# Patient Record
Sex: MALE | Race: BLACK OR AFRICAN AMERICAN | NOT HISPANIC OR LATINO | Employment: FULL TIME | URBAN - METROPOLITAN AREA
[De-identification: names, ages, dates, MRNs, and addresses within clinical notes are randomized per-mention and may not be internally consistent; named-entity substitution may affect disease eponyms.]

---

## 2018-12-05 ENCOUNTER — TRANSCRIBE ORDERS (OUTPATIENT)
Dept: LAB | Facility: CLINIC | Age: 38
End: 2018-12-05

## 2018-12-05 ENCOUNTER — APPOINTMENT (OUTPATIENT)
Dept: OCCUPATIONAL MEDICINE | Facility: CLINIC | Age: 38
End: 2018-12-05

## 2018-12-05 ENCOUNTER — TRANSCRIBE ORDERS (OUTPATIENT)
Dept: OCCUPATIONAL MEDICINE | Facility: CLINIC | Age: 38
End: 2018-12-05

## 2018-12-05 ENCOUNTER — APPOINTMENT (OUTPATIENT)
Dept: LAB | Facility: HOSPITAL | Age: 38
End: 2018-12-05

## 2018-12-05 DIAGNOSIS — Z00.8 SPECIAL EXAM: ICD-10-CM

## 2018-12-05 DIAGNOSIS — Z00.8 SPECIAL EXAM: Primary | ICD-10-CM

## 2018-12-05 LAB — RUBV IGG SERPL IA-ACNC: 5.3 IU/ML

## 2018-12-05 PROCEDURE — 86787 VARICELLA-ZOSTER ANTIBODY: CPT

## 2018-12-05 PROCEDURE — 86480 TB TEST CELL IMMUN MEASURE: CPT

## 2018-12-05 PROCEDURE — 86735 MUMPS ANTIBODY: CPT

## 2018-12-05 PROCEDURE — 36415 COLL VENOUS BLD VENIPUNCTURE: CPT

## 2018-12-05 PROCEDURE — 86762 RUBELLA ANTIBODY: CPT

## 2018-12-05 PROCEDURE — 86765 RUBEOLA ANTIBODY: CPT

## 2018-12-06 LAB
MEV IGG SER QL: NORMAL
MUV IGG SER QL: NORMAL
VZV IGG SER IA-ACNC: NORMAL

## 2018-12-07 LAB
GAMMA INTERFERON BACKGROUND BLD IA-ACNC: 0.02 IU/ML
M TB IFN-G BLD-IMP: NEGATIVE
M TB IFN-G CD4+ BCKGRND COR BLD-ACNC: -0.01 IU/ML
M TB IFN-G CD4+ BCKGRND COR BLD-ACNC: 0 IU/ML
MEV IGM SER-ACNC: <0.8 AU (ref 0–0.79)
MITOGEN IGNF BCKGRD COR BLD-ACNC: >10 IU/ML

## 2019-01-09 ENCOUNTER — OFFICE VISIT (OUTPATIENT)
Dept: URGENT CARE | Facility: MEDICAL CENTER | Age: 39
End: 2019-01-09
Payer: COMMERCIAL

## 2019-01-09 VITALS
BODY MASS INDEX: 40.32 KG/M2 | OXYGEN SATURATION: 100 % | WEIGHT: 272.2 LBS | RESPIRATION RATE: 16 BRPM | HEIGHT: 69 IN | HEART RATE: 71 BPM | TEMPERATURE: 98 F | SYSTOLIC BLOOD PRESSURE: 150 MMHG | DIASTOLIC BLOOD PRESSURE: 98 MMHG

## 2019-01-09 DIAGNOSIS — J01.00 ACUTE NON-RECURRENT MAXILLARY SINUSITIS: Primary | ICD-10-CM

## 2019-01-09 PROCEDURE — 99203 OFFICE O/P NEW LOW 30 MIN: CPT | Performed by: PHYSICIAN ASSISTANT

## 2019-01-09 RX ORDER — AMOXICILLIN AND CLAVULANATE POTASSIUM 875; 125 MG/1; MG/1
1 TABLET, FILM COATED ORAL EVERY 12 HOURS SCHEDULED
Qty: 14 TABLET | Refills: 0 | Status: SHIPPED | OUTPATIENT
Start: 2019-01-09 | End: 2019-01-16

## 2019-01-09 RX ORDER — CETIRIZINE HYDROCHLORIDE 10 MG/1
10 TABLET ORAL DAILY
Qty: 30 TABLET | Refills: 0 | Status: SHIPPED | OUTPATIENT
Start: 2019-01-09 | End: 2021-02-03 | Stop reason: ALTCHOICE

## 2019-01-09 RX ORDER — BENZONATATE 100 MG/1
100 CAPSULE ORAL 3 TIMES DAILY PRN
Qty: 15 CAPSULE | Refills: 0 | Status: SHIPPED | OUTPATIENT
Start: 2019-01-09 | End: 2019-03-19 | Stop reason: ALTCHOICE

## 2019-01-09 RX ORDER — ALBUTEROL SULFATE 90 UG/1
2 AEROSOL, METERED RESPIRATORY (INHALATION) EVERY 6 HOURS PRN
Qty: 1 INHALER | Refills: 0 | Status: SHIPPED | OUTPATIENT
Start: 2019-01-09 | End: 2020-04-03 | Stop reason: SDUPTHER

## 2019-01-09 RX ORDER — DIPHENOXYLATE HYDROCHLORIDE AND ATROPINE SULFATE 2.5; .025 MG/1; MG/1
1 TABLET ORAL DAILY
COMMUNITY

## 2019-01-09 RX ORDER — FLUTICASONE PROPIONATE 50 MCG
2 SPRAY, SUSPENSION (ML) NASAL DAILY
Qty: 16 G | Refills: 0 | Status: SHIPPED | OUTPATIENT
Start: 2019-01-09 | End: 2020-02-03 | Stop reason: ALTCHOICE

## 2019-01-09 NOTE — PROGRESS NOTES
[facility]        NAME: Ritesh Aragon is a 45 y o  female  : 1980    MRN: [id number]  DATE: 2019  TIME: 6:00 PM    Assessment and Plan   Acute non-recurrent maxillary sinusitis [J01 00]  1  Acute non-recurrent maxillary sinusitis  amoxicillin-clavulanate (AUGMENTIN) 875-125 mg per tablet    fluticasone (FLONASE) 50 mcg/act nasal spray    cetirizine (ZyrTEC) 10 mg tablet    albuterol (PROVENTIL HFA,VENTOLIN HFA) 90 mcg/act inhaler    benzonatate (TESSALON PERLES) 100 mg capsule         Patient Instructions     Take antibiotic as directed until completed  Motrin and/or Tylenol as needed for fevers and body aches and pains  Use additional medications as directed for symptomatic relief  Follow up with PCP in 3-5 days  Proceed to  ER if symptoms worsen  Chief Complaint     Chief Complaint   Patient presents with    Cold Like Symptoms     Patient here with sinus congestion, and a cough for 6 days  History of Present Illness       49-year-old female presents with week's worth of sinus congestion pain and cough  Reports he did have some fevers and chills  Denies any chest pain abdominal pain nausea vomiting or diarrhea  Has been taking Mucinex sinus for the past week without any relief  Sinusitis   This is a new problem  The current episode started 1 to 4 weeks ago  The problem has been waxing and waning since onset  Maximum temperature: Subjective  The fever has been present for 1 to 2 days  The pain is moderate  Associated symptoms include chills, congestion, coughing, headaches, sinus pressure and a sore throat  Treatments tried: mucinex sinus  The treatment provided mild relief  Review of Systems   Review of Systems   Constitutional: Positive for chills  HENT: Positive for congestion, sinus pressure and sore throat  Eyes: Negative  Respiratory: Positive for cough  Cardiovascular: Negative  Gastrointestinal: Negative  Musculoskeletal: Negative  Skin: Negative  Neurological: Positive for headaches  Current Medications       Current Outpatient Prescriptions:     multivitamin (THERAGRAN) TABS, Take 1 tablet by mouth daily, Disp: , Rfl:     albuterol (PROVENTIL HFA,VENTOLIN HFA) 90 mcg/act inhaler, Inhale 2 puffs every 6 (six) hours as needed for wheezing, Disp: 1 Inhaler, Rfl: 0    amoxicillin-clavulanate (AUGMENTIN) 875-125 mg per tablet, Take 1 tablet by mouth every 12 (twelve) hours for 7 days, Disp: 14 tablet, Rfl: 0    benzonatate (TESSALON PERLES) 100 mg capsule, Take 1 capsule (100 mg total) by mouth 3 (three) times a day as needed for cough, Disp: 15 capsule, Rfl: 0    cetirizine (ZyrTEC) 10 mg tablet, Take 1 tablet (10 mg total) by mouth daily, Disp: 30 tablet, Rfl: 0    fluticasone (FLONASE) 50 mcg/act nasal spray, 2 sprays into each nostril daily, Disp: 16 g, Rfl: 0    Current Allergies     Allergies as of 01/09/2019 - Reviewed 01/09/2019   Allergen Reaction Noted    Contrast dye [iodinated diagnostic agents] Anaphylaxis 01/09/2019            The following portions of the patient's history were reviewed and updated as appropriate: allergies, current medications, past family history, past medical history, past social history, past surgical history and problem list      Past Medical History:   Diagnosis Date    Asthma        Past Surgical History:   Procedure Laterality Date    GASTRIC BYPASS         No family history on file  Medications have been verified  Objective   /98 (BP Location: Left arm, Patient Position: Sitting, Cuff Size: Large)   Pulse 71   Temp 98 °F (36 7 °C) (Tympanic)   Resp 16   Ht 5' 9" (1 753 m)   Wt 123 kg (272 lb 3 2 oz)   SpO2 100%   BMI 40 20 kg/m²        Physical Exam     Physical Exam   Constitutional: She is oriented to person, place, and time  She appears well-developed and well-nourished  No distress  HENT:   Head: Normocephalic and atraumatic     Right Ear: External ear normal    Left Ear: External ear normal    Nose: Right sinus exhibits maxillary sinus tenderness  Left sinus exhibits maxillary sinus tenderness  Mouth/Throat: Oropharynx is clear and moist  No oropharyngeal exudate  Eyes: Conjunctivae are normal  Right eye exhibits no discharge  Left eye exhibits no discharge  Neck: Normal range of motion  Neck supple  Cardiovascular: Normal rate, regular rhythm, normal heart sounds and intact distal pulses  No murmur heard  Pulmonary/Chest: Effort normal and breath sounds normal  No respiratory distress  She has no wheezes  She has no rales  Abdominal: Soft  Bowel sounds are normal  There is no tenderness  Musculoskeletal: Normal range of motion  Lymphadenopathy:     She has no cervical adenopathy  Neurological: She is alert and oriented to person, place, and time  Skin: Skin is warm and dry  Psychiatric: She has a normal mood and affect  Nursing note and vitals reviewed

## 2019-01-09 NOTE — PATIENT INSTRUCTIONS
Take antibiotic as directed until completed  Motrin and/or Tylenol as needed for fevers and body aches and pains  Use additional medications as directed for symptomatic relief  Follow up with PCP in 3-5 days  Proceed to  ER if symptoms worsen  Sinusitis   AMBULATORY CARE:   Sinusitis  is inflammation or infection of your sinuses  It is most often caused by a virus  Acute sinusitis may last up to 12 weeks  Chronic sinusitis lasts longer than 12 weeks  Recurrent sinusitis means you have 4 or more times in 1 year  Common symptoms include the following:   · Fever    · Pain, pressure, redness, or swelling around the forehead, cheeks, or eyes    · Thick yellow or green discharge from your nose    · Tenderness when you touch your face over your sinuses    · Dry cough that happens mostly at night or when you lie down    · Headache and face pain that is worse when you lean forward    · Tooth pain, or pain when you chew  Seek care immediately if:   · Your eye and eyelid are red, swollen, and painful  · You cannot open your eye  · You have vision changes, such as double vision  · Your eyeball bulges out or you cannot move your eye  · You are more sleepy than normal, or you notice changes in your ability to think, move, or talk  · You have a stiff neck, a fever, or a bad headache  · You have swelling of your forehead or scalp  Contact your healthcare provider if:   · Your symptoms do not improve after 3 days  · Your symptoms do not go away after 10 days  · You have nausea and are vomiting  · Your nose is bleeding  · You have questions or concerns about your condition or care  Treatment for sinusitis:  Your symptoms may go away on their own  Your healthcare provider may recommend watchful waiting for up to 10 days before starting antibiotics  You may  need any of the following:  · Acetaminophen  decreases pain and fever  It is available without a doctor's order   Ask how much to take and how often to take it  Follow directions  Read the labels of all other medicines you are using to see if they also contain acetaminophen, or ask your doctor or pharmacist  Acetaminophen can cause liver damage if not taken correctly  Do not use more than 4 grams (4,000 milligrams) total of acetaminophen in one day  · NSAIDs , such as ibuprofen, help decrease swelling, pain, and fever  This medicine is available with or without a doctor's order  NSAIDs can cause stomach bleeding or kidney problems in certain people  If you take blood thinner medicine, always ask your healthcare provider if NSAIDs are safe for you  Always read the medicine label and follow directions  · Nasal steroid sprays  may help decrease inflammation in your nose and sinuses  · Decongestants  help reduce swelling and drain mucus in the nose and sinuses  They may help you breathe easier  · Antihistamines  help dry mucus in the nose and relieve sneezing  · Antibiotics  help treat or prevent a bacterial infection  · Take your medicine as directed  Contact your healthcare provider if you think your medicine is not helping or if you have side effects  Tell him or her if you are allergic to any medicine  Keep a list of the medicines, vitamins, and herbs you take  Include the amounts, and when and why you take them  Bring the list or the pill bottles to follow-up visits  Carry your medicine list with you in case of an emergency  Self-care:   · Rinse your sinuses  Use a sinus rinse device to rinse your nasal passages with a saline (salt water) solution or distilled water  Do not use tap water  This will help thin the mucus in your nose and rinse away pollen and dirt  It will also help reduce swelling so you can breathe normally  Ask your healthcare provider how often to do this  · Breathe in steam   Heat a bowl of water until you see steam  Lean over the bowl and make a tent over your head with a large towel   Breathe deeply for about 20 minutes  Be careful not to get too close to the steam or burn yourself  Do this 3 times a day  You can also breathe deeply when you take a hot shower  · Sleep with your head elevated  Place an extra pillow under your head before you go to sleep to help your sinuses drain  · Drink liquids as directed  Ask your healthcare provider how much liquid to drink each day and which liquids are best for you  Liquids will thin the mucus in your nose and help it drain  Avoid drinks that contain alcohol or caffeine  · Do not smoke, and avoid secondhand smoke  Nicotine and other chemicals in cigarettes and cigars can make your symptoms worse  Ask your healthcare provider for information if you currently smoke and need help to quit  E-cigarettes or smokeless tobacco still contain nicotine  Talk to your healthcare provider before you use these products  Prevent the spread of germs that cause sinusitis:  Wash your hands often with soap and water  Wash your hands after you use the bathroom, change a child's diaper, or sneeze  Wash your hands before you prepare or eat food  Follow up with your healthcare provider as directed: You may be referred to an ear, nose, and throat specialist  Write down your questions so you remember to ask them during your visits  © 2017 [address] [facility] Information is for End User's use only and may not be sold, redistributed or otherwise used for commercial purposes  All illustrations and images included in CareNotes® are the copyrighted property of [employer]  or Bertram Chandra  The above information is an  only  It is not intended as medical advice for individual conditions or treatments  Talk to your doctor, nurse or pharmacist before following any medical regimen to see if it is safe and effective for you

## 2019-03-06 ENCOUNTER — HOSPITAL ENCOUNTER (EMERGENCY)
Facility: HOSPITAL | Age: 39
Discharge: HOME | End: 2019-03-06
Attending: EMERGENCY MEDICINE | Admitting: EMERGENCY MEDICINE
Payer: COMMERCIAL

## 2019-03-06 ENCOUNTER — APPOINTMENT (EMERGENCY)
Dept: RADIOLOGY | Facility: HOSPITAL | Age: 39
End: 2019-03-06
Payer: COMMERCIAL

## 2019-03-06 VITALS
TEMPERATURE: 98.1 F | DIASTOLIC BLOOD PRESSURE: 70 MMHG | SYSTOLIC BLOOD PRESSURE: 139 MMHG | HEART RATE: 90 BPM | RESPIRATION RATE: 18 BRPM | OXYGEN SATURATION: 97 % | WEIGHT: 264.55 LBS | BODY MASS INDEX: 39.07 KG/M2

## 2019-03-06 DIAGNOSIS — K52.9 GASTROENTERITIS: Primary | ICD-10-CM

## 2019-03-06 LAB
ALBUMIN SERPL BCP-MCNC: 3.8 G/DL (ref 3.5–5)
ALP SERPL-CCNC: 67 U/L (ref 46–116)
ALT SERPL W P-5'-P-CCNC: 15 U/L (ref 12–78)
ANION GAP SERPL CALCULATED.3IONS-SCNC: 9 MMOL/L (ref 4–13)
AST SERPL W P-5'-P-CCNC: 21 U/L (ref 5–45)
BASOPHILS # BLD AUTO: 0.01 THOUSANDS/ΜL (ref 0–0.1)
BASOPHILS NFR BLD AUTO: 0 % (ref 0–1)
BILIRUB SERPL-MCNC: 0.7 MG/DL (ref 0.2–1)
BUN SERPL-MCNC: 11 MG/DL (ref 5–25)
CALCIUM SERPL-MCNC: 9 MG/DL (ref 8.3–10.1)
CHLORIDE SERPL-SCNC: 104 MMOL/L (ref 100–108)
CO2 SERPL-SCNC: 25 MMOL/L (ref 21–32)
CREAT SERPL-MCNC: 0.96 MG/DL (ref 0.6–1.3)
EOSINOPHIL # BLD AUTO: 0.09 THOUSAND/ΜL (ref 0–0.61)
EOSINOPHIL NFR BLD AUTO: 1 % (ref 0–6)
ERYTHROCYTE [DISTWIDTH] IN BLOOD BY AUTOMATED COUNT: 13.1 % (ref 11.6–15.1)
GLUCOSE SERPL-MCNC: 95 MG/DL (ref 65–140)
HCT VFR BLD AUTO: 50.9 % (ref 36.5–46.1)
HGB BLD-MCNC: 16 G/DL (ref 12–15.4)
IMM GRANULOCYTES # BLD AUTO: 0.03 THOUSAND/UL (ref 0–0.2)
IMM GRANULOCYTES NFR BLD AUTO: 0 % (ref 0–2)
LIPASE SERPL-CCNC: 160 U/L (ref 73–393)
LYMPHOCYTES # BLD AUTO: 0.29 THOUSANDS/ΜL (ref 0.6–4.47)
LYMPHOCYTES NFR BLD AUTO: 2 % (ref 14–44)
MCH RBC QN AUTO: 29.2 PG (ref 26.8–34.3)
MCHC RBC AUTO-ENTMCNC: 31.4 G/DL (ref 31.4–37.4)
MCV RBC AUTO: 93 FL (ref 82–98)
MONOCYTES # BLD AUTO: 0.28 THOUSAND/ΜL (ref 0.17–1.22)
MONOCYTES NFR BLD AUTO: 2 % (ref 4–12)
NEUTROPHILS # BLD AUTO: 11.65 THOUSANDS/ΜL (ref 1.85–7.62)
NEUTS SEG NFR BLD AUTO: 95 % (ref 43–75)
NRBC BLD AUTO-RTO: 0 /100 WBCS
PLATELET # BLD AUTO: 254 THOUSANDS/UL (ref 149–390)
PMV BLD AUTO: 9.5 FL (ref 8.9–12.7)
POTASSIUM SERPL-SCNC: 4 MMOL/L (ref 3.5–5.3)
PROT SERPL-MCNC: 8.1 G/DL (ref 6.4–8.2)
RBC # BLD AUTO: 5.48 MILLION/UL (ref 3.88–5.12)
SODIUM SERPL-SCNC: 138 MMOL/L (ref 136–145)
TROPONIN I SERPL-MCNC: <0.02 NG/ML
WBC # BLD AUTO: 12.35 THOUSAND/UL (ref 4.31–10.16)

## 2019-03-06 PROCEDURE — 36415 COLL VENOUS BLD VENIPUNCTURE: CPT

## 2019-03-06 PROCEDURE — 80053 COMPREHEN METABOLIC PANEL: CPT | Performed by: EMERGENCY MEDICINE

## 2019-03-06 PROCEDURE — 93005 ELECTROCARDIOGRAM TRACING: CPT

## 2019-03-06 PROCEDURE — 85025 COMPLETE CBC W/AUTO DIFF WBC: CPT | Performed by: EMERGENCY MEDICINE

## 2019-03-06 PROCEDURE — 84484 ASSAY OF TROPONIN QUANT: CPT | Performed by: EMERGENCY MEDICINE

## 2019-03-06 PROCEDURE — 99285 EMERGENCY DEPT VISIT HI MDM: CPT

## 2019-03-06 PROCEDURE — 83690 ASSAY OF LIPASE: CPT | Performed by: EMERGENCY MEDICINE

## 2019-03-06 PROCEDURE — 96361 HYDRATE IV INFUSION ADD-ON: CPT

## 2019-03-06 PROCEDURE — 71046 X-RAY EXAM CHEST 2 VIEWS: CPT

## 2019-03-06 PROCEDURE — 96360 HYDRATION IV INFUSION INIT: CPT

## 2019-03-06 RX ORDER — MAGNESIUM HYDROXIDE/ALUMINUM HYDROXICE/SIMETHICONE 120; 1200; 1200 MG/30ML; MG/30ML; MG/30ML
30 SUSPENSION ORAL ONCE
Status: COMPLETED | OUTPATIENT
Start: 2019-03-06 | End: 2019-03-06

## 2019-03-06 RX ORDER — DICYCLOMINE HCL 20 MG
20 TABLET ORAL ONCE
Status: COMPLETED | OUTPATIENT
Start: 2019-03-06 | End: 2019-03-06

## 2019-03-06 RX ORDER — ONDANSETRON 2 MG/ML
4 INJECTION INTRAMUSCULAR; INTRAVENOUS ONCE
Status: DISCONTINUED | OUTPATIENT
Start: 2019-03-06 | End: 2019-03-06

## 2019-03-06 RX ORDER — ONDANSETRON 4 MG/1
4 TABLET, ORALLY DISINTEGRATING ORAL EVERY 8 HOURS PRN
Qty: 15 TABLET | Refills: 0 | Status: SHIPPED | OUTPATIENT
Start: 2019-03-06 | End: 2019-10-17 | Stop reason: SDUPTHER

## 2019-03-06 RX ORDER — ONDANSETRON 4 MG/1
4 TABLET, ORALLY DISINTEGRATING ORAL ONCE
Status: COMPLETED | OUTPATIENT
Start: 2019-03-06 | End: 2019-03-06

## 2019-03-06 RX ADMIN — DICYCLOMINE HYDROCHLORIDE 20 MG: 20 TABLET ORAL at 16:28

## 2019-03-06 RX ADMIN — ALUMINUM HYDROXIDE, MAGNESIUM HYDROXIDE, AND SIMETHICONE 30 ML: 200; 200; 20 SUSPENSION ORAL at 18:06

## 2019-03-06 RX ADMIN — ONDANSETRON 4 MG: 4 TABLET, ORALLY DISINTEGRATING ORAL at 17:13

## 2019-03-06 RX ADMIN — LIDOCAINE HYDROCHLORIDE 15 ML: 20 SOLUTION ORAL; TOPICAL at 18:07

## 2019-03-06 RX ADMIN — SODIUM CHLORIDE 1000 ML: 0.9 INJECTION, SOLUTION INTRAVENOUS at 18:04

## 2019-03-06 NOTE — ED PROVIDER NOTES
History  Chief Complaint   Patient presents with    Chest Pain     Pt reports chest tightness for 1 hr  She also c/o abd pain     Patient is a 28-year-old transgender female with a history of gastric bypass who presents with abdominal and chest pain  Patient states she woke up this morning around 3:30 a m  with epigastric pain  She admits to associated nausea and diarrhea  She has had decreased PO intake today due to nausea  Her symptoms persisted throughout the morning and afternoon  This afternoon she developed pain radiating into her central chest   At that point, she decided to come to the emergency department for further evaluation  She denies fever, chills, shortness of breath, diaphoresis or other complaints  She continues to have nausea and diarrhea  She admits to sick contacts at home  Her grandmother had similar symptoms several days ago  Patient had sleeve gastrectomy and has had no complications as a result of the surgery  However she states that she drinks mainly diet sodas because she has difficulty tolerating water  History provided by:  Patient  Chest Pain   Pain location:  Substernal area and epigastric  Pain quality: aching and burning    Pain radiates to:  Does not radiate  Pain radiates to the back: no    Pain severity:  Mild  Duration:  1 day  Timing:  Constant  Progression:  Unchanged  Chronicity:  New  Ineffective treatments:  None tried  Associated symptoms: abdominal pain, heartburn and nausea    Associated symptoms: no back pain, no cough, no diaphoresis, no dizziness, no dysphagia, no fever, no headache, no palpitations, no shortness of breath and not vomiting        Prior to Admission Medications   Prescriptions Last Dose Informant Patient Reported? Taking?    albuterol (PROVENTIL HFA,VENTOLIN HFA) 90 mcg/act inhaler Past Week at Unknown time  No Yes   Sig: Inhale 2 puffs every 6 (six) hours as needed for wheezing   benzonatate (TESSALON PERLES) 100 mg capsule Past Month at Unknown time  No Yes   Sig: Take 1 capsule (100 mg total) by mouth 3 (three) times a day as needed for cough   cetirizine (ZyrTEC) 10 mg tablet Past Month at Unknown time  No Yes   Sig: Take 1 tablet (10 mg total) by mouth daily   fluticasone (FLONASE) 50 mcg/act nasal spray Past Month at Unknown time  No Yes   Si sprays into each nostril daily   multivitamin (THERAGRAN) TABS 3/6/2019 at Unknown time Self Yes Yes   Sig: Take 1 tablet by mouth daily      Facility-Administered Medications: None       Past Medical History:   Diagnosis Date    Asthma        Past Surgical History:   Procedure Laterality Date    GASTRIC BYPASS         History reviewed  No pertinent family history  I have reviewed and agree with the history as documented  Social History     Tobacco Use    Smoking status: Never Smoker    Smokeless tobacco: Never Used   Substance Use Topics    Alcohol use: Yes     Comment: socially    Drug use: Not on file        Review of Systems   Constitutional: Negative for chills, diaphoresis and fever  HENT: Negative for nosebleeds, sore throat and trouble swallowing  Eyes: Negative for photophobia, pain and visual disturbance  Respiratory: Negative for cough, chest tightness and shortness of breath  Cardiovascular: Positive for chest pain  Negative for palpitations and leg swelling  Gastrointestinal: Positive for abdominal pain, heartburn and nausea  Negative for constipation, diarrhea and vomiting  Endocrine: Negative for polydipsia and polyuria  Genitourinary: Negative for difficulty urinating, dysuria and hematuria  Musculoskeletal: Negative for back pain, neck pain and neck stiffness  Skin: Negative for pallor and rash  Neurological: Negative for dizziness, syncope, light-headedness and headaches  All other systems reviewed and are negative  Physical Exam  Physical Exam   Constitutional: She is oriented to person, place, and time   She appears well-developed and well-nourished  No distress  HENT:   Head: Normocephalic and atraumatic  Mouth/Throat: Oropharynx is clear and moist and mucous membranes are normal    Eyes: Pupils are equal, round, and reactive to light  EOM are normal    Neck: Normal range of motion  Neck supple  Cardiovascular: Normal rate, regular rhythm, normal heart sounds, intact distal pulses and normal pulses  Pulmonary/Chest: Effort normal and breath sounds normal  No respiratory distress  Abdominal: Soft  She exhibits no distension  There is tenderness in the epigastric area, periumbilical area and suprapubic area  There is no rigidity, no rebound and no guarding  Musculoskeletal: Normal range of motion  She exhibits no edema or tenderness  Lymphadenopathy:     She has no cervical adenopathy  Neurological: She is alert and oriented to person, place, and time  She has normal strength  No cranial nerve deficit or sensory deficit  Skin: Skin is warm and dry  Capillary refill takes less than 2 seconds  Psychiatric: She has a normal mood and affect  Nursing note and vitals reviewed        Vital Signs  ED Triage Vitals   Temperature Pulse Respirations Blood Pressure SpO2   03/06/19 1336 03/06/19 1336 03/06/19 1336 03/06/19 1340 03/06/19 1336   98 1 °F (36 7 °C) 78 18 167/80 96 %      Temp Source Heart Rate Source Patient Position - Orthostatic VS BP Location FiO2 (%)   03/06/19 1336 03/06/19 1336 03/06/19 1340 03/06/19 1340 --   Oral Monitor Sitting Left arm       Pain Score       03/06/19 1336       8           Vitals:    03/06/19 1340 03/06/19 1619 03/06/19 1838 03/06/19 1845   BP: 167/80 162/94 139/70 139/70   Pulse:  75 91 90   Patient Position - Orthostatic VS: Sitting Lying Lying        Visual Acuity      ED Medications  Medications   dicyclomine (BENTYL) tablet 20 mg (20 mg Oral Given 3/6/19 1628)   sodium chloride 0 9 % bolus 1,000 mL (0 mL Intravenous Stopped 3/6/19 1954)   ondansetron (ZOFRAN-ODT) dispersible tablet 4 mg (4 mg Oral Given 3/6/19 1713)   aluminum-magnesium hydroxide-simethicone (MYLANTA) 200-200-20 mg/5 mL oral suspension 30 mL (30 mL Oral Given 3/6/19 1806)   lidocaine viscous (XYLOCAINE) 2 % mucosal solution 15 mL (15 mL Swish & Spit Given 3/6/19 1807)       Diagnostic Studies  Results Reviewed     Procedure Component Value Units Date/Time    Lipase [[id number]]  (Normal) Collected:  03/06/19 1412    Lab Status:  Final result Specimen:  Blood from Arm, Right Updated:  03/06/19 1633     Lipase 160 u/L     CBC and differential [[id number]]  (Abnormal) Collected:  03/06/19 1412    Lab Status:  Final result Specimen:  Blood from Arm, Right Updated:  03/06/19 1444     WBC 12 35 Thousand/uL      RBC 5 48 Million/uL      Hemoglobin 16 0 g/dL      Hematocrit 50 9 %      MCV 93 fL      MCH 29 2 pg      MCHC 31 4 g/dL      RDW 13 1 %      MPV 9 5 fL      Platelets 480 Thousands/uL      nRBC 0 /100 WBCs      Neutrophils Relative 95 %      Immat GRANS % 0 %      Lymphocytes Relative 2 %      Monocytes Relative 2 %      Eosinophils Relative 1 %      Basophils Relative 0 %      Neutrophils Absolute 11 65 Thousands/µL      Immature Grans Absolute 0 03 Thousand/uL      Lymphocytes Absolute 0 29 Thousands/µL      Monocytes Absolute 0 28 Thousand/µL      Eosinophils Absolute 0 09 Thousand/µL      Basophils Absolute 0 01 Thousands/µL     Narrative: This is an appended report  These results have been appended to a previously verified report      Troponin I [[id number]]  (Normal) Collected:  03/06/19 1412    Lab Status:  Final result Specimen:  Blood from Arm, Right Updated:  03/06/19 1436     Troponin I <0 02 ng/mL     Comprehensive metabolic panel [[id number]] Collected:  03/06/19 1412    Lab Status:  Final result Specimen:  Blood from Arm, Right Updated:  03/06/19 1435     Sodium 138 mmol/L      Potassium 4 0 mmol/L      Chloride 104 mmol/L      CO2 25 mmol/L      ANION GAP 9 mmol/L      BUN 11 mg/dL      Creatinine 0 96 mg/dL Glucose 95 mg/dL      Calcium 9 0 mg/dL      AST 21 U/L      ALT 15 U/L      Alkaline Phosphatase 67 U/L      Total Protein 8 1 g/dL      Albumin 3 8 g/dL      Total Bilirubin 0 70 mg/dL      eGFR -- ml/min/1 73sq m     Narrative:       Notes:   1  eGFR calculation is only valid for adults 18 years and older  2  EGFR calculation cannot be performed for patients who are transgender, non-binary, or whose legal sex, sex at birth, and gender identity differ  XR chest 2 views   ED Interpretation by Sonja Lynch DO (03/06 2103)   No acute process  Final Result by Zulema Conley MD (03/06 1814)      No acute cardiopulmonary disease  Workstation performed: [id number]                    Procedures  ECG 12 Lead Documentation  Date/Time: 3/6/2019 3:55 PM  Performed by: Sonja Lynch DO  Authorized by: Sonja Lynch DO     ECG reviewed by me, the ED Provider: yes    Patient location:  ED  Previous ECG:     Previous ECG:  Unavailable    Comparison to cardiac monitor: Yes    Comments:      Normal sinus rhythm at a rate of 81 beats per minute  Normal intervals  Normal axis  Normal QRS  No ST T wave abnormalities  Occasional PACs  No old for comparison  Phone Contacts  ED Phone Contact    ED Course  ED Course as of Mar 07 1324   Wed Mar 06, 2019   1603 CBC appears heme concentrated  Will give IV fluids  1738 Patient with continued epigastric pain  Will give GI cocktail  1909 Patient tolerating PO  No vomiting in the emergency department  Complains of abdominal cramping but abdominal exam is not concerning for acute surgical pathology  MDM  Number of Diagnoses or Management Options  Gastroenteritis: new and requires workup  Diagnosis management comments: Patient presents with epigastric pain, nausea and diarrhea    She also states that epigastric pain radiates into her central chest   EKG shows no evidence of ischemia and troponin is negative  I suspect chest pain is of a GI etiology given her other symptoms  She admits to sick contact with similar GI symptoms  Patient was treated in the emergency department with IV fluids and antiemetics  She had significant improvement in nausea and is now tolerating PO  Abdominal exam is benign and I do not feel that further imaging is indicated  Do not suspect acute surgical process including but not limited to acute cholecystitis, acute appendicitis, SBO, mesenteric ischemia, AAA, vascular dissection, vascular occlusion, perforated viscus, testicular torsion  Do not suspect intrathoracic cause of abdominal pain  Symptoms improved and patient is tolerating po  Patient advised to return to ED if symptoms worsen or persist  Patient also advised to follow up with PCP  Amount and/or Complexity of Data Reviewed  Clinical lab tests: ordered and reviewed  Tests in the radiology section of CPT®: ordered and reviewed  Tests in the medicine section of CPT®: ordered and reviewed  Review and summarize past medical records: yes  Independent visualization of images, tracings, or specimens: yes    Risk of Complications, Morbidity, and/or Mortality  Presenting problems: high  Diagnostic procedures: moderate  Management options: moderate    Patient Progress  Patient progress: improved      Disposition  Final diagnoses:   Gastroenteritis     Time reflects when diagnosis was documented in both MDM as applicable and the Disposition within this note     Time User Action Codes Description Comment    3/6/2019  6:55 PM Carolina Cooper Add [K52 9] Gastroenteritis       ED Disposition     ED Disposition Condition Date/Time Comment    Discharge Stable Wed Mar 6, 2019  6:55 PM Beth Mata  discharge to home/self care              Follow-up Information     Follow up With Specialties Details Why Aletha Sutherland MD Internal Medicine Schedule an appointment as soon as possible for a visit [id number]  [phone]            Discharge Medication List as of 3/6/2019  6:56 PM      START taking these medications    Details   ondansetron (ZOFRAN-ODT) 4 mg disintegrating tablet Take 1 tablet (4 mg total) by mouth every 8 (eight) hours as needed for nausea or vomiting, Starting Wed 3/6/2019, Normal         CONTINUE these medications which have NOT CHANGED    Details   albuterol (PROVENTIL HFA,VENTOLIN HFA) 90 mcg/act inhaler Inhale 2 puffs every 6 (six) hours as needed for wheezing, Starting Wed 1/9/2019, Print      benzonatate (TESSALON PERLES) 100 mg capsule Take 1 capsule (100 mg total) by mouth 3 (three) times a day as needed for cough, Starting Wed 1/9/2019, Print      cetirizine (ZyrTEC) 10 mg tablet Take 1 tablet (10 mg total) by mouth daily, Starting Wed 1/9/2019, Print      fluticasone (FLONASE) 50 mcg/act nasal spray 2 sprays into each nostril daily, Starting Wed 1/9/2019, Print      multivitamin (THERAGRAN) TABS Take 1 tablet by mouth daily, Historical Med           No discharge procedures on file      ED Provider  Electronically Signed by           Laury Sanchez DO  03/07/19 9139

## 2019-03-07 LAB
ATRIAL RATE: 86 BPM
P AXIS: 63 DEGREES
PR INTERVAL: 172 MS
QRS AXIS: 5 DEGREES
QRSD INTERVAL: 88 MS
QT INTERVAL: 358 MS
QTC INTERVAL: 416 MS
T WAVE AXIS: 57 DEGREES
VENTRICULAR RATE: 81 BPM

## 2019-03-07 PROCEDURE — 93010 ELECTROCARDIOGRAM REPORT: CPT | Performed by: INTERNAL MEDICINE

## 2019-03-19 ENCOUNTER — OFFICE VISIT (OUTPATIENT)
Dept: INTERNAL MEDICINE CLINIC | Facility: CLINIC | Age: 39
End: 2019-03-19
Payer: COMMERCIAL

## 2019-03-19 VITALS
HEART RATE: 68 BPM | SYSTOLIC BLOOD PRESSURE: 142 MMHG | TEMPERATURE: 98.5 F | WEIGHT: 283.4 LBS | BODY MASS INDEX: 41.98 KG/M2 | HEIGHT: 69 IN | DIASTOLIC BLOOD PRESSURE: 90 MMHG | OXYGEN SATURATION: 97 %

## 2019-03-19 DIAGNOSIS — Z78.9 MALE-TO-FEMALE TRANSGENDER PERSON: ICD-10-CM

## 2019-03-19 DIAGNOSIS — I10 ACCELERATED HYPERTENSION: ICD-10-CM

## 2019-03-19 DIAGNOSIS — Z98.84 S/P GASTRIC BYPASS: Primary | ICD-10-CM

## 2019-03-19 DIAGNOSIS — M50.90 CERVICAL DISC DISEASE: ICD-10-CM

## 2019-03-19 DIAGNOSIS — F41.9 ANXIETY: ICD-10-CM

## 2019-03-19 PROCEDURE — 99204 OFFICE O/P NEW MOD 45 MIN: CPT | Performed by: INTERNAL MEDICINE

## 2019-03-19 PROCEDURE — 1036F TOBACCO NON-USER: CPT | Performed by: INTERNAL MEDICINE

## 2019-03-19 PROCEDURE — 3008F BODY MASS INDEX DOCD: CPT | Performed by: INTERNAL MEDICINE

## 2019-03-19 RX ORDER — AMLODIPINE BESYLATE 5 MG/1
5 TABLET ORAL DAILY
Qty: 30 TABLET | Refills: 2 | Status: SHIPPED | OUTPATIENT
Start: 2019-03-19 | End: 2019-09-27 | Stop reason: SDUPTHER

## 2019-03-19 RX ORDER — ESTRADIOL 2 MG/1
1 TABLET ORAL 2 TIMES DAILY
COMMUNITY
End: 2019-03-19 | Stop reason: SDUPTHER

## 2019-03-19 RX ORDER — LORAZEPAM 1 MG/1
1 TABLET ORAL AS NEEDED
COMMUNITY
End: 2019-03-19 | Stop reason: SDUPTHER

## 2019-03-19 RX ORDER — LORAZEPAM 1 MG/1
1 TABLET ORAL AS NEEDED
Qty: 30 TABLET | Refills: 1 | Status: SHIPPED | OUTPATIENT
Start: 2019-03-19 | End: 2020-02-03 | Stop reason: SDUPTHER

## 2019-03-19 RX ORDER — SPIRONOLACTONE 100 MG/1
100 TABLET, FILM COATED ORAL 2 TIMES DAILY
Qty: 60 TABLET | Refills: 3 | Status: SHIPPED | OUTPATIENT
Start: 2019-03-19 | End: 2020-03-10 | Stop reason: HOSPADM

## 2019-03-19 RX ORDER — SPIRONOLACTONE 100 MG/1
100 TABLET, FILM COATED ORAL 2 TIMES DAILY
COMMUNITY
End: 2019-03-19 | Stop reason: SDUPTHER

## 2019-03-19 RX ORDER — ESTRADIOL 2 MG/1
1 TABLET ORAL 2 TIMES DAILY
Qty: 60 TABLET | Refills: 2 | Status: SHIPPED | OUTPATIENT
Start: 2019-03-19 | End: 2020-02-03 | Stop reason: SDUPTHER

## 2019-03-19 NOTE — PROGRESS NOTES
Assessment/Plan:    S/P gastric bypass  Will need some routine follow-up regarding gastric bypass surgery  Patient would like to be evaluated by our Bariatric surgery Department  Accelerated hypertension  Blood pressure significantly elevated initially improved in the course of the office visit to 142/90  We will initiate amlodipine 5 mg daily obtain some basic blood work in see the patient back in 4 weeks    Cervical disc disease  Currently stable not requiring any additional management at this time    Male-to-female transgender person  Continue estradiol 2 mg daily along with spironolactone 200 mg daily    Anxiety  Lorazepam was renewed       Diagnoses and all orders for this visit:    S/P gastric bypass  -     LORazepam (ATIVAN) 1 mg tablet; Take 1 tablet (1 mg total) by mouth as needed for anxiety for up to 60 days    Accelerated hypertension  -     spironolactone (ALDACTONE) 100 mg tablet; Take 1 tablet (100 mg total) by mouth 2 (two) times a day  -     amLODIPine (NORVASC) 5 mg tablet; Take 1 tablet (5 mg total) by mouth daily for 90 days  -     Basic metabolic panel; Future  -     CBC and Platelet; Future    Cervical disc disease  -     LORazepam (ATIVAN) 1 mg tablet; Take 1 tablet (1 mg total) by mouth as needed for anxiety for up to 60 days    Male-to-female transgender person  -     spironolactone (ALDACTONE) 100 mg tablet; Take 1 tablet (100 mg total) by mouth 2 (two) times a day  -     estradiol (ESTRACE) 2 MG tablet; Take 0 5 tablets (1 mg total) by mouth 2 (two) times a day for 90 days    Anxiety    Other orders  -     Discontinue: LORazepam (ATIVAN) 1 mg tablet; Take 1 mg by mouth as needed for anxiety  -     Discontinue: estradiol (ESTRACE) 2 MG tablet; Take 1 mg by mouth 2 (two) times a day  -     Discontinue: spironolactone (ALDACTONE) 100 mg tablet; Take 100 mg by mouth 2 (two) times a day          Subjective:      Patient ID: Greg Trotter  is a 44 y o  adult      The patient is 51-year-old transgender female, preoperative is status post gastric bypass surgery 2 years ago for morbid obesity of greater than 500 lb  She has been treated with hormone therapy since early 2000s in anticipation of eventual trans gender surgery  Patient presents to establish medical care  Medications were reviewed  The patient is currently undergoing care in [place] and would like to transition her hormonal management to this area  She does have a history of hypertension in the past along with anxiety and pseudoseizures that occur when her anxiety is out of control  She has been stable for quite some time  She had been treated previously with some antihypertensive medications but all of her medications were discontinued following successful gastric bypass surgery  Patient recently relocated from [place]  No labs have been done recently        Family History   Problem Relation Age of Onset    Hypertension Mother     Diabetes Mother     Hypertension Sister      Social History     Socioeconomic History    Marital status: Single     Spouse name: Not on file    Number of children: Not on file    Years of education: Not on file    Highest education level: Not on file   Occupational History    Not on file   Social Needs    Financial resource strain: Not on file    Food insecurity:     Worry: Not on file     Inability: Not on file    Transportation needs:     Medical: Not on file     Non-medical: Not on file   Tobacco Use    Smoking status: Never Smoker    Smokeless tobacco: Never Used   Substance and Sexual Activity    Alcohol use: Yes     Comment: socially    Drug use: Never    Sexual activity: Not on file   Lifestyle    Physical activity:     Days per week: Not on file     Minutes per session: Not on file    Stress: Not on file   Relationships    Social connections:     Talks on phone: Not on file     Gets together: Not on file     Attends Anglican service: Not on file Active member of club or organization: Not on file     Attends meetings of clubs or organizations: Not on file     Relationship status: Not on file    Intimate partner violence:     Fear of current or ex partner: Not on file     Emotionally abused: Not on file     Physically abused: Not on file     Forced sexual activity: Not on file   Other Topics Concern    Not on file   Social History Narrative    Not on file     Past Medical History:   Diagnosis Date    Anxiety     Asthma     seasonal       Current Outpatient Medications:     albuterol (PROVENTIL HFA,VENTOLIN HFA) 90 mcg/act inhaler, Inhale 2 puffs every 6 (six) hours as needed for wheezing, Disp: 1 Inhaler, Rfl: 0    cetirizine (ZyrTEC) 10 mg tablet, Take 1 tablet (10 mg total) by mouth daily (Patient taking differently: Take 10 mg by mouth daily as needed ), Disp: 30 tablet, Rfl: 0    estradiol (ESTRACE) 2 MG tablet, Take 0 5 tablets (1 mg total) by mouth 2 (two) times a day for 90 days, Disp: 60 tablet, Rfl: 2    fluticasone (FLONASE) 50 mcg/act nasal spray, 2 sprays into each nostril daily (Patient taking differently: 2 sprays into each nostril daily as needed ), Disp: 16 g, Rfl: 0    LORazepam (ATIVAN) 1 mg tablet, Take 1 tablet (1 mg total) by mouth as needed for anxiety for up to 60 days, Disp: 30 tablet, Rfl: 1    multivitamin (THERAGRAN) TABS, Take 1 tablet by mouth daily, Disp: , Rfl:     ondansetron (ZOFRAN-ODT) 4 mg disintegrating tablet, Take 1 tablet (4 mg total) by mouth every 8 (eight) hours as needed for nausea or vomiting, Disp: 15 tablet, Rfl: 0    spironolactone (ALDACTONE) 100 mg tablet, Take 1 tablet (100 mg total) by mouth 2 (two) times a day, Disp: 60 tablet, Rfl: 3    amLODIPine (NORVASC) 5 mg tablet, Take 1 tablet (5 mg total) by mouth daily for 90 days, Disp: 30 tablet, Rfl: 2  Allergies   Allergen Reactions    Contrast Dye [Iodinated Diagnostic Agents] Anaphylaxis     Past Surgical History:   Procedure Laterality Date  GASTRIC BYPASS           Review of Systems   Constitutional: Negative  HENT: Negative  Eyes: Negative  Respiratory: Negative  Cardiovascular: Negative  Gastrointestinal: Negative  Genitourinary: Negative  Musculoskeletal: Negative  Skin: Negative  Neurological: Negative  Hematological: Negative  Psychiatric/Behavioral: Negative  Objective:      /90 (BP Location: Right arm, Patient Position: Sitting, Cuff Size: Standard)   Pulse 68   Temp 98 5 °F (36 9 °C) (Oral)   Ht 5' 8 9" (1 75 m)   Wt 129 kg (283 lb 6 4 oz)   SpO2 97%   BMI 41 97 kg/m²  BMI Counseling: Body mass index is 41 97 kg/m²  The BMI is above normal  Nutrition recommendations include reducing portion sizes, decreasing overall calorie intake, consuming healthier snacks, decreasing soda and/or juice intake, moderation in carbohydrate intake, increasing intake of lean protein, reducing intake of saturated fat and trans fat and reducing intake of cholesterol  Exercise recommendations include exercising 3-5 times per week  Patient referred to bariatric surgery due to patient being morbidly obese  Patient has undergone gastric bypass previously  Physical Exam   Constitutional: She is oriented to person, place, and time  She appears well-developed  No distress  HENT:   Head: Normocephalic and atraumatic  Right Ear: External ear normal    Left Ear: External ear normal    Eyes: Pupils are equal, round, and reactive to light  Conjunctivae are normal  No scleral icterus  Neck: No JVD present  No tracheal deviation present  Cardiovascular: Normal rate, regular rhythm and normal heart sounds  No murmur heard  Pulmonary/Chest: Effort normal and breath sounds normal  No respiratory distress  She has no wheezes  She has no rales  Abdominal: Soft  She exhibits no distension  There is no tenderness  Musculoskeletal: Normal range of motion  She exhibits no edema or deformity     Neurological: She is alert and oriented to person, place, and time  Grossly, no deficits  Skin: Skin is warm and dry  She is not diaphoretic  Psychiatric: She has a normal mood and affect  Her behavior is normal  Thought content normal    Vitals reviewed

## 2019-03-19 NOTE — ASSESSMENT & PLAN NOTE
Will need some routine follow-up regarding gastric bypass surgery  Patient would like to be evaluated by our Bariatric surgery Department

## 2019-03-19 NOTE — ASSESSMENT & PLAN NOTE
Blood pressure significantly elevated initially improved in the course of the office visit to 142/90    We will initiate amlodipine 5 mg daily obtain some basic blood work in see the patient back in 4 weeks

## 2019-04-24 ENCOUNTER — APPOINTMENT (OUTPATIENT)
Dept: LAB | Facility: CLINIC | Age: 39
End: 2019-04-24
Payer: COMMERCIAL

## 2019-04-24 DIAGNOSIS — I10 ACCELERATED HYPERTENSION: ICD-10-CM

## 2019-04-24 LAB
ANION GAP SERPL CALCULATED.3IONS-SCNC: 7 MMOL/L (ref 4–13)
BUN SERPL-MCNC: 10 MG/DL (ref 5–25)
CALCIUM SERPL-MCNC: 9.1 MG/DL (ref 8.3–10.1)
CHLORIDE SERPL-SCNC: 103 MMOL/L (ref 100–108)
CO2 SERPL-SCNC: 29 MMOL/L (ref 21–32)
CREAT SERPL-MCNC: 1 MG/DL (ref 0.6–1.3)
ERYTHROCYTE [DISTWIDTH] IN BLOOD BY AUTOMATED COUNT: 13.3 % (ref 11.6–15.1)
GLUCOSE SERPL-MCNC: 86 MG/DL (ref 65–140)
HCT VFR BLD AUTO: 47.9 % (ref 36.5–46.1)
HGB BLD-MCNC: 15.4 G/DL (ref 12–15.4)
MCH RBC QN AUTO: 29.4 PG (ref 26.8–34.3)
MCHC RBC AUTO-ENTMCNC: 32.2 G/DL (ref 31.4–37.4)
MCV RBC AUTO: 91 FL (ref 82–98)
PLATELET # BLD AUTO: 260 THOUSANDS/UL (ref 149–390)
PMV BLD AUTO: 9.8 FL (ref 8.9–12.7)
POTASSIUM SERPL-SCNC: 4.3 MMOL/L (ref 3.5–5.3)
RBC # BLD AUTO: 5.24 MILLION/UL (ref 3.88–5.12)
SODIUM SERPL-SCNC: 139 MMOL/L (ref 136–145)
WBC # BLD AUTO: 7.29 THOUSAND/UL (ref 4.31–10.16)

## 2019-04-24 PROCEDURE — 36415 COLL VENOUS BLD VENIPUNCTURE: CPT

## 2019-04-24 PROCEDURE — 80048 BASIC METABOLIC PNL TOTAL CA: CPT

## 2019-04-24 PROCEDURE — 85027 COMPLETE CBC AUTOMATED: CPT

## 2019-05-01 ENCOUNTER — OFFICE VISIT (OUTPATIENT)
Dept: INTERNAL MEDICINE CLINIC | Facility: CLINIC | Age: 39
End: 2019-05-01
Payer: COMMERCIAL

## 2019-05-01 VITALS
WEIGHT: 288.6 LBS | HEIGHT: 69 IN | HEART RATE: 60 BPM | TEMPERATURE: 98 F | SYSTOLIC BLOOD PRESSURE: 118 MMHG | DIASTOLIC BLOOD PRESSURE: 76 MMHG | BODY MASS INDEX: 42.75 KG/M2

## 2019-05-01 DIAGNOSIS — Z23 NEED FOR TDAP VACCINATION: Primary | ICD-10-CM

## 2019-05-01 DIAGNOSIS — M50.90 CERVICAL DISC DISEASE: ICD-10-CM

## 2019-05-01 DIAGNOSIS — I10 ESSENTIAL HYPERTENSION: ICD-10-CM

## 2019-05-01 DIAGNOSIS — Z98.84 S/P GASTRIC BYPASS: ICD-10-CM

## 2019-05-01 PROCEDURE — 3078F DIAST BP <80 MM HG: CPT | Performed by: INTERNAL MEDICINE

## 2019-05-01 PROCEDURE — 99213 OFFICE O/P EST LOW 20 MIN: CPT | Performed by: INTERNAL MEDICINE

## 2019-05-01 PROCEDURE — 3074F SYST BP LT 130 MM HG: CPT | Performed by: INTERNAL MEDICINE

## 2019-05-01 RX ORDER — CYCLOBENZAPRINE HCL 10 MG
10 TABLET ORAL DAILY PRN
Qty: 30 TABLET | Refills: 1 | Status: SHIPPED | OUTPATIENT
Start: 2019-05-01 | End: 2021-06-07 | Stop reason: ALTCHOICE

## 2019-05-13 ENCOUNTER — APPOINTMENT (OUTPATIENT)
Dept: LAB | Facility: CLINIC | Age: 39
End: 2019-05-13
Payer: COMMERCIAL

## 2019-05-13 DIAGNOSIS — Z98.84 S/P GASTRIC BYPASS: ICD-10-CM

## 2019-05-13 DIAGNOSIS — I10 ESSENTIAL HYPERTENSION: ICD-10-CM

## 2019-05-13 LAB
MAGNESIUM SERPL-MCNC: 1.9 MG/DL (ref 1.6–2.6)
VIT B12 SERPL-MCNC: 261 PG/ML (ref 100–900)

## 2019-05-13 PROCEDURE — 82607 VITAMIN B-12: CPT

## 2019-05-13 PROCEDURE — 36415 COLL VENOUS BLD VENIPUNCTURE: CPT

## 2019-05-13 PROCEDURE — 83735 ASSAY OF MAGNESIUM: CPT

## 2019-05-13 PROCEDURE — 84591 ASSAY OF NOS VITAMIN: CPT

## 2019-05-20 LAB — MISCELLANEOUS LAB TEST RESULT: NORMAL

## 2019-05-31 ENCOUNTER — OFFICE VISIT (OUTPATIENT)
Dept: GASTROENTEROLOGY | Facility: CLINIC | Age: 39
End: 2019-05-31
Payer: COMMERCIAL

## 2019-05-31 VITALS
SYSTOLIC BLOOD PRESSURE: 138 MMHG | BODY MASS INDEX: 41.97 KG/M2 | WEIGHT: 283.38 LBS | HEART RATE: 80 BPM | DIASTOLIC BLOOD PRESSURE: 86 MMHG

## 2019-05-31 DIAGNOSIS — K21.9 GASTROESOPHAGEAL REFLUX DISEASE WITHOUT ESOPHAGITIS: Primary | ICD-10-CM

## 2019-05-31 DIAGNOSIS — K42.9 UMBILICAL HERNIA WITHOUT OBSTRUCTION AND WITHOUT GANGRENE: ICD-10-CM

## 2019-05-31 PROCEDURE — 99244 OFF/OP CNSLTJ NEW/EST MOD 40: CPT | Performed by: INTERNAL MEDICINE

## 2019-05-31 RX ORDER — PANTOPRAZOLE SODIUM 40 MG/1
40 TABLET, DELAYED RELEASE ORAL DAILY
Qty: 30 TABLET | Refills: 2 | Status: SHIPPED | OUTPATIENT
Start: 2019-05-31 | End: 2019-09-26 | Stop reason: SDUPTHER

## 2019-06-06 ENCOUNTER — TELEPHONE (OUTPATIENT)
Dept: GASTROENTEROLOGY | Facility: CLINIC | Age: 39
End: 2019-06-06

## 2019-06-06 DIAGNOSIS — K42.9 UMBILICAL HERNIA WITHOUT OBSTRUCTION AND WITHOUT GANGRENE: Primary | ICD-10-CM

## 2019-06-07 ENCOUNTER — OFFICE VISIT (OUTPATIENT)
Dept: INTERNAL MEDICINE CLINIC | Facility: CLINIC | Age: 39
End: 2019-06-07
Payer: COMMERCIAL

## 2019-06-07 VITALS
HEART RATE: 76 BPM | TEMPERATURE: 98.4 F | WEIGHT: 288 LBS | BODY MASS INDEX: 42.65 KG/M2 | DIASTOLIC BLOOD PRESSURE: 82 MMHG | OXYGEN SATURATION: 98 % | HEIGHT: 69 IN | SYSTOLIC BLOOD PRESSURE: 146 MMHG

## 2019-06-07 DIAGNOSIS — M50.90 CERVICAL DISC DISEASE: Primary | ICD-10-CM

## 2019-06-07 PROCEDURE — 3008F BODY MASS INDEX DOCD: CPT | Performed by: INTERNAL MEDICINE

## 2019-06-07 PROCEDURE — 99213 OFFICE O/P EST LOW 20 MIN: CPT | Performed by: INTERNAL MEDICINE

## 2019-06-07 RX ORDER — GABAPENTIN 100 MG/1
100 CAPSULE ORAL 3 TIMES DAILY
Qty: 90 CAPSULE | Refills: 1 | Status: SHIPPED | OUTPATIENT
Start: 2019-06-07 | End: 2020-03-02 | Stop reason: SDUPTHER

## 2019-06-07 RX ORDER — METHOCARBAMOL 500 MG/1
500 TABLET, FILM COATED ORAL DAILY PRN
Qty: 90 TABLET | Refills: 1 | Status: SHIPPED | OUTPATIENT
Start: 2019-06-07 | End: 2020-03-25

## 2019-06-07 RX ORDER — METHOCARBAMOL 500 MG/1
500 TABLET, FILM COATED ORAL DAILY PRN
COMMUNITY
End: 2019-06-07 | Stop reason: SDUPTHER

## 2019-06-11 ENCOUNTER — ANESTHESIA EVENT (OUTPATIENT)
Dept: GASTROENTEROLOGY | Facility: HOSPITAL | Age: 39
End: 2019-06-11

## 2019-06-11 DIAGNOSIS — T50.995S ALLERGIC REACTION TO DYE, SEQUELA: Primary | ICD-10-CM

## 2019-06-11 RX ORDER — METHYLPREDNISOLONE 32 MG/1
TABLET ORAL
Qty: 2 TABLET | Refills: 0 | Status: SHIPPED | OUTPATIENT
Start: 2019-06-11 | End: 2020-02-17

## 2019-06-11 RX ORDER — SODIUM CHLORIDE, SODIUM LACTATE, POTASSIUM CHLORIDE, CALCIUM CHLORIDE 600; 310; 30; 20 MG/100ML; MG/100ML; MG/100ML; MG/100ML
125 INJECTION, SOLUTION INTRAVENOUS CONTINUOUS
Status: CANCELLED | OUTPATIENT
Start: 2019-06-11

## 2019-06-12 ENCOUNTER — TELEPHONE (OUTPATIENT)
Dept: GASTROENTEROLOGY | Facility: CLINIC | Age: 39
End: 2019-06-12

## 2019-06-12 ENCOUNTER — HOSPITAL ENCOUNTER (OUTPATIENT)
Dept: GASTROENTEROLOGY | Facility: HOSPITAL | Age: 39
Setting detail: OUTPATIENT SURGERY
Discharge: HOME | End: 2019-06-12
Attending: INTERNAL MEDICINE | Admitting: INTERNAL MEDICINE
Payer: COMMERCIAL

## 2019-06-12 ENCOUNTER — ANESTHESIA (OUTPATIENT)
Dept: GASTROENTEROLOGY | Facility: HOSPITAL | Age: 39
End: 2019-06-12

## 2019-06-12 VITALS
OXYGEN SATURATION: 98 % | TEMPERATURE: 98.3 F | SYSTOLIC BLOOD PRESSURE: 159 MMHG | WEIGHT: 282.85 LBS | DIASTOLIC BLOOD PRESSURE: 76 MMHG | HEIGHT: 69 IN | RESPIRATION RATE: 19 BRPM | BODY MASS INDEX: 41.89 KG/M2 | HEART RATE: 81 BPM

## 2019-06-12 DIAGNOSIS — K21.9 GASTROESOPHAGEAL REFLUX DISEASE WITHOUT ESOPHAGITIS: ICD-10-CM

## 2019-06-12 DIAGNOSIS — K42.9 UMBILICAL HERNIA WITHOUT OBSTRUCTION AND WITHOUT GANGRENE: ICD-10-CM

## 2019-06-12 PROCEDURE — 43239 EGD BIOPSY SINGLE/MULTIPLE: CPT | Performed by: INTERNAL MEDICINE

## 2019-06-12 PROCEDURE — 88305 TISSUE EXAM BY PATHOLOGIST: CPT | Performed by: PATHOLOGY

## 2019-06-12 RX ORDER — PROPOFOL 10 MG/ML
INJECTION, EMULSION INTRAVENOUS AS NEEDED
Status: DISCONTINUED | OUTPATIENT
Start: 2019-06-12 | End: 2019-06-12 | Stop reason: SURG

## 2019-06-12 RX ORDER — DIPHENHYDRAMINE HYDROCHLORIDE 50 MG/ML
INJECTION INTRAMUSCULAR; INTRAVENOUS AS NEEDED
Status: DISCONTINUED | OUTPATIENT
Start: 2019-06-12 | End: 2019-06-12 | Stop reason: SURG

## 2019-06-12 RX ORDER — METOCLOPRAMIDE HYDROCHLORIDE 5 MG/ML
INJECTION INTRAMUSCULAR; INTRAVENOUS AS NEEDED
Status: DISCONTINUED | OUTPATIENT
Start: 2019-06-12 | End: 2019-06-12 | Stop reason: SURG

## 2019-06-12 RX ORDER — KETAMINE HYDROCHLORIDE 50 MG/ML
INJECTION, SOLUTION, CONCENTRATE INTRAMUSCULAR; INTRAVENOUS AS NEEDED
Status: DISCONTINUED | OUTPATIENT
Start: 2019-06-12 | End: 2019-06-12 | Stop reason: SURG

## 2019-06-12 RX ORDER — ONDANSETRON 2 MG/ML
INJECTION INTRAMUSCULAR; INTRAVENOUS AS NEEDED
Status: DISCONTINUED | OUTPATIENT
Start: 2019-06-12 | End: 2019-06-12 | Stop reason: SURG

## 2019-06-12 RX ORDER — DEXAMETHASONE SODIUM PHOSPHATE 4 MG/ML
INJECTION, SOLUTION INTRA-ARTICULAR; INTRALESIONAL; INTRAMUSCULAR; INTRAVENOUS; SOFT TISSUE AS NEEDED
Status: DISCONTINUED | OUTPATIENT
Start: 2019-06-12 | End: 2019-06-12 | Stop reason: SURG

## 2019-06-12 RX ORDER — SODIUM CHLORIDE, SODIUM LACTATE, POTASSIUM CHLORIDE, CALCIUM CHLORIDE 600; 310; 30; 20 MG/100ML; MG/100ML; MG/100ML; MG/100ML
INJECTION, SOLUTION INTRAVENOUS CONTINUOUS PRN
Status: DISCONTINUED | OUTPATIENT
Start: 2019-06-12 | End: 2019-06-12 | Stop reason: SURG

## 2019-06-12 RX ADMIN — PROPOFOL 50 MG: 10 INJECTION, EMULSION INTRAVENOUS at 12:09

## 2019-06-12 RX ADMIN — SODIUM CHLORIDE, SODIUM LACTATE, POTASSIUM CHLORIDE, AND CALCIUM CHLORIDE: .6; .31; .03; .02 INJECTION, SOLUTION INTRAVENOUS at 11:42

## 2019-06-12 RX ADMIN — METOCLOPRAMIDE HYDROCHLORIDE 10 MG: 5 INJECTION INTRAMUSCULAR; INTRAVENOUS at 12:17

## 2019-06-12 RX ADMIN — KETAMINE HYDROCHLORIDE 15 MG: 50 INJECTION INTRAMUSCULAR; INTRAVENOUS at 12:08

## 2019-06-12 RX ADMIN — KETAMINE HYDROCHLORIDE 15 MG: 50 INJECTION INTRAMUSCULAR; INTRAVENOUS at 12:09

## 2019-06-12 RX ADMIN — PROPOFOL 70 MG: 10 INJECTION, EMULSION INTRAVENOUS at 12:08

## 2019-06-12 RX ADMIN — LIDOCAINE HYDROCHLORIDE 100 MG: 20 INJECTION, SOLUTION INTRAVENOUS at 12:07

## 2019-06-12 RX ADMIN — DIPHENHYDRAMINE HYDROCHLORIDE 12.5 MG: 50 INJECTION, SOLUTION INTRAMUSCULAR; INTRAVENOUS at 12:21

## 2019-06-12 RX ADMIN — PROPOFOL 10 MG: 10 INJECTION, EMULSION INTRAVENOUS at 12:22

## 2019-06-12 RX ADMIN — ONDANSETRON 4 MG: 2 INJECTION INTRAMUSCULAR; INTRAVENOUS at 12:15

## 2019-06-12 RX ADMIN — ONDANSETRON 4 MG: 2 INJECTION INTRAMUSCULAR; INTRAVENOUS at 12:20

## 2019-06-12 RX ADMIN — PROPOFOL 50 MG: 10 INJECTION, EMULSION INTRAVENOUS at 12:11

## 2019-06-12 RX ADMIN — PROPOFOL 130 MG: 10 INJECTION, EMULSION INTRAVENOUS at 12:07

## 2019-06-12 RX ADMIN — PROPOFOL 10 MG: 10 INJECTION, EMULSION INTRAVENOUS at 12:21

## 2019-06-12 RX ADMIN — DEXAMETHASONE SODIUM PHOSPHATE 4 MG: 4 INJECTION, SOLUTION INTRAMUSCULAR; INTRAVENOUS at 12:19

## 2019-06-12 RX ADMIN — PROPOFOL 10 MG: 10 INJECTION, EMULSION INTRAVENOUS at 12:23

## 2019-06-13 ENCOUNTER — TELEPHONE (OUTPATIENT)
Dept: GASTROENTEROLOGY | Facility: CLINIC | Age: 39
End: 2019-06-13

## 2019-06-25 ENCOUNTER — TELEPHONE (OUTPATIENT)
Dept: BARIATRICS | Facility: CLINIC | Age: 39
End: 2019-06-25

## 2019-08-20 ENCOUNTER — TELEPHONE (OUTPATIENT)
Dept: INTERNAL MEDICINE CLINIC | Facility: CLINIC | Age: 39
End: 2019-08-20

## 2019-08-20 DIAGNOSIS — M50.90 CERVICAL DISC DISEASE: Primary | ICD-10-CM

## 2019-08-20 DIAGNOSIS — M54.2 NECK PAIN: ICD-10-CM

## 2019-08-20 NOTE — TELEPHONE ENCOUNTER
Pt states Dr Camacho has been following her for her neck pain and she wants to know if she can get a script put in for a tens unit  If someone can look into this

## 2019-09-25 DIAGNOSIS — I10 ESSENTIAL HYPERTENSION: Primary | ICD-10-CM

## 2019-09-26 DIAGNOSIS — K42.9 UMBILICAL HERNIA WITHOUT OBSTRUCTION AND WITHOUT GANGRENE: ICD-10-CM

## 2019-09-26 DIAGNOSIS — K21.9 GASTROESOPHAGEAL REFLUX DISEASE WITHOUT ESOPHAGITIS: ICD-10-CM

## 2019-09-26 RX ORDER — PANTOPRAZOLE SODIUM 40 MG/1
40 TABLET, DELAYED RELEASE ORAL DAILY
Qty: 30 TABLET | Refills: 2 | Status: SHIPPED | OUTPATIENT
Start: 2019-09-26 | End: 2019-10-17 | Stop reason: SDUPTHER

## 2019-09-26 RX ORDER — AMLODIPINE BESYLATE 5 MG/1
TABLET ORAL
Qty: 30 TABLET | Refills: 0 | Status: SHIPPED | OUTPATIENT
Start: 2019-09-26 | End: 2020-03-25

## 2019-09-27 DIAGNOSIS — K21.9 GASTROESOPHAGEAL REFLUX DISEASE WITHOUT ESOPHAGITIS: ICD-10-CM

## 2019-09-27 DIAGNOSIS — K42.9 UMBILICAL HERNIA WITHOUT OBSTRUCTION AND WITHOUT GANGRENE: ICD-10-CM

## 2019-09-27 DIAGNOSIS — I10 ACCELERATED HYPERTENSION: ICD-10-CM

## 2019-09-27 RX ORDER — AMLODIPINE BESYLATE 5 MG/1
5 TABLET ORAL DAILY
Qty: 30 TABLET | Refills: 0 | Status: SHIPPED | OUTPATIENT
Start: 2019-09-27 | End: 2020-02-03 | Stop reason: SDUPTHER

## 2019-09-27 RX ORDER — AMLODIPINE BESYLATE 5 MG/1
5 TABLET ORAL DAILY
Qty: 30 TABLET | Refills: 0 | OUTPATIENT
Start: 2019-09-27 | End: 2019-12-26

## 2019-09-27 NOTE — TELEPHONE ENCOUNTER
Please call patient and have her call the GI doctor for the Pantoprazole refill  They manage this medication  Thank you!

## 2019-09-27 NOTE — TELEPHONE ENCOUNTER
Pt needs a refill on amLODIPine (NORVASC) 5 mg tablet and pantoprazole (PROTONIX) 40 mg tablet, pt needs both medications sent to [facility] on [address] [facility]  Pt is there for the wknd

## 2019-10-03 ENCOUNTER — TELEPHONE (OUTPATIENT)
Dept: INTERNAL MEDICINE CLINIC | Facility: CLINIC | Age: 39
End: 2019-10-03

## 2019-10-03 DIAGNOSIS — K64.9 HEMORRHOIDS, UNSPECIFIED HEMORRHOID TYPE: Primary | ICD-10-CM

## 2019-10-03 RX ORDER — HYDROCORTISONE ACETATE 25 MG/1
25 SUPPOSITORY RECTAL 2 TIMES DAILY
Qty: 60 SUPPOSITORY | Refills: 1 | Status: SHIPPED | OUTPATIENT
Start: 2019-10-03 | End: 2021-07-28

## 2019-10-03 NOTE — TELEPHONE ENCOUNTER
Patient called requesting a Rx for Hydrocortisone Acetate rectal suppository for inflammation of hemorrhoids  This was written by her previous doctor and she wanted to know if you would refill it  Thank you!

## 2019-10-17 DIAGNOSIS — K21.9 GASTROESOPHAGEAL REFLUX DISEASE WITHOUT ESOPHAGITIS: ICD-10-CM

## 2019-10-17 DIAGNOSIS — K52.9 GASTROENTERITIS: ICD-10-CM

## 2019-10-17 DIAGNOSIS — K42.9 UMBILICAL HERNIA WITHOUT OBSTRUCTION AND WITHOUT GANGRENE: ICD-10-CM

## 2019-10-17 RX ORDER — ONDANSETRON 4 MG/1
4 TABLET, ORALLY DISINTEGRATING ORAL EVERY 8 HOURS PRN
Qty: 60 TABLET | Refills: 1 | Status: SHIPPED | OUTPATIENT
Start: 2019-10-17

## 2019-10-17 RX ORDER — PANTOPRAZOLE SODIUM 40 MG/1
40 TABLET, DELAYED RELEASE ORAL DAILY
Qty: 30 TABLET | Refills: 10 | Status: SHIPPED | OUTPATIENT
Start: 2019-10-17 | End: 2020-01-20 | Stop reason: SDUPTHER

## 2019-11-18 DIAGNOSIS — I10 ESSENTIAL HYPERTENSION: Primary | ICD-10-CM

## 2019-11-18 RX ORDER — AMLODIPINE BESYLATE 5 MG/1
TABLET ORAL
Qty: 30 TABLET | Refills: 0 | Status: SHIPPED | OUTPATIENT
Start: 2019-11-18 | End: 2020-02-03 | Stop reason: SDUPTHER

## 2020-01-20 DIAGNOSIS — K21.9 GASTROESOPHAGEAL REFLUX DISEASE WITHOUT ESOPHAGITIS: ICD-10-CM

## 2020-01-20 DIAGNOSIS — K42.9 UMBILICAL HERNIA WITHOUT OBSTRUCTION AND WITHOUT GANGRENE: ICD-10-CM

## 2020-01-20 RX ORDER — PANTOPRAZOLE SODIUM 40 MG/1
40 TABLET, DELAYED RELEASE ORAL DAILY
Qty: 30 TABLET | Refills: 10 | Status: SHIPPED | OUTPATIENT
Start: 2020-01-20 | End: 2021-02-10

## 2020-02-03 ENCOUNTER — OFFICE VISIT (OUTPATIENT)
Dept: INTERNAL MEDICINE CLINIC | Facility: CLINIC | Age: 40
End: 2020-02-03
Payer: COMMERCIAL

## 2020-02-03 VITALS
SYSTOLIC BLOOD PRESSURE: 148 MMHG | HEART RATE: 82 BPM | HEIGHT: 69 IN | DIASTOLIC BLOOD PRESSURE: 90 MMHG | OXYGEN SATURATION: 98 % | TEMPERATURE: 98.1 F | BODY MASS INDEX: 42.69 KG/M2 | WEIGHT: 288.2 LBS

## 2020-02-03 DIAGNOSIS — Z98.84 S/P GASTRIC BYPASS: ICD-10-CM

## 2020-02-03 DIAGNOSIS — Z78.9 MALE-TO-FEMALE TRANSGENDER PERSON: ICD-10-CM

## 2020-02-03 DIAGNOSIS — R51.9 WORSENING HEADACHES: ICD-10-CM

## 2020-02-03 DIAGNOSIS — Z23 NEED FOR INFLUENZA VACCINATION: Primary | ICD-10-CM

## 2020-02-03 DIAGNOSIS — I10 ESSENTIAL HYPERTENSION: ICD-10-CM

## 2020-02-03 DIAGNOSIS — Z90.3 HISTORY OF SLEEVE GASTRECTOMY: ICD-10-CM

## 2020-02-03 DIAGNOSIS — M50.90 CERVICAL DISC DISEASE: ICD-10-CM

## 2020-02-03 PROCEDURE — 3077F SYST BP >= 140 MM HG: CPT | Performed by: INTERNAL MEDICINE

## 2020-02-03 PROCEDURE — 3008F BODY MASS INDEX DOCD: CPT | Performed by: INTERNAL MEDICINE

## 2020-02-03 PROCEDURE — 99214 OFFICE O/P EST MOD 30 MIN: CPT | Performed by: INTERNAL MEDICINE

## 2020-02-03 PROCEDURE — 1036F TOBACCO NON-USER: CPT | Performed by: INTERNAL MEDICINE

## 2020-02-03 PROCEDURE — 3080F DIAST BP >= 90 MM HG: CPT | Performed by: INTERNAL MEDICINE

## 2020-02-03 RX ORDER — TRAMADOL HYDROCHLORIDE 50 MG/1
50 TABLET ORAL EVERY 6 HOURS PRN
Qty: 60 TABLET | Refills: 1 | Status: SHIPPED | OUTPATIENT
Start: 2020-02-03 | End: 2020-04-03 | Stop reason: SDUPTHER

## 2020-02-03 RX ORDER — ESTRADIOL 2 MG/1
2 TABLET ORAL 2 TIMES DAILY
Qty: 180 TABLET | Refills: 1 | Status: SHIPPED | OUTPATIENT
Start: 2020-02-03 | End: 2021-02-03 | Stop reason: SDUPTHER

## 2020-02-03 RX ORDER — LORAZEPAM 1 MG/1
1 TABLET ORAL AS NEEDED
Qty: 30 TABLET | Refills: 1 | Status: SHIPPED | OUTPATIENT
Start: 2020-02-03 | End: 2020-04-03 | Stop reason: SDUPTHER

## 2020-02-03 RX ORDER — CHLORTHALIDONE 25 MG/1
25 TABLET ORAL DAILY
Qty: 30 TABLET | Refills: 5 | Status: SHIPPED | OUTPATIENT
Start: 2020-02-03 | End: 2020-08-11

## 2020-02-03 NOTE — ASSESSMENT & PLAN NOTE
Will prescribe Ultram   To be taken as needed for headaches or neck pain  Patient is also using Robaxin p r n

## 2020-02-03 NOTE — PROGRESS NOTES
Assessment/Plan:    Worsening headaches  Possibly due to combination of cervical disc disease and elevated blood pressure  Will initiate chlorthalidone 25 mg daily  We will also provide the patient with a prescription for Ultram which she has used in the past with great success controlling her headaches  Cervical disc disease  Will prescribe Ultram   To be taken as needed for headaches or neck pain  Patient is also using Robaxin p r n  Male-to-female transgender person  Patient has not been taking Aldactone as prescribed because of muscle cramps  Essential hypertension  Will add chlorthalidone 25 mg daily and follow-up in 2 months  Labs requested       Diagnoses and all orders for this visit:    Need for influenza vaccination    Male-to-female transgender person  -     estradiol (ESTRACE) 2 MG tablet; Take 1 tablet (2 mg total) by mouth 2 (two) times a day    S/P gastric bypass  -     LORazepam (ATIVAN) 1 mg tablet; Take 1 tablet (1 mg total) by mouth as needed for anxiety  -     Comprehensive metabolic panel; Future  -     Magnesium; Future  -     CBC and Platelet; Future    Cervical disc disease  -     LORazepam (ATIVAN) 1 mg tablet; Take 1 tablet (1 mg total) by mouth as needed for anxiety  -     traMADol (ULTRAM) 50 mg tablet; Take 1 tablet (50 mg total) by mouth every 6 (six) hours as needed for moderate pain    Essential hypertension  -     chlorthalidone 25 mg tablet; Take 1 tablet (25 mg total) by mouth daily  -     Comprehensive metabolic panel; Future  -     Magnesium; Future  -     CBC and Platelet; Future    Worsening headaches  -     chlorthalidone 25 mg tablet; Take 1 tablet (25 mg total) by mouth daily  -     traMADol (ULTRAM) 50 mg tablet;  Take 1 tablet (50 mg total) by mouth every 6 (six) hours as needed for moderate pain    History of sleeve gastrectomy    Other orders  -     Cancel: influenza vaccine, [id number], quadrivalent, 0 5 mL, preservative-free, for adult and pediatric patients 6 mos+ (AFLURIA, FLUARIX, FLULAVAL, FLUZONE)          Subjective:      Patient ID: Sujata Rai  is a 44 y o  adult  Patient presents for follow-up visit  She has been complaining of some recent onset of worsening headaches  Headache started the back of her head and radiate towards the frontal scalp but recently was associated with kind of a left-sided headache along with some numbness and tingling of the left arm  Patient took her usual medications for relieving headaches, gabapentin and Robaxin with eventual relief  She discontinued, or is in the process of discontinuing aldactone because of side effects of muscle cramps  Patient has also been complaining of some increased neck pains of late  Denies any weakness in arms or legs denies any weakness in hand   Appetite has been regular  Despite having had sleeve gastrectomy the patient has been unable to lose any substantial amount of weight  Family History   Problem Relation Age of Onset    Hypertension Mother     Diabetes Mother     Hypertension Sister      Social History     Socioeconomic History    Marital status: Single     Spouse name: Not on file    Number of children: Not on file    Years of education: Not on file    Highest education level: Not on file   Occupational History    Not on file   Social Needs    Financial resource strain: Not on file    Food insecurity:     Worry: Not on file     Inability: Not on file    Transportation needs:     Medical: Not on file     Non-medical: Not on file   Tobacco Use    Smoking status: Never Smoker    Smokeless tobacco: Never Used   Substance and Sexual Activity    Alcohol use:  Yes     Alcohol/week: 3 0 standard drinks     Types: 3 Standard drinks or equivalent per week     Comment: 3 per month    Drug use: Never    Sexual activity: Not on file   Lifestyle    Physical activity:     Days per week: Not on file     Minutes per session: Not on file    Stress: Not on file Relationships    Social connections:     Talks on phone: Not on file     Gets together: Not on file     Attends Holiness service: Not on file     Active member of club or organization: Not on file     Attends meetings of clubs or organizations: Not on file     Relationship status: Not on file    Intimate partner violence:     Fear of current or ex partner: Not on file     Emotionally abused: Not on file     Physically abused: Not on file     Forced sexual activity: Not on file   Other Topics Concern    Not on file   Social History Narrative    Not on file     Past Medical History:   Diagnosis Date    Anxiety     Asthma     seasonal    Hernia of abdominal wall     Hypertension     Seizures ([address] )     pseudoseizures since 2012       Current Outpatient Medications:     albuterol (PROVENTIL HFA,VENTOLIN HFA) 90 mcg/act inhaler, Inhale 2 puffs every 6 (six) hours as needed for wheezing, Disp: 1 Inhaler, Rfl: 0    amLODIPine (NORVASC) 5 mg tablet, TAKE ONE TABLET BY MOUTH DAILY, Disp: 30 tablet, Rfl: 0    cyclobenzaprine (FLEXERIL) 10 mg tablet, Take 1 tablet (10 mg total) by mouth daily as needed for muscle spasms for up to 30 days, Disp: 30 tablet, Rfl: 1    estradiol (ESTRACE) 2 MG tablet, Take 1 tablet (2 mg total) by mouth 2 (two) times a day, Disp: 180 tablet, Rfl: 1    gabapentin (NEURONTIN) 100 mg capsule, Take 1 capsule (100 mg total) by mouth 3 (three) times a day for 60 days, Disp: 90 capsule, Rfl: 1    hydrocortisone (ANUSOL-HC) 25 mg suppository, Insert 1 suppository (25 mg total) into the rectum 2 (two) times a day, Disp: 60 suppository, Rfl: 1    LORazepam (ATIVAN) 1 mg tablet, Take 1 tablet (1 mg total) by mouth as needed for anxiety, Disp: 30 tablet, Rfl: 1    methocarbamol (ROBAXIN) 500 mg tablet, Take 1 tablet (500 mg total) by mouth daily as needed for muscle spasms, Disp: 90 tablet, Rfl: 1    multivitamin (THERAGRAN) TABS, Take 1 tablet by mouth daily, Disp: , Rfl:     ondansetron (ZOFRAN-ODT) 4 mg disintegrating tablet, Take 1 tablet (4 mg total) by mouth every 8 (eight) hours as needed for nausea or vomiting, Disp: 60 tablet, Rfl: 1    pantoprazole (PROTONIX) 40 mg tablet, Take 1 tablet (40 mg total) by mouth daily, Disp: 30 tablet, Rfl: 10    spironolactone (ALDACTONE) 100 mg tablet, Take 1 tablet (100 mg total) by mouth 2 (two) times a day, Disp: 60 tablet, Rfl: 3    cetirizine (ZyrTEC) 10 mg tablet, Take 1 tablet (10 mg total) by mouth daily (Patient not taking: Reported on 2/3/2020), Disp: 30 tablet, Rfl: 0    chlorthalidone 25 mg tablet, Take 1 tablet (25 mg total) by mouth daily, Disp: 30 tablet, Rfl: 5    diphenhydrAMINE (BENADRYL) 50 MG tablet, Take one tablet 1 hour prior to contrast administration  (Patient not taking: Reported on 2/3/2020), Disp: 1 tablet, Rfl: 0    methylPREDNISolone (MEDROL) 32 MG tablet, Take one tablet by mouth 12 and 2 hours prior to contrast administraion  (Patient not taking: Reported on 2/3/2020), Disp: 2 tablet, Rfl: 0    traMADol (ULTRAM) 50 mg tablet, Take 1 tablet (50 mg total) by mouth every 6 (six) hours as needed for moderate pain, Disp: 60 tablet, Rfl: 1  Allergies   Allergen Reactions    Contrast Dye [Iodinated Diagnostic Agents] Anaphylaxis     Past Surgical History:   Procedure Laterality Date    APPENDECTOMY      CHOLECYSTECTOMY      GASTRIC BYPASS      HERNIA REPAIR      x2         Review of Systems   Constitutional: Negative  HENT: Negative  Eyes: Negative  Respiratory: Negative  Cardiovascular: Negative  Gastrointestinal: Negative  Endocrine: Negative  Genitourinary: Negative  Musculoskeletal: Positive for myalgias (Leg cramps) and neck pain  Skin: Negative  Allergic/Immunologic: Negative  Neurological: Negative  Hematological: Negative  Psychiatric/Behavioral: Negative            Objective:      /90 (BP Location: Left arm, Patient Position: Sitting, Cuff Size: Large)   Pulse 82 Temp 98 1 °F (36 7 °C) (Oral)   Ht 5' 8 58" (1 742 m)   Wt 131 kg (288 lb 3 2 oz)   SpO2 98%   BMI 43 08 kg/m²          Physical Exam   Constitutional: She is oriented to person, place, and time  She appears well-developed and well-nourished  No distress  HENT:   Head: Normocephalic and atraumatic  Right Ear: External ear normal    Left Ear: External ear normal    Mouth/Throat: Oropharynx is clear and moist    Eyes: Pupils are equal, round, and reactive to light  Conjunctivae and EOM are normal  Right eye exhibits no discharge  Left eye exhibits no discharge  Neck: Normal range of motion  No JVD present  No tracheal deviation present  Cardiovascular: Normal rate, regular rhythm and normal heart sounds  No murmur heard  Pulmonary/Chest: Effort normal and breath sounds normal  No respiratory distress  She has no rales  Abdominal: Soft  She exhibits no distension  Musculoskeletal: She exhibits no edema  Lymphadenopathy:     She has no cervical adenopathy  Neurological: She is alert and oriented to person, place, and time  No cranial nerve deficit  Coordination normal    No focal motor deficits appreciated  Skin: Skin is warm and dry  No rash noted  She is not diaphoretic  Vitals reviewed

## 2020-02-04 ENCOUNTER — APPOINTMENT (OUTPATIENT)
Dept: LAB | Facility: CLINIC | Age: 40
End: 2020-02-04
Payer: COMMERCIAL

## 2020-02-04 ENCOUNTER — TELEPHONE (OUTPATIENT)
Dept: INTERNAL MEDICINE CLINIC | Facility: CLINIC | Age: 40
End: 2020-02-04

## 2020-02-04 DIAGNOSIS — Z98.84 S/P GASTRIC BYPASS: ICD-10-CM

## 2020-02-04 DIAGNOSIS — I10 ESSENTIAL HYPERTENSION: ICD-10-CM

## 2020-02-04 LAB
ALBUMIN SERPL BCP-MCNC: 3.9 G/DL (ref 3.5–5)
ALP SERPL-CCNC: 68 U/L (ref 46–116)
ALT SERPL W P-5'-P-CCNC: 17 U/L (ref 12–78)
ANION GAP SERPL CALCULATED.3IONS-SCNC: 4 MMOL/L (ref 4–13)
AST SERPL W P-5'-P-CCNC: 16 U/L (ref 5–45)
BILIRUB SERPL-MCNC: 0.82 MG/DL (ref 0.2–1)
BUN SERPL-MCNC: 11 MG/DL (ref 5–25)
CALCIUM SERPL-MCNC: 9.3 MG/DL (ref 8.3–10.1)
CHLORIDE SERPL-SCNC: 104 MMOL/L (ref 100–108)
CO2 SERPL-SCNC: 30 MMOL/L (ref 21–32)
CREAT SERPL-MCNC: 0.95 MG/DL (ref 0.6–1.3)
ERYTHROCYTE [DISTWIDTH] IN BLOOD BY AUTOMATED COUNT: 12.9 % (ref 11.6–15.1)
GLUCOSE P FAST SERPL-MCNC: 87 MG/DL (ref 65–99)
HCT VFR BLD AUTO: 47.3 % (ref 36.5–46.1)
HGB BLD-MCNC: 15.1 G/DL (ref 12–15.4)
MAGNESIUM SERPL-MCNC: 2.3 MG/DL (ref 1.6–2.6)
MCH RBC QN AUTO: 29.4 PG (ref 26.8–34.3)
MCHC RBC AUTO-ENTMCNC: 31.9 G/DL (ref 31.4–37.4)
MCV RBC AUTO: 92 FL (ref 82–98)
PLATELET # BLD AUTO: 242 THOUSANDS/UL (ref 149–390)
PMV BLD AUTO: 10.1 FL (ref 8.9–12.7)
POTASSIUM SERPL-SCNC: 4 MMOL/L (ref 3.5–5.3)
PROT SERPL-MCNC: 8 G/DL (ref 6.4–8.2)
RBC # BLD AUTO: 5.13 MILLION/UL (ref 3.88–5.12)
SODIUM SERPL-SCNC: 138 MMOL/L (ref 136–145)
WBC # BLD AUTO: 7.06 THOUSAND/UL (ref 4.31–10.16)

## 2020-02-04 PROCEDURE — 80053 COMPREHEN METABOLIC PANEL: CPT

## 2020-02-04 PROCEDURE — 83735 ASSAY OF MAGNESIUM: CPT

## 2020-02-04 PROCEDURE — 36415 COLL VENOUS BLD VENIPUNCTURE: CPT

## 2020-02-04 PROCEDURE — 85027 COMPLETE CBC AUTOMATED: CPT

## 2020-02-04 NOTE — TELEPHONE ENCOUNTER
PA for Tramadol sent in Cover My Meds was denied for #60 tablets for a 30 day supply    #50 tablets for a 1 month supply is covered without a referral

## 2020-02-17 ENCOUNTER — HOSPITAL ENCOUNTER (OUTPATIENT)
Dept: RADIOLOGY | Age: 40
Discharge: HOME | End: 2020-02-17
Payer: COMMERCIAL

## 2020-02-17 ENCOUNTER — OFFICE VISIT (OUTPATIENT)
Dept: NEUROLOGY | Facility: CLINIC | Age: 40
End: 2020-02-17
Payer: COMMERCIAL

## 2020-02-17 ENCOUNTER — TELEPHONE (OUTPATIENT)
Dept: NEUROLOGY | Facility: CLINIC | Age: 40
End: 2020-02-17

## 2020-02-17 VITALS
DIASTOLIC BLOOD PRESSURE: 90 MMHG | BODY MASS INDEX: 42.65 KG/M2 | HEIGHT: 69 IN | HEART RATE: 66 BPM | WEIGHT: 288 LBS | SYSTOLIC BLOOD PRESSURE: 140 MMHG

## 2020-02-17 DIAGNOSIS — R29.898 RIGHT LEG WEAKNESS: ICD-10-CM

## 2020-02-17 DIAGNOSIS — M54.16 RADICULOPATHY, LUMBAR REGION: Primary | ICD-10-CM

## 2020-02-17 DIAGNOSIS — M54.16 RADICULOPATHY, LUMBAR REGION: ICD-10-CM

## 2020-02-17 PROCEDURE — 96372 THER/PROPH/DIAG INJ SC/IM: CPT | Performed by: PSYCHIATRY & NEUROLOGY

## 2020-02-17 PROCEDURE — 72131 CT LUMBAR SPINE W/O DYE: CPT

## 2020-02-17 PROCEDURE — 99204 OFFICE O/P NEW MOD 45 MIN: CPT | Performed by: PSYCHIATRY & NEUROLOGY

## 2020-02-17 RX ORDER — PREDNISONE 20 MG/1
TABLET ORAL
Qty: 27 TABLET | Refills: 0 | Status: SHIPPED | OUTPATIENT
Start: 2020-02-17 | End: 2020-03-10 | Stop reason: HOSPADM

## 2020-02-17 RX ORDER — KETOROLAC TROMETHAMINE 30 MG/ML
30 INJECTION, SOLUTION INTRAMUSCULAR; INTRAVENOUS ONCE
Status: COMPLETED | OUTPATIENT
Start: 2020-02-17 | End: 2020-02-17

## 2020-02-17 RX ADMIN — KETOROLAC TROMETHAMINE 30 MG: 30 INJECTION, SOLUTION INTRAMUSCULAR; INTRAVENOUS at 14:50

## 2020-02-17 NOTE — PROGRESS NOTES
Patient ID: Gianluca Hernández  is a 44 y o  adult  Assessment/Plan:    No problem-specific Assessment & Plan notes found for this encounter  Diagnoses and all orders for this visit:    Radiculopathy, lumbar region-- acute onset severe low back pain with radiation most notable to the right gluteus muscles  Concern for L5 disc herniation-acute  She has significant right lower extremity weakness with worse walking now than earlier on today  Recommend stat CT of the lumbar spine without contrast (pt with anaphylaxis to CT dye in past) to look for potential acute disc herniation versus other pathology  -     ketorolac (TORADOL) injection 30 mg  -     predniSONE 20 mg tablet; 4 tabs x 3 days, 3 tabs x 3 days, 2 tabs x 2 days, 1 tab x 2 days, then stop  Take in AM with food  -     CT spine lumbar wo contrast; Future    Right leg weakness  -     predniSONE 20 mg tablet; 4 tabs x 3 days, 3 tabs x 3 days, 2 tabs x 2 days, 1 tab x 2 days, then stop  Take in AM with food  -     CT spine lumbar wo contrast; Future      Discussed avoiding prolonged supine or seated positions and/or lay on this side rather than a no supine position at nighttime  Limit physical activity, no prolonged walks, no stairs until imaging/results  Gentle stretching and heating pad recommended  Once acute pain improved to some degree will refer to physical therapy  Discussed not driving herself home today due to the weakness  Further recommendations pending CT lumbar spine results    Other patient instructions  If worse numbness tingling weakness bladder control change or you cannot feel when you urinate/ call 911 go to nearest ER do not drive yourself there    Subjective:    HPI    Ms Ron Whiting is a pleasant 43 yo female with hx gx gastric bypass surgery seen in consultation for acute low back pain and RLE weakness starting Friday, with significant hx of cervical DDD and secondary headaches    States started sudden onset when standing up since Friday, with low mid back pain with burning upward toward her thoracic spine  States no pain or numbness or tingling radiating downwards per her  States RLE weakness progressively worse, cannot raise her proximal RLE antigravity while seated and she is unable to flex or extend the knee, is able to dorsiflex and plantarflex her foot  She tells me this is worse than this morning when she was able to drive into work- 45 minute drive  Denies LLE symptoms  No bowel or bladder control issues, no saddle anesthesia  No falls, no other recent trauma reported  Denies lifting heavy objects prior to this starting or other inciting event  Does report muscle spasm is low back and gluteus mm on the right- points to this region  She states standing and walking helps pain to some degree, sitting or laying down is much worse  States has to use her hands to turn herself in bed which is new today vs Friday  Heating pad and stretching somewhat helpful  Denies prior hx similar  Medications tried/failed: Tramadol, robaxin, gabapentin 100 mg TID which she typically takes for chronic cervical radiculopathy and cervicogenic headaches with benefit, has not helped low back pain  Also has long standing headaches starting from neck with photophobia phnophobia nausea improved with tramadol BID started by PCP recently  Robaxin 250-500 mg a day with benefit- no sedation from this  Flexeril 10 mg has not tried recently    The following portions of the patient's history were reviewed and updated as appropriate: allergies, current medications, past family history, past medical history, past social history, past surgical history and problem list and ROS         Objective:    Blood pressure 140/90, pulse 66, height 5' 8 58" (1 742 m), weight 131 kg (288 lb)  Physical Exam   Constitutional: She is oriented to person, place, and time  She appears well-developed and well-nourished  HENT:   Head: Normocephalic and atraumatic     Eyes: Pupils are equal, round, and reactive to light  Neck: Normal range of motion  Cardiovascular: Normal rate and regular rhythm  Pulmonary/Chest: Effort normal    Abdominal: Soft  Musculoskeletal: She exhibits tenderness (low back pain tenderness on mild palpation)  Neurological: She is alert and oriented to person, place, and time  Reflex Scores:       Patellar reflexes are Tr on the right side  Nursing note and vitals reviewed  Neurological Exam  Mental Status  Alert  Oriented to person, place, time and situation  Recent and remote memory are intact  no dysarthria present  Language is fluent with no aphasia  Attention and concentration are normal  Fund of knowledge is appropriate for level of education  Cranial Nerves  CN II: Visual fields full to confrontation  CN III, IV, VI: Extraocular movements intact bilaterally  Pupils equal round and reactive to light bilaterally  CN V: Facial sensation is normal   CN VII: Full and symmetric facial movement  CN VIII: Hearing is normal   CN IX, X: Palate elevates symmetrically  Normal gag reflex  CN XI: Shoulder shrug strength is normal   CN XII: Tongue midline without atrophy or fasciculations  Motor   Normal muscle tone  Strength is 5/5 in all four extremities except as noted  RLE 2/5 quadriceps and hamstrings, knee flexion and extension  5/5 dorsi and plantarflexion- there is a pain limited component to some degree  Sensory  Sensation is intact to light touch, pinprick, vibration and proprioception in all four extremities  Light touch is normal in upper and lower extremities  Temperature is normal in upper and lower extremities  Vibration is normal in upper and lower extremities  No right-sided hemispatial neglect  No left-sided hemispatial neglect  Reflexes  Deep tendon reflexes are 2+ and symmetric except as noted                                             Right                      Left  Patellar                               Tr Coordination  Right: Finger-to-nose normal   Left: Finger-to-nose normal     Gait  Casual gait: Reduced stride length  Antalgic gait  Paretic RLE         ROS:    Review of Systems   Constitutional: Negative  Negative for appetite change and fever  HENT: Negative  Negative for hearing loss, tinnitus, trouble swallowing and voice change  Eyes: Negative  Negative for photophobia and pain  Respiratory: Negative  Negative for shortness of breath  Cardiovascular: Negative  Negative for palpitations  Gastrointestinal: Positive for nausea  Negative for vomiting  Endocrine: Negative  Negative for cold intolerance and heat intolerance  Genitourinary: Negative  Negative for dysuria, frequency and urgency  Musculoskeletal: Positive for back pain ([place]eer back)  Negative for myalgias and neck pain  Skin: Negative  Negative for rash  Neurological: Negative  Negative for dizziness, tremors, seizures, syncope, facial asymmetry, speech difficulty, weakness, light-headedness, numbness and headaches  Hematological: Negative  Does not bruise/bleed easily  Psychiatric/Behavioral: Negative  Negative for confusion, hallucinations and sleep disturbance

## 2020-02-17 NOTE — PATIENT INSTRUCTIONS
Flexeril take 1 tab nightly as needed for low back pain/spasm  Prednisone take in the morning  Increase gabapentin to 1 tab AM, 1 tab afternoon, 3 tabs nightly if needed  Heating pad/thermacare to low back  Sleep on your side  Stay hydrated  If worse numbness tingling weakness bladder control change or you cannot feel when you urinate/ call 911 go to nearest ER do not drive yourself there

## 2020-02-17 NOTE — TELEPHONE ENCOUNTER
Discussed with Robbin Cartwright  Will try prednisone taper, flexeril nightly heating pad rest   If worse symptoms, will let me or on call neurology person know ASAP    Will refer to PM/neurosurgery pending clinical course    Just KRIS

## 2020-02-24 DIAGNOSIS — M54.16 RADICULOPATHY, LUMBAR REGION: Primary | ICD-10-CM

## 2020-02-24 DIAGNOSIS — M54.50 SEVERE LOW BACK PAIN: ICD-10-CM

## 2020-02-24 DIAGNOSIS — M51.26 LUMBAR HERNIATED DISC: ICD-10-CM

## 2020-02-24 DIAGNOSIS — R29.898 RIGHT LEG WEAKNESS: ICD-10-CM

## 2020-02-24 DIAGNOSIS — M54.16 RIGHT LUMBAR RADICULITIS: ICD-10-CM

## 2020-02-24 NOTE — PROGRESS NOTES
PT order placed and NSx referral placed given large L3 herniated disc even though its the opposite side of symptoms

## 2020-02-27 ENCOUNTER — CONSULT (OUTPATIENT)
Dept: NEUROSURGERY | Facility: CLINIC | Age: 40
End: 2020-02-27
Payer: COMMERCIAL

## 2020-02-27 VITALS
TEMPERATURE: 97.8 F | HEIGHT: 69 IN | BODY MASS INDEX: 42.51 KG/M2 | DIASTOLIC BLOOD PRESSURE: 90 MMHG | RESPIRATION RATE: 16 BRPM | HEART RATE: 68 BPM | WEIGHT: 287 LBS | SYSTOLIC BLOOD PRESSURE: 140 MMHG

## 2020-02-27 DIAGNOSIS — M54.16 RIGHT LUMBAR RADICULITIS: ICD-10-CM

## 2020-02-27 DIAGNOSIS — M54.12 RADICULOPATHY, CERVICAL: Primary | ICD-10-CM

## 2020-02-27 DIAGNOSIS — R29.898 RIGHT LEG WEAKNESS: ICD-10-CM

## 2020-02-27 DIAGNOSIS — M54.50 SEVERE LOW BACK PAIN: ICD-10-CM

## 2020-02-27 DIAGNOSIS — M51.26 LUMBAR HERNIATED DISC: ICD-10-CM

## 2020-02-27 PROCEDURE — 3008F BODY MASS INDEX DOCD: CPT | Performed by: INTERNAL MEDICINE

## 2020-02-27 PROCEDURE — 99244 OFF/OP CNSLTJ NEW/EST MOD 40: CPT | Performed by: PHYSICIAN ASSISTANT

## 2020-02-27 NOTE — PROGRESS NOTES
Office Note - Neurosurgery   Leslie Rios  44 y o  adult MRN: [id number]      Assessment: This is a 44years old [facility], here for evaluation of her neck and lower back pain  Patient reports, she has chronic neck pain over 3 years which gradually became worse  She reports intermittent numbness and weakness with weakness in  strength in the left hand, she also reports lower back pain with radiculopathy to her left proximal thigh, pain is pulling type and sometimes shooting  Her walking distance decreased, and her left leg sometimes right leg is getting weaker  She tried prednisone, Toradol but without any improvement  Patient also reports wobbling gait  Denies any epidural steroid injection or physical therapy  Neuro exam-slight weakness in the left  strength noted, and also positive straight leg raise test on the left leg was pain radiating down to her posterolateral thigh, proximal left leg weakness 4+/5, DTR 1+ in both lower extremities, 3+ in the left upper arm  Otherwise, 2+ on the right upper and lower extremity no clonus     Gait instability or heel-to-toe walking noted  Tenderness on lower cervical spine and upper lumbar region on palpation  CT of lumbar spine demonstrates multilevel degenerative disease, is extrusion of disc at L3-4 region  Hx, PE, images, and management plan discussed with the patient  Patient referred for MRI of lumbar and cervical spines without contrast   Ambulatory referral to pain management and also ambulatory referral to physical therapy was made  Patient's questions and concerns were answered  A fall precaution  Advised to avoid heavy lifting  Patient expressed her understanding is and agreed with the plan  Follow-up after images results    Plan:    1  Ambulatory referral to pain management  2  Ambulatory referral to physical therapy  3  MRI of cervical spine without contrast  4   MRI of lumbar spine without contrast  5  Follow up after images results  6  Call for question or concern  Subjective/Objective     Chief Complaint: " Have  neck and back pain"/3 years        HPI  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, here for evaluation of cervical and lower back radiculopathy  Patient reports pain is progressive and getting worse, she has reports the pain in the neck sometimes goes to her posterior head and down to her back, associated with left arm paresthesia and hand  weakness  Patient reports gait instability  Patient has also lower back pain with left leg tightness and putting  She also claims weakness on the right leg which currently subsided  She denies any bowel/bladder dysfunction or saddle anesthesia  Patient denies history of fever, chills, rigors, cough or chest pain  He tries Toradol and prednisone without any improvement  Denies any epidural steroid injection or physical therapy  Patient has history of hypertension, status post sleeve gastrectomy, anxiety, but denies history of diabetes mellitus, congestive heart failure, stroke, seizure, bleeding disorder or taking anticoagulant medications  She denies history of smoking cigarettes but drinks alcohol occasionally  CT of lumbar spine findings as described in the assessment section above  ROS  Reviewed personally and updated  Review of Systems   Constitutional: Negative  HENT: Negative  Eyes: Negative  Cardiovascular: Negative  Gastrointestinal: Negative  Endocrine: Negative  Genitourinary: Negative  Musculoskeletal: Positive for back pain (mid back pain radiating up into neck ), gait problem (due to weakness in leg) and neck pain  Skin: Negative  Allergic/Immunologic: Negative  Neurological: Positive for weakness (Right leg), numbness (mid back and buttock tinginling and in b/l hands) and headaches  Hematological: Does not bruise/bleed easily     Psychiatric/Behavioral: Positive for sleep disturbance (occasionally due to back pain)  Family History    Family History   Problem Relation Age of Onset   Tamar Bustillos Hypertension Mother     Diabetes Mother     Hypertension Sister        Social History    Social History     Socioeconomic History    Marital status: Single     Spouse name: Not on file    Number of children: Not on file    Years of education: Not on file    Highest education level: Not on file   Occupational History    Not on file   Social Needs    Financial resource strain: Not on file    Food insecurity:     Worry: Not on file     Inability: Not on file    Transportation needs:     Medical: Not on file     Non-medical: Not on file   Tobacco Use    Smoking status: Never Smoker    Smokeless tobacco: Never Used   Substance and Sexual Activity    Alcohol use:  Yes     Alcohol/week: 3 0 standard drinks     Types: 3 Standard drinks or equivalent per week     Comment: 3 per month    Drug use: Never    Sexual activity: Not on file   Lifestyle    Physical activity:     Days per week: Not on file     Minutes per session: Not on file    Stress: Not on file   Relationships    Social connections:     Talks on phone: Not on file     Gets together: Not on file     Attends Mosque service: Not on file     Active member of club or organization: Not on file     Attends meetings of clubs or organizations: Not on file     Relationship status: Not on file    Intimate partner violence:     Fear of current or ex partner: Not on file     Emotionally abused: Not on file     Physically abused: Not on file     Forced sexual activity: Not on file   Other Topics Concern    Not on file   Social History Narrative    Not on file       Past Medical History    Past Medical History:   Diagnosis Date    Anxiety     Asthma     seasonal    Hernia of abdominal wall     Hypertension     Seizures ([facility] [address] )     pseudoseizures since 2012       Surgical History    Past Surgical History:   Procedure Laterality Date  APPENDECTOMY      CHOLECYSTECTOMY      GASTRIC BYPASS      HERNIA REPAIR      x2       Medications      Current Outpatient Medications:     albuterol (PROVENTIL HFA,VENTOLIN HFA) 90 mcg/act inhaler, Inhale 2 puffs every 6 (six) hours as needed for wheezing, Disp: 1 Inhaler, Rfl: 0    amLODIPine (NORVASC) 5 mg tablet, TAKE ONE TABLET BY MOUTH DAILY, Disp: 30 tablet, Rfl: 0    cetirizine (ZyrTEC) 10 mg tablet, Take 1 tablet (10 mg total) by mouth daily, Disp: 30 tablet, Rfl: 0    chlorthalidone 25 mg tablet, Take 1 tablet (25 mg total) by mouth daily, Disp: 30 tablet, Rfl: 5    diphenhydrAMINE (BENADRYL) 50 MG tablet, Take one tablet 1 hour prior to contrast administration  , Disp: 1 tablet, Rfl: 0    estradiol (ESTRACE) 2 MG tablet, Take 1 tablet (2 mg total) by mouth 2 (two) times a day, Disp: 180 tablet, Rfl: 1    hydrocortisone (ANUSOL-HC) 25 mg suppository, Insert 1 suppository (25 mg total) into the rectum 2 (two) times a day, Disp: 60 suppository, Rfl: 1    LORazepam (ATIVAN) 1 mg tablet, Take 1 tablet (1 mg total) by mouth as needed for anxiety, Disp: 30 tablet, Rfl: 1    methocarbamol (ROBAXIN) 500 mg tablet, Take 1 tablet (500 mg total) by mouth daily as needed for muscle spasms, Disp: 90 tablet, Rfl: 1    multivitamin (THERAGRAN) TABS, Take 1 tablet by mouth daily, Disp: , Rfl:     ondansetron (ZOFRAN-ODT) 4 mg disintegrating tablet, Take 1 tablet (4 mg total) by mouth every 8 (eight) hours as needed for nausea or vomiting, Disp: 60 tablet, Rfl: 1    pantoprazole (PROTONIX) 40 mg tablet, Take 1 tablet (40 mg total) by mouth daily, Disp: 30 tablet, Rfl: 10    spironolactone (ALDACTONE) 100 mg tablet, Take 1 tablet (100 mg total) by mouth 2 (two) times a day, Disp: 60 tablet, Rfl: 3    traMADol (ULTRAM) 50 mg tablet, Take 1 tablet (50 mg total) by mouth every 6 (six) hours as needed for moderate pain, Disp: 60 tablet, Rfl: 1    cyclobenzaprine (FLEXERIL) 10 mg tablet, Take 1 tablet (10 mg total) by mouth daily as needed for muscle spasms for up to 30 days, Disp: 30 tablet, Rfl: 1    gabapentin (NEURONTIN) 100 mg capsule, Take 1 capsule (100 mg total) by mouth 3 (three) times a day for 60 days, Disp: 90 capsule, Rfl: 1    predniSONE 20 mg tablet, 4 tabs x 3 days, 3 tabs x 3 days, 2 tabs x 2 days, 1 tab x 2 days, then stop  Take in AM with food (Patient not taking: Reported on 2/27/2020), Disp: 27 tablet, Rfl: 0    Allergies    Allergies   Allergen Reactions    Contrast Dye [Iodinated Diagnostic Agents] Anaphylaxis       The following portions of the patient's history were reviewed and updated as appropriate: allergies, current medications, past family history, past medical history, past social history, past surgical history and problem list     Investigations    I personally reviewed the CT results with the patient:    CT of lumbar spine findings as described in the assessment section    Physical Exam    Vitals:    02/27/20 0823   BP: 140/90   Pulse: 68   Resp: 16   Temp: 97 8 °F (36 6 °C)       Physical Exam   Constitutional: She is oriented to person, place, and time  She appears well-developed and well-nourished  HENT:   Head: Normocephalic and atraumatic  Eyes: EOM are normal    Neck: Normal range of motion  Cardiovascular: Normal rate  Pulmonary/Chest: Effort normal    Musculoskeletal: Normal range of motion  She exhibits tenderness  Neurological: She is alert and oriented to person, place, and time  She has normal strength  No cranial nerve deficit or sensory deficit  She has a normal Finger-Nose-Finger Test  GCS eye subscore is 4  GCS verbal subscore is 5  GCS motor subscore is 6  Reflex Scores:       Tricep reflexes are 2+ on the right side and 3+ on the left side  Bicep reflexes are 2+ on the right side and 2+ on the left side  Brachioradialis reflexes are 2+ on the right side and 2+ on the left side         Patellar reflexes are 1+ on the right side and 1+ on the left side  Achilles reflexes are 1+ on the right side and 1+ on the left side  Psychiatric: Her speech is normal      Neurologic Exam     Mental Status   Oriented to person, place, and time  Speech: speech is normal   Level of consciousness: alert    Cranial Nerves     CN III, IV, VI   Extraocular motions are normal    Nystagmus: none     CN V   Right facial sensation deficit: none  Left facial sensation deficit: none    CN VII   Right facial weakness: none  Left facial weakness: none    CN XI   CN XI normal      CN XII   CN XII normal      Motor Exam   Muscle bulk: normal  Overall muscle tone: normal  Right arm tone: normal  Left arm tone: normal  Right arm pronator drift: absent  Left arm pronator drift: absent  Right leg tone: normal  Left leg tone: normal    Strength   Strength 5/5 throughout       Sensory Exam   Light touch normal      Gait, Coordination, and Reflexes     Coordination   Finger to nose coordination: normal    Reflexes   Right brachioradialis: 2+  Left brachioradialis: 2+  Right biceps: 2+  Left biceps: 2+  Right triceps: 2+  Left triceps: 3+  Right patellar: 1+  Left patellar: 1+  Right achilles: 1+  Left achilles: 1+  Right : 2+  Left : 1+  Right plantar: normal  Left plantar: normal  Right Bo[place]: absent  Left [place]: absent  Right ankle clonus: absent  Left ankle clonus: absent

## 2020-03-01 ENCOUNTER — HOSPITAL ENCOUNTER (EMERGENCY)
Facility: HOSPITAL | Age: 40
Discharge: HOME | End: 2020-03-01
Attending: EMERGENCY MEDICINE | Admitting: EMERGENCY MEDICINE
Payer: COMMERCIAL

## 2020-03-01 VITALS
SYSTOLIC BLOOD PRESSURE: 140 MMHG | TEMPERATURE: 97.8 F | HEIGHT: 68 IN | HEART RATE: 72 BPM | WEIGHT: 283.95 LBS | BODY MASS INDEX: 43.04 KG/M2 | DIASTOLIC BLOOD PRESSURE: 82 MMHG | OXYGEN SATURATION: 98 % | RESPIRATION RATE: 12 BRPM

## 2020-03-01 DIAGNOSIS — G89.29 CHRONIC NECK PAIN: Primary | ICD-10-CM

## 2020-03-01 DIAGNOSIS — M54.2 CHRONIC NECK PAIN: Primary | ICD-10-CM

## 2020-03-01 DIAGNOSIS — R11.2 NAUSEA AND VOMITING: ICD-10-CM

## 2020-03-01 DIAGNOSIS — G44.209 TENSION HEADACHE: ICD-10-CM

## 2020-03-01 LAB
ANION GAP SERPL CALCULATED.3IONS-SCNC: 9 MMOL/L (ref 4–13)
BACTERIA UR QL AUTO: ABNORMAL /HPF
BASOPHILS # BLD AUTO: 0.03 THOUSANDS/ΜL (ref 0–0.1)
BASOPHILS NFR BLD AUTO: 0 % (ref 0–1)
BILIRUB UR QL STRIP: NEGATIVE
BUN SERPL-MCNC: 13 MG/DL (ref 5–25)
CALCIUM SERPL-MCNC: 9.2 MG/DL (ref 8.3–10.1)
CHLORIDE SERPL-SCNC: 100 MMOL/L (ref 100–108)
CLARITY UR: CLEAR
CO2 SERPL-SCNC: 31 MMOL/L (ref 21–32)
COLOR UR: YELLOW
CREAT SERPL-MCNC: 0.97 MG/DL (ref 0.6–1.3)
EOSINOPHIL # BLD AUTO: 0.04 THOUSAND/ΜL (ref 0–0.61)
EOSINOPHIL NFR BLD AUTO: 0 % (ref 0–6)
ERYTHROCYTE [DISTWIDTH] IN BLOOD BY AUTOMATED COUNT: 13.1 % (ref 11.6–15.1)
GLUCOSE SERPL-MCNC: 121 MG/DL (ref 65–140)
GLUCOSE UR STRIP-MCNC: NEGATIVE MG/DL
HCT VFR BLD AUTO: 50.5 % (ref 36.5–46.1)
HGB BLD-MCNC: 15.9 G/DL (ref 12–15.4)
HGB UR QL STRIP.AUTO: NEGATIVE
IMM GRANULOCYTES # BLD AUTO: 0.07 THOUSAND/UL (ref 0–0.2)
IMM GRANULOCYTES NFR BLD AUTO: 1 % (ref 0–2)
KETONES UR STRIP-MCNC: NEGATIVE MG/DL
LEUKOCYTE ESTERASE UR QL STRIP: NEGATIVE
LYMPHOCYTES # BLD AUTO: 1.35 THOUSANDS/ΜL (ref 0.6–4.47)
LYMPHOCYTES NFR BLD AUTO: 11 % (ref 14–44)
MCH RBC QN AUTO: 29.1 PG (ref 26.8–34.3)
MCHC RBC AUTO-ENTMCNC: 31.5 G/DL (ref 31.4–37.4)
MCV RBC AUTO: 93 FL (ref 82–98)
MONOCYTES # BLD AUTO: 0.55 THOUSAND/ΜL (ref 0.17–1.22)
MONOCYTES NFR BLD AUTO: 4 % (ref 4–12)
NEUTROPHILS # BLD AUTO: 10.51 THOUSANDS/ΜL (ref 1.85–7.62)
NEUTS SEG NFR BLD AUTO: 84 % (ref 43–75)
NITRITE UR QL STRIP: NEGATIVE
NON-SQ EPI CELLS URNS QL MICRO: ABNORMAL /HPF
NRBC BLD AUTO-RTO: 0 /100 WBCS
PH UR STRIP.AUTO: 8 [PH]
PLATELET # BLD AUTO: 296 THOUSANDS/UL (ref 149–390)
PMV BLD AUTO: 9.5 FL (ref 8.9–12.7)
POTASSIUM SERPL-SCNC: 3.6 MMOL/L (ref 3.5–5.3)
PROT UR STRIP-MCNC: ABNORMAL MG/DL
RBC # BLD AUTO: 5.46 MILLION/UL (ref 3.88–5.12)
RBC #/AREA URNS AUTO: ABNORMAL /HPF
SODIUM SERPL-SCNC: 140 MMOL/L (ref 136–145)
SP GR UR STRIP.AUTO: 1.01 (ref 1–1.03)
UROBILINOGEN UR QL STRIP.AUTO: 0.2 E.U./DL
WBC # BLD AUTO: 12.55 THOUSAND/UL (ref 4.31–10.16)
WBC #/AREA URNS AUTO: ABNORMAL /HPF

## 2020-03-01 PROCEDURE — 99284 EMERGENCY DEPT VISIT MOD MDM: CPT | Performed by: EMERGENCY MEDICINE

## 2020-03-01 PROCEDURE — 93005 ELECTROCARDIOGRAM TRACING: CPT

## 2020-03-01 PROCEDURE — 99284 EMERGENCY DEPT VISIT MOD MDM: CPT

## 2020-03-01 PROCEDURE — 80048 BASIC METABOLIC PNL TOTAL CA: CPT | Performed by: EMERGENCY MEDICINE

## 2020-03-01 PROCEDURE — 85025 COMPLETE CBC W/AUTO DIFF WBC: CPT | Performed by: EMERGENCY MEDICINE

## 2020-03-01 PROCEDURE — 96374 THER/PROPH/DIAG INJ IV PUSH: CPT

## 2020-03-01 PROCEDURE — 96361 HYDRATE IV INFUSION ADD-ON: CPT

## 2020-03-01 PROCEDURE — 96375 TX/PRO/DX INJ NEW DRUG ADDON: CPT

## 2020-03-01 PROCEDURE — 36415 COLL VENOUS BLD VENIPUNCTURE: CPT | Performed by: EMERGENCY MEDICINE

## 2020-03-01 PROCEDURE — 81001 URINALYSIS AUTO W/SCOPE: CPT | Performed by: EMERGENCY MEDICINE

## 2020-03-01 RX ORDER — KETOROLAC TROMETHAMINE 30 MG/ML
15 INJECTION, SOLUTION INTRAMUSCULAR; INTRAVENOUS ONCE
Status: COMPLETED | OUTPATIENT
Start: 2020-03-01 | End: 2020-03-01

## 2020-03-01 RX ORDER — GABAPENTIN 300 MG/1
300 CAPSULE ORAL
COMMUNITY
End: 2020-03-02 | Stop reason: SDUPTHER

## 2020-03-01 RX ORDER — METOCLOPRAMIDE HYDROCHLORIDE 5 MG/ML
10 INJECTION INTRAMUSCULAR; INTRAVENOUS ONCE
Status: COMPLETED | OUTPATIENT
Start: 2020-03-01 | End: 2020-03-01

## 2020-03-01 RX ORDER — DIPHENHYDRAMINE HYDROCHLORIDE 50 MG/ML
50 INJECTION INTRAMUSCULAR; INTRAVENOUS ONCE
Status: COMPLETED | OUTPATIENT
Start: 2020-03-01 | End: 2020-03-01

## 2020-03-01 RX ADMIN — METOCLOPRAMIDE 10 MG: 5 INJECTION, SOLUTION INTRAMUSCULAR; INTRAVENOUS at 13:02

## 2020-03-01 RX ADMIN — DIPHENHYDRAMINE HYDROCHLORIDE 50 MG: 50 INJECTION, SOLUTION INTRAMUSCULAR; INTRAVENOUS at 12:57

## 2020-03-01 RX ADMIN — SODIUM CHLORIDE 1000 ML: 0.9 INJECTION, SOLUTION INTRAVENOUS at 12:55

## 2020-03-01 RX ADMIN — KETOROLAC TROMETHAMINE 15 MG: 30 INJECTION, SOLUTION INTRAMUSCULAR at 12:56

## 2020-03-02 DIAGNOSIS — M50.90 CERVICAL DISC DISEASE: ICD-10-CM

## 2020-03-02 DIAGNOSIS — G44.86 CERVICOGENIC HEADACHE: Primary | ICD-10-CM

## 2020-03-02 RX ORDER — GABAPENTIN 100 MG/1
CAPSULE ORAL
Qty: 180 CAPSULE | Refills: 1 | Status: SHIPPED | OUTPATIENT
Start: 2020-03-02 | End: 2020-03-10 | Stop reason: HOSPADM

## 2020-03-02 RX ORDER — GABAPENTIN 300 MG/1
300 CAPSULE ORAL
Qty: 90 CAPSULE | Refills: 3 | Status: SHIPPED | OUTPATIENT
Start: 2020-03-02 | End: 2020-03-10 | Stop reason: HOSPADM

## 2020-03-03 ENCOUNTER — HOSPITAL ENCOUNTER (EMERGENCY)
Facility: HOSPITAL | Age: 40
Discharge: HOME | End: 2020-03-03
Attending: EMERGENCY MEDICINE | Admitting: EMERGENCY MEDICINE
Payer: COMMERCIAL

## 2020-03-03 VITALS
WEIGHT: 284.39 LBS | DIASTOLIC BLOOD PRESSURE: 66 MMHG | HEART RATE: 79 BPM | TEMPERATURE: 98 F | RESPIRATION RATE: 17 BRPM | BODY MASS INDEX: 42.12 KG/M2 | SYSTOLIC BLOOD PRESSURE: 118 MMHG | OXYGEN SATURATION: 98 % | HEIGHT: 69 IN

## 2020-03-03 DIAGNOSIS — R00.2 PALPITATIONS: Primary | ICD-10-CM

## 2020-03-03 DIAGNOSIS — M54.2 CHRONIC NECK PAIN: ICD-10-CM

## 2020-03-03 DIAGNOSIS — G89.29 CHRONIC NECK PAIN: ICD-10-CM

## 2020-03-03 LAB
ALBUMIN SERPL BCP-MCNC: 3.7 G/DL (ref 3.5–5)
ALP SERPL-CCNC: 60 U/L (ref 46–116)
ALT SERPL W P-5'-P-CCNC: 13 U/L (ref 12–78)
ANION GAP SERPL CALCULATED.3IONS-SCNC: 9 MMOL/L (ref 4–13)
AST SERPL W P-5'-P-CCNC: 14 U/L (ref 5–45)
BASOPHILS # BLD AUTO: 0.04 THOUSANDS/ΜL (ref 0–0.1)
BASOPHILS NFR BLD AUTO: 0 % (ref 0–1)
BILIRUB SERPL-MCNC: 0.6 MG/DL (ref 0.2–1)
BUN SERPL-MCNC: 18 MG/DL (ref 5–25)
CALCIUM SERPL-MCNC: 8.8 MG/DL (ref 8.3–10.1)
CHLORIDE SERPL-SCNC: 101 MMOL/L (ref 100–108)
CO2 SERPL-SCNC: 27 MMOL/L (ref 21–32)
CREAT SERPL-MCNC: 1.17 MG/DL (ref 0.6–1.3)
EOSINOPHIL # BLD AUTO: 0.1 THOUSAND/ΜL (ref 0–0.61)
EOSINOPHIL NFR BLD AUTO: 1 % (ref 0–6)
ERYTHROCYTE [DISTWIDTH] IN BLOOD BY AUTOMATED COUNT: 13 % (ref 11.6–15.1)
GLUCOSE SERPL-MCNC: 139 MG/DL (ref 65–140)
HCT VFR BLD AUTO: 44.7 % (ref 36.5–46.1)
HGB BLD-MCNC: 14.7 G/DL (ref 12–15.4)
IMM GRANULOCYTES # BLD AUTO: 0.04 THOUSAND/UL (ref 0–0.2)
IMM GRANULOCYTES NFR BLD AUTO: 0 % (ref 0–2)
LYMPHOCYTES # BLD AUTO: 2.04 THOUSANDS/ΜL (ref 0.6–4.47)
LYMPHOCYTES NFR BLD AUTO: 18 % (ref 14–44)
MAGNESIUM SERPL-MCNC: 1.9 MG/DL (ref 1.6–2.6)
MCH RBC QN AUTO: 29.9 PG (ref 26.8–34.3)
MCHC RBC AUTO-ENTMCNC: 32.9 G/DL (ref 31.4–37.4)
MCV RBC AUTO: 91 FL (ref 82–98)
MONOCYTES # BLD AUTO: 0.67 THOUSAND/ΜL (ref 0.17–1.22)
MONOCYTES NFR BLD AUTO: 6 % (ref 4–12)
NEUTROPHILS # BLD AUTO: 8.76 THOUSANDS/ΜL (ref 1.85–7.62)
NEUTS SEG NFR BLD AUTO: 75 % (ref 43–75)
NRBC BLD AUTO-RTO: 0 /100 WBCS
PLATELET # BLD AUTO: 256 THOUSANDS/UL (ref 149–390)
PMV BLD AUTO: 9.2 FL (ref 8.9–12.7)
POTASSIUM SERPL-SCNC: 3.5 MMOL/L (ref 3.5–5.3)
PROT SERPL-MCNC: 7.8 G/DL (ref 6.4–8.2)
RBC # BLD AUTO: 4.92 MILLION/UL (ref 3.88–5.12)
SODIUM SERPL-SCNC: 137 MMOL/L (ref 136–145)
TROPONIN I SERPL-MCNC: <0.02 NG/ML
TSH SERPL DL<=0.05 MIU/L-ACNC: 2.98 UIU/ML (ref 0.36–3.74)
WBC # BLD AUTO: 11.65 THOUSAND/UL (ref 4.31–10.16)

## 2020-03-03 PROCEDURE — 93005 ELECTROCARDIOGRAM TRACING: CPT

## 2020-03-03 PROCEDURE — 80053 COMPREHEN METABOLIC PANEL: CPT | Performed by: PHYSICIAN ASSISTANT

## 2020-03-03 PROCEDURE — 99285 EMERGENCY DEPT VISIT HI MDM: CPT

## 2020-03-03 PROCEDURE — 99285 EMERGENCY DEPT VISIT HI MDM: CPT | Performed by: PHYSICIAN ASSISTANT

## 2020-03-03 PROCEDURE — 85025 COMPLETE CBC W/AUTO DIFF WBC: CPT | Performed by: PHYSICIAN ASSISTANT

## 2020-03-03 PROCEDURE — 84443 ASSAY THYROID STIM HORMONE: CPT | Performed by: PHYSICIAN ASSISTANT

## 2020-03-03 PROCEDURE — 36415 COLL VENOUS BLD VENIPUNCTURE: CPT | Performed by: PHYSICIAN ASSISTANT

## 2020-03-03 PROCEDURE — 84484 ASSAY OF TROPONIN QUANT: CPT | Performed by: PHYSICIAN ASSISTANT

## 2020-03-03 PROCEDURE — 83735 ASSAY OF MAGNESIUM: CPT | Performed by: PHYSICIAN ASSISTANT

## 2020-03-03 NOTE — ED PROVIDER NOTES
History  Chief Complaint   Patient presents with    Vomiting     weakness, nausea and vomiting today    Headache      66-year-old male to female, presents to the emergency room with headache and generalized weakness  Patient states she has a history of chronic neck pain and left-sided headache, which she follows with neuro surgery  She states that she was on prednisone but finished this a few days ago  She reports that she also is on tramadol for the pain but states that this is not been relieving her symptoms  She states that over the last few days she has had gradually worsening pain  She states that this is typical of prior chronic neck and headache  She also reports that she has had generalized weakness and nausea/vomiting x1  She denies any numbness/tingling/weakness of her extremities, vision changes, abdominal pain, diarrhea/constipation, or urinary symptoms  No other complaints  She also reports that she is scheduled for an MRI on 03/12  States that the pain and symptoms are no different than prior  History provided by:  Patient  Headache   Pain location:  L parietal  Quality: throbbing   Radiates to:  Does not radiate  Onset quality:  Gradual  Timing:  Constant  Progression:  Worsening  Chronicity:  Chronic  Similar to prior headaches: yes    Relieved by:  None tried  Worsened by:  Nothing  Ineffective treatments: tramadol   Associated symptoms: no abdominal pain, no congestion, no cough, no diarrhea, no ear pain, no eye pain, no fever, no nausea, no neck pain, no neck stiffness, no numbness, no sore throat, no vomiting and no weakness        Prior to Admission Medications   Prescriptions Last Dose Informant Patient Reported? Taking?    LORazepam (ATIVAN) 1 mg tablet More than a month at Unknown time  No No   Sig: Take 1 tablet (1 mg total) by mouth as needed for anxiety   albuterol (PROVENTIL HFA,VENTOLIN HFA) 90 mcg/act inhaler Unknown at Unknown time Self No No   Sig: Inhale 2 puffs every 6 (six) hours as needed for wheezing   amLODIPine (NORVASC) 5 mg tablet 3/1/2020 at Unknown time  No Yes   Sig: TAKE ONE TABLET BY MOUTH DAILY   cetirizine (ZyrTEC) 10 mg tablet Unknown at Unknown time Self No No   Sig: Take 1 tablet (10 mg total) by mouth daily   chlorthalidone 25 mg tablet 3/1/2020 at Unknown time  No Yes   Sig: Take 1 tablet (25 mg total) by mouth daily   cyclobenzaprine (FLEXERIL) 10 mg tablet  Self No No   Sig: Take 1 tablet (10 mg total) by mouth daily as needed for muscle spasms for up to 30 days   diphenhydrAMINE (BENADRYL) 50 MG tablet   No No   Sig: Take one tablet 1 hour prior to contrast administration  estradiol (ESTRACE) 2 MG tablet 3/1/2020 at Unknown time  No Yes   Sig: Take 1 tablet (2 mg total) by mouth 2 (two) times a day   hydrocortisone (ANUSOL-HC) 25 mg suppository Unknown at Unknown time  No No   Sig: Insert 1 suppository (25 mg total) into the rectum 2 (two) times a day   methocarbamol (ROBAXIN) 500 mg tablet 2020 at Unknown time  No Yes   Sig: Take 1 tablet (500 mg total) by mouth daily as needed for muscle spasms   multivitamin (THERAGRAN) TABS 3/1/2020 at Unknown time Self Yes Yes   Sig: Take 1 tablet by mouth daily   ondansetron (ZOFRAN-ODT) 4 mg disintegrating tablet 3/1/2020 at Unknown time  No Yes   Sig: Take 1 tablet (4 mg total) by mouth every 8 (eight) hours as needed for nausea or vomiting   pantoprazole (PROTONIX) 40 mg tablet 2020 at Unknown time  No Yes   Sig: Take 1 tablet (40 mg total) by mouth daily   predniSONE 20 mg tablet Unknown at Unknown time  No No   Si tabs x 3 days, 3 tabs x 3 days, 2 tabs x 2 days, 1 tab x 2 days, then stop   Take in AM with food   Patient not taking: Reported on 2020   spironolactone (ALDACTONE) 100 mg tablet Unknown at Unknown time Self No No   Sig: Take 1 tablet (100 mg total) by mouth 2 (two) times a day   traMADol (ULTRAM) 50 mg tablet 3/1/2020 at Unknown time  No Yes   Sig: Take 1 tablet (50 mg total) by mouth every 6 (six) hours as needed for moderate pain      Facility-Administered Medications: None       Past Medical History:   Diagnosis Date    Anxiety     Asthma     seasonal    Hernia of abdominal wall     Hypertension     Seizures (HCC)     pseudoseizures since 2012       Past Surgical History:   Procedure Laterality Date    APPENDECTOMY      CHOLECYSTECTOMY      GASTRIC BYPASS      HERNIA REPAIR      x2       Family History   Problem Relation Age of Onset    Hypertension Mother     Diabetes Mother     Hypertension Sister      I have reviewed and agree with the history as documented  E-Cigarette/Vaping    E-Cigarette Use Never User      E-Cigarette/Vaping Substances     Social History     Tobacco Use    Smoking status: Never Smoker    Smokeless tobacco: Never Used   Substance Use Topics    Alcohol use: Yes     Alcohol/week: 3 0 standard drinks     Types: 3 Standard drinks or equivalent per week     Comment: 3 per month    Drug use: Never       Review of Systems   Constitutional: Negative for chills and fever  HENT: Negative for congestion, ear pain and sore throat  Eyes: Negative for pain and visual disturbance  Respiratory: Negative for cough, shortness of breath and wheezing  Cardiovascular: Negative for chest pain and leg swelling  Gastrointestinal: Negative for abdominal pain, diarrhea, nausea and vomiting  Genitourinary: Negative for dysuria, frequency, hematuria and urgency  Musculoskeletal: Negative for neck pain and neck stiffness  Skin: Negative for rash and wound  Neurological: Positive for headaches  Negative for weakness and numbness  Psychiatric/Behavioral: Negative for agitation and confusion  All other systems reviewed and are negative  Physical Exam  Physical Exam   Constitutional: She is oriented to person, place, and time  She appears well-developed and well-nourished  HENT:   Head: Normocephalic and atraumatic     Mouth/Throat: Oropharynx is clear and moist    Eyes: Pupils are equal, round, and reactive to light  EOM are normal    Neck: Normal range of motion  Neck supple  Cardiovascular: Normal rate and regular rhythm  Pulmonary/Chest: Effort normal and breath sounds normal  No stridor  No respiratory distress  She has no wheezes  She has no rales  Abdominal: Soft  Bowel sounds are normal  She exhibits no distension  There is no tenderness  Musculoskeletal: Normal range of motion  Neurological: She is alert and oriented to person, place, and time  She has normal strength  No cranial nerve deficit or sensory deficit  No focal deficits   Skin: Skin is warm and dry  Nursing note and vitals reviewed        Vital Signs  ED Triage Vitals [03/01/20 1151]   Temperature Pulse Respirations Blood Pressure SpO2   97 8 °F (36 6 °C) 88 18 158/85 95 %      Temp Source Heart Rate Source Patient Position - Orthostatic VS BP Location FiO2 (%)   Oral Monitor Lying Right arm --      Pain Score       8           Vitals:    03/01/20 1151 03/01/20 1230 03/01/20 1415 03/01/20 1500   BP: 158/85 170/96 148/87 140/82   Pulse: 88 70 69 72   Patient Position - Orthostatic VS: Lying            Visual Acuity  Visual Acuity      Most Recent Value   L Pupil Size (mm)  3   R Pupil Size (mm)  3          ED Medications  Medications   sodium chloride 0 9 % bolus 1,000 mL (0 mL Intravenous Stopped 3/1/20 1405)   ketorolac (TORADOL) injection 15 mg (15 mg Intravenous Given 3/1/20 1256)   metoclopramide (REGLAN) injection 10 mg (10 mg Intravenous Given 3/1/20 1302)   diphenhydrAMINE (BENADRYL) injection 50 mg (50 mg Intravenous Given 3/1/20 1257)       Diagnostic Studies  Results Reviewed     Procedure Component Value Units Date/Time    Urine Microscopic [[id number]]  (Abnormal) Collected:  03/01/20 1406    Lab Status:  Final result Specimen:  Urine, Clean Catch Updated:  03/01/20 1434     RBC, UA 0-1 /hpf      WBC, UA None Seen /hpf      Epithelial Cells Occasional /hpf Bacteria, UA Occasional /hpf     UA w Reflex to Microscopic w Reflex to Culture [[id number]]  (Abnormal) Collected:  03/01/20 1406    Lab Status:  Final result Specimen:  Urine, Clean Catch Updated:  03/01/20 1418     Color, UA Yellow     Clarity, UA Clear     Specific Gravity, UA 1 015     pH, UA 8 0     Leukocytes, UA Negative     Nitrite, UA Negative     Protein, UA Trace mg/dl      Glucose, UA Negative mg/dl      Ketones, UA Negative mg/dl      Urobilinogen, UA 0 2 E U /dl      Bilirubin, UA Negative     Blood, UA Negative    Basic metabolic panel [[id number]] Collected:  03/01/20 1253    Lab Status:  Final result Specimen:  Blood from Arm, Right Updated:  03/01/20 1309     Sodium 140 mmol/L      Potassium 3 6 mmol/L      Chloride 100 mmol/L      CO2 31 mmol/L      ANION GAP 9 mmol/L      BUN 13 mg/dL      Creatinine 0 97 mg/dL      Glucose 121 mg/dL      Calcium 9 2 mg/dL      eGFR --    Narrative:       Notes:     1  eGFR calculation is only valid for adults 18 years and older  2  EGFR calculation cannot be performed for patients who are transgender, non-binary, or whose legal sex, sex at birth, and gender identity differ      CBC and differential [[id number]]  (Abnormal) Collected:  03/01/20 1253    Lab Status:  Final result Specimen:  Blood from Arm, Right Updated:  03/01/20 1302     WBC 12 55 Thousand/uL      RBC 5 46 Million/uL      Hemoglobin 15 9 g/dL      Hematocrit 50 5 %      MCV 93 fL      MCH 29 1 pg      MCHC 31 5 g/dL      RDW 13 1 %      MPV 9 5 fL      Platelets 902 Thousands/uL      nRBC 0 /100 WBCs      Neutrophils Relative 84 %      Immat GRANS % 1 %      Lymphocytes Relative 11 %      Monocytes Relative 4 %      Eosinophils Relative 0 %      Basophils Relative 0 %      Neutrophils Absolute 10 51 Thousands/µL      Immature Grans Absolute 0 07 Thousand/uL      Lymphocytes Absolute 1 35 Thousands/µL      Monocytes Absolute 0 55 Thousand/µL      Eosinophils Absolute 0 04 Thousand/µL      Basophils Absolute 0 03 Thousands/µL                  No orders to display              Procedures  Procedures         ED Course  ED Course as of Mar 02 2016   Sun Mar 01, 2020   1235 Hx of chronic neck pain- hx of left side headache from it  On prednisone- finished Thursday, tramadol  Generalized weakness, lastnight- throbbing pain  Gradually worsening  Nausea and vomiting x 1  Following with a neurosurgeon  Has an MRI scheduled on 3/12                                   [facility]  Number of Diagnoses or Management Options  Chronic neck pain: new and requires workup  Nausea and vomiting: new and requires workup  Tension headache: new and requires workup  Diagnosis management comments: Patient with acute on chronic head and neck pain as well as generalized weakness  Will do basic blood work and give fluids and migraine cocktail  Will reassess for dispo  Patient reevaluated and feels improved  Patient updated on results of tests  Discharge instructions given including medications, follow-up, and return precautions  Patient demonstrates verbal understanding and agrees with plan         Amount and/or Complexity of Data Reviewed  Clinical lab tests: ordered and reviewed  Tests in the radiology section of CPT®: ordered and reviewed  Tests in the medicine section of CPT®: ordered and reviewed  Discussion of test results with the performing providers: yes  Decide to obtain previous medical records or to obtain history from someone other than the patient: yes  Obtain history from someone other than the patient: yes  Review and summarize past medical records: yes  Discuss the patient with other providers: yes  Independent visualization of images, tracings, or specimens: yes    Patient Progress  Patient progress: improved        Disposition  Final diagnoses:   Chronic neck pain   Tension headache   Nausea and vomiting     Time reflects when diagnosis was documented in both MDM as applicable and the Disposition within this note     Time User Action Codes Description Comment    3/1/2020  2:55 PM Edgardo Second Add [M54 2,  G89 29] Chronic neck pain     3/1/2020  2:55 PM Edgardo Second Add [X01 954] Tension headache     3/1/2020  2:55 PM IndraGulshano Pack A Add [R11 2] Nausea and vomiting       ED Disposition     ED Disposition Condition Date/Time Comment    Discharge Stable Sun Mar 1, 2020  2:55 PM [facility] [address]  discharge to home/self care  Follow-up Information     Follow up With Specialties Details Why Contact Info Additional [address], [place] Internal Medicine Call in 1 day For follow-up within 2-3 days  [address],[facility]  [address] Emergency Department Emergency Medicine Go to  Immediately for any new or worsening symptoms   [address] [facility] [address] ED, [address] [place], [place], [address]          Discharge Medication List as of 3/1/2020  2:56 PM      CONTINUE these medications which have NOT CHANGED    Details   amLODIPine (NORVASC) 5 mg tablet TAKE ONE TABLET BY MOUTH DAILY, Normal      chlorthalidone 25 mg tablet Take 1 tablet (25 mg total) by mouth daily, Starting Mon 2/3/2020, Normal      estradiol (ESTRACE) 2 MG tablet Take 1 tablet (2 mg total) by mouth 2 (two) times a day, Starting Mon 2/3/2020, Until Sun 5/3/2020, Normal      methocarbamol (ROBAXIN) 500 mg tablet Take 1 tablet (500 mg total) by mouth daily as needed for muscle spasms, Starting Fri 6/7/2019, Normal      multivitamin (THERAGRAN) TABS Take 1 tablet by mouth daily, Historical Med      ondansetron (ZOFRAN-ODT) 4 mg disintegrating tablet Take 1 tablet (4 mg total) by mouth every 8 (eight) hours as needed for nausea or vomiting, Starting Thu 10/17/2019, Normal      pantoprazole (PROTONIX) 40 mg tablet Take 1 tablet (40 mg total) by mouth daily, Starting Mon 1/20/2020, Normal      traMADol (ULTRAM) 50 mg tablet Take 1 tablet (50 mg total) by mouth every 6 (six) hours as needed for moderate pain, Starting Mon 2/3/2020, Until Fri 4/3/2020, Normal      albuterol (PROVENTIL HFA,VENTOLIN HFA) 90 mcg/act inhaler Inhale 2 puffs every 6 (six) hours as needed for wheezing, Starting Wed 1/9/2019, Print      cetirizine (ZyrTEC) 10 mg tablet Take 1 tablet (10 mg total) by mouth daily, Starting Wed 1/9/2019, Print      cyclobenzaprine (FLEXERIL) 10 mg tablet Take 1 tablet (10 mg total) by mouth daily as needed for muscle spasms for up to 30 days, Starting Wed 5/1/2019, Until Mon 2/17/2020, Normal      diphenhydrAMINE (BENADRYL) 50 MG tablet Take one tablet 1 hour prior to contrast administration  , Normal      hydrocortisone (ANUSOL-HC) 25 mg suppository Insert 1 suppository (25 mg total) into the rectum 2 (two) times a day, Starting Thu 10/3/2019, Normal      LORazepam (ATIVAN) 1 mg tablet Take 1 tablet (1 mg total) by mouth as needed for anxiety, Starting Mon 2/3/2020, Until Fri 4/3/2020, Normal      predniSONE 20 mg tablet 4 tabs x 3 days, 3 tabs x 3 days, 2 tabs x 2 days, 1 tab x 2 days, then stop  Take in AM with food, Normal      spironolactone (ALDACTONE) 100 mg tablet Take 1 tablet (100 mg total) by mouth 2 (two) times a day, Starting Tue 3/19/2019, Normal      gabapentin (NEURONTIN) 300 mg capsule Take 300 mg by mouth daily at bedtime, Historical Med           No discharge procedures on file      PDMP Review       Value Time User    PDMP Reviewed  Yes 2/3/2020  9:41 AM Nabeel Alexis MD          ED Provider  Electronically Signed by           Leonor Qureshi DO  03/02/20 2016

## 2020-03-04 LAB
ATRIAL RATE: 85 BPM
ATRIAL RATE: 91 BPM
P AXIS: 54 DEGREES
P AXIS: 58 DEGREES
PR INTERVAL: 160 MS
PR INTERVAL: 166 MS
QRS AXIS: 2 DEGREES
QRS AXIS: 5 DEGREES
QRSD INTERVAL: 90 MS
QRSD INTERVAL: 96 MS
QT INTERVAL: 360 MS
QT INTERVAL: 384 MS
QTC INTERVAL: 442 MS
QTC INTERVAL: 456 MS
T WAVE AXIS: 47 DEGREES
T WAVE AXIS: 62 DEGREES
VENTRICULAR RATE: 85 BPM
VENTRICULAR RATE: 91 BPM

## 2020-03-04 PROCEDURE — 93010 ELECTROCARDIOGRAM REPORT: CPT | Performed by: INTERNAL MEDICINE

## 2020-03-04 NOTE — DISCHARGE INSTRUCTIONS
Avoid caffeine and stay hydrated  Follow-up with your family doctor in 3 days for recheck  Return to ER with chest pain or worsening symptoms

## 2020-03-04 NOTE — ED PROVIDER NOTES
History  Chief Complaint   Patient presents with    Palpitations     Patient reports feeling palpitations, smartwatch showed heart rate in the 130's  Patient reports cramping in left shoulder/arm and numbness in left face  66-year-old transgender female with history of anxiety, pseudoseizures, chronic back pain, and hypertension presenting via EMS for evaluation of palpitations that began about 2 hours ago  Patient reports having chronic neck pain and was seen here 2 days ago for her neck pain  Patient was given a migraine cocktail at that time felt significantly better  Patient states she was on the phone tonight and holding the cellphone in her left hand  Patient states her left hand began cramping with associated paresthesias  Her neck pain also began worsening at that time  She noticed that her heart was racing and checked her fitbit and her heart rate was 128 which prompted her to call EMS  Patient took Flexeril and lorazepam prior to arrival because she thought her symptoms might be related to anxiety  Patient is feeling better on arrival and palpitations have resolved  She denies chest pain, shortness of breath, calf pain, dizziness, syncope, diaphoresis, abdominal pain, nausea, vomiting, fevers  Patient is scheduled to have an MRI cervical and lumbar spine done next week  History provided by:  Patient and medical records   used: No    Palpitations   Palpitations quality:  Regular  Onset quality:  Sudden  Timing:  Constant  Progression:  Improving  Chronicity:  New  Context: anxiety    Context: not dehydration and not exercise    Relieved by: Lorazepam, Flexeril    Worsened by:  Nothing  Ineffective treatments:  None tried  Associated symptoms: numbness (Paresthesias in left hand)    Associated symptoms: no back pain, no chest pain, no chest pressure, no cough, no diaphoresis, no dizziness, no leg pain, no lower extremity edema, no malaise/fatigue, no nausea, no near-syncope, no shortness of breath, no syncope, no vomiting and no weakness        Prior to Admission Medications   Prescriptions Last Dose Informant Patient Reported? Taking? LORazepam (ATIVAN) 1 mg tablet 3/3/2020 at Unknown time  No Yes   Sig: Take 1 tablet (1 mg total) by mouth as needed for anxiety   albuterol (PROVENTIL HFA,VENTOLIN HFA) 90 mcg/act inhaler More than a month at Unknown time Self No No   Sig: Inhale 2 puffs every 6 (six) hours as needed for wheezing   amLODIPine (NORVASC) 5 mg tablet 3/3/2020 at Unknown time  No Yes   Sig: TAKE ONE TABLET BY MOUTH DAILY   cetirizine (ZyrTEC) 10 mg tablet Not Taking at Unknown time Self No No   Sig: Take 1 tablet (10 mg total) by mouth daily   Patient not taking: Reported on 3/3/2020   chlorthalidone 25 mg tablet 3/3/2020 at Unknown time  No Yes   Sig: Take 1 tablet (25 mg total) by mouth daily   cyclobenzaprine (FLEXERIL) 10 mg tablet  Self No No   Sig: Take 1 tablet (10 mg total) by mouth daily as needed for muscle spasms for up to 30 days   diphenhydrAMINE (BENADRYL) 50 MG tablet More than a month at Unknown time  No No   Sig: Take one tablet 1 hour prior to contrast administration     estradiol (ESTRACE) 2 MG tablet 3/3/2020 at Unknown time  No Yes   Sig: Take 1 tablet (2 mg total) by mouth 2 (two) times a day   gabapentin (NEURONTIN) 100 mg capsule Past Week at Unknown time  No Yes   Sig: Take 1 tab AM and afternoon daily   gabapentin (NEURONTIN) 300 mg capsule Past Week at Unknown time  No Yes   Sig: Take 1 capsule (300 mg total) by mouth daily at bedtime   hydrocortisone (ANUSOL-HC) 25 mg suppository More than a month at Unknown time  No No   Sig: Insert 1 suppository (25 mg total) into the rectum 2 (two) times a day   methocarbamol (ROBAXIN) 500 mg tablet 3/3/2020 at Unknown time  No Yes   Sig: Take 1 tablet (500 mg total) by mouth daily as needed for muscle spasms   multivitamin (THERAGRAN) TABS 3/3/2020 at Unknown time Self Yes Yes   Sig: Take 1 tablet by mouth daily   ondansetron (ZOFRAN-ODT) 4 mg disintegrating tablet Past Month at Unknown time  No Yes   Sig: Take 1 tablet (4 mg total) by mouth every 8 (eight) hours as needed for nausea or vomiting   pantoprazole (PROTONIX) 40 mg tablet 3/3/2020 at Unknown time  No Yes   Sig: Take 1 tablet (40 mg total) by mouth daily   predniSONE 20 mg tablet Not Taking at Unknown time  No No   Si tabs x 3 days, 3 tabs x 3 days, 2 tabs x 2 days, 1 tab x 2 days, then stop  Take in AM with food   Patient not taking: Reported on 2020   spironolactone (ALDACTONE) 100 mg tablet Past Month at Unknown time Self No Yes   Sig: Take 1 tablet (100 mg total) by mouth 2 (two) times a day   traMADol (ULTRAM) 50 mg tablet Past Week at Unknown time  No Yes   Sig: Take 1 tablet (50 mg total) by mouth every 6 (six) hours as needed for moderate pain      Facility-Administered Medications: None       Past Medical History:   Diagnosis Date    Anxiety     Asthma     seasonal    Hernia of abdominal wall     Hypertension     Seizures (HCC)     pseudoseizures since        Past Surgical History:   Procedure Laterality Date    APPENDECTOMY      CHOLECYSTECTOMY      GASTRIC BYPASS      HERNIA REPAIR      x2       Family History   Problem Relation Age of Onset    Hypertension Mother     Diabetes Mother     Hypertension Sister      I have reviewed and agree with the history as documented  E-Cigarette/Vaping    E-Cigarette Use Never User      E-Cigarette/Vaping Substances     Social History     Tobacco Use    Smoking status: Never Smoker    Smokeless tobacco: Never Used   Substance Use Topics    Alcohol use: Yes     Alcohol/week: 3 0 standard drinks     Types: 3 Standard drinks or equivalent per week     Comment: 3 per month    Drug use: Never       Review of Systems   Constitutional: Negative for chills, diaphoresis, fever and malaise/fatigue  HENT: Negative for congestion and drooling      Eyes: Negative for discharge and redness  Respiratory: Negative for cough, shortness of breath, wheezing and stridor  Cardiovascular: Positive for palpitations  Negative for chest pain, leg swelling, syncope and near-syncope  Gastrointestinal: Negative for abdominal pain, nausea and vomiting  Genitourinary: Negative for difficulty urinating and dysuria  Musculoskeletal: Positive for neck pain  Negative for back pain and neck stiffness  Skin: Negative for color change and rash  Allergic/Immunologic: Negative for immunocompromised state  Neurological: Positive for numbness (Paresthesias in left hand)  Negative for dizziness, syncope, facial asymmetry, weakness and light-headedness  Psychiatric/Behavioral: Negative for confusion  All other systems reviewed and are negative  Physical Exam  Physical Exam   Constitutional: She appears well-developed and well-nourished  No distress  Non-toxic appearing   HENT:   Head: Normocephalic and atraumatic  Right Ear: External ear normal    Left Ear: External ear normal    Mouth/Throat: Oropharynx is clear and moist    Eyes: Conjunctivae are normal  Right eye exhibits no discharge  Left eye exhibits no discharge  No scleral icterus  Neck: Normal range of motion  Neck supple  Palpable and visible spasm of cervical portion of left trapezius muscle  No midline cervical tenderness present  No meningismus    Cardiovascular: Normal rate, regular rhythm and normal heart sounds  No murmur heard  Pulses:       Radial pulses are 2+ on the right side, and 2+ on the left side  HR 97 on exam   Pulmonary/Chest: Effort normal and breath sounds normal  No stridor  No respiratory distress  She has no wheezes  She has no rales  Abdominal: Soft  Bowel sounds are normal  She exhibits no distension  There is no tenderness  There is no guarding  Musculoskeletal: Normal range of motion  She exhibits no deformity  Lymphadenopathy:     She has no cervical adenopathy  Neurological: She is alert  She is not disoriented  GCS eye subscore is 4  GCS verbal subscore is 5  GCS motor subscore is 6    4/5  strength on left  Sensation in decreased throughout left hand  Does not seem to be in a dermatomal panel   Skin: Skin is warm and dry  She is not diaphoretic  Psychiatric: She has a normal mood and affect  Her behavior is normal    Nursing note and vitals reviewed  Vital Signs  ED Triage Vitals [03/03/20 2156]   Temperature Pulse Respirations Blood Pressure SpO2   98 °F (36 7 °C) (!) 106 18 (!) 180/98 99 %      Temp Source Heart Rate Source Patient Position - Orthostatic VS BP Location FiO2 (%)   Oral Monitor Sitting Right arm --      Pain Score       No Pain           Vitals:    03/03/20 2156 03/03/20 2313 03/03/20 2330   BP: (!) 180/98 115/63 118/66   Pulse: (!) 106 81 79   Patient Position - Orthostatic VS: Sitting Lying Lying         Visual Acuity      ED Medications  Medications - No data to display    Diagnostic Studies  Results Reviewed     Procedure Component Value Units Date/Time    TSH [[id number]]  (Normal) Collected:  03/03/20 2223    Lab Status:  Final result Specimen:  Blood from Arm, Right Updated:  03/03/20 2307     TSH 3RD GENERATON 2 978 uIU/mL     Narrative:       Patients undergoing fluorescein dye angiography may retain small amounts of fluorescein in the body for 48-72 hours post procedure  Samples containing fluorescein can produce falsely depressed TSH values  If the patient had this procedure,a specimen should be resubmitted post fluorescein clearance        Magnesium [[id number]]  (Normal) Collected:  03/03/20 2223    Lab Status:  Final result Specimen:  Blood from Arm, Right Updated:  03/03/20 2307     Magnesium 1 9 mg/dL     Comprehensive metabolic panel [[id number]] Collected:  03/03/20 2223    Lab Status:  Final result Specimen:  Blood from Arm, Right Updated:  03/03/20 2256     Sodium 137 mmol/L      Potassium 3 5 mmol/L      Chloride 101 mmol/L CO2 27 mmol/L      ANION GAP 9 mmol/L      BUN 18 mg/dL      Creatinine 1 17 mg/dL      Glucose 139 mg/dL      Calcium 8 8 mg/dL      AST 14 U/L      ALT 13 U/L      Alkaline Phosphatase 60 U/L      Total Protein 7 8 g/dL      Albumin 3 7 g/dL      Total Bilirubin 0 60 mg/dL      eGFR --    Narrative:       Notes:     1  eGFR calculation is only valid for adults 18 years and older  2  EGFR calculation cannot be performed for patients who are transgender, non-binary, or whose legal sex, sex at birth, and gender identity differ      Troponin I [[id number]]  (Normal) Collected:  03/03/20 2223    Lab Status:  Final result Specimen:  Blood from Arm, Right Updated:  03/03/20 2254     Troponin I <0 02 ng/mL     CBC and differential [[id number]]  (Abnormal) Collected:  03/03/20 2223    Lab Status:  Final result Specimen:  Blood from Arm, Right Updated:  03/03/20 2241     WBC 11 65 Thousand/uL      RBC 4 92 Million/uL      Hemoglobin 14 7 g/dL      Hematocrit 44 7 %      MCV 91 fL      MCH 29 9 pg      MCHC 32 9 g/dL      RDW 13 0 %      MPV 9 2 fL      Platelets 643 Thousands/uL      nRBC 0 /100 WBCs      Neutrophils Relative 75 %      Immat GRANS % 0 %      Lymphocytes Relative 18 %      Monocytes Relative 6 %      Eosinophils Relative 1 %      Basophils Relative 0 %      Neutrophils Absolute 8 76 Thousands/µL      Immature Grans Absolute 0 04 Thousand/uL      Lymphocytes Absolute 2 04 Thousands/µL      Monocytes Absolute 0 67 Thousand/µL      Eosinophils Absolute 0 10 Thousand/µL      Basophils Absolute 0 04 Thousands/µL                  No orders to display              Procedures  ECG 12 Lead Documentation Only  Date/Time: 3/3/2020 11:16 PM  Performed by: Ricardo Arreaga PA-C  Authorized by: Ricardo Arreaga PA-C     Indications / Diagnosis:  Palpitations  ECG reviewed by me, the ED Provider: yes    Patient location:  ED  Previous ECG:     Previous ECG:  Compared to current    Comparison ECG info:  3/1/20 Similarity:  No change    Comparison to cardiac monitor: Yes    Interpretation:     Interpretation: normal    Rate:     ECG rate:  91    ECG rate assessment: normal    Rhythm:     Rhythm: sinus rhythm    Ectopy:     Ectopy: none    QRS:     QRS axis:  Normal    QRS intervals:  Normal  Conduction:     Conduction: normal    ST segments:     ST segments:  Normal  T waves:     T waves: normal               ED Course                 MDM  Number of Diagnoses or Management Options  Chronic neck pain: new and does not require workup  Palpitations: new and requires workup  Diagnosis management comments: 42yo female with a history of anxiety presenting for evaluation of palpitations  Palpitations started while she was having cramping and pain in neck with associated pain in her left hand  She checked her FitBit and her heart rate was 128 at that time  She took Ativan and Flexeril prior to arrival and now feels better  HR is 97 on initial evaluation  No chest pain and shortness of breath  There is palpable spasm of left cervical portion of trapezius muscle  Differential diagnosis includes but is not limited to: anxiety, palpitations related to pain, ACS, thyroid dysfunction, electrolyte abnormality, no shortness of breath or chest pain to suggest PE    Initial ED plan: Check labs including CBC, CMP, TSH, Mag, troponin, and EKG  No indication for cervical imaging at this time as she is scheduled to have an MRI done next week  Final assessment: Labs overall unremarkable  Electrolytes, TSH, and renal function normal  Troponin negative and EKG reveals NSR without ST changes  Patient re-evaluated and feels significantly better  Blood pressure improved to 118/66 and heart rate decreased to 79  Suspect palpitations were related to pain  No indications for admission at this time  Advised close PCP follow-up for recheck in 3-4 days  ED return precautions discussed including chest pain   Patient expressed understanding and is agreeable to plan  Patient discharged in stable condition  Amount and/or Complexity of Data Reviewed  Clinical lab tests: ordered and reviewed  Tests in the medicine section of CPT®: ordered and reviewed  Review and summarize past medical records: yes  Independent visualization of images, tracings, or specimens: yes    Risk of Complications, Morbidity, and/or Mortality  Presenting problems: moderate  Diagnostic procedures: moderate  Management options: moderate    Patient Progress  Patient progress: stable        Disposition  Final diagnoses:   Palpitations   Chronic neck pain     Time reflects when diagnosis was documented in both MDM as applicable and the Disposition within this note     Time User Action Codes Description Comment    3/3/2020 11:24  [employer] [place] [R00 2] Palpitations     3/3/2020 11:24  [employer] [place] [M54 2,  G89 29] Chronic neck pain       ED Disposition     ED Disposition Condition Date/Time Comment    Discharge Stable Tue Mar 3, 2020 11:24 PM Diaz Ware  discharge to home/self care              Follow-up Information     Follow up With Specialties Details Why Contact Info Additional Information    Roz Edmonds MD Internal Medicine In 3 days  [address] [facility]  [phone]       [facility] Emergency Medicine  If symptoms worsen [address] [facility] [id number] [phone] [address] [facility], [address], [address]          Discharge Medication List as of 3/3/2020 11:25 PM      CONTINUE these medications which have NOT CHANGED    Details   albuterol (PROVENTIL HFA,VENTOLIN HFA) 90 mcg/act inhaler Inhale 2 puffs every 6 (six) hours as needed for wheezing, Starting Wed 1/9/2019, Print      amLODIPine (NORVASC) 5 mg tablet TAKE ONE TABLET BY MOUTH DAILY, Normal      cetirizine (ZyrTEC) 10 mg tablet Take 1 tablet (10 mg total) by mouth daily, Starting Wed 1/9/2019, Print      chlorthalidone 25 mg tablet Take 1 tablet (25 mg total) by mouth daily, Starting Mon 2/3/2020, Normal      cyclobenzaprine (FLEXERIL) 10 mg tablet Take 1 tablet (10 mg total) by mouth daily as needed for muscle spasms for up to 30 days, Starting Wed 5/1/2019, Until Mon 2/17/2020, Normal      diphenhydrAMINE (BENADRYL) 50 MG tablet Take one tablet 1 hour prior to contrast administration  , Normal      estradiol (ESTRACE) 2 MG tablet Take 1 tablet (2 mg total) by mouth 2 (two) times a day, Starting Mon 2/3/2020, Until Sun 5/3/2020, Normal      !! gabapentin (NEURONTIN) 100 mg capsule Take 1 tab AM and afternoon daily, Normal      !! gabapentin (NEURONTIN) 300 mg capsule Take 1 capsule (300 mg total) by mouth daily at bedtime, Starting Mon 3/2/2020, Normal      hydrocortisone (ANUSOL-HC) 25 mg suppository Insert 1 suppository (25 mg total) into the rectum 2 (two) times a day, Starting Thu 10/3/2019, Normal      LORazepam (ATIVAN) 1 mg tablet Take 1 tablet (1 mg total) by mouth as needed for anxiety, Starting Mon 2/3/2020, Until Fri 4/3/2020, Normal      methocarbamol (ROBAXIN) 500 mg tablet Take 1 tablet (500 mg total) by mouth daily as needed for muscle spasms, Starting Fri 6/7/2019, Normal      multivitamin (THERAGRAN) TABS Take 1 tablet by mouth daily, Historical Med      ondansetron (ZOFRAN-ODT) 4 mg disintegrating tablet Take 1 tablet (4 mg total) by mouth every 8 (eight) hours as needed for nausea or vomiting, Starting Thu 10/17/2019, Normal      pantoprazole (PROTONIX) 40 mg tablet Take 1 tablet (40 mg total) by mouth daily, Starting Mon 1/20/2020, Normal      predniSONE 20 mg tablet 4 tabs x 3 days, 3 tabs x 3 days, 2 tabs x 2 days, 1 tab x 2 days, then stop   Take in AM with food, Normal      spironolactone (ALDACTONE) 100 mg tablet Take 1 tablet (100 mg total) by mouth 2 (two) times a day, Starting Tue 3/19/2019, Normal      traMADol (ULTRAM) 50 mg tablet Take 1 tablet (50 mg total) by mouth every 6 (six) hours as needed for moderate pain, Starting Mon 2/3/2020, Until Fri 4/3/2020, Normal       !! - Potential duplicate medications found  Please discuss with provider  No discharge procedures on file      PDMP Review       Value Time User    PDMP Reviewed  Yes 2/3/2020  9:41 AM Phuc Shay MD          ED Provider  Electronically Signed by           Nidia Garcia PA-C  03/04/20 101

## 2020-03-06 ENCOUNTER — APPOINTMENT (EMERGENCY)
Dept: CT IMAGING | Facility: HOSPITAL | Age: 40
DRG: 552 | End: 2020-03-06
Payer: COMMERCIAL

## 2020-03-06 ENCOUNTER — DOCUMENTATION (OUTPATIENT)
Dept: NEUROLOGY | Facility: CLINIC | Age: 40
End: 2020-03-06

## 2020-03-06 ENCOUNTER — HOSPITAL ENCOUNTER (INPATIENT)
Facility: HOSPITAL | Age: 40
LOS: 4 days | Discharge: HOME | DRG: 552 | End: 2020-03-10
Attending: EMERGENCY MEDICINE | Admitting: INTERNAL MEDICINE
Payer: COMMERCIAL

## 2020-03-06 DIAGNOSIS — R29.2 HYPERREFLEXIA: ICD-10-CM

## 2020-03-06 DIAGNOSIS — Z90.3 HISTORY OF SLEEVE GASTRECTOMY: ICD-10-CM

## 2020-03-06 DIAGNOSIS — M62.81 LEFT-SIDED MUSCLE WEAKNESS: Primary | ICD-10-CM

## 2020-03-06 DIAGNOSIS — M54.16 RADICULOPATHY, LUMBAR REGION: ICD-10-CM

## 2020-03-06 DIAGNOSIS — I10 ESSENTIAL HYPERTENSION: ICD-10-CM

## 2020-03-06 DIAGNOSIS — R20.2 FACIAL PARESTHESIA: ICD-10-CM

## 2020-03-06 DIAGNOSIS — M48.02 SPINAL STENOSIS IN CERVICAL REGION: ICD-10-CM

## 2020-03-06 DIAGNOSIS — R20.0 LEFT SIDED NUMBNESS: ICD-10-CM

## 2020-03-06 PROBLEM — R53.1 LEFT-SIDED WEAKNESS: Status: ACTIVE | Noted: 2020-03-06

## 2020-03-06 LAB
ALBUMIN SERPL BCP-MCNC: 4 G/DL (ref 3.5–5)
ALP SERPL-CCNC: 66 U/L (ref 46–116)
ALT SERPL W P-5'-P-CCNC: 13 U/L (ref 12–78)
ANION GAP SERPL CALCULATED.3IONS-SCNC: 12 MMOL/L (ref 4–13)
APTT PPP: 28 SECONDS (ref 23–37)
AST SERPL W P-5'-P-CCNC: 13 U/L (ref 5–45)
BASOPHILS # BLD AUTO: 0.05 THOUSANDS/ΜL (ref 0–0.1)
BASOPHILS NFR BLD AUTO: 1 % (ref 0–1)
BILIRUB SERPL-MCNC: 0.61 MG/DL (ref 0.2–1)
BUN SERPL-MCNC: 14 MG/DL (ref 5–25)
CALCIUM SERPL-MCNC: 9.1 MG/DL (ref 8.3–10.1)
CHLORIDE SERPL-SCNC: 102 MMOL/L (ref 100–108)
CO2 SERPL-SCNC: 25 MMOL/L (ref 21–32)
CREAT SERPL-MCNC: 1.03 MG/DL (ref 0.6–1.3)
EOSINOPHIL # BLD AUTO: 0.06 THOUSAND/ΜL (ref 0–0.61)
EOSINOPHIL NFR BLD AUTO: 1 % (ref 0–6)
ERYTHROCYTE [DISTWIDTH] IN BLOOD BY AUTOMATED COUNT: 12.7 % (ref 11.6–15.1)
GLUCOSE SERPL-MCNC: 93 MG/DL (ref 65–140)
HCT VFR BLD AUTO: 45.5 % (ref 36.5–46.1)
HGB BLD-MCNC: 15.3 G/DL (ref 12–15.4)
IMM GRANULOCYTES # BLD AUTO: 0.03 THOUSAND/UL (ref 0–0.2)
IMM GRANULOCYTES NFR BLD AUTO: 0 % (ref 0–2)
INR PPP: 1.04 (ref 0.84–1.19)
LYMPHOCYTES # BLD AUTO: 1.98 THOUSANDS/ΜL (ref 0.6–4.47)
LYMPHOCYTES NFR BLD AUTO: 18 % (ref 14–44)
MAGNESIUM SERPL-MCNC: 2.1 MG/DL (ref 1.6–2.6)
MCH RBC QN AUTO: 30.1 PG (ref 26.8–34.3)
MCHC RBC AUTO-ENTMCNC: 33.6 G/DL (ref 31.4–37.4)
MCV RBC AUTO: 90 FL (ref 82–98)
MONOCYTES # BLD AUTO: 0.64 THOUSAND/ΜL (ref 0.17–1.22)
MONOCYTES NFR BLD AUTO: 6 % (ref 4–12)
NEUTROPHILS # BLD AUTO: 8.25 THOUSANDS/ΜL (ref 1.85–7.62)
NEUTS SEG NFR BLD AUTO: 74 % (ref 43–75)
NRBC BLD AUTO-RTO: 0 /100 WBCS
PLATELET # BLD AUTO: 259 THOUSANDS/UL (ref 149–390)
PMV BLD AUTO: 9.4 FL (ref 8.9–12.7)
POTASSIUM SERPL-SCNC: 3.7 MMOL/L (ref 3.5–5.3)
PROT SERPL-MCNC: 8.2 G/DL (ref 6.4–8.2)
PROTHROMBIN TIME: 13 SECONDS (ref 11.6–14.5)
RBC # BLD AUTO: 5.08 MILLION/UL (ref 3.88–5.12)
SODIUM SERPL-SCNC: 139 MMOL/L (ref 136–145)
WBC # BLD AUTO: 11.01 THOUSAND/UL (ref 4.31–10.16)

## 2020-03-06 PROCEDURE — 93005 ELECTROCARDIOGRAM TRACING: CPT

## 2020-03-06 PROCEDURE — 99223 1ST HOSP IP/OBS HIGH 75: CPT | Performed by: PHYSICIAN ASSISTANT

## 2020-03-06 PROCEDURE — 99285 EMERGENCY DEPT VISIT HI MDM: CPT | Performed by: EMERGENCY MEDICINE

## 2020-03-06 PROCEDURE — 85610 PROTHROMBIN TIME: CPT | Performed by: EMERGENCY MEDICINE

## 2020-03-06 PROCEDURE — 83036 HEMOGLOBIN GLYCOSYLATED A1C: CPT | Performed by: PHYSICIAN ASSISTANT

## 2020-03-06 PROCEDURE — 70450 CT HEAD/BRAIN W/O DYE: CPT

## 2020-03-06 PROCEDURE — 80053 COMPREHEN METABOLIC PANEL: CPT | Performed by: EMERGENCY MEDICINE

## 2020-03-06 PROCEDURE — 85730 THROMBOPLASTIN TIME PARTIAL: CPT | Performed by: EMERGENCY MEDICINE

## 2020-03-06 PROCEDURE — 85025 COMPLETE CBC W/AUTO DIFF WBC: CPT | Performed by: EMERGENCY MEDICINE

## 2020-03-06 PROCEDURE — 83735 ASSAY OF MAGNESIUM: CPT | Performed by: EMERGENCY MEDICINE

## 2020-03-06 PROCEDURE — 36415 COLL VENOUS BLD VENIPUNCTURE: CPT | Performed by: EMERGENCY MEDICINE

## 2020-03-06 PROCEDURE — 99285 EMERGENCY DEPT VISIT HI MDM: CPT

## 2020-03-06 RX ORDER — ESTRADIOL 1 MG/1
2 TABLET ORAL DAILY
Status: DISCONTINUED | OUTPATIENT
Start: 2020-03-07 | End: 2020-03-10 | Stop reason: HOSPADM

## 2020-03-06 RX ORDER — DOCUSATE SODIUM 100 MG/1
100 CAPSULE, LIQUID FILLED ORAL 2 TIMES DAILY PRN
Status: DISCONTINUED | OUTPATIENT
Start: 2020-03-06 | End: 2020-03-10 | Stop reason: HOSPADM

## 2020-03-06 RX ORDER — SPIRONOLACTONE 100 MG/1
100 TABLET, FILM COATED ORAL 2 TIMES DAILY
Status: DISCONTINUED | OUTPATIENT
Start: 2020-03-06 | End: 2020-03-06

## 2020-03-06 RX ORDER — ASPIRIN 325 MG
325 TABLET ORAL ONCE
Status: COMPLETED | OUTPATIENT
Start: 2020-03-06 | End: 2020-03-06

## 2020-03-06 RX ORDER — ONDANSETRON 4 MG/1
4 TABLET, ORALLY DISINTEGRATING ORAL EVERY 8 HOURS PRN
Status: DISCONTINUED | OUTPATIENT
Start: 2020-03-06 | End: 2020-03-10 | Stop reason: HOSPADM

## 2020-03-06 RX ORDER — SENNOSIDES 8.6 MG
1 TABLET ORAL DAILY
Status: DISCONTINUED | OUTPATIENT
Start: 2020-03-07 | End: 2020-03-10 | Stop reason: HOSPADM

## 2020-03-06 RX ORDER — CHLORTHALIDONE 25 MG/1
25 TABLET ORAL DAILY
Status: DISCONTINUED | OUTPATIENT
Start: 2020-03-07 | End: 2020-03-10 | Stop reason: HOSPADM

## 2020-03-06 RX ORDER — TRAMADOL HYDROCHLORIDE 50 MG/1
50 TABLET ORAL ONCE
Status: COMPLETED | OUTPATIENT
Start: 2020-03-06 | End: 2020-03-06

## 2020-03-06 RX ORDER — ASPIRIN 81 MG/1
81 TABLET, CHEWABLE ORAL DAILY
Status: DISCONTINUED | OUTPATIENT
Start: 2020-03-07 | End: 2020-03-08

## 2020-03-06 RX ORDER — ONDANSETRON 2 MG/ML
4 INJECTION INTRAMUSCULAR; INTRAVENOUS EVERY 6 HOURS PRN
Status: DISCONTINUED | OUTPATIENT
Start: 2020-03-06 | End: 2020-03-10 | Stop reason: HOSPADM

## 2020-03-06 RX ORDER — LORAZEPAM 1 MG/1
1 TABLET ORAL EVERY 8 HOURS PRN
Status: DISCONTINUED | OUTPATIENT
Start: 2020-03-06 | End: 2020-03-10 | Stop reason: HOSPADM

## 2020-03-06 RX ORDER — DOCUSATE SODIUM 100 MG/1
100 CAPSULE, LIQUID FILLED ORAL 2 TIMES DAILY
Status: DISCONTINUED | OUTPATIENT
Start: 2020-03-06 | End: 2020-03-06

## 2020-03-06 RX ORDER — ATORVASTATIN CALCIUM 40 MG/1
40 TABLET, FILM COATED ORAL EVERY EVENING
Status: DISCONTINUED | OUTPATIENT
Start: 2020-03-06 | End: 2020-03-08

## 2020-03-06 RX ORDER — ALBUTEROL SULFATE 90 UG/1
2 AEROSOL, METERED RESPIRATORY (INHALATION) EVERY 6 HOURS PRN
Status: DISCONTINUED | OUTPATIENT
Start: 2020-03-06 | End: 2020-03-10 | Stop reason: HOSPADM

## 2020-03-06 RX ORDER — AMLODIPINE BESYLATE 5 MG/1
5 TABLET ORAL DAILY
Status: DISCONTINUED | OUTPATIENT
Start: 2020-03-07 | End: 2020-03-10 | Stop reason: HOSPADM

## 2020-03-06 RX ORDER — CALCIUM CARBONATE 200(500)MG
1000 TABLET,CHEWABLE ORAL DAILY PRN
Status: DISCONTINUED | OUTPATIENT
Start: 2020-03-06 | End: 2020-03-10 | Stop reason: HOSPADM

## 2020-03-06 RX ORDER — GABAPENTIN 100 MG/1
100 CAPSULE ORAL 2 TIMES DAILY
Status: DISCONTINUED | OUTPATIENT
Start: 2020-03-07 | End: 2020-03-07

## 2020-03-06 RX ORDER — BISACODYL 10 MG
10 SUPPOSITORY, RECTAL RECTAL DAILY PRN
Status: DISCONTINUED | OUTPATIENT
Start: 2020-03-06 | End: 2020-03-10 | Stop reason: HOSPADM

## 2020-03-06 RX ORDER — HYDROCORTISONE ACETATE 25 MG/1
25 SUPPOSITORY RECTAL 2 TIMES DAILY
Status: DISCONTINUED | OUTPATIENT
Start: 2020-03-06 | End: 2020-03-07

## 2020-03-06 RX ORDER — ESTRADIOL 1 MG/1
2 TABLET ORAL 2 TIMES DAILY
Status: DISCONTINUED | OUTPATIENT
Start: 2020-03-06 | End: 2020-03-06

## 2020-03-06 RX ORDER — TRAMADOL HYDROCHLORIDE 50 MG/1
50 TABLET ORAL EVERY 6 HOURS PRN
Status: DISCONTINUED | OUTPATIENT
Start: 2020-03-06 | End: 2020-03-07

## 2020-03-06 RX ORDER — HEPARIN SODIUM 5000 [USP'U]/ML
5000 INJECTION, SOLUTION INTRAVENOUS; SUBCUTANEOUS EVERY 8 HOURS SCHEDULED
Status: DISCONTINUED | OUTPATIENT
Start: 2020-03-06 | End: 2020-03-10 | Stop reason: HOSPADM

## 2020-03-06 RX ORDER — CYCLOBENZAPRINE HCL 10 MG
10 TABLET ORAL ONCE
Status: COMPLETED | OUTPATIENT
Start: 2020-03-06 | End: 2020-03-06

## 2020-03-06 RX ORDER — LORAZEPAM 1 MG/1
1 TABLET ORAL AS NEEDED
Status: DISCONTINUED | OUTPATIENT
Start: 2020-03-06 | End: 2020-03-06

## 2020-03-06 RX ORDER — METHOCARBAMOL 500 MG/1
500 TABLET, FILM COATED ORAL DAILY PRN
Status: DISCONTINUED | OUTPATIENT
Start: 2020-03-06 | End: 2020-03-10 | Stop reason: HOSPADM

## 2020-03-06 RX ORDER — PANTOPRAZOLE SODIUM 40 MG/1
40 TABLET, DELAYED RELEASE ORAL
Status: DISCONTINUED | OUTPATIENT
Start: 2020-03-07 | End: 2020-03-10 | Stop reason: HOSPADM

## 2020-03-06 RX ORDER — GABAPENTIN 300 MG/1
300 CAPSULE ORAL
Status: DISCONTINUED | OUTPATIENT
Start: 2020-03-06 | End: 2020-03-07

## 2020-03-06 RX ADMIN — HEPARIN SODIUM 5000 UNITS: 5000 INJECTION INTRAVENOUS; SUBCUTANEOUS at 22:09

## 2020-03-06 RX ADMIN — CYCLOBENZAPRINE 10 MG: 10 TABLET, FILM COATED ORAL at 19:49

## 2020-03-06 RX ADMIN — TRAMADOL HYDROCHLORIDE 50 MG: 50 TABLET, FILM COATED ORAL at 19:49

## 2020-03-06 RX ADMIN — GABAPENTIN 300 MG: 300 CAPSULE ORAL at 22:09

## 2020-03-06 RX ADMIN — ASPIRIN 325 MG ORAL TABLET 325 MG: 325 PILL ORAL at 19:49

## 2020-03-06 NOTE — LETTER
[address] [facility]  FLOOR MED SURG UNIT  [address] [address]  Dept: [phone]    March 10, 2020     Patient: Freddy Dietz  YOB: 1980   Date of Visit: 3/6/2020       To Whom it May Concern:    Uli Saha is under my professional care  She was seen in the hospital from 3/6/2020   to 03/10/20  She may return to work on 03/11/2020 with the following limitations With frequent breaks, no heavy lifting  If you have any questions or concerns, please don't hesitate to call           Sincerely,          Duane Agee MD

## 2020-03-06 NOTE — PROGRESS NOTES
I was called by Dr Renita Solomon regarding Westly Render as she has been having worsened weakness starting yesterday to examine her and reassess how she is doing  I did examine her with findings as below  NEUROLOGIC EXAMINATION:     Alert and oriented to person, date, location  Fund of knowledge is full with good understanding of medical situation  Recent and remote memory were intact    Cranial nerves: Pupils are equal round reactive to light and accommodation  Visual Fields are full to confrontation bilaterally  Extraocular movements are intact without nystagmus  Facial sensation is was decreased to light touch  No facial droop, face activates symmetrically  There is no dysarthria  Hearing was intact to finger rub  Tongue and uvula are midline and palate elevates symmetrically  Shoulder shrug  5/5  Motor Exam:  Downward drift of left arm, bu no pronator drift  Bulk and tone are normal Right arm: Shoulder abduction 5/5, elbow flexion 5/5, elbow extension 5/5,  wrist extension 5/5, hand  5/5  Left arm: Shoulder abduction 4/5, elbow flexion 4+/5, elbow extension 4/5, wrist extension 4+/5, hand  5/5  Right leg: Hip flexion 5/5, knee extension 5/5, knee flexion 5/5, dorsiflexion 5/5  Left leg: Hip flexion 2/5, knee extension 4+/5, knee flexion 4+/5, dorsiflexion 4/5    Deep tendon reflexes: Biceps 2+, brachioradialis 2+, patellar 2+, Achilles 2+ on right  Left biceps, brachioradialis and patella are asymmetric and brisker on therleft  Toes are mute bilaterally     Sensation: decreased sensation to light touch on left arm, trunk, and leg    Dr Soham Vogt was update with the findings listed above

## 2020-03-06 NOTE — PROGRESS NOTES
Patient called me this evening stating worsening left arm and leg weakness new since Wednesday with no trauma or other inciting etiology    Also reports intermittent jerking movements overnight of LUE  LLE with no clear confusion associated with this    Patient currently in [facility] office and I had my colleague Mu Toledo evaluate patient with significant LUE and LLE weakness sensory reduction C5-6 distribution- this was not noted on recent exam Feb 2020 by me nor significant weakness noted by neurosurgery evaluation recently  and also L V1-V3 sensory reduction noted  Also hyperreflexic on that side  No florid myelopathic signs noted  Her facial sensory loss would not be explained by spinal pathology    She has hx of mild central canal spinal stenosis with DDD more notable C4-5-6 as of 2018 MRI C spine and MR L spine with L3-4  Large extruded disc to left    Recommend ER evaluation with CTA H/N given facial symptoms also to make sure no dissection  Recommend admit for MRI C/L spine given new worsening symptoms over the last 48 hours to make sure no acute spine pathology, cord compromise noted  Pt prefers [employer] [facility] [facility] as she lives close to [facility]   Will notify ED there

## 2020-03-06 NOTE — ED PROVIDER NOTES
History  Chief Complaint   Patient presents with    Numbness     Pt left sided arm and leg numbness and weakness "from face all the way down to my feet", reports this episode started Wednesday  Pt currently under care of nuerologist        History provided by:  Patient   used: No     31-year-old transgender male to female sent from neurology office for evaluation left arm and leg weakness beginning 2 days ago as well as paresthesias in the arm and leg and also the left side of the face  Notes that she recalls having some jolting sensations while sleeping Tuesday night and Wednesday morning symptoms are present when she woke  She has had some difficulty walking  States that she has needed to use her right arm in order to lift up her left leg at times  She has had difficulty lifting the left arm and doing tasks  No history of though similar symptoms  She had had some paresthesias on the right side 2 weeks ago which resolved  She notes a history of chronic migraines as well as neck pain, especially left-sided  She does not feel like her movement is limited by pain although she states she walks better when she takes tramadol  On exam 4/5 strength in the left upper and lower extremity both proximally and distally with subjective light touch sensory loss as well  No difficulty with coordination  No difficulty with speech or swallowing  Patient has IV contrast allergy (anaphylaxis)  Will CT head, check EKG, labs and re-evaluate  Will need admission for further workup from inpatient Neurology consultation, MRI  Prior to Admission Medications   Prescriptions Last Dose Informant Patient Reported? Taking?    LORazepam (ATIVAN) 1 mg tablet   No No   Sig: Take 1 tablet (1 mg total) by mouth as needed for anxiety   albuterol (PROVENTIL HFA,VENTOLIN HFA) 90 mcg/act inhaler  Self No No   Sig: Inhale 2 puffs every 6 (six) hours as needed for wheezing   amLODIPine (NORVASC) 5 mg tablet   No No Sig: TAKE ONE TABLET BY MOUTH DAILY   cetirizine (ZyrTEC) 10 mg tablet  Self No No   Sig: Take 1 tablet (10 mg total) by mouth daily   Patient not taking: Reported on 3/3/2020   chlorthalidone 25 mg tablet   No No   Sig: Take 1 tablet (25 mg total) by mouth daily   cyclobenzaprine (FLEXERIL) 10 mg tablet  Self No No   Sig: Take 1 tablet (10 mg total) by mouth daily as needed for muscle spasms for up to 30 days   diphenhydrAMINE (BENADRYL) 50 MG tablet   No No   Sig: Take one tablet 1 hour prior to contrast administration  estradiol (ESTRACE) 2 MG tablet   No No   Sig: Take 1 tablet (2 mg total) by mouth 2 (two) times a day   gabapentin (NEURONTIN) 100 mg capsule   No No   Sig: Take 1 tab AM and afternoon daily   gabapentin (NEURONTIN) 300 mg capsule   No No   Sig: Take 1 capsule (300 mg total) by mouth daily at bedtime   hydrocortisone (ANUSOL-HC) 25 mg suppository   No No   Sig: Insert 1 suppository (25 mg total) into the rectum 2 (two) times a day   methocarbamol (ROBAXIN) 500 mg tablet   No No   Sig: Take 1 tablet (500 mg total) by mouth daily as needed for muscle spasms   multivitamin (THERAGRAN) TABS  Self Yes No   Sig: Take 1 tablet by mouth daily   ondansetron (ZOFRAN-ODT) 4 mg disintegrating tablet   No No   Sig: Take 1 tablet (4 mg total) by mouth every 8 (eight) hours as needed for nausea or vomiting   pantoprazole (PROTONIX) 40 mg tablet   No No   Sig: Take 1 tablet (40 mg total) by mouth daily   predniSONE 20 mg tablet   No No   Si tabs x 3 days, 3 tabs x 3 days, 2 tabs x 2 days, 1 tab x 2 days, then stop   Take in AM with food   Patient not taking: Reported on 2020   spironolactone (ALDACTONE) 100 mg tablet  Self No No   Sig: Take 1 tablet (100 mg total) by mouth 2 (two) times a day   traMADol (ULTRAM) 50 mg tablet   No No   Sig: Take 1 tablet (50 mg total) by mouth every 6 (six) hours as needed for moderate pain      Facility-Administered Medications: None       Past Medical History: Diagnosis Date    Anxiety     Asthma     seasonal    Hernia of abdominal wall     Hypertension     Seizures (HCC)     pseudoseizures since 2012       Past Surgical History:   Procedure Laterality Date    APPENDECTOMY      CHOLECYSTECTOMY      GASTRIC BYPASS      HERNIA REPAIR      x2       Family History   Problem Relation Age of Onset    Hypertension Mother     Diabetes Mother     Hypertension Sister      I have reviewed and agree with the history as documented  E-Cigarette/Vaping    E-Cigarette Use Never User      E-Cigarette/Vaping Substances     Social History     Tobacco Use    Smoking status: Never Smoker    Smokeless tobacco: Never Used   Substance Use Topics    Alcohol use: Yes     Alcohol/week: 3 0 standard drinks     Types: 3 Standard drinks or equivalent per week     Comment: 3 per month    Drug use: Never       Review of Systems   Constitutional: Negative for activity change and appetite change  Eyes: Negative for photophobia and visual disturbance  Respiratory: Negative for chest tightness and shortness of breath  Gastrointestinal: Negative for abdominal pain, nausea and vomiting  Musculoskeletal: Positive for gait problem and neck pain  Negative for back pain and neck stiffness  Skin: Negative for color change and rash  Neurological: Positive for weakness, numbness and headaches  Negative for dizziness  All other systems reviewed and are negative  Physical Exam  Physical Exam   Constitutional: She is oriented to person, place, and time  She appears well-developed and well-nourished  No distress  HENT:   Head: Normocephalic  Mouth/Throat: Oropharynx is clear and moist    Eyes: Pupils are equal, round, and reactive to light  EOM are normal    Neck:   Some tenderness of the left trapezius  Cardiovascular: Normal rate and regular rhythm  Pulmonary/Chest: Effort normal and breath sounds normal    Abdominal: Soft  She exhibits no distension   There is no tenderness  Musculoskeletal: Normal range of motion  She exhibits no tenderness or deformity  Neurological: She is alert and oriented to person, place, and time  No cranial nerve deficit  Coordination normal    4/5 strength left upper and lower extremities  Diminished sensation of the left upper and lower extremities as well as left face  Skin: Skin is warm and dry  Psychiatric: She has a normal mood and affect  Her behavior is normal    Nursing note and vitals reviewed        Vital Signs  ED Triage Vitals   Temperature Pulse Respirations Blood Pressure SpO2   03/06/20 1747 03/06/20 1747 03/06/20 1747 03/06/20 1747 03/06/20 1747   98 4 °F (36 9 °C) 79 16 (!) 158/110 97 %      Temp Source Heart Rate Source Patient Position - Orthostatic VS BP Location FiO2 (%)   03/06/20 1747 03/06/20 1946 03/06/20 1946 03/06/20 1747 --   Oral Monitor Sitting Right arm       Pain Score       03/06/20 1747       6           Vitals:    03/06/20 1747 03/06/20 1946   BP: (!) 158/110 155/98   Pulse: 79 86   Patient Position - Orthostatic VS:  Sitting         Visual Acuity  Visual Acuity      Most Recent Value   L Pupil Size (mm)  2   R Pupil Size (mm)  2          ED Medications  Medications   aspirin tablet 325 mg (325 mg Oral Given 3/6/20 1949)   cyclobenzaprine (FLEXERIL) tablet 10 mg (10 mg Oral Given 3/6/20 1949)   traMADol (ULTRAM) tablet 50 mg (50 mg Oral Given 3/6/20 1949)       Diagnostic Studies  Results Reviewed     Procedure Component Value Units Date/Time    Protime-INR [[id number]]  (Normal) Collected:  03/06/20 1813    Lab Status:  Final result Specimen:  Blood from Arm, Left Updated:  03/06/20 1901     Protime 13 0 seconds      INR 1 04    APTT [[id number]]  (Normal) Collected:  03/06/20 1813    Lab Status:  Final result Specimen:  Blood from Arm, Left Updated:  03/06/20 1901     PTT 28 seconds     Comprehensive metabolic panel [[id number]] Collected:  03/06/20 1813    Lab Status:  Final result Specimen:  Blood from Arm, Left Updated:  03/06/20 1854     Sodium 139 mmol/L      Potassium 3 7 mmol/L      Chloride 102 mmol/L      CO2 25 mmol/L      ANION GAP 12 mmol/L      BUN 14 mg/dL      Creatinine 1 03 mg/dL      Glucose 93 mg/dL      Calcium 9 1 mg/dL      AST 13 U/L      ALT 13 U/L      Alkaline Phosphatase 66 U/L      Total Protein 8 2 g/dL      Albumin 4 0 g/dL      Total Bilirubin 0 61 mg/dL      eGFR --    Narrative:       Notes:     1  eGFR calculation is only valid for adults 18 years and older  2  EGFR calculation cannot be performed for patients who are transgender, non-binary, or whose legal sex, sex at birth, and gender identity differ  Magnesium [[id number]]  (Normal) Collected:  03/06/20 1813    Lab Status:  Final result Specimen:  Blood from Arm, Left Updated:  03/06/20 1854     Magnesium 2 1 mg/dL     CBC and differential [[id number]]  (Abnormal) Collected:  03/06/20 1813    Lab Status:  Final result Specimen:  Blood from Arm, Left Updated:  03/06/20 1829     WBC 11 01 Thousand/uL      RBC 5 08 Million/uL      Hemoglobin 15 3 g/dL      Hematocrit 45 5 %      MCV 90 fL      MCH 30 1 pg      MCHC 33 6 g/dL      RDW 12 7 %      MPV 9 4 fL      Platelets 845 Thousands/uL      nRBC 0 /100 WBCs      Neutrophils Relative 74 %      Immat GRANS % 0 %      Lymphocytes Relative 18 %      Monocytes Relative 6 %      Eosinophils Relative 1 %      Basophils Relative 1 %      Neutrophils Absolute 8 25 Thousands/µL      Immature Grans Absolute 0 03 Thousand/uL      Lymphocytes Absolute 1 98 Thousands/µL      Monocytes Absolute 0 64 Thousand/µL      Eosinophils Absolute 0 06 Thousand/µL      Basophils Absolute 0 05 Thousands/µL                  CT head without contrast   Final Result by Kassi Santoyo MD (03/06 1904)      No acute intracranial abnormality                    Workstation performed: [id number]                    Procedures  ECG 12 Lead Documentation Only  Date/Time: 3/6/2020 6:53 PM  Performed by: Bridger Lomas Yves Barboza MD  Authorized by: Analisa Jama MD     Indications / Diagnosis:  Possible CVA  ECG reviewed by me, the ED Provider: yes    Patient location:  ED  Previous ECG:     Previous ECG:  Compared to current    Comparison ECG info:  3/3/20    Similarity:  No change  Rate:     ECG rate:  86  Rhythm:     Rhythm: sinus rhythm    Ectopy:     Ectopy: none    QRS:     QRS axis:  Normal  Conduction:     Conduction: normal    ST segments:     ST segments:  Normal  T waves:     T waves: normal               ED Course             Stroke Assessment     Row Name 03/06/20 1951             NIH Stroke Scale    Interval  Baseline      Level of Consciousness (1a )  0      LOC Questions (1b )  0      LOC Commands (1c )  0      Best Gaze (2 )  0      Visual (3 )  0      Facial Palsy (4 )  0      Motor Arm, Left (5a )  1      Motor Arm, Right (5b )  0      Motor Leg, Left (6a )  1      Motor Leg, Right (6b )  0      Limb Ataxia (7 )  0      Sensory (8 )  1      Best Language (9 )  0      Dysarthria (10 )  0      Extinction and Inattention (11 ) (Formerly Neglect)  0      Total  3          First Filed Value   TPA Decision  Patient not a TPA candidate  Patient is not a candidate options  Unclear time of onset outside appropriate time window  MDM  Number of Diagnoses or Management Options  Left sided numbness:   Left-sided muscle weakness:   Diagnosis management comments: 70-year-old transgender male to female presented with left-sided upper and lower extremity weakness for 2 days as well as sensory changes on the left side  CT head unremarkable  Given aspirin  Admitted for further workup  Patient stable at this time         Amount and/or Complexity of Data Reviewed  Clinical lab tests: ordered and reviewed  Tests in the radiology section of CPT®: ordered and reviewed  Discuss the patient with other providers: yes  Independent visualization of images, tracings, or specimens: yes    Patient Progress  Patient progress: stable        Disposition  Final diagnoses:   Left-sided muscle weakness   Left sided numbness     Time reflects when diagnosis was documented in both MDM as applicable and the Disposition within this note     Time User Action Codes Description Comment    3/6/2020  7:50 PM Michelle Found Add [M62 81] Left-sided muscle weakness     3/6/2020  7:51 PM Jackie CALERO Add [R20 0] Left sided numbness       ED Disposition     ED Disposition Condition Date/Time Comment    Admit Stable Fri Mar 6, 2020  7:50 PM Case was discussed with Lisa Barbour and the patient's admission status was agreed to be Admission Status: inpatient status to the service of Dr Gricelda Chaudhari   Follow-up Information    None         Patient's Medications   Discharge Prescriptions    No medications on file     No discharge procedures on file      PDMP Review       Value Time User    PDMP Reviewed  Yes 2/3/2020  9:41 AM Shira Herrmann MD          ED Provider  Electronically Signed by           Renetta Ghosh MD  03/06/20 9545

## 2020-03-07 ENCOUNTER — APPOINTMENT (INPATIENT)
Dept: MRI IMAGING | Facility: HOSPITAL | Age: 40
DRG: 552 | End: 2020-03-07
Payer: COMMERCIAL

## 2020-03-07 LAB
ANION GAP SERPL CALCULATED.3IONS-SCNC: 7 MMOL/L (ref 4–13)
BUN SERPL-MCNC: 15 MG/DL (ref 5–25)
CALCIUM SERPL-MCNC: 8.8 MG/DL (ref 8.3–10.1)
CHLORIDE SERPL-SCNC: 103 MMOL/L (ref 100–108)
CHOLEST SERPL-MCNC: 144 MG/DL (ref 50–200)
CO2 SERPL-SCNC: 28 MMOL/L (ref 21–32)
CREAT SERPL-MCNC: 1.06 MG/DL (ref 0.6–1.3)
ERYTHROCYTE [DISTWIDTH] IN BLOOD BY AUTOMATED COUNT: 12.7 % (ref 11.6–15.1)
EST. AVERAGE GLUCOSE BLD GHB EST-MCNC: 111 MG/DL
GLUCOSE SERPL-MCNC: 81 MG/DL (ref 65–140)
HBA1C MFR BLD: 5.5 %
HCT VFR BLD AUTO: 43.7 % (ref 36.5–46.1)
HDLC SERPL-MCNC: 50 MG/DL
HGB BLD-MCNC: 14.4 G/DL (ref 12–15.4)
LDLC SERPL CALC-MCNC: 86 MG/DL (ref 0–100)
MCH RBC QN AUTO: 29.7 PG (ref 26.8–34.3)
MCHC RBC AUTO-ENTMCNC: 33 G/DL (ref 31.4–37.4)
MCV RBC AUTO: 90 FL (ref 82–98)
PLATELET # BLD AUTO: 226 THOUSANDS/UL (ref 149–390)
PMV BLD AUTO: 9.6 FL (ref 8.9–12.7)
POTASSIUM SERPL-SCNC: 3.5 MMOL/L (ref 3.5–5.3)
RBC # BLD AUTO: 4.85 MILLION/UL (ref 3.88–5.12)
SODIUM SERPL-SCNC: 138 MMOL/L (ref 136–145)
TRIGL SERPL-MCNC: 40 MG/DL
WBC # BLD AUTO: 8.5 THOUSAND/UL (ref 4.31–10.16)

## 2020-03-07 PROCEDURE — 99232 SBSQ HOSP IP/OBS MODERATE 35: CPT | Performed by: INTERNAL MEDICINE

## 2020-03-07 PROCEDURE — 97166 OT EVAL MOD COMPLEX 45 MIN: CPT

## 2020-03-07 PROCEDURE — 85027 COMPLETE CBC AUTOMATED: CPT | Performed by: PHYSICIAN ASSISTANT

## 2020-03-07 PROCEDURE — 80061 LIPID PANEL: CPT | Performed by: PHYSICIAN ASSISTANT

## 2020-03-07 PROCEDURE — 97110 THERAPEUTIC EXERCISES: CPT

## 2020-03-07 PROCEDURE — 70551 MRI BRAIN STEM W/O DYE: CPT

## 2020-03-07 PROCEDURE — 97163 PT EVAL HIGH COMPLEX 45 MIN: CPT

## 2020-03-07 PROCEDURE — 99255 IP/OBS CONSLTJ NEW/EST HI 80: CPT | Performed by: PSYCHIATRY & NEUROLOGY

## 2020-03-07 PROCEDURE — 80048 BASIC METABOLIC PNL TOTAL CA: CPT | Performed by: PHYSICIAN ASSISTANT

## 2020-03-07 PROCEDURE — 97116 GAIT TRAINING THERAPY: CPT

## 2020-03-07 RX ORDER — DIPHENHYDRAMINE HYDROCHLORIDE 50 MG/ML
25 INJECTION INTRAMUSCULAR; INTRAVENOUS EVERY 6 HOURS PRN
Status: DISCONTINUED | OUTPATIENT
Start: 2020-03-07 | End: 2020-03-10 | Stop reason: HOSPADM

## 2020-03-07 RX ORDER — TRAMADOL HYDROCHLORIDE 50 MG/1
50 TABLET ORAL EVERY 4 HOURS PRN
Status: DISCONTINUED | OUTPATIENT
Start: 2020-03-07 | End: 2020-03-10 | Stop reason: HOSPADM

## 2020-03-07 RX ORDER — HYDROCORTISONE ACETATE 25 MG/1
25 SUPPOSITORY RECTAL 2 TIMES DAILY PRN
Status: DISCONTINUED | OUTPATIENT
Start: 2020-03-07 | End: 2020-03-10 | Stop reason: HOSPADM

## 2020-03-07 RX ORDER — LORAZEPAM 2 MG/ML
1 INJECTION INTRAMUSCULAR ONCE
Status: COMPLETED | OUTPATIENT
Start: 2020-03-07 | End: 2020-03-07

## 2020-03-07 RX ORDER — GABAPENTIN 300 MG/1
300 CAPSULE ORAL 3 TIMES DAILY
Status: DISCONTINUED | OUTPATIENT
Start: 2020-03-07 | End: 2020-03-10 | Stop reason: HOSPADM

## 2020-03-07 RX ADMIN — SENNOSIDES 8.6 MG: 8.6 TABLET, FILM COATED ORAL at 10:32

## 2020-03-07 RX ADMIN — PANTOPRAZOLE SODIUM 40 MG: 40 TABLET, DELAYED RELEASE ORAL at 06:22

## 2020-03-07 RX ADMIN — HEPARIN SODIUM 5000 UNITS: 5000 INJECTION INTRAVENOUS; SUBCUTANEOUS at 06:22

## 2020-03-07 RX ADMIN — ASPIRIN 81 MG 81 MG: 81 TABLET ORAL at 10:33

## 2020-03-07 RX ADMIN — CHLORTHALIDONE 25 MG: 25 TABLET ORAL at 10:32

## 2020-03-07 RX ADMIN — AMLODIPINE BESYLATE 5 MG: 5 TABLET ORAL at 10:33

## 2020-03-07 RX ADMIN — METHOCARBAMOL TABLETS 500 MG: 500 TABLET, COATED ORAL at 10:32

## 2020-03-07 RX ADMIN — TRAMADOL HYDROCHLORIDE 50 MG: 50 TABLET, FILM COATED ORAL at 06:39

## 2020-03-07 RX ADMIN — Medication 1 TABLET: at 10:33

## 2020-03-07 RX ADMIN — LORAZEPAM 1 MG: 2 INJECTION INTRAMUSCULAR; INTRAVENOUS at 14:46

## 2020-03-07 RX ADMIN — GABAPENTIN 300 MG: 300 CAPSULE ORAL at 15:45

## 2020-03-07 RX ADMIN — HEPARIN SODIUM 5000 UNITS: 5000 INJECTION INTRAVENOUS; SUBCUTANEOUS at 14:46

## 2020-03-07 RX ADMIN — GABAPENTIN 100 MG: 100 CAPSULE ORAL at 10:33

## 2020-03-07 RX ADMIN — TRAMADOL HYDROCHLORIDE 50 MG: 50 TABLET, FILM COATED ORAL at 17:49

## 2020-03-07 RX ADMIN — ESTRADIOL 2 MG: 1 TABLET ORAL at 10:33

## 2020-03-07 RX ADMIN — GABAPENTIN 300 MG: 300 CAPSULE ORAL at 21:27

## 2020-03-07 RX ADMIN — TRAMADOL HYDROCHLORIDE 50 MG: 50 TABLET, FILM COATED ORAL at 01:03

## 2020-03-07 RX ADMIN — TRAMADOL HYDROCHLORIDE 50 MG: 50 TABLET, FILM COATED ORAL at 21:27

## 2020-03-07 RX ADMIN — TRAMADOL HYDROCHLORIDE 50 MG: 50 TABLET, FILM COATED ORAL at 12:44

## 2020-03-07 NOTE — PROGRESS NOTES
Progress Note - Estrellita Castaneda  1980, 44 y o  adult MRN: [id number]    Unit/Bed#: S -01 Encounter: [id number]    Primary Care Provider: Myron Kingsley MD   Date and time admitted to hospital: 3/6/2020  5:41 PM      * Left-sided weakness  Assessment & Plan  · Patient with 3 day history of left upper extremity and left lower extremity weakness and sensory deficits  · Patient has decreased strength in upper extremity and lower extremity-- approximately 4/5 on the left side in regards to strength; decreased sensation to crude touch left-sided face, left upper extremity, left neck, left torso, left lower extremity  · Fortunately, 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 Myelopathy   · MRI brain, C spine and L spine   · Neuro checks  · Echo   · Telemetry  · FLP, hgb A1c  · Neuro consult-pending- appreciate input    Cervical disc disease  Assessment & Plan  · Known cervical disc disease, as patient has had long-standing issues with RLE in the past and has been following with Dr Jaqui Valdez in the past  · Continue with muscle relaxers    Essential hypertension  Assessment & Plan  · BP slightly elevated at this time  · Continue home medications    GERD (gastroesophageal reflux disease)  Assessment & Plan  · Continue PPI    History of sleeve gastrectomy  Assessment & Plan  · Performed in [place]  · Continue with diet modifications, vitamin supplementations    Male-to-female transgender person  Assessment & Plan  · Continue estrace    VTE Pharmacologic Prophylaxis:   Pharmacologic: Heparin  Mechanical VTE Prophylaxis in Place: Yes    Patient Centered Rounds: I have performed bedside rounds with nursing staff today       Discussions with Specialists or Other Care Team Provider:     Education and Discussions with Family / Patient: --  66   ALT U/L  --  13   AST U/L  --  13   GLUCOSE RANDOM mg/dL 81 93     Results from last 7 days   Lab Units 03/06/20  1813   INR  1 04         Results from last 7 days   Lab Units 03/06/20  1813   HEMOGLOBIN A1C % 5 5               * I Have Reviewed All Lab Data Listed Above  * Additional Pertinent Lab Tests Reviewed:  All Labs Within Last 24 Hours Reviewed    Imaging:    Imaging Reports Reviewed Today Include:   Imaging Personally Reviewed by Myself Includes:      Recent Cultures (last 7 days):           Last 24 Hours Medication List:     Current Facility-Administered Medications:  albuterol 2 puff Inhalation Q6H PRN Malia Romano PA-C   amLODIPine 5 mg Oral Daily Malia Romano PA-C   aspirin 81 mg Oral Daily Malia Romano PA-C   atorvastatin 40 mg Oral QPM Malia Romano PA-C   bisacodyl 10 mg Rectal Daily PRN NATHAN Mckeon   calcium carbonate 1,000 mg Oral Daily PRN Malia Romano PA-C   chlorthalidone 25 mg Oral Daily Malia Romano PA-C   diphenhydrAMINE 25 mg Intravenous Q6H PRN Vivek Mcghee MD   docusate sodium 100 mg Oral BID PRN NATHAN Mckeon   estradiol 2 mg Oral Daily NATHAN Mckeon   gabapentin 100 mg Oral BID Malia Romano PA-C   gabapentin 300 mg Oral HS aMlia Romano PA-C   heparin (porcine) 5,000 Units Subcutaneous Q8H [address] Malia Romano PA-C   hydrocortisone 25 mg Rectal BID PRN Vivek Mcghee MD   LORazepam 1 mg Oral Q8H PRN Malia Romano PA-C   methocarbamol 500 mg Oral Daily PRN Malia Romano PA-C   multivitamin-minerals 1 tablet Oral Daily Malia Romano PA-C   ondansetron 4 mg Intravenous Q6H PRN Malia Romano PA-C   ondansetron 4 mg Oral Q8H PRN Malia Romano PA-C   pantoprazole 40 mg Oral Early Morning Malia Romano PA-C   senna 1 tablet Oral Daily Malia Romano PA-C   traMADol 50 mg Oral Q6H PRN Malia Romano PA-C        Today, Patient Was Seen By: Myranda Rios MD    ** Please Note: Dictation voice to text software may have been used in the creation of this document   **

## 2020-03-07 NOTE — PLAN OF CARE
Problem: PHYSICAL THERAPY ADULT  Goal: Performs mobility at highest level of function for planned discharge setting  See evaluation for individualized goals  Description  Treatment/Interventions: Functional transfer training, LE strengthening/ROM, Elevations, Therapeutic exercise, Endurance training, Patient/family training, Equipment eval/education, Bed mobility, Gait training  Equipment Recommended: Other (Comment)(SBQC, trial RW)       See flowsheet documentation for full assessment, interventions and recommendations  Note:   Prognosis: Good  Problem List: Decreased strength, Decreased range of motion, Impaired balance, Decreased mobility, Impaired sensation, Obesity, Pain  Assessment: Sumeet Stark Reap is a 43 y/o Female who presents to [facility] on 3/6/2020 from home w/ c/o L arm and leg weakness, with a diagnosis of L-sided weakness  Received orders for PT eval and treat, with activity order(s) of up and OOB as tolerated and fall precautions  This patient presents w/ comorbidities of seizures, hx of sleeve gastrectomy, HTN, cervical disc disease, male-to-female transgender, GERD, HAs and has personal factors of living in 2 story house, mobilizing w/ assistive device, stair(s) to enter home, anxiety and obesity  Patient presents with the following impairments at time of evaluation including: pain, weakness, decreased ROM, impaired balance, gait deviations, altered sensation and fall risk  These impairments are evident in findings from the physical examination weakness and decreased ROM, mobility assessment (need for min A to mod I assist w/ all phases of mobility when usually mobilizing independently, tolerance to only 50, 80 feet of ambulation and need for cueing for mobility technique), and objective measure(s) Barthel Index: 80/100  During session, pt needed input for mobility technique  Due to physical deficits, patient is at an increased risk for falls   This patient's clinical presentation is unstable/unpredictable, which is evident in need for assist w/ all phases of mobility when usually mobilizing independently, tolerance to only 50, 80 feet of ambulation and need for input for mobility technique  At this time patient would benefit from skilled inpatient PT in order to address abovementioned deficits in order to improve overall function and mobility  Discharge recommendation is for outpatient PT and home w/ family support in order to reduce fall risk and maximize level of functional independence  Recommendation: Home with family support, Outpatient PT          See flowsheet documentation for full assessment

## 2020-03-07 NOTE — PLAN OF CARE
Problem: OCCUPATIONAL THERAPY ADULT  Goal: Performs self-care activities at highest level of function for planned discharge setting  See evaluation for individualized goals  Description  Treatment Interventions: ADL retraining, Functional transfer training, UE strengthening/ROM, Endurance training, Patient/family training, Neuromuscular reeducation, Activityengagement, Energy conservation, Continued evaluation          See flowsheet documentation for full assessment, interventions and recommendations  Note:   Limitation: Decreased ADL status, Decreased UE strength, Decreased endurance, Decreased self-care trans, Decreased high-level ADLs  Prognosis: Fair  Assessment: Patient evaluated by Occupational Therapy  Patient admitted with Left-sided weakness  The patients occupational profile, medical and therapy history includes a expanded review of medical and/or therapy records and additional review of physical, cognitive, or psychosocial history related to current functional performance  Comorbidities affecting functional mobility and ADLS include: hypertension  Prior to admission, patient was independent with functional mobility with SPC, independent with ADLS, independent with IADLS and living with family in a 2 level home with 2 steps to enter  Patient performed Sup bed mobility and transfers  Patient performed  Functional mobility with a quad cane 30 feet  Patient performed LB and UB dressing Min A , toileting at Sup and groom and UB bath at Sup  The evaluation identifies the following performance deficits: weakness, impaired balance, decreased endurance, decreased coordination, increased fall risk, new onset of impairment of functional mobility, decreased ADLS, decreased IADLS and decreased activity tolerance, that result in activity limitations and/or participation restrictions   This evaluation requires clinical decision making of moderate complexity, because the patient may present with comorbidities that affect occupational performance and required minimal or moderate modification of tasks or assistance with the consideration of several treatment options  The Barthel Index was used as a functional outcome tool presenting with a score of 70  Patient will benefit from skilled Occupational Therapy services to address above deficits and facilitate a safe return to prior level of function       OT Discharge Recommendation: Other (Comment)(Home with family support and Outpatient OT)

## 2020-03-07 NOTE — ASSESSMENT & PLAN NOTE
· Known cervical disc disease, as patient has had long-standing issues with RLE in the past and has been following with Dr Lukas Estrada in the past  · Continue with muscle relaxers

## 2020-03-07 NOTE — PHYSICAL THERAPY NOTE
PHYSICAL THERAPY EVALUATION NOTE    Patient Name: Lennox Yee  Today's Date: 3/7/2020     AGE:   44 y o   Mrn:   [id number]  ADMIT DX:  Essential hypertension [I10]  Left-sided muscle weakness [M62 81]  Left sided numbness [R20 0]  History of sleeve gastrectomy [Z90 3]    Past Medical History:   Diagnosis Date    Anxiety     Asthma     seasonal    Hernia of abdominal wall     Hypertension     Seizures ([facility] [address] )     pseudoseizures since      Length Of Stay: 1  PHYSICAL THERAPY EVALUATION :   Time in: 0938  Time out: 1005  Total eval time: 27 min       20 1028   Note Type   Note type Eval/Treat   Pain Assessment   Pain Assessment 0-10   Pain Score No Pain   Home Living   Type of Home House   Home Layout Two level;1/2 bath on main level;Bed/bath upstairs;Stairs to enter without rails  (3 SAMMIE)   Bathroom Shower/Tub Tub/shower unit   Home Equipment Cane  (borrowed from [address])   Prior Function   Level of [place] Independent with ADLs and functional mobility   Lives With Other (Comment)  (Godmother)   ADL Assistance Independent   IADLs Independent   Falls in the last 6 months 0   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)   Comments Pt lives in [address] [place] [address] w/ Godmother, she is I w/ ADLs, IADLS, commutes ~ 1 hour/day, (+) drives, uses her Godmother's SPC when needed  Restrictions/Precautions   Weight Bearing Precautions Per Order No   Other Precautions Fall Risk   General   Additional Pertinent History Pt states she's had a history of neck pain, but the L sided weakness/numbness has been since this last wednesday  She's used a cane for issues w/ her R LEs for the last year on and off      Family/Caregiver Present No   Cognition   Overall Cognitive Status Impaired   Orientation Level Oriented X4  (Pt identified by full name and )   Memory Within functional limits   Following Commands Follows all commands and directions without difficulty   Comments Pt supine in bed upon arrival  She is very pleasant and agreeable to PT intervention   RLE Assessment   RLE Assessment WFL   Strength RLE   RLE Overall Strength 4+/5   LLE Assessment   LLE Assessment WFL   Strength LLE   LLE Overall Strength 4-/5   Coordination   Coordination and Movement Description Cervical AROM: Flexion = WNL, Extension = Limited (~10 degrees); R SB > L SB, R Rotation > L Rotation (pt unable to rotate to R beyond midline seoncdary to pain, discomfort)  TTP along L paraspinals, upper trap   Sensation X   Light Touch   RLE Light Touch Grossly intact   LLE Light Touch Impaired   LLE Light Touch Comments Less than R; diminished temperature sensation as well   Bed Mobility   Supine to Sit 6  Modified independent   Additional items HOB elevated   Sit to Supine Unable to assess   Transfers   Sit to Stand 5  Supervision   Additional items Assist x 1; Increased time required   Stand to Sit 5  Supervision   Additional items Assist x 1; Increased time required   Additional Comments Introduced [facility] to patient via demonstration  Pt required S w/ SBQC compared to [facility]   Ambulation/Elevation   Gait pattern Improper Weight shift;Decreased foot clearance; Short stride; Excessively slow; Step to  (Decreased L LE clearance > R LE)   Gait Assistance   (CGA, S)   Additional items Assist x 1;Verbal cues   Assistive Device SPC;Small base quad cane   Distance 50 ft, 80 ft  (50 w/ SPC, 80 w/ SBQC)   Stair Management Assistance 4  Minimal assist   Additional items Assist x 1;Verbal cues  (for sequencing)   Stair Management Technique One rail L;Step to pattern;Assistive device  [facility])   Number of Stairs 8  (4 ascending, 4 descending)   Balance   Static Sitting Good   Static Standing Fair   Ambulatory Fair -  (w/ SBQC)   Activity Tolerance   Activity Tolerance Patient limited by pain   Nurse Made Aware Spoke to [facility]   Assessment   Prognosis Good   Problem List Decreased strength;Decreased range of motion; Impaired balance;Decreased mobility; Impaired sensation;Obesity;Pain   Assessment Rhys Saul is a 45 y/o Female who [facility] 3[facility]2020 from home w/ c/o L arm and leg weakness, with a diagnosis of L-sided weakness  Received orders for PT eval and treat, with activity order(s) of up and OOB as tolerated and fall precautions  This patient presents w/ comorbidities of seizures, hx of sleeve gastrectomy, HTN, cervical disc disease, male-to-female transgender, GERD, HAs and has personal factors of living in 2 story house, mobilizing w/ assistive device, stair(s) to enter home, anxiety and obesity  Patient presents with the following impairments at time of evaluation including: pain, weakness, decreased ROM, impaired balance, gait deviations, altered sensation and fall risk  These impairments are evident in findings from the physical examination weakness and decreased ROM, mobility assessment (need for min A to mod I assist w/ all phases of mobility when usually mobilizing independently, tolerance to only 50, 80 feet of ambulation and need for cueing for mobility technique), and objective measure(s) Barthel Index: 80/100  During session, pt needed input for mobility technique  Due to physical deficits, patient is at an increased risk for falls  This patient's clinical presentation is unstable/unpredictable, which is evident in need for assist w/ all phases of mobility when usually mobilizing independently, tolerance to only 50, 80 feet of ambulation and need for input for mobility technique  At this time patient would benefit from skilled inpatient PT in order to address abovementioned deficits in order to improve overall function and mobility  Discharge recommendation is for outpatient PT and home w/ family support in order to reduce fall risk and maximize level of functional independence      Goals   Patient Goals decrease pain   STG Expiration Date 03/17/20 Short Term Goal #1 Patient will: Increase bilateral LE strength 1/2 grade to facilitate independent mobility, Perform all transfers independently to improve independence, Ambulate at least 200 ft  with least restrictive assistive device modified independent w/o LOB, Navigate 1 full flight of stairs modified independent with unilateral handrail to facilitate return to previous living environment, Increase all balance 1/2 grade to decrease risk for falls, Complete exercise program independently and Improve Barthel Index score to 100 or greater to facilitate independence   PT Treatment Day 0  (treatment note below)   Plan   Treatment/Interventions Functional transfer training;LE strengthening/ROM; Elevations; Therapeutic exercise; Endurance training;Patient/family training;Equipment eval/education; Bed mobility;Gait training   PT Frequency Other (Comment)  (3-5x/wk)   Recommendation   Recommendation Home with family support; Outpatient PT   Equipment Recommended Other (Comment)  (SBQC, trial RW)   Barthel Index   Feeding 10   Bathing 5   Grooming Score 5   Dressing Score 10   Bladder Score 10   Bowels Score 10   Toilet Use Score 10   Transfers (Bed/Chair) Score 15   Mobility (Level Surface) Score 0   Stairs Score 5   Barthel Index Score 80                                                                  PHYSICAL THERAPY TREATMENT NOTE    Patient Name: Lesvia Spear  Today's Date: 3/7/2020     Time in: 1005  Time out: 1028  Total treat time: 23 min    S: Pt OOB in chair, agreeable to PT intervention    O: Introduced pt to [employer]  Pt c/o feeling the need to guard her LUE due to "pulling" of cervical muscles when it lays at her side at rest  Pt ambulated 40 ft w/ RW w/ S  PT spent time educating pt on proper stair training w/ SBQC and LE sequencing  Pt then again ambulated w/ SBQC x 20 ft w/ S back to patient room  Pt performed STS transfer to recliner w/ S   PT demonstrated cervical stretches throughout ROM w/ pt      A: Patient tolerated treatment well  Therapist introduced RW to patient in an effort to address LUE support and improve L LE "dragging"  Pt able to ambulate w/ RW for 40 ft w/ S, still note slight dragging of L LE  Provided pt again w/ SBQC, pt ambulated w/ S w/ the same gait deviations, pt states she feels she moves better w/ SBQC  Spent time demonstrating proper stair technique w/ SBQC and limb sequencing ("up with the good, down with the bad")  Ended session w/ demonstrating and have patient perform cervical stretches throughout cervical ROM on self, pt demonstrates good technique  Also demonstrated addition of self-manual therapy on L cervical musculature (trigger-point, muscle work) to assist w/ AROM  Provided pt with heat pack on folded up wash cloth, pt is able to distinguish hot/cold on upper trap area      P: Continue per plan above    Skilled inpatient PT is still recommended in order to progress patient toward [address], PT

## 2020-03-07 NOTE — ASSESSMENT & PLAN NOTE
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  Myelopathy   · MRI brain, C spine and L spine   · Neuro checks  · Echo   · Telemetry  · FLP, hgb A1c  · Neuro consult-pending- appreciate input

## 2020-03-07 NOTE — SPEECH THERAPY NOTE
Speech Language Pathology    Swallow Screen    Order received for Bedside Swallow Evaluation  Patient has been swallowing foods, fluids, and medications without any problems  Patient is currently receiving a regular diet and thin/ all liquids and denied difficulty swallowing during meals  There are no skilled ST needs at this time  Spoke with RN re: ST findings and plan      Aracely Rice MA, [address] [facility]  Speech Language Pathologist

## 2020-03-07 NOTE — CONSULTS
Consultation - Neurology   Vianey Desai  44 y o  adult MRN: [id number]  Unit/Bed#: S -01 Encounter: [id number]    Assessment/Plan   Assessment:  Vianey Desai  is a 44 y o  male to female transgender adult with HTN, chronic cervical and lower back pain, lumbar radiculopathy with right lower extremity weakness, history of gastric bypass surgery, history of pseudoseizures who presents to [facility], [place] ED on     1  Left-sided weakness and sensory deficit  Concern for cervical radiculopathy versus myelopathy in the setting of cervical disc disease, will obtain MRI C-spine  Will also obtain MRI brain to rule out acute infarct and MRI L-spine  Plan:  -MRI brain, C-spine, and L-spine pending  -Echo pending  -Increased gabapentin 300 mg TID  -Continue tramadol and robaxin   -Continue ASA 81 mg daily  -Continue atorvastatin 40 mg daily  -Telemetry  -Frequent neuro checks  -PT/OT  -Medical management per primary team  -Continue supportive care per primary team, notify with changes    Imaging/Labs:  -CT head unremarkable for acute intracranial abnormalities    -Lipid panel:  Cholesterol 144, triglycerides 40, HDL 50, LDL 86   -Hemoglobin A1c:  5 5    History of Present Illness     Reason for Consult / Principal Problem:  Left-sided weakness, left-sided numbness    HPI: Vianey Desai  is a 44 y o  male to female transgender  adult with chronic cervical and lower back pain, lumbar radiculopathy with right lower extremity weakness, history of gastric bypass surgery, history of pseudoseizures who presents to [facility], [place] ED on     Patient states on Wednesday 03/04/2020 she developed left sided weakness  She went to work on Wednesday, working at a [employer]  On Thursday patient woke up to jerking in the RUE and RLE extremity  Friday morning she reported she was "jittery", and numb and weak on the right side   She states she then noticed she was dragging her left leg, and noted it was very weak   Patient called her neurologist Dr Nupur Crocker while at work, who then requested epileptologist Dr Ivette Parker, who is in the same office as the patient, to evaluate her  Per epileptologist exam on 03/06/2020, patient had a downward drift in left upper extremity, 4-4+/5 strength in left upper extremity, 2/5 weakness in left hip flexion, 4/5 weakness in left lower extremity, decreased sensation in left upper and lower extremity, and asymmetric brisk reflexes in the left in comparison to the right  Patient states she started a new med chlorthalidone 1 month ago, without any issues  Patient denies trauma to the neck prior to current symptomology  Patient states today she feels sensation of "hot and cold sensation", which she states resolved at the end of examination  Patient continues to have the left-sided weakness and numbness  Denies CP, SOB, headache, dizziness, vision changes, N/V, abdominal pain  Inpatient consult to Neurology  Consult performed by: Priscila Colindres PA-C  Consult ordered by: Harshal Schwartz PA-C        Review of Systems  12 point ROS performed, as stated above, all others negative    Historical Information   Past Medical History:   Diagnosis Date    Anxiety     Asthma     seasonal    Hernia of abdominal wall     Hypertension     Seizures ([facility] [address] )     pseudoseizures since 2012     Past Surgical History:   Procedure Laterality Date    APPENDECTOMY      CHOLECYSTECTOMY      GASTRIC BYPASS      HERNIA REPAIR      x2     Social History   Social History     Substance and Sexual Activity   Alcohol Use Yes    Alcohol/week: 3 0 standard drinks    Types: 3 Standard drinks or equivalent per week    Comment: 3 per month     Social History     Substance and Sexual Activity   Drug Use Never     E-Cigarette/Vaping    E-Cigarette Use Never User      E-Cigarette/Vaping Substances     Social History     Tobacco Use   Smoking Status Never Smoker   Smokeless Tobacco Never Used     Family History:   Family History   Problem Relation Age of Onset    Hypertension Mother     Diabetes Mother     Hypertension Sister        Review of previous medical records was completed  Meds/Allergies   all current active meds have been reviewed, current meds:   Current Facility-Administered Medications   Medication Dose Route Frequency    albuterol (PROVENTIL HFA,VENTOLIN HFA) inhaler 2 puff  2 puff Inhalation Q6H PRN    amLODIPine (NORVASC) tablet 5 mg  5 mg Oral Daily    aspirin chewable tablet 81 mg  81 mg Oral Daily    atorvastatin (LIPITOR) tablet 40 mg  40 mg Oral QPM    bisacodyl (DULCOLAX) rectal suppository 10 mg  10 mg Rectal Daily PRN    calcium carbonate (TUMS) chewable tablet 1,000 mg  1,000 mg Oral Daily PRN    chlorthalidone tablet 25 mg  25 mg Oral Daily    diphenhydrAMINE (BENADRYL) injection 25 mg  25 mg Intravenous Q6H PRN    docusate sodium (COLACE) capsule 100 mg  100 mg Oral BID PRN    estradiol (ESTRACE) tablet 2 mg  2 mg Oral Daily    gabapentin (NEURONTIN) capsule 100 mg  100 mg Oral BID    gabapentin (NEURONTIN) capsule 300 mg  300 mg Oral HS    heparin (porcine) subcutaneous injection 5,000 Units  5,000 Units Subcutaneous Q8H [address]    hydrocortisone (ANUSOL-HC) rectal suppository 25 mg  25 mg Rectal BID PRN    LORazepam (ATIVAN) injection 1 mg  1 mg Intravenous Once    LORazepam (ATIVAN) tablet 1 mg  1 mg Oral Q8H PRN    methocarbamol (ROBAXIN) tablet 500 mg  500 mg Oral Daily PRN    multivitamin-minerals (CENTRUM) tablet 1 tablet  1 tablet Oral Daily    ondansetron (ZOFRAN) injection 4 mg  4 mg Intravenous Q6H PRN    ondansetron (ZOFRAN-ODT) dispersible tablet 4 mg  4 mg Oral Q8H PRN    pantoprazole (PROTONIX) EC tablet 40 mg  40 mg Oral Early Morning    senna (SENOKOT) tablet 8 6 mg  1 tablet Oral Daily    traMADol (ULTRAM) tablet 50 mg  50 mg Oral Q6H PRN   , PTA meds:   Prior to Admission Medications   Prescriptions Last Dose Informant Patient Reported? Taking? LORazepam (ATIVAN) 1 mg tablet   No No   Sig: Take 1 tablet (1 mg total) by mouth as needed for anxiety   albuterol (PROVENTIL HFA,VENTOLIN HFA) 90 mcg/act inhaler  Self No No   Sig: Inhale 2 puffs every 6 (six) hours as needed for wheezing   amLODIPine (NORVASC) 5 mg tablet   No No   Sig: TAKE ONE TABLET BY MOUTH DAILY   cetirizine (ZyrTEC) 10 mg tablet  Self No No   Sig: Take 1 tablet (10 mg total) by mouth daily   Patient not taking: Reported on 3/3/2020   chlorthalidone 25 mg tablet   No No   Sig: Take 1 tablet (25 mg total) by mouth daily   cyclobenzaprine (FLEXERIL) 10 mg tablet  Self No No   Sig: Take 1 tablet (10 mg total) by mouth daily as needed for muscle spasms for up to 30 days   diphenhydrAMINE (BENADRYL) 50 MG tablet   No No   Sig: Take one tablet 1 hour prior to contrast administration  estradiol (ESTRACE) 2 MG tablet   No No   Sig: Take 1 tablet (2 mg total) by mouth 2 (two) times a day   gabapentin (NEURONTIN) 100 mg capsule   No No   Sig: Take 1 tab AM and afternoon daily   gabapentin (NEURONTIN) 300 mg capsule   No No   Sig: Take 1 capsule (300 mg total) by mouth daily at bedtime   hydrocortisone (ANUSOL-HC) 25 mg suppository   No No   Sig: Insert 1 suppository (25 mg total) into the rectum 2 (two) times a day   methocarbamol (ROBAXIN) 500 mg tablet   No No   Sig: Take 1 tablet (500 mg total) by mouth daily as needed for muscle spasms   multivitamin (THERAGRAN) TABS  Self Yes No   Sig: Take 1 tablet by mouth daily   ondansetron (ZOFRAN-ODT) 4 mg disintegrating tablet   No No   Sig: Take 1 tablet (4 mg total) by mouth every 8 (eight) hours as needed for nausea or vomiting   pantoprazole (PROTONIX) 40 mg tablet   No No   Sig: Take 1 tablet (40 mg total) by mouth daily   predniSONE 20 mg tablet   No No   Si tabs x 3 days, 3 tabs x 3 days, 2 tabs x 2 days, 1 tab x 2 days, then stop   Take in AM with food   Patient not taking: Reported on 2020   spironolactone (ALDACTONE) 100 mg tablet Not Taking at Unknown time Self No No   Sig: Take 1 tablet (100 mg total) by mouth 2 (two) times a day   Patient not taking: Reported on 3/6/2020   traMADol (ULTRAM) 50 mg tablet   No No   Sig: Take 1 tablet (50 mg total) by mouth every 6 (six) hours as needed for moderate pain      Facility-Administered Medications: None    and     Allergies   Allergen Reactions    Contrast Dye [Iodinated Diagnostic Agents] Anaphylaxis       Objective   Vitals:Blood pressure 156/93, pulse 79, temperature 98 3 °F (36 8 °C), temperature source Oral, resp  rate 18, height 5' 9" (1 753 m), weight 124 kg (273 lb), SpO2 98 %  ,Body mass index is 40 32 kg/m²  No intake or output data in the 24 hours ending 03/07/20 1321    Invasive Devices: Invasive Devices     Peripheral Intravenous Line            Peripheral IV 03/06/20 Right Arm less than 1 day                Physical Exam   Constitutional: She is oriented to person, place, and time  She appears well-developed and well-nourished  No distress  HENT:   Head: Normocephalic and atraumatic  Eyes: Pupils are equal, round, and reactive to light  Conjunctivae and EOM are normal  Right eye exhibits no discharge  Left eye exhibits no discharge  No scleral icterus  Neck: Normal range of motion  Neck supple  No tenderness to palpation    Cardiovascular: Normal rate, regular rhythm and normal heart sounds  No murmur heard  Pulmonary/Chest: Effort normal and breath sounds normal  No respiratory distress  She has no wheezes  Musculoskeletal: Normal range of motion  Neurological: She is alert and oriented to person, place, and time  She has a normal Finger-Nose-Finger Test    Skin: Skin is warm and dry  No rash noted  She is not diaphoretic  No erythema  No pallor  Psychiatric: She has a normal mood and affect  Her behavior is normal  Judgment and thought content normal    Nursing note and vitals reviewed  Neurologic Exam     Mental Status   Oriented to person, place, and time  Patient is alert, lying in bed  Oriented x3  No dysarthria or aphasia noted  Able to follow multistep commands and answers all questions appropriately  Cranial Nerves     CN II   Visual fields full to confrontation  CN III, IV, VI   Pupils are equal, round, and reactive to light  Extraocular motions are normal    Nystagmus: none   Upgaze: normal  Downgaze: normal  Conjugate gaze: present    CN VII   Facial expression full, symmetric  CN VIII   Hearing: intact    CN IX, X   Palate: symmetric    CN XII   Tongue deviation: none  Decreased sensation to light touch and pinprick in left V1-V3     Motor Exam   Muscle bulk: normal  Overall muscle tone: normal  RUE strength 5/5  Left extensor drift noted   Left  weakness noted 4/5  Left triceps 5-/5  Left biceps 5/5  BLE extremity strength 5/5 except as noted:  Left dorsiflexion 5-/5     Sensory Exam   Decreased sensation to light touch and pinprick in left upper and lower extremities   No neglect noted      Gait, Coordination, and Reflexes     Coordination   Finger to nose coordination: normal    Tremor   Resting tremor: absent    Reflexes   Right plantar: normal  Left plantar: normal  No dysmetria on bilateral finger-to-nose testing  Bilateral Biceps and brachiradialis trace  Bilateral Triceps reflexes 1+  Bilateral Patellar 2+     Lab Results: I have personally reviewed pertinent reports    Recent Results (from the past 24 hour(s))   CBC and differential    Collection Time: 03/06/20  6:13 PM   Result Value Ref Range    WBC 11 01 (H) 4 31 - 10 16 Thousand/uL    RBC 5 08 3 88 - 5 12 Million/uL    Hemoglobin 15 3 12 0 - 15 4 g/dL    Hematocrit 45 5 36 5 - 46 1 %    MCV 90 82 - 98 fL    MCH 30 1 26 8 - 34 3 pg    MCHC 33 6 31 4 - 37 4 g/dL    RDW 12 7 11 6 - 15 1 %    MPV 9 4 8 9 - 12 7 fL    Platelets 877 019 - 143 Thousands/uL    nRBC 0 /100 WBCs    Neutrophils Relative 74 43 - 75 %    Immat GRANS % 0 0 - 2 %    Lymphocytes Relative 18 14 - 44 % Monocytes Relative 6 4 - 12 %    Eosinophils Relative 1 0 - 6 %    Basophils Relative 1 0 - 1 %    Neutrophils Absolute 8 25 (H) 1 85 - 7 62 Thousands/µL    Immature Grans Absolute 0 03 0 00 - 0 20 Thousand/uL    Lymphocytes Absolute 1 98 0 60 - 4 47 Thousands/µL    Monocytes Absolute 0 64 0 17 - 1 22 Thousand/µL    Eosinophils Absolute 0 06 0 00 - 0 61 Thousand/µL    Basophils Absolute 0 05 0 00 - 0 10 Thousands/µL   Comprehensive metabolic panel    Collection Time: 03/06/20  6:13 PM   Result Value Ref Range    Sodium 139 136 - 145 mmol/L    Potassium 3 7 3 5 - 5 3 mmol/L    Chloride 102 100 - 108 mmol/L    CO2 25 21 - 32 mmol/L    ANION GAP 12 4 - 13 mmol/L    BUN 14 5 - 25 mg/dL    Creatinine 1 03 0 60 - 1 30 mg/dL    Glucose 93 65 - 140 mg/dL    Calcium 9 1 8 3 - 10 1 mg/dL    AST 13 5 - 45 U/L    ALT 13 12 - 78 U/L    Alkaline Phosphatase 66 46 - 116 U/L    Total Protein 8 2 6 4 - 8 2 g/dL    Albumin 4 0 3 5 - 5 0 g/dL    Total Bilirubin 0 61 0 20 - 1 00 mg/dL    eGFR     Protime-INR    Collection Time: 03/06/20  6:13 PM   Result Value Ref Range    Protime 13 0 11 6 - 14 5 seconds    INR 1 04 0 84 - 1 19   APTT    Collection Time: 03/06/20  6:13 PM   Result Value Ref Range    PTT 28 23 - 37 seconds   Magnesium    Collection Time: 03/06/20  6:13 PM   Result Value Ref Range    Magnesium 2 1 1 6 - 2 6 mg/dL   Hemoglobin A1c w/EAG Estimation    Collection Time: 03/06/20  6:13 PM   Result Value Ref Range    Hemoglobin A1C 5 5 Normal 3 8-5 6%; PreDiabetic 5 7-6 4%;  Diabetic >=6 5%; Glycemic control for adults with diabetes <7 0% %     mg/dl   Lipid Panel with Direct LDL reflex    Collection Time: 03/07/20  5:16 AM   Result Value Ref Range    Cholesterol 144 50 - 200 mg/dL    Triglycerides 40 <=150 mg/dL    HDL, Direct 50 >=40 mg/dL    LDL Calculated 86 0 - 100 mg/dL   Basic metabolic panel    Collection Time: 03/07/20  5:16 AM   Result Value Ref Range    Sodium 138 136 - 145 mmol/L    Potassium 3 5 3 5 - 5 3 mmol/L    Chloride 103 100 - 108 mmol/L    CO2 28 21 - 32 mmol/L    ANION GAP 7 4 - 13 mmol/L    BUN 15 5 - 25 mg/dL    Creatinine 1 06 0 60 - 1 30 mg/dL    Glucose 81 65 - 140 mg/dL    Calcium 8 8 8 3 - 10 1 mg/dL    eGFR     CBC (With Platelets)    Collection Time: 03/07/20  5:16 AM   Result Value Ref Range    WBC 8 50 4 31 - 10 16 Thousand/uL    RBC 4 85 3 88 - 5 12 Million/uL    Hemoglobin 14 4 12 0 - 15 4 g/dL    Hematocrit 43 7 36 5 - 46 1 %    MCV 90 82 - 98 fL    MCH 29 7 26 8 - 34 3 pg    MCHC 33 0 31 4 - 37 4 g/dL    RDW 12 7 11 6 - 15 1 %    Platelets 948 757 - 989 Thousands/uL    MPV 9 6 8 9 - 12 7 fL   ]  Imaging Studies: I have personally reviewed pertinent reports and I have personally reviewed pertinent films in PACS  EKG, Pathology, and Other Studies: I have personally reviewed pertinent reports      VTE Prophylaxis: Heparin

## 2020-03-07 NOTE — ASSESSMENT & PLAN NOTE
· Known cervical disc disease, as patient has had long-standing issues with RLE in the past and has been following with Dr Ellamae Leventhal in the past  · Continue with muscle relaxers

## 2020-03-07 NOTE — H&P
H&P- Newton Tony  1980, 44 y o  adult MRN: [id number]    Unit/Bed#: S -01 Encounter: [id number]    Primary Care Provider: Ruddy Segura MD   Date and time admitted to hospital: 3/6/2020  5:41 PM    * Left-sided weakness  Assessment & Plan  · Patient with 3 day history of left upper extremity and left lower extremity weakness and sensory deficits  · Patient has decreased strength in upper extremity and lower extremity-- approximately 4/5 on the left side in regards to strength; decreased sensation to crude touch left-sided face, left upper extremity, left neck, left torso, left lower extremity  · Fortunately, 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 Myelopathy   · MRI brain, C spine and L spine   · Neuro checks  · Echo   · Telemetry  · FLP, hgb A1c  · Neuro consult-- appreciate input    GERD (gastroesophageal reflux disease)  Assessment & Plan  · Continue PPI    Male-to-female transgender person  Assessment & Plan  · Continue estrace    Cervical disc disease  Assessment & Plan  · Known cervical disc disease, as patient has had long-standing issues with RLE in the past and has been following with Dr Chance Francis in the past  · Continue with muscle relaxers    Essential hypertension  Assessment & Plan  · BP slightly elevated at this time  · Continue home medications    History of sleeve gastrectomy  Assessment & Plan  · Performed in [place]  · Continue with diet modifications, vitamin supplementations    VTE Prophylaxis: Heparin  / sequential compression device   Code Status:  Level 1  POLST: POLST form is not discussed and not completed at this time    Discussion with family:  Patient has alerted family    Anticipated Length of Stay:  Patient will be admitted on an Inpatient basis with an anticipated length of stay of  greater than 2 midnights  Justification for Hospital Stay:  Neurology workup    Total Time for Visit, including Counseling / Coordination of Care: 30 minutes  Greater than 50% of this total time spent on direct patient counseling and coordination of care  Chief Complaint:   Left-sided weakness    History of Present Illness:    Tera Ayon  is a 44 y o  adult with past medical history significant for cervical disc disease, hypertension, history of gastric bypass disease, GERD presents to this ER for evaluation of left-sided weakness and sensation deficits for the last 3 days  Patient reports that she has experience right lower extremity weakness in the past, and she follow-up with Neurology locally who diagnosed her with disc disease  She has been following with neurology and also Neurosurgery and her symptoms are much improved  However, over the last 3 days she has had increasing weakness and sensation deficits in her left arm, left face, left neck, and left lower extremity  She states though her muscles feel heavy and she has to use her opposite extremity to lift her arm or leg  Patient also reports numbness, which she describes as feeling less sensation on the left side of body compared to the right side of her body  She also reports jerking movements that occurred this AM  Patient reports no prior history of similar symptoms in her left upper extremity in the past   She reports that over the last 3 days, she has noticed that when she takes her tramadol she is able to ambulate more easily  She denies having any back pain or neck pain  Patient reports that she called her primary neurologist today to explain her symptoms  Fortunately for the patient, 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 and 1 of the Neurology attendings was able to examine her in the office, where they noted L sided weakness and also hyperreflexia without babinski    They recommended the patient come to the ER and ultimately admitted for further workup  Review of Systems:    Review of Systems   Constitutional: Negative  HENT: Negative  Eyes: Negative  Respiratory: Negative  Cardiovascular: Negative  Gastrointestinal: Negative  Genitourinary: Negative  Musculoskeletal: Positive for gait problem  Negative for arthralgias, back pain and neck pain  Skin: Negative  Neurological: Positive for weakness, numbness and headaches  Hematological: Negative  Psychiatric/Behavioral: The patient is nervous/anxious  Past Medical and Surgical History:     Past Medical History:   Diagnosis Date    Anxiety     Asthma     seasonal    Hernia of abdominal wall     Hypertension     Seizures ([address] )     pseudoseizures since 2012       Past Surgical History:   Procedure Laterality Date    APPENDECTOMY      CHOLECYSTECTOMY      GASTRIC BYPASS      HERNIA REPAIR      x2       Meds/Allergies:    Prior to Admission medications    Medication Sig Start Date End Date Taking? Authorizing Provider   albuterol (PROVENTIL HFA,VENTOLIN HFA) 90 mcg/act inhaler Inhale 2 puffs every 6 (six) hours as needed for wheezing 1/9/19   Saul River PA-C   amLODIPine (NORVASC) 5 mg tablet TAKE ONE TABLET BY MOUTH DAILY 9/26/19   Myron Browne MD   cetirizine (ZyrTEC) 10 mg tablet Take 1 tablet (10 mg total) by mouth daily  Patient not taking: Reported on 3/3/2020 1/9/19   Aydee River PA-C   chlorthalidone 25 mg tablet Take 1 tablet (25 mg total) by mouth daily 2/3/20   Myron Browne MD   cyclobenzaprine (FLEXERIL) 10 mg tablet Take 1 tablet (10 mg total) by mouth daily as needed for muscle spasms for up to 30 days 5/1/19 2/17/20  Myron Browne MD   diphenhydrAMINE (BENADRYL) 50 MG tablet Take one tablet 1 hour prior to contrast administration   6/11/19   Nova Garrison MD   estradiol (ESTRACE) 2 MG tablet Take 1 tablet (2 mg total) by mouth 2 (two) times a day 2/3/20 5/3/20  Myron Browne MD   gabapentin (NEURONTIN) 100 mg capsule Take 1 tab AM and afternoon daily 3/2/20   Shawna Taylor DO   gabapentin (NEURONTIN) 300 mg capsule Take 1 capsule (300 mg total) by mouth daily at bedtime 3/2/20   Shawna Taylor DO   hydrocortisone (ANUSOL-HC) 25 mg suppository Insert 1 suppository (25 mg total) into the rectum 2 (two) times a day 10/3/19   Lola Wheeler MD   LORazepam (ATIVAN) 1 mg tablet Take 1 tablet (1 mg total) by mouth as needed for anxiety 2/3/20 4/3/20  Lola Wheeler MD   methocarbamol (ROBAXIN) 500 mg tablet Take 1 tablet (500 mg total) by mouth daily as needed for muscle spasms 6/7/19   Lola Wheeler MD   multivitamin [facility]) TABS Take 1 tablet by mouth daily    Historical Provider, MD   ondansetron (ZOFRAN-ODT) 4 mg disintegrating tablet Take 1 tablet (4 mg total) by mouth every 8 (eight) hours as needed for nausea or vomiting 10/17/19   Reina Oakes III, MD   pantoprazole (PROTONIX) 40 mg tablet Take 1 tablet (40 mg total) by mouth daily 1/20/20   Brenda Naik PA-C   predniSONE 20 mg tablet 4 tabs x 3 days, 3 tabs x 3 days, 2 tabs x 2 days, 1 tab x 2 days, then stop  Take in AM with food  Patient not taking: Reported on 2/27/2020 2/17/20   Shawna Taylor DO   spironolactone (ALDACTONE) 100 mg tablet Take 1 tablet (100 mg total) by mouth 2 (two) times a day 3/19/19   Lola Wheeler MD   traMADol Abiodun Lis) 50 mg tablet Take 1 tablet (50 mg total) by mouth every 6 (six) hours as needed for moderate pain 2/3/20 4/3/20  Lola Wheeler MD     I have reviewed home medications with patient personally  Allergies:    Allergies   Allergen Reactions    Contrast Dye [Iodinated Diagnostic Agents] Anaphylaxis       Social History:     Marital Status: Single   Occupation:  Medical assistant with neurology  Patient Pre-hospital Living Situation:  Independent  Patient Pre-hospital Level of Mobility:  Typically independent, uses a cane intermittently  Patient Pre-hospital Diet Restrictions: None  Substance Use History:   Social History     Substance and Sexual Activity   Alcohol Use Yes    Alcohol/week: 3 0 standard drinks    Types: 3 Standard drinks or equivalent per week    Comment: 3 per month     Social History     Tobacco Use   Smoking Status Never Smoker   Smokeless Tobacco Never Used     Social History     Substance and Sexual Activity   Drug Use Never       Family History:    Family History   Problem Relation Age of Onset    Hypertension Mother     Diabetes Mother     Hypertension Sister        Physical Exam:     Vitals:   Blood Pressure: 168/87 (03/06/20 2045)  Pulse: 72 (03/06/20 2045)  Temperature: 98 °F (36 7 °C) (03/06/20 2045)  Temp Source: Oral (03/06/20 2045)  Respirations: 17 (03/06/20 2045)  Height: 5' 9" (175 3 cm) (03/06/20 2045)  Weight - Scale: 124 kg (273 lb) (03/06/20 2045)  SpO2: 96 % (03/06/20 2045)    Physical Exam   Constitutional: She is oriented to person, place, and time  No distress  HENT:   Head: Normocephalic and atraumatic  Eyes: Pupils are equal, round, and reactive to light  EOM are normal    Neck: No JVD present  Cardiovascular: Normal rate and regular rhythm  Exam reveals no gallop and no friction rub  No murmur heard  Pulmonary/Chest: Effort normal and breath sounds normal  No stridor  No respiratory distress  She has no wheezes  Abdominal: Soft  Bowel sounds are normal  She exhibits no distension  There is no tenderness  There is no guarding  Neurological: She is alert and oriented to person, place, and time  A sensory deficit (Patient reports decreased sensation to crude touch along CN V1-3 distribution; anterior neck, anterior and posterior arm & leg) is present  She exhibits abnormal muscle tone (overall about 4/5 strength in LUE; 4/5 strength LLE with exception of hip flexion that appears weaker)  GCS eye subscore is 4  GCS verbal subscore is 5  GCS motor subscore is 6  Skin: Skin is warm and dry  She is not diaphoretic  No erythema  Psychiatric: She has a normal mood and affect  Additional Data:     Lab Results: I have personally reviewed pertinent reports  Results from last 7 days   Lab Units 03/06/20  1813   WBC Thousand/uL 11 01*   HEMOGLOBIN g/dL 15 3   HEMATOCRIT % 45 5   PLATELETS Thousands/uL 259   NEUTROS PCT % 74   LYMPHS PCT % 18   MONOS PCT % 6   EOS PCT % 1     Results from last 7 days   Lab Units 03/06/20  1813   SODIUM mmol/L 139   POTASSIUM mmol/L 3 7   CHLORIDE mmol/L 102   CO2 mmol/L 25   BUN mg/dL 14   CREATININE mg/dL 1 03   ANION GAP mmol/L 12   CALCIUM mg/dL 9 1   ALBUMIN g/dL 4 0   TOTAL BILIRUBIN mg/dL 0 61   ALK PHOS U/L 66   ALT U/L 13   AST U/L 13   GLUCOSE RANDOM mg/dL 93     Results from last 7 days   Lab Units 03/06/20  1813   INR  1 04                   Imaging: I have personally reviewed pertinent reports  CT head without contrast   Final Result by Melba Bajwa MD (03/06 1904)      No acute intracranial abnormality  Workstation performed: [id number]         MRI Inpatient Order    (Results Pending)       EKG, Pathology, and Other Studies Reviewed on Admission:   · EKG:  Normal sinus rhythm, rate 86    Allscripts / Epic Records Reviewed: Yes     ** Please Note: This note has been constructed using a voice recognition system   **

## 2020-03-07 NOTE — OCCUPATIONAL THERAPY NOTE
Occupational Therapy Evaluation     Patient Name: Eddy Sierra  Today's Date: 3/7/2020  Problem List  Principal Problem:    Left-sided weakness  Active Problems:    History of sleeve gastrectomy    Essential hypertension    Cervical disc disease    Male-to-female transgender person    GERD (gastroesophageal reflux disease)    Past Medical History  Past Medical History:   Diagnosis Date    Anxiety     Asthma     seasonal    Hernia of abdominal wall     Hypertension     Seizures ([address] )     pseudoseizures since 2012     Past Surgical History  Past Surgical History:   Procedure Laterality Date    APPENDECTOMY      CHOLECYSTECTOMY      GASTRIC BYPASS      HERNIA REPAIR      x2             03/07/20 1145   Note Type   Note type Eval/Treat   Restrictions/Precautions   Weight Bearing Precautions Per Order No   Other Precautions Fall Risk;Bed Alarm; Chair Alarm   Pain Assessment   Pain Assessment 0-10   Pain Score 4   Pain Location Neck   Home Living   Type of Home House  (2 SAMMIE)   Home Layout Two level;Bed/bath upstairs;1/2 bath on main level   Bathroom Shower/Tub Tub/shower unit   Bathroom Toilet Standard   Home Equipment [facility])   Prior Function   Level of [place] Independent with ADLs and functional mobility  (Patient has needed assist with putting on socks)   Lives With Other (Comment)  (Godmother)   ADL Assistance Independent   IADLs Independent   Falls in the last 6 months 0   ADL   Eating Assistance 7  Independent   Grooming Assistance 5  Supervision/Setup   Grooming Deficit Wash/dry hands; Wash/dry face; Increased time to complete   UB Bathing Assistance 5  Supervision/Setup   UB Bathing Deficit Right arm;Left arm; Increased time to complete   LB Bathing Assistance Unable to assess   UB Dressing Assistance 4  Minimal Assistance   UB Dressing Deficit Thread RUE; Thread LUE; Increased time to complete;Pull around back   LB Dressing Assistance 4  Minimal Assistance   LB Dressing Deficit Thread RLE into pants; Thread LLE into pants; Increased time to complete;Supervision/safety   Toileting Assistance  5  Supervision/Setup   Toileting Deficit Increased time to complete   Bed Mobility   Supine to Sit 5  Supervision   Additional items HOB elevated; Increased time required;Verbal cues   Sit to Supine 4  Minimal assistance   Additional items LE management; Increased time required   Transfers   Sit to Stand 5  Supervision   Additional items Assist x 1;HOB elevated; Increased time required;Verbal cues; Bedrails   Stand to Sit 5  Supervision   Additional items Assist x 1; Increased time required   Functional Mobility   Functional Mobility 5  Supervision   Additional Comments 30 feet with quad cane   Balance   Static Sitting Good   Dynamic Sitting Good   Static Standing Fair   Dynamic Standing Fair -   Activity Tolerance   Activity Tolerance Patient tolerated treatment well;Patient limited by pain   Nurse Made Aware SIERRA cowan   RUGUIDO Assessment   RUE Assessment WFL   RUE Strength   RUE Overall Strength   (/5)   LUE Assessment   LUE Assessment X   LUE Overall AROM   L Shoulder Flexion   (limited on AROm increase on PROM)   L Shoulder ABduction   (limited on AROm increase on PROM)   L Shoulder ADduction   (limited on AROm increase on PROM)   L Elbow Flexion   (limited on AROm increase on PROM)   L Elbow Extension   (limited on AROm increase on PROM)   LUE Strength   LUE Overall Strength Deficits  (3-/5)   Hand Function   Fine Motor Coordination Impaired   Vision - Complex Assessment   Tracking Able to track stimulus in all quads without difficulty   Acuity Able to read clock/calendar on wall without difficulty   Cognition   Orientation Level Oriented X4   Following Commands Follows all commands and directions without difficulty   Comments   (Pt id by wristband name and )   Assessment   Limitation Decreased ADL status; Decreased UE strength;Decreased endurance;Decreased self-care trans;Decreased high-level ADLs   Prognosis Fair Assessment Patient evaluated by Occupational Therapy  Patient admitted with Left-sided weakness  The patients occupational profile, medical and therapy history includes a expanded review of medical and/or therapy records and additional review of physical, cognitive, or psychosocial history related to current functional performance  Comorbidities affecting functional mobility and ADLS include: hypertension  Prior to admission, patient was independent with functional mobility with SPC, independent with ADLS, independent with IADLS and living with family in a 2 level home with 2 steps to enter  Patient performed Sup bed mobility and transfers  Patient performed  Functional mobility with a quad cane 30 feet  Patient performed LB and UB dressing Min A , toileting at Sup and groom and UB bath at Sup  The evaluation identifies the following performance deficits: weakness, impaired balance, decreased endurance, decreased coordination, increased fall risk, new onset of impairment of functional mobility, decreased ADLS, decreased IADLS and decreased activity tolerance, that result in activity limitations and/or participation restrictions  This evaluation requires clinical decision making of moderate complexity, because the patient may present with comorbidities that affect occupational performance and required minimal or moderate modification of tasks or assistance with the consideration of several treatment options  The Barthel Index was used as a functional outcome tool presenting with a score of 70  Patient will benefit from skilled Occupational Therapy services to address above deficits and facilitate a safe return to prior level of function     Goals   Patient Goals less neck pain   STG Time Frame   (1-7)   Short Term Goal #1   Patient will increase standing tolerance to 8 minutes during ADL task to decrease assistance level and decrease fall risk; Patient will increase bed mobility to Mod  independent in preparation for ADLS and transfers; Patient will increase functional mobility to and from bathroom with quad cane Mod independently to increase performance with ADLS and to use a toilet; Patient will tolerate 10 minutes of UE ROM/strengthening to increase general activity tolerance and performance in ADLS/IADLS; Patient will improve functional activity tolerance to 10 minutes of sustained functional tasks to increase participation in basic self-care and decrease assistance level;  Patient will increase dynamic standing balance to Fair + to improve postural stability and decrease fall risk during standing ADLS and transfers  LTG Time Frame   (8-14)   Long Term Goal #1 Goals established to promote patient goal of less neck pain:  Patient will increase standing tolerance to 10 minutes during ADL task to decrease assistance level and decrease fall risk; Patient will increase bed mobility to independent in preparation for ADLS and transfers; Patient will increase functional mobility to and from bathroom with quad cane independently to increase performance with ADLS and to use a toilet; Patient will tolerate 12 minutes of UE ROM/strengthening to increase general activity tolerance and performance in ADLS/IADLS; Patient will improve functional activity tolerance to 12 minutes of sustained functional tasks to increase participation in basic self-care and decrease assistance level;  Patient will increase dynamic standing balance to good to improve postural stability and decrease fall risk during standing ADLS and transfers     Functional Transfer Goals   Pt Will Perform All Functional Transfers Independently   Pt Will Transfer To Bedside Commode Independently   Pt Will Transfer To Toilet Independently   Pt Will Transfer To Shower Independently   ADL Goals   Pt Will Perform Eating Independently   Pt Will Perform Grooming Independently   Pt Will Perform Bathing With stand by assist   Pt Will Perform UE Dressing With mod indep   Pt Will Perform LE Dressing With mod indep   Pt Will Perform Toileting With mod indep   Plan   Treatment Interventions ADL retraining;Functional transfer training;UE strengthening/ROM; Endurance training;Patient/family training;Neuromuscular reeducation; Activityengagement; Energy conservation;Continued evaluation   Goal Expiration Date 03/21/20   OT Frequency 2-3x/wk   Additional Treatment Session   Treatment Assessment Patient performed AROM to tolerance with LT UE and RT UE at 2X 10  PROM was performed to tolerance to Lt UE  Patient was educated on AROM exercises  Patient performed function ambulation with SPC 30 feet in room and hallway  Patient was returned to bed with all needs met     Recommendation   OT Discharge Recommendation Other (Comment)  (Home with family support and Outpatient OT)   Barthel Index   Feeding 10   Bathing 5   Grooming Score 5   Dressing Score 5   Bladder Score 10   Bowels Score 10   Toilet Use Score 5   Transfers (Bed/Chair) Score 15   Mobility (Level Surface) Score 0   Stairs Score 5   Barthel Index Score 70   Nobie [place], OT

## 2020-03-07 NOTE — QUICK NOTE
Telemetry order expiring  Reviewed MRI: no acute infarct representing CVA  Telemetry reviewed: no events  Will discontinue telemetry   Primary RN aware

## 2020-03-07 NOTE — PLAN OF CARE
Problem: Potential for Falls  Goal: Patient will remain free of falls  Description  INTERVENTIONS:  - Assess patient frequently for physical needs  -  Identify cognitive and physical deficits and behaviors that affect risk of falls  -  [place] fall precautions as indicated by assessment   - Educate patient/family on patient safety including physical limitations  - Instruct patient to call for assistance with activity based on assessment  - Modify environment to reduce risk of injury  - Consider OT/PT consult to assist with strengthening/mobility  Outcome: Progressing     Problem: Neurological Deficit  Goal: Neurological status is stable or improving  Description  Interventions:  - Monitor and assess patient's level of consciousness, motor function, sensory function, and level of assistance needed for ADLs  - Monitor and report changes from baseline  Collaborate with interdisciplinary team to initiate plan and implement interventions as ordered  - Provide and maintain a safe environment  - Consider seizure precautions  - Consider fall precautions  - Consider aspiration precautions  - Consider bleeding precautions  Outcome: Progressing     Problem: Activity Intolerance/Impaired Mobility  Goal: Mobility/activity is maintained at optimum level for patient  Description  Interventions:  - Assess and monitor patient  barriers to mobility and need for assistive/adaptive devices  - Assess patient's emotional response to limitations  - Collaborate with interdisciplinary team and initiate plans and interventions as ordered  - Encourage independent activity per ability   - Maintain proper body alignment  - Perform active/passive rom as tolerated/ordered    - Plan activities to conserve energy   - Turn patient as appropriate  Outcome: Progressing     Problem: PAIN - ADULT  Goal: Verbalizes/displays adequate comfort level or baseline comfort level  Description  Interventions:  - Encourage patient to monitor pain and request assistance  - Assess pain using appropriate pain scale  - Administer analgesics based on type and severity of pain and evaluate response  - Implement non-pharmacological measures as appropriate and evaluate response  - Consider cultural and social influences on pain and pain management  - Notify physician/advanced practitioner if interventions unsuccessful or patient reports new pain  Outcome: Progressing     Problem: SAFETY ADULT  Goal: Maintain or return to baseline ADL function  Description  INTERVENTIONS:  -  Assess patient's ability to carry out ADLs; assess patient's baseline for ADL function and identify physical deficits which impact ability to perform ADLs (bathing, care of mouth/teeth, toileting, grooming, dressing, etc )  - Assess/evaluate cause of self-care deficits   - Assess range of motion  - Assess patient's mobility; develop plan if impaired  - Assess patient's need for assistive devices and provide as appropriate  - Encourage maximum independence but intervene and supervise when necessary  - Involve family in performance of ADLs  - Assess for home care needs following discharge   - Consider OT consult to assist with ADL evaluation and planning for discharge  - Provide patient education as appropriate  Outcome: Progressing  Goal: Maintain or return mobility status to optimal level  Description  INTERVENTIONS:  - Assess patient's baseline mobility status (ambulation, transfers, stairs, etc )    - Identify cognitive and physical deficits and behaviors that affect mobility  - Identify mobility aids required to assist with transfers and/or ambulation (gait belt, sit-to-stand, lift, walker, cane, etc )  - [place] fall precautions as indicated by assessment  - Record patient progress and toleration of activity level on Mobility SBAR; progress patient to next Phase/Stage  - Instruct patient to call for assistance with activity based on assessment  - Consider rehabilitation consult to assist with strengthening/weightbearing, etc   Outcome: Progressing     Problem: DISCHARGE PLANNING  Goal: Discharge to home or other facility with appropriate resources  Description  INTERVENTIONS:  - Identify barriers to discharge w/patient and caregiver  - Arrange for needed discharge resources and transportation as appropriate  - Identify discharge learning needs (meds, wound care, etc )  - Arrange for interpretive services to assist at discharge as needed  - Refer to Case Management Department for coordinating discharge planning if the patient needs post-hospital services based on physician/advanced practitioner order or complex needs related to functional status, cognitive ability, or social support system  Outcome: Progressing     Problem: NEUROSENSORY - ADULT  Goal: Achieves stable or improved neurological status  Description  INTERVENTIONS  - Monitor and report changes in neurological status  - Monitor vital signs such as temperature, blood pressure, glucose, and any other labs ordered   - Initiate measures to prevent increased intracranial pressure  - Monitor for seizure activity and implement precautions if appropriate      Outcome: Progressing  Goal: Remains free of injury related to seizures activity  Description  INTERVENTIONS  - Maintain airway, patient safety  and administer oxygen as ordered  - Monitor patient for seizure activity, document and report duration and description of seizure to physician/advanced practitioner  - If seizure occurs,  ensure patient safety during seizure  - Reorient patient post seizure  - Seizure pads on all 4 side rails  - Instruct patient/family to notify RN of any seizure activity including if an aura is experienced  - Instruct patient/family to call for assistance with activity based on nursing assessment  - Administer anti-seizure medications if ordered    Outcome: Progressing  Goal: Achieves maximal functionality and self care  Description  INTERVENTIONS  - Monitor swallowing and airway patency with patient fatigue and changes in neurological status  - Encourage and assist patient to increase activity and self care     - Encourage visually impaired, hearing impaired and aphasic patients to use assistive/communication devices  Outcome: Progressing     Problem: MUSCULOSKELETAL - ADULT  Goal: Maintain or return mobility to safest level of function  Description  INTERVENTIONS:  - Assess patient's ability to carry out ADLs; assess patient's baseline for ADL function and identify physical deficits which impact ability to perform ADLs (bathing, care of mouth/teeth, toileting, grooming, dressing, etc )  - Assess/evaluate cause of self-care deficits   - Assess range of motion  - Assess patient's mobility  - Assess patient's need for assistive devices and provide as appropriate  - Encourage maximum independence but intervene and supervise when necessary  - Involve family in performance of ADLs  - Assess for home care needs following discharge   - Consider OT consult to assist with ADL evaluation and planning for discharge  - Provide patient education as appropriate  Outcome: Progressing  Goal: Maintain proper alignment of affected body part  Description  INTERVENTIONS:  - Support, maintain and protect limb and body alignment  - Provide patient/ family with appropriate education  Outcome: Progressing

## 2020-03-07 NOTE — PLAN OF CARE
Problem: PHYSICAL THERAPY ADULT  Goal: Performs mobility at highest level of function for planned discharge setting  See evaluation for individualized goals  Description  Treatment/Interventions: Functional transfer training, LE strengthening/ROM, Elevations, Therapeutic exercise, Endurance training, Patient/family training, Equipment eval/education, Bed mobility, Gait training  Equipment Recommended: Other (Comment)(SBQC, trial RW)       See flowsheet documentation for full assessment, interventions and recommendations  3/7/2020 1116 by Juan Johnson, PT  Outcome: Progressing  Note:   Prognosis: Good  Problem List: Decreased strength, Decreased range of motion, Impaired balance, Decreased mobility, Impaired sensation, Obesity, Pain  Assessment: Patient tolerated treatment well  Therapist introduced RW to patient in an effort to address LUE support and improve L LE "dragging"  Pt able to ambulate w/ RW for 40 ft w/ S, still note slight dragging of L LE  Provided pt again w/ SBQC, pt ambulated w/ S w/ the same gait deviations, pt states she feels she moves better w/ SBQC  Spent time demonstrating proper stair technique w/ SBQC and limb sequencing ("up with the good, down with the bad")  Ended session w/ demonstrating and have patient perform cervical stretches throughout cervical ROM on self, pt demonstrates good technique  Also demonstrated addition of self-manual therapy on L cervical musculature (trigger-point, muscle work) to assist w/ AROM  Provided pt with heat pack on folded up wash cloth, pt is able to distinguish hot/cold on upper trap area  Recommendation: Home with family support, Outpatient PT          See flowsheet documentation for full assessment

## 2020-03-07 NOTE — PLAN OF CARE
Problem: Potential for Falls  Goal: Patient will remain free of falls  Description  INTERVENTIONS:  - Assess patient frequently for physical needs  -  Identify cognitive and physical deficits and behaviors that affect risk of falls  -  [place] fall precautions as indicated by assessment   - Educate patient/family on patient safety including physical limitations  - Instruct patient to call for assistance with activity based on assessment  - Modify environment to reduce risk of injury  - Consider OT/PT consult to assist with strengthening/mobility  Outcome: Progressing     Problem: Neurological Deficit  Goal: Neurological status is stable or improving  Description  Interventions:  - Monitor and assess patient's level of consciousness, motor function, sensory function, and level of assistance needed for ADLs  - Monitor and report changes from baseline  Collaborate with interdisciplinary team to initiate plan and implement interventions as ordered  - Provide and maintain a safe environment  - Consider seizure precautions  - Consider fall precautions  - Consider aspiration precautions  - Consider bleeding precautions  Outcome: Progressing     Problem: Activity Intolerance/Impaired Mobility  Goal: Mobility/activity is maintained at optimum level for patient  Description  Interventions:  - Assess and monitor patient  barriers to mobility and need for assistive/adaptive devices  - Assess patient's emotional response to limitations  - Collaborate with interdisciplinary team and initiate plans and interventions as ordered  - Encourage independent activity per ability   - Maintain proper body alignment  - Perform active/passive rom as tolerated/ordered    - Plan activities to conserve energy   - Turn patient as appropriate  Outcome: Progressing     Problem: PAIN - ADULT  Goal: Verbalizes/displays adequate comfort level or baseline comfort level  Description  Interventions:  - Encourage patient to monitor pain and request assistance  - Assess pain using appropriate pain scale  - Administer analgesics based on type and severity of pain and evaluate response  - Implement non-pharmacological measures as appropriate and evaluate response  - Consider cultural and social influences on pain and pain management  - Notify physician/advanced practitioner if interventions unsuccessful or patient reports new pain  Outcome: Progressing     Problem: SAFETY ADULT  Goal: Maintain or return to baseline ADL function  Description  INTERVENTIONS:  -  Assess patient's ability to carry out ADLs; assess patient's baseline for ADL function and identify physical deficits which impact ability to perform ADLs (bathing, care of mouth/teeth, toileting, grooming, dressing, etc )  - Assess/evaluate cause of self-care deficits   - Assess range of motion  - Assess patient's mobility; develop plan if impaired  - Assess patient's need for assistive devices and provide as appropriate  - Encourage maximum independence but intervene and supervise when necessary  - Involve family in performance of ADLs  - Assess for home care needs following discharge   - Consider OT consult to assist with ADL evaluation and planning for discharge  - Provide patient education as appropriate  Outcome: Progressing  Goal: Maintain or return mobility status to optimal level  Description  INTERVENTIONS:  - Assess patient's baseline mobility status (ambulation, transfers, stairs, etc )    - Identify cognitive and physical deficits and behaviors that affect mobility  - Identify mobility aids required to assist with transfers and/or ambulation (gait belt, sit-to-stand, lift, walker, cane, etc )  - [place] fall precautions as indicated by assessment  - Record patient progress and toleration of activity level on Mobility SBAR; progress patient to next Phase/Stage  - Instruct patient to call for assistance with activity based on assessment  - Consider rehabilitation consult to assist with strengthening/weightbearing, etc   Outcome: Progressing     Problem: DISCHARGE PLANNING  Goal: Discharge to home or other facility with appropriate resources  Description  INTERVENTIONS:  - Identify barriers to discharge w/patient and caregiver  - Arrange for needed discharge resources and transportation as appropriate  - Identify discharge learning needs (meds, wound care, etc )  - Arrange for interpretive services to assist at discharge as needed  - Refer to Case Management Department for coordinating discharge planning if the patient needs post-hospital services based on physician/advanced practitioner order or complex needs related to functional status, cognitive ability, or social support system  Outcome: Progressing     Problem: NEUROSENSORY - ADULT  Goal: Achieves stable or improved neurological status  Description  INTERVENTIONS  - Monitor and report changes in neurological status  - Monitor vital signs such as temperature, blood pressure, glucose, and any other labs ordered   - Initiate measures to prevent increased intracranial pressure  - Monitor for seizure activity and implement precautions if appropriate      Outcome: Progressing  Goal: Remains free of injury related to seizures activity  Description  INTERVENTIONS  - Maintain airway, patient safety  and administer oxygen as ordered  - Monitor patient for seizure activity, document and report duration and description of seizure to physician/advanced practitioner  - If seizure occurs,  ensure patient safety during seizure  - Reorient patient post seizure  - Seizure pads on all 4 side rails  - Instruct patient/family to notify RN of any seizure activity including if an aura is experienced  - Instruct patient/family to call for assistance with activity based on nursing assessment  - Administer anti-seizure medications if ordered    Outcome: Progressing  Goal: Achieves maximal functionality and self care  Description  INTERVENTIONS  - Monitor swallowing and airway patency with patient fatigue and changes in neurological status  - Encourage and assist patient to increase activity and self care     - Encourage visually impaired, hearing impaired and aphasic patients to use assistive/communication devices  Outcome: Progressing     Problem: MUSCULOSKELETAL - ADULT  Goal: Maintain or return mobility to safest level of function  Description  INTERVENTIONS:  - Assess patient's ability to carry out ADLs; assess patient's baseline for ADL function and identify physical deficits which impact ability to perform ADLs (bathing, care of mouth/teeth, toileting, grooming, dressing, etc )  - Assess/evaluate cause of self-care deficits   - Assess range of motion  - Assess patient's mobility  - Assess patient's need for assistive devices and provide as appropriate  - Encourage maximum independence but intervene and supervise when necessary  - Involve family in performance of ADLs  - Assess for home care needs following discharge   - Consider OT consult to assist with ADL evaluation and planning for discharge  - Provide patient education as appropriate  Outcome: Progressing  Goal: Maintain proper alignment of affected body part  Description  INTERVENTIONS:  - Support, maintain and protect limb and body alignment  - Provide patient/ family with appropriate education  Outcome: Progressing

## 2020-03-07 NOTE — ASSESSMENT & PLAN NOTE
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  Myelopathy   · MRI brain, C spine and L spine   · Neuro checks  · Echo   · Telemetry  · FLP, hgb A1c  · Neuro consult-- appreciate input

## 2020-03-08 ENCOUNTER — APPOINTMENT (INPATIENT)
Dept: NON INVASIVE DIAGNOSTICS | Facility: HOSPITAL | Age: 40
DRG: 552 | End: 2020-03-08
Payer: COMMERCIAL

## 2020-03-08 PROCEDURE — 99232 SBSQ HOSP IP/OBS MODERATE 35: CPT | Performed by: INTERNAL MEDICINE

## 2020-03-08 PROCEDURE — 93306 TTE W/DOPPLER COMPLETE: CPT | Performed by: INTERNAL MEDICINE

## 2020-03-08 PROCEDURE — 93306 TTE W/DOPPLER COMPLETE: CPT

## 2020-03-08 PROCEDURE — 99232 SBSQ HOSP IP/OBS MODERATE 35: CPT | Performed by: PSYCHIATRY & NEUROLOGY

## 2020-03-08 RX ORDER — LORAZEPAM 2 MG/ML
1 INJECTION INTRAMUSCULAR ONCE
Status: COMPLETED | OUTPATIENT
Start: 2020-03-08 | End: 2020-03-09

## 2020-03-08 RX ADMIN — GABAPENTIN 300 MG: 300 CAPSULE ORAL at 20:55

## 2020-03-08 RX ADMIN — GABAPENTIN 300 MG: 300 CAPSULE ORAL at 16:01

## 2020-03-08 RX ADMIN — PANTOPRAZOLE SODIUM 40 MG: 40 TABLET, DELAYED RELEASE ORAL at 06:23

## 2020-03-08 RX ADMIN — SENNOSIDES 8.6 MG: 8.6 TABLET, FILM COATED ORAL at 09:43

## 2020-03-08 RX ADMIN — ASPIRIN 81 MG 81 MG: 81 TABLET ORAL at 09:43

## 2020-03-08 RX ADMIN — METHOCARBAMOL TABLETS 500 MG: 500 TABLET, COATED ORAL at 00:34

## 2020-03-08 RX ADMIN — LORAZEPAM 1 MG: 1 TABLET ORAL at 18:10

## 2020-03-08 RX ADMIN — AMLODIPINE BESYLATE 5 MG: 5 TABLET ORAL at 09:43

## 2020-03-08 RX ADMIN — ESTRADIOL 2 MG: 1 TABLET ORAL at 09:43

## 2020-03-08 RX ADMIN — TRAMADOL HYDROCHLORIDE 50 MG: 50 TABLET, FILM COATED ORAL at 06:58

## 2020-03-08 RX ADMIN — TRAMADOL HYDROCHLORIDE 50 MG: 50 TABLET, FILM COATED ORAL at 19:56

## 2020-03-08 RX ADMIN — TRAMADOL HYDROCHLORIDE 50 MG: 50 TABLET, FILM COATED ORAL at 16:01

## 2020-03-08 RX ADMIN — CHLORTHALIDONE 25 MG: 25 TABLET ORAL at 09:43

## 2020-03-08 RX ADMIN — GABAPENTIN 300 MG: 300 CAPSULE ORAL at 09:43

## 2020-03-08 NOTE — MALNUTRITION/BMI
This medical record reflects one or more clinical indicators suggestive of malnutrition and/or morbid obesity  BMI Findings:  BMI Classifications: Morbid Obesity 40-44 9     Body mass index is 40 32 kg/m²  See Nutrition note dated 3/8/20 for additional details  Completed nutrition assessment is viewable in the nutrition documentation

## 2020-03-08 NOTE — ASSESSMENT & PLAN NOTE
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  Myelopathy   · MRI brain, C spine and L spine   · Neuro checks  · Echo   · Telemetry  · FLP, hgb A1c  · MRI shows no acute findings  · MRI C-spine and lumbar spine pending

## 2020-03-08 NOTE — PROGRESS NOTES
Progress Note - Megan Reza  1980, 44 y o  adult MRN: [id number]    Unit/Bed#: S -01 Encounter: [id number]    Primary Care Provider: Gerardo Galindo MD   Date and time admitted to hospital: 3/6/2020  5:41 PM      * Left-sided weakness and sensory deficits  Assessment & Plan  · Patient with 3 day history of left upper extremity and left lower extremity weakness and sensory deficits  · Patient has decreased strength in upper extremity and lower extremity-- approximately 4/5 on the left side in regards to strength; decreased sensation to crude touch left-sided face, left upper extremity, left neck, left torso, left lower extremity  · Fortunately, 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 Myelopathy   · MRI brain, C spine and L spine   · Neuro checks  · Echo   · Telemetry  · FLP, hgb A1c  · MRI shows no acute findings  · MRI C-spine and lumbar spine pending    Cervical disc disease  Assessment & Plan  · Known cervical disc disease, as patient has had long-standing issues with RLE in the past and has been following with Dr Doe Daily in the past  · Continue with muscle relaxers    Essential hypertension  Assessment & Plan  · BP slightly elevated at this time  · Continue home medications    GERD (gastroesophageal reflux disease)  Assessment & Plan  · Continue PPI    History of sleeve gastrectomy  Assessment & Plan  · Performed in [place]  · Continue with diet modifications, vitamin supplementations    Male-to-female transgender person  Assessment & Plan  · Continue estrace    VTE Pharmacologic Prophylaxis:   Pharmacologic: Heparin  Mechanical VTE Prophylaxis in Place: Yes    Patient Centered Rounds: I have performed bedside rounds with nursing staff today      Discussions with Specialists or Other Care Team Provider:     Education and Discussions with Family / Patient: * patient    Time Spent for Care: 30 minutes  More than 50% of total time spent on counseling and coordination of care as described above  Current Length of Stay: 2 day(s)    Current Patient Status: Inpatient   Certification Statement: The patient will continue to require additional inpatient hospital stay due to Left-sided numbness    Discharge Plan:  Pending MRI    Code Status: Level 1 - Full Code      Subjective:   Feels okay today  Feels that her weakness has improved  Still feels tingly but much improved compared to yesterday    Objective:     Vitals:   Temp (24hrs), Av 2 °F (36 8 °C), Min:98 °F (36 7 °C), Max:98 3 °F (36 8 °C)    Temp:  [98 °F (36 7 °C)-98 3 °F (36 8 °C)] 98 3 °F (36 8 °C)  HR:  [63-83] 76  Resp:  [17-18] 17  BP: (128-156)/(75-98) 152/75  SpO2:  [96 %-98 %] 98 %  Body mass index is 40 32 kg/m²  Input and Output Summary (last 24 hours): Intake/Output Summary (Last 24 hours) at 3/8/2020 1208  Last data filed at 3/7/2020 1924  Gross per 24 hour   Intake 480 ml   Output    Net 480 ml       Physical Exam:     Physical Exam    Gen -Patient comfortable   Neck- Supple  No thyromegaly or lymphadenopathy  Lungs-Clear bilaterally without any wheeze or rales   Heart S1-S2, regular rate and rhythm, no murmurs  Abdomen-soft nontender, no organomegaly   Bowel sounds present  Extremities-no cyanosi,  clubbing or edema  Skin- no rash  Neuro-awake alert and oriented     Additional Data:     Labs:    Results from last 7 days   Lab Units 20  0516 20  1813   WBC Thousand/uL 8 50 11 01*   HEMOGLOBIN g/dL 14 4 15 3   HEMATOCRIT % 43 7 45 5   PLATELETS Thousands/uL 226 259   NEUTROS PCT %  --  74   LYMPHS PCT %  --  18   MONOS PCT %  --  6   EOS PCT %  --  1     Results from last 7 days   Lab Units 20  0516 20  1813   SODIUM mmol/L 138 139   POTASSIUM mmol/L 3 5 3 7   CHLORIDE mmol/L 103 102   CO2 mmol/L 28 25   BUN mg/dL 15 14 CREATININE mg/dL 1 06 1 03   ANION GAP mmol/L 7 12   CALCIUM mg/dL 8 8 9 1   ALBUMIN g/dL  --  4 0   TOTAL BILIRUBIN mg/dL  --  0 61   ALK PHOS U/L  --  66   ALT U/L  --  13   AST U/L  --  13   GLUCOSE RANDOM mg/dL 81 93     Results from last 7 days   Lab Units 03/06/20  1813   INR  1 04         Results from last 7 days   Lab Units 03/06/20  1813   HEMOGLOBIN A1C % 5 5               * I Have Reviewed All Lab Data Listed Above  * Additional Pertinent Lab Tests Reviewed:  All Labs Within Last 24 Hours Reviewed    Imaging:    Imaging Reports Reviewed Today Include:   Imaging Personally Reviewed by Myself Includes:      Recent Cultures (last 7 days):           Last 24 Hours Medication List:     Current Facility-Administered Medications:  albuterol 2 puff Inhalation Q6H PRN Malia Romano PA-C   amLODIPine 5 mg Oral Daily Malia Romano PA-C   aspirin 81 mg Oral Daily Malia Romano PA-C   atorvastatin 40 mg Oral QPM Malia Romano PA-C   bisacodyl 10 mg Rectal Daily PRN NATHAN Najera   calcium carbonate 1,000 mg Oral Daily PRN Malia Romano PA-C   chlorthalidone 25 mg Oral Daily Malia Romano PA-C   diphenhydrAMINE 25 mg Intravenous Q6H PRN Vivek Mcghee MD   docusate sodium 100 mg Oral BID PRN NATHAN Najera   estradiol 2 mg Oral Daily NATHAN Najera   gabapentin 300 mg Oral TID Jorden Iniguez PA-C   heparin (porcine) 5,000 Units Subcutaneous Q8H [facility] Malia Romano PA-C   hydrocortisone 25 mg Rectal BID PRN Vivek Mcghee MD   LORazepam 1 mg Intravenous Once Vivek Mcghee MD   LORazepam 1 mg Oral Q8H PRN Malia Romano PA-C   methocarbamol 500 mg Oral Daily PRN Malia Romano PA-C   multivitamin-minerals 1 tablet Oral Daily Malia Romano PA-C   ondansetron 4 mg Intravenous Q6H PRN Malia Romano PA-C   ondansetron 4 mg Oral Q8H PRN Malia Romano PA-C   pantoprazole 40 mg Oral Early Morning Evgeny Mcpherson MADISON Romano PA-C   senna 1 tablet Oral Daily Malia Romano PA-C   traMADol 50 mg Oral Q4H PRN Crystal Pelayo MD        Today, Patient Was Seen By: Kuldeep Cruz MD    ** Please Note: Dictation voice to text software may have been used in the creation of this document   **

## 2020-03-08 NOTE — PROGRESS NOTES
Progress Note - Neurology   Diaz Man  44 y o  adult MRN: [id number]  Unit/Bed#: S -01 Encounter: [id number]      Subjective:   Patient reports improvement of neck pain as well as paresthesias affecting the left side since gabapentin dosage was changed to 300 t i d  MRI of the brain reveals no evidence of any acute CVA, shows punctate foci in the left frontal subcortical region felt to be nonspecific  MRI of the cervical spine is pending, patient has been ambulating with the help of a cane  ROS: 12 system cued query was unchanged from org  consult note  Vitals:   Vitals:    03/08/20 1500   BP: 162/99   Pulse: 84   Resp: 18   Temp: 98 6 °F (37 °C)   SpO2: 96%   ,Body mass index is 40 32 kg/m²      MEDS:    Current Facility-Administered Medications:     albuterol (PROVENTIL HFA,VENTOLIN HFA) inhaler 2 puff, 2 puff, Inhalation, Q6H PRN, Malia Romano PA-C    amLODIPine (NORVASC) tablet 5 mg, 5 mg, Oral, Daily, Malia Romano PA-C, 5 mg at 03/08/20 0943    aspirin chewable tablet 81 mg, 81 mg, Oral, Daily, Malia Romano PA-C, 81 mg at 03/08/20 0943    atorvastatin (LIPITOR) tablet 40 mg, 40 mg, Oral, QPM, Malia Romano PA-C    bisacodyl (DULCOLAX) rectal suppository 10 mg, 10 mg, Rectal, Daily PRN, NATHAN Munoz    calcium carbonate (TUMS) chewable tablet 1,000 mg, 1,000 mg, Oral, Daily PRN, Malia Romano PA-C    chlorthalidone tablet 25 mg, 25 mg, Oral, Daily, Malia Romano PA-C, 25 mg at 03/08/20 0943    diphenhydrAMINE (BENADRYL) injection 25 mg, 25 mg, Intravenous, Q6H PRN, Vivek Mcghee MD    docusate sodium (COLACE) capsule 100 mg, 100 mg, Oral, BID PRN, NATHAN Munoz    estradiol (ESTRACE) tablet 2 mg, 2 mg, Oral, Daily, NATHAN Trimble, 2 mg at 03/08/20 2114    gabapentin (NEURONTIN) capsule 300 mg, 300 mg, Oral, TID, Lucy Stevenson PA-C, 300 mg at 03/08/20 1601    heparin (porcine) subcutaneous injection 5,000 Units, 5,000 Units, Subcutaneous, Q8H [address], 5,000 Units at 03/07/20 1446 **AND** Platelet count, , , Once, Malia Romano PA-C    hydrocortisone (ANUSOL-HC) rectal suppository 25 mg, 25 mg, Rectal, BID PRN, Vivek Mcghee MD    LORazepam (ATIVAN) injection 1 mg, 1 mg, Intravenous, Once, Vivek Mcghee MD    LORazepam (ATIVAN) tablet 1 mg, 1 mg, Oral, Q8H PRN, Malia Romano PA-C    methocarbamol (ROBAXIN) tablet 500 mg, 500 mg, Oral, Daily PRN, Malia Romano PA-C, 500 mg at 03/08/20 0034    multivitamin-minerals (CENTRUM) tablet 1 tablet, 1 tablet, Oral, Daily, Malia Romano PA-C, 1 tablet at 03/07/20 1033    ondansetron (ZOFRAN) injection 4 mg, 4 mg, Intravenous, Q6H PRN, Malia Romano PA-C    ondansetron (ZOFRAN-ODT) dispersible tablet 4 mg, 4 mg, Oral, Q8H PRN, Malia Romano PA-C    pantoprazole (PROTONIX) EC tablet 40 mg, 40 mg, Oral, Early Morning, Malia Romano PA-C, 40 mg at 03/08/20 4165    senna (SENOKOT) tablet 8 6 mg, 1 tablet, Oral, Daily, Malia Romano PA-C, 8 6 mg at 03/08/20 0943    traMADol (ULTRAM) tablet 50 mg, 50 mg, Oral, Q4H PRN, Darwin Osorio MD, 50 mg at 03/08/20 1601    Physical Exam:  General appearance: alert, appears stated age and cooperative  Head: Normocephalic, without obvious abnormality, atraumatic    Neurologic:  On examination patient is alert awake oriented, speech is fluent, cranial nerves are preserved bilaterally, sensation in the V1 V2 V3 distribution is symmetric bilaterally, with evidence of left regan sensory dysfunction mild left upper extremity extensor drift and distal weakness in the left hand  Lab Results: I have personally reviewed pertinent reports  Imaging Studies: I have personally reviewed pertinent reports  Assessment:  1  Cervical spondylosis, suspect myelopathy with cervical strain and left cervical radiculopathy      Plan:  Continue present medical management, patient remains on tramadol, Robaxin and gabapentin, MRI of the cervical spine is pending if MRI fails to reveal evidence of cervical myelopathy patient may be discharged on current medications to follow up with her neurologist as outpatient  Will DC aspirin and atorvastatin at this time  3/8/2020,5:36 PM    Dictation voice to text software has been used in the creation of this document  Please consider this in light of any contextual or grammatical errors

## 2020-03-08 NOTE — PLAN OF CARE
Problem: Potential for Falls  Goal: Patient will remain free of falls  Description  INTERVENTIONS:  - Assess patient frequently for physical needs  -  Identify cognitive and physical deficits and behaviors that affect risk of falls  -  [place] fall precautions as indicated by assessment   - Educate patient/family on patient safety including physical limitations  - Instruct patient to call for assistance with activity based on assessment  - Modify environment to reduce risk of injury  - Consider OT/PT consult to assist with strengthening/mobility  Outcome: Progressing     Problem: Neurological Deficit  Goal: Neurological status is stable or improving  Description  Interventions:  - Monitor and assess patient's level of consciousness, motor function, sensory function, and level of assistance needed for ADLs  - Monitor and report changes from baseline  Collaborate with interdisciplinary team to initiate plan and implement interventions as ordered  - Provide and maintain a safe environment  - Consider seizure precautions  - Consider fall precautions  - Consider aspiration precautions  - Consider bleeding precautions  Outcome: Progressing     Problem: Activity Intolerance/Impaired Mobility  Goal: Mobility/activity is maintained at optimum level for patient  Description  Interventions:  - Assess and monitor patient  barriers to mobility and need for assistive/adaptive devices  - Assess patient's emotional response to limitations  - Collaborate with interdisciplinary team and initiate plans and interventions as ordered  - Encourage independent activity per ability   - Maintain proper body alignment  - Perform active/passive rom as tolerated/ordered    - Plan activities to conserve energy   - Turn patient as appropriate  Outcome: Progressing     Problem: PAIN - ADULT  Goal: Verbalizes/displays adequate comfort level or baseline comfort level  Description  Interventions:  - Encourage patient to monitor pain and request assistance  - Assess pain using appropriate pain scale  - Administer analgesics based on type and severity of pain and evaluate response  - Implement non-pharmacological measures as appropriate and evaluate response  - Consider cultural and social influences on pain and pain management  - Notify physician/advanced practitioner if interventions unsuccessful or patient reports new pain  Outcome: Progressing     Problem: SAFETY ADULT  Goal: Maintain or return to baseline ADL function  Description  INTERVENTIONS:  -  Assess patient's ability to carry out ADLs; assess patient's baseline for ADL function and identify physical deficits which impact ability to perform ADLs (bathing, care of mouth/teeth, toileting, grooming, dressing, etc )  - Assess/evaluate cause of self-care deficits   - Assess range of motion  - Assess patient's mobility; develop plan if impaired  - Assess patient's need for assistive devices and provide as appropriate  - Encourage maximum independence but intervene and supervise when necessary  - Involve family in performance of ADLs  - Assess for home care needs following discharge   - Consider OT consult to assist with ADL evaluation and planning for discharge  - Provide patient education as appropriate  Outcome: Progressing  Goal: Maintain or return mobility status to optimal level  Description  INTERVENTIONS:  - Assess patient's baseline mobility status (ambulation, transfers, stairs, etc )    - Identify cognitive and physical deficits and behaviors that affect mobility  - Identify mobility aids required to assist with transfers and/or ambulation (gait belt, sit-to-stand, lift, walker, cane, etc )  - [place] fall precautions as indicated by assessment  - Record patient progress and toleration of activity level on Mobility SBAR; progress patient to next Phase/Stage  - Instruct patient to call for assistance with activity based on assessment  - Consider rehabilitation consult to assist with strengthening/weightbearing, etc   Outcome: Progressing     Problem: DISCHARGE PLANNING  Goal: Discharge to home or other facility with appropriate resources  Description  INTERVENTIONS:  - Identify barriers to discharge w/patient and caregiver  - Arrange for needed discharge resources and transportation as appropriate  - Identify discharge learning needs (meds, wound care, etc )  - Arrange for interpretive services to assist at discharge as needed  - Refer to Case Management Department for coordinating discharge planning if the patient needs post-hospital services based on physician/advanced practitioner order or complex needs related to functional status, cognitive ability, or social support system  Outcome: Progressing     Problem: NEUROSENSORY - ADULT  Goal: Achieves stable or improved neurological status  Description  INTERVENTIONS  - Monitor and report changes in neurological status  - Monitor vital signs such as temperature, blood pressure, glucose, and any other labs ordered   - Initiate measures to prevent increased intracranial pressure  - Monitor for seizure activity and implement precautions if appropriate      Outcome: Progressing  Goal: Remains free of injury related to seizures activity  Description  INTERVENTIONS  - Maintain airway, patient safety  and administer oxygen as ordered  - Monitor patient for seizure activity, document and report duration and description of seizure to physician/advanced practitioner  - If seizure occurs,  ensure patient safety during seizure  - Reorient patient post seizure  - Seizure pads on all 4 side rails  - Instruct patient/family to notify RN of any seizure activity including if an aura is experienced  - Instruct patient/family to call for assistance with activity based on nursing assessment  - Administer anti-seizure medications if ordered    Outcome: Progressing  Goal: Achieves maximal functionality and self care  Description  INTERVENTIONS  - Monitor swallowing and airway patency with patient fatigue and changes in neurological status  - Encourage and assist patient to increase activity and self care     - Encourage visually impaired, hearing impaired and aphasic patients to use assistive/communication devices  Outcome: Progressing     Problem: MUSCULOSKELETAL - ADULT  Goal: Maintain or return mobility to safest level of function  Description  INTERVENTIONS:  - Assess patient's ability to carry out ADLs; assess patient's baseline for ADL function and identify physical deficits which impact ability to perform ADLs (bathing, care of mouth/teeth, toileting, grooming, dressing, etc )  - Assess/evaluate cause of self-care deficits   - Assess range of motion  - Assess patient's mobility  - Assess patient's need for assistive devices and provide as appropriate  - Encourage maximum independence but intervene and supervise when necessary  - Involve family in performance of ADLs  - Assess for home care needs following discharge   - Consider OT consult to assist with ADL evaluation and planning for discharge  - Provide patient education as appropriate  Outcome: Progressing  Goal: Maintain proper alignment of affected body part  Description  INTERVENTIONS:  - Support, maintain and protect limb and body alignment  - Provide patient/ family with appropriate education  Outcome: Progressing

## 2020-03-08 NOTE — ASSESSMENT & PLAN NOTE
· Known cervical disc disease, as patient has had long-standing issues with RLE in the past and has been following with Dr Day Samuels in the past  · Continue with muscle relaxers

## 2020-03-08 NOTE — UTILIZATION REVIEW
Initial Clinical Review    Admission: Date/Time/Statement: Admission Orders (From admission, onward)     Ordered        03/06/20 1951  Inpatient Admission (expected length of stay for this patient Order details is greater than two midnights)  Once                   Orders Placed This Encounter   Procedures    Inpatient Admission (expected length of stay for this patient Order details is greater than two midnights)     Standing Status:   Standing     Number of Occurrences:   1     Order Specific Question:   Admitting Physician     Answer:   Rukhsana Whaley [[id number]]     Order Specific Question:   Level of Care     Answer:   Med Surg [[id number]]     Order Specific Question:   Estimated length of stay     Answer:   More than 2 Midnights     Order Specific Question:   Certification     Answer:   I certify that inpatient services are medically necessary for this patient for a duration of greater than two midnights  See H&P and MD Progress Notes for additional information about the patient's course of treatment  ED Arrival Information     Expected Arrival Acuity Means of Arrival Escorted By Service Admission Type    3/6/2020  3/6/2020 17:31 Emergent Wheelchair Self Hospitalist Emergency    Arrival Complaint    Left-sided muscle weakness        Chief Complaint   Patient presents with    Numbness     Pt left sided arm and leg numbness and weakness "from face all the way down to my feet", reports this episode started Wednesday  Pt currently under care of nuerologist      Assessment/Plan: 44year old female, presented to the ED from home via car  Admitted as Inpatient due to left sided muscle weakness  Patient with 3 day history of left upper extremity and left lower extremity weakness and sensory deficits    Patient has decreased strength in upper extremity and lower extremity-- approximately 4/5 on the left side in regards to strength; decreased sensation to crude touch left-sided face, left upper extremity, left neck, left torso, left lower extremity  Fortunately, 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 Neurology attendings  Recommend r/o sub acute CVA and evaluate for ? Myelopathy  MRI brain, C spine and L spine  Neuro checks  Echo  Telemetry  FLP, hgb A1c  Neuro consult      ED Triage Vitals   Temperature Pulse Respirations Blood Pressure SpO2   03/06/20 1747 03/06/20 1747 03/06/20 1747 03/06/20 1747 03/06/20 1747   98 4 °F (36 9 °C) 79 16 (!) 158/110 97 %      Temp Source Heart Rate Source Patient Position - Orthostatic VS BP Location FiO2 (%)   03/06/20 1747 03/06/20 1946 03/06/20 1946 03/06/20 1747 --   Oral Monitor Sitting Right arm       Pain Score       03/06/20 1747       6        Wt Readings from Last 1 Encounters:   03/06/20 124 kg (273 lb)     Additional Vital Signs:   Date/Time  Temp  Pulse  Resp  BP  MAP (mmHg)  SpO2  O2 Device  Patient Position - Orthostatic VS   03/08/20 1500  98 6 °F (37 °C)  84  18  162/99    96 %  None (Room air)  Sitting   03/08/20 0700  98 3 °F (36 8 °C)  76  17  152/75    98 %  None (Room air)  Lying   03/08/20 0300  98 1 °F (36 7 °C)  63  17  140/98    97 %  None (Room air)  Lying   03/07/20 2300    71  18  129/86  103  98 %  None (Room air)  Lying   03/07/20 1900  98 °F (36 7 °C)  67  17  133/94  109  96 %  None (Room air)  Lying   03/07/20 1500    83  17  128/88    96 %  None (Room air)  Lying   03/07/20 1259  98 3 °F (36 8 °C)  79    156/93        Lying   03/07/20 0700  98 °F (36 7 °C)  93  18  159/93    98 %  None (Room air)  Lying   03/07/20 0545  97 8 °F (36 6 °C)  60  18  150/73    96 %  None (Room air)  Lying   03/07/20 0345  97 7 °F (36 5 °C)  66  18  140/60    97 %  None (Room air)  Lying   03/07/20 0145  98 °F (36 7 °C)  69  18  145/72    98 %  None (Room air)  Lying   03/06/20 2345  97 7 °F (36 5 °C)  69  18  140/75    93 %  None (Room air)  Lying   03/06/20 2245    76    137/96           03/06/20 2145    80   143/99  117      Lying   20  98 °F (36 7 °C)  72  17  168/87  116  96 %  None (Room air)  Lying   20 1946    86  18  155/98    96 %    Sitting     Date and Time Eye Opening Best Verbal Response Best Motor Response [place] Coma Scale Score   20 0745 4 5 6 15   20 2345 4 5 6 15   20 1545 4 5 6 15   20 1145 4 5 6 15   20 0745 4 5 6 15   20 2045 4 5 6 15   20 1838 4 5 6 15     2020 @ 1557  MRI brain:  No acute intracranial abnormality  Punctate foci of left frontal subcortical white matter signal abnormality; correlate for history of migraines   Alternatively, this may represent the sequela of chronic microangiopathic disease  2020 @ 1902  CT head:  No acute intracranial abnormality      2020 @ 1801  EC, NSR    Pertinent Labs/Diagnostic Test Results:   Results from last 7 days   Lab Units 20  2223   WBC Thousand/uL 8 50 11 01* 11 65*   HEMOGLOBIN g/dL 14 4 15 3 14 7   HEMATOCRIT % 43 7 45 5 44 7   PLATELETS Thousands/uL 226 259 256   NEUTROS ABS Thousands/µL  --  8 25* 8 76*     Results from last 7 days   Lab Units 20  2223   SODIUM mmol/L 138 139 137   POTASSIUM mmol/L 3 5 3 7 3 5   CHLORIDE mmol/L 103 102 101   CO2 mmol/L 28 25 27   ANION GAP mmol/L 7 12 9   BUN mg/dL 15 14 18   CREATININE mg/dL 1 06 1 03 1 17   CALCIUM mg/dL 8 8 9 1 8 8   MAGNESIUM mg/dL  --  2 1 1 9     Results from last 7 days   Lab Units 20  2223   AST U/L 13 14   ALT U/L 13 13   ALK PHOS U/L 66 60   TOTAL PROTEIN g/dL 8 2 7 8   ALBUMIN g/dL 4 0 3 7   TOTAL BILIRUBIN mg/dL 0 61 0 60     Results from last 7 days   Lab Units 20  2223   GLUCOSE RANDOM mg/dL 81 93 139     Results from last 7 days   Lab Units 20  1813   HEMOGLOBIN A1C % 5 5   EAG mg/dl 111     Results from last 7 days   Lab Units 20  2223   TROPONIN I ng/mL <0 02     Results from last 7 days   Lab Units 03/06/20  1813   PROTIME seconds 13 0   INR  1 04   PTT seconds 28     Results from last 7 days   Lab Units 03/03/20  2223   TSH 3RD GENERATON uIU/mL 2 978     ED Treatment:   Medication Administration from 03/06/2020 1713 to 03/06/2020 2029       Date/Time Order Dose Route Action     03/06/2020 1949 aspirin tablet 325 mg 325 mg Oral Given     03/06/2020 1949 cyclobenzaprine (FLEXERIL) tablet 10 mg 10 mg Oral Given     03/06/2020 1949 traMADol (ULTRAM) tablet 50 mg 50 mg Oral Given        Past Medical History:   Diagnosis Date    Anxiety     Asthma     seasonal    Hernia of abdominal wall     Hypertension     Seizures (HCC)     pseudoseizures since 2012     Present on Admission:   Essential hypertension   Cervical disc disease   Male-to-female transgender person   GERD (gastroesophageal reflux disease)    Admitting Diagnosis: Essential hypertension [I10]  Left-sided muscle weakness [M62 81]  Left sided numbness [R20 0]  History of sleeve gastrectomy [Z90 3]  Age/Sex: 44 y o  adult  Admission Orders:  Scheduled Medications:  Medications:  amLODIPine 5 mg Oral Daily   aspirin 81 mg Oral Daily   atorvastatin 40 mg Oral QPM   chlorthalidone 25 mg Oral Daily   estradiol 2 mg Oral Daily   gabapentin 300 mg Oral TID   heparin (porcine) 5,000 Units Subcutaneous Q8H [address]   LORazepam 1 mg Intravenous Once   multivitamin-minerals 1 tablet Oral Daily   pantoprazole 40 mg Oral Early Morning   senna 1 tablet Oral Daily   Continuous IV Infusions:   PRN Meds:  albuterol 2 puff Inhalation Q6H PRN   bisacodyl 10 mg Rectal Daily PRN   calcium carbonate 1,000 mg Oral Daily PRN   diphenhydrAMINE 25 mg Intravenous Q6H PRN   docusate sodium 100 mg Oral BID PRN   hydrocortisone 25 mg Rectal BID PRN   LORazepam 1 mg Oral Q8H PRN   methocarbamol 500 mg Oral Daily PRN   ondansetron 4 mg Intravenous Q6H PRN   ondansetron 4 mg Oral Q8H PRN   traMADol 50 mg Oral Q4H PRN     Neuro Checks F5X x4, q2hx4 then q4h x 72h  Nsg dysphagia assessment > regular diet  MRI C/L spine:  Pending  Man SCDs  Consult PT/OT  IP CONSULT TO NEUROLOGY  IP CONSULT TO CASE MANAGEMENT  IP CONSULT TO NUTRITION SERVICES    Network Utilization Review Department  [email or web address]  ATTENTION: Please call with any questions or concerns to [phone] and carefully listen to the prompts so that you are directed to the right person  All voicemails are confidential   Michele Archibald all requests for admission clinical reviews, approved or denied determinations and any other requests to dedicated fax number below belonging to the campus where the patient is receiving treatment   List of dedicated fax numbers for the Facilities:  [address] DENIALS (Administrative/Medical Necessity) [phone]   [address] (Maternity/NICU/Pediatrics) [phone]   Baljit Needs [phone]   Micaela Caal [phone]   Chad Nelson [phone]   Angelica Settle [phone]   [address] [facility] [address] [place] [address] [phone]   [facility]  [phone]   [address] [facility] [address] [facility]  [address] [facility] [address] [facility] [phone]

## 2020-03-08 NOTE — PLAN OF CARE
Problem: Potential for Falls  Goal: Patient will remain free of falls  Description  INTERVENTIONS:  - Assess patient frequently for physical needs  -  Identify cognitive and physical deficits and behaviors that affect risk of falls  -  [place] fall precautions as indicated by assessment   - Educate patient/family on patient safety including physical limitations  - Instruct patient to call for assistance with activity based on assessment  - Modify environment to reduce risk of injury  - Consider OT/PT consult to assist with strengthening/mobility  Outcome: Progressing     Problem: Neurological Deficit  Goal: Neurological status is stable or improving  Description  Interventions:  - Monitor and assess patient's level of consciousness, motor function, sensory function, and level of assistance needed for ADLs  - Monitor and report changes from baseline  Collaborate with interdisciplinary team to initiate plan and implement interventions as ordered  - Provide and maintain a safe environment  - Consider seizure precautions  - Consider fall precautions  - Consider aspiration precautions  - Consider bleeding precautions  Outcome: Progressing     Problem: Activity Intolerance/Impaired Mobility  Goal: Mobility/activity is maintained at optimum level for patient  Description  Interventions:  - Assess and monitor patient  barriers to mobility and need for assistive/adaptive devices  - Assess patient's emotional response to limitations  - Collaborate with interdisciplinary team and initiate plans and interventions as ordered  - Encourage independent activity per ability   - Maintain proper body alignment  - Perform active/passive rom as tolerated/ordered    - Plan activities to conserve energy   - Turn patient as appropriate  Outcome: Progressing     Problem: PAIN - ADULT  Goal: Verbalizes/displays adequate comfort level or baseline comfort level  Description  Interventions:  - Encourage patient to monitor pain and request assistance  - Assess pain using appropriate pain scale  - Administer analgesics based on type and severity of pain and evaluate response  - Implement non-pharmacological measures as appropriate and evaluate response  - Consider cultural and social influences on pain and pain management  - Notify physician/advanced practitioner if interventions unsuccessful or patient reports new pain  Outcome: Progressing     Problem: SAFETY ADULT  Goal: Maintain or return to baseline ADL function  Description  INTERVENTIONS:  -  Assess patient's ability to carry out ADLs; assess patient's baseline for ADL function and identify physical deficits which impact ability to perform ADLs (bathing, care of mouth/teeth, toileting, grooming, dressing, etc )  - Assess/evaluate cause of self-care deficits   - Assess range of motion  - Assess patient's mobility; develop plan if impaired  - Assess patient's need for assistive devices and provide as appropriate  - Encourage maximum independence but intervene and supervise when necessary  - Involve family in performance of ADLs  - Assess for home care needs following discharge   - Consider OT consult to assist with ADL evaluation and planning for discharge  - Provide patient education as appropriate  Outcome: Progressing  Goal: Maintain or return mobility status to optimal level  Description  INTERVENTIONS:  - Assess patient's baseline mobility status (ambulation, transfers, stairs, etc )    - Identify cognitive and physical deficits and behaviors that affect mobility  - Identify mobility aids required to assist with transfers and/or ambulation (gait belt, sit-to-stand, lift, walker, cane, etc )  - [place] fall precautions as indicated by assessment  - Record patient progress and toleration of activity level on Mobility SBAR; progress patient to next Phase/Stage  - Instruct patient to call for assistance with activity based on assessment  - Consider rehabilitation consult to assist with strengthening/weightbearing, etc   Outcome: Progressing     Problem: DISCHARGE PLANNING  Goal: Discharge to home or other facility with appropriate resources  Description  INTERVENTIONS:  - Identify barriers to discharge w/patient and caregiver  - Arrange for needed discharge resources and transportation as appropriate  - Identify discharge learning needs (meds, wound care, etc )  - Arrange for interpretive services to assist at discharge as needed  - Refer to Case Management Department for coordinating discharge planning if the patient needs post-hospital services based on physician/advanced practitioner order or complex needs related to functional status, cognitive ability, or social support system  Outcome: Progressing     Problem: NEUROSENSORY - ADULT  Goal: Achieves stable or improved neurological status  Description  INTERVENTIONS  - Monitor and report changes in neurological status  - Monitor vital signs such as temperature, blood pressure, glucose, and any other labs ordered   - Initiate measures to prevent increased intracranial pressure  - Monitor for seizure activity and implement precautions if appropriate      Outcome: Progressing  Goal: Remains free of injury related to seizures activity  Description  INTERVENTIONS  - Maintain airway, patient safety  and administer oxygen as ordered  - Monitor patient for seizure activity, document and report duration and description of seizure to physician/advanced practitioner  - If seizure occurs,  ensure patient safety during seizure  - Reorient patient post seizure  - Seizure pads on all 4 side rails  - Instruct patient/family to notify RN of any seizure activity including if an aura is experienced  - Instruct patient/family to call for assistance with activity based on nursing assessment  - Administer anti-seizure medications if ordered    Outcome: Progressing  Goal: Achieves maximal functionality and self care  Description  INTERVENTIONS  - Monitor swallowing and airway patency with patient fatigue and changes in neurological status  - Encourage and assist patient to increase activity and self care     - Encourage visually impaired, hearing impaired and aphasic patients to use assistive/communication devices  Outcome: Progressing     Problem: MUSCULOSKELETAL - ADULT  Goal: Maintain or return mobility to safest level of function  Description  INTERVENTIONS:  - Assess patient's ability to carry out ADLs; assess patient's baseline for ADL function and identify physical deficits which impact ability to perform ADLs (bathing, care of mouth/teeth, toileting, grooming, dressing, etc )  - Assess/evaluate cause of self-care deficits   - Assess range of motion  - Assess patient's mobility  - Assess patient's need for assistive devices and provide as appropriate  - Encourage maximum independence but intervene and supervise when necessary  - Involve family in performance of ADLs  - Assess for home care needs following discharge   - Consider OT consult to assist with ADL evaluation and planning for discharge  - Provide patient education as appropriate  Outcome: Progressing  Goal: Maintain proper alignment of affected body part  Description  INTERVENTIONS:  - Support, maintain and protect limb and body alignment  - Provide patient/ family with appropriate education  Outcome: Progressing

## 2020-03-09 ENCOUNTER — APPOINTMENT (INPATIENT)
Dept: MRI IMAGING | Facility: HOSPITAL | Age: 40
DRG: 552 | End: 2020-03-09
Payer: COMMERCIAL

## 2020-03-09 LAB
ATRIAL RATE: 86 BPM
P AXIS: 58 DEGREES
PR INTERVAL: 162 MS
QRS AXIS: 20 DEGREES
QRSD INTERVAL: 90 MS
QT INTERVAL: 358 MS
QTC INTERVAL: 428 MS
T WAVE AXIS: 71 DEGREES
VENTRICULAR RATE: 86 BPM

## 2020-03-09 PROCEDURE — 99233 SBSQ HOSP IP/OBS HIGH 50: CPT | Performed by: PSYCHIATRY & NEUROLOGY

## 2020-03-09 PROCEDURE — 72148 MRI LUMBAR SPINE W/O DYE: CPT

## 2020-03-09 PROCEDURE — 72141 MRI NECK SPINE W/O DYE: CPT

## 2020-03-09 PROCEDURE — 97116 GAIT TRAINING THERAPY: CPT

## 2020-03-09 PROCEDURE — 93010 ELECTROCARDIOGRAM REPORT: CPT | Performed by: INTERNAL MEDICINE

## 2020-03-09 PROCEDURE — 99232 SBSQ HOSP IP/OBS MODERATE 35: CPT | Performed by: INTERNAL MEDICINE

## 2020-03-09 RX ORDER — METAXALONE 800 MG/1
800 TABLET ORAL 3 TIMES DAILY
Status: DISCONTINUED | OUTPATIENT
Start: 2020-03-09 | End: 2020-03-09

## 2020-03-09 RX ORDER — MAGNESIUM SULFATE 1 G/100ML
1 INJECTION INTRAVENOUS 2 TIMES DAILY
Status: DISCONTINUED | OUTPATIENT
Start: 2020-03-09 | End: 2020-03-10 | Stop reason: HOSPADM

## 2020-03-09 RX ORDER — BACLOFEN 10 MG/1
10 TABLET ORAL 3 TIMES DAILY
Status: DISCONTINUED | OUTPATIENT
Start: 2020-03-09 | End: 2020-03-10 | Stop reason: HOSPADM

## 2020-03-09 RX ORDER — KETOROLAC TROMETHAMINE 30 MG/ML
30 INJECTION, SOLUTION INTRAMUSCULAR; INTRAVENOUS EVERY 6 HOURS SCHEDULED
Status: DISCONTINUED | OUTPATIENT
Start: 2020-03-09 | End: 2020-03-10 | Stop reason: HOSPADM

## 2020-03-09 RX ORDER — METOCLOPRAMIDE HYDROCHLORIDE 5 MG/ML
10 INJECTION INTRAMUSCULAR; INTRAVENOUS EVERY 6 HOURS SCHEDULED
Status: DISCONTINUED | OUTPATIENT
Start: 2020-03-09 | End: 2020-03-10 | Stop reason: HOSPADM

## 2020-03-09 RX ORDER — METHOCARBAMOL 500 MG/1
500 TABLET, FILM COATED ORAL ONCE
Status: DISCONTINUED | OUTPATIENT
Start: 2020-03-09 | End: 2020-03-09

## 2020-03-09 RX ADMIN — GABAPENTIN 300 MG: 300 CAPSULE ORAL at 08:14

## 2020-03-09 RX ADMIN — BACLOFEN 10 MG: 10 TABLET ORAL at 20:01

## 2020-03-09 RX ADMIN — LORAZEPAM 1 MG: 1 TABLET ORAL at 17:37

## 2020-03-09 RX ADMIN — METAXALONE 800 MG: 800 TABLET ORAL at 16:09

## 2020-03-09 RX ADMIN — SENNOSIDES 8.6 MG: 8.6 TABLET, FILM COATED ORAL at 08:14

## 2020-03-09 RX ADMIN — TRAMADOL HYDROCHLORIDE 50 MG: 50 TABLET, FILM COATED ORAL at 14:30

## 2020-03-09 RX ADMIN — GABAPENTIN 300 MG: 300 CAPSULE ORAL at 16:09

## 2020-03-09 RX ADMIN — METOCLOPRAMIDE HYDROCHLORIDE 10 MG: 5 INJECTION INTRAMUSCULAR; INTRAVENOUS at 20:01

## 2020-03-09 RX ADMIN — TRAMADOL HYDROCHLORIDE 50 MG: 50 TABLET, FILM COATED ORAL at 00:05

## 2020-03-09 RX ADMIN — ONDANSETRON 4 MG: 2 INJECTION INTRAMUSCULAR; INTRAVENOUS at 17:37

## 2020-03-09 RX ADMIN — KETOROLAC TROMETHAMINE 30 MG: 30 INJECTION, SOLUTION INTRAMUSCULAR at 20:00

## 2020-03-09 RX ADMIN — TRAMADOL HYDROCHLORIDE 50 MG: 50 TABLET, FILM COATED ORAL at 05:26

## 2020-03-09 RX ADMIN — ESTRADIOL 2 MG: 1 TABLET ORAL at 08:16

## 2020-03-09 RX ADMIN — MAGNESIUM SULFATE HEPTAHYDRATE 1 G: 1 INJECTION, SOLUTION INTRAVENOUS at 20:04

## 2020-03-09 RX ADMIN — LORAZEPAM 1 MG: 1 TABLET ORAL at 09:01

## 2020-03-09 RX ADMIN — CHLORTHALIDONE 25 MG: 25 TABLET ORAL at 08:16

## 2020-03-09 RX ADMIN — VALPROATE SODIUM 1000 MG: 100 INJECTION, SOLUTION INTRAVENOUS at 21:42

## 2020-03-09 RX ADMIN — Medication 1 TABLET: at 08:14

## 2020-03-09 RX ADMIN — PANTOPRAZOLE SODIUM 40 MG: 40 TABLET, DELAYED RELEASE ORAL at 05:26

## 2020-03-09 RX ADMIN — LORAZEPAM 1 MG: 2 INJECTION INTRAMUSCULAR; INTRAVENOUS at 10:00

## 2020-03-09 RX ADMIN — METHOCARBAMOL TABLETS 500 MG: 500 TABLET, COATED ORAL at 00:06

## 2020-03-09 RX ADMIN — AMLODIPINE BESYLATE 5 MG: 5 TABLET ORAL at 08:14

## 2020-03-09 NOTE — SOCIAL WORK
Cm received consult for ischemic stroke  PT/OT has evaluated pt and she does not have any deficits  They are recommending a quad cane however does not have any cm needs at this time  Cm will continue to be available to assist with planning as needed

## 2020-03-09 NOTE — PHYSICAL THERAPY NOTE
PHYSICAL THERAPY TREATMENT NOTE      Patient Name: Mary Boateng  Today's Date: 3/9/2020        03/09/20 1555   Pain Assessment   Pain Assessment 0-10   Pain Score 3   Pain Type Acute pain   Pain Location Head   Restrictions/Precautions   Weight Bearing Precautions Per Order No   Other Precautions Fall Risk   General   Chart Reviewed Yes   Response to Previous Treatment Patient with no complaints from previous session  Family/Caregiver Present No   Cognition   Overall Cognitive Status Impaired   Attention Within functional limits   Orientation Level Oriented X4   Memory Within functional limits   Following Commands Follows all commands and directions without difficulty   Comments Pt identified by full name and    Subjective   Subjective Pt supine in bed upon arrival  She is pleasant and agreeable to PT intervention   Bed Mobility   Supine to Sit 6  Modified independent   Additional items HOB elevated   Sit to Supine 6  Modified independent   Additional items HOB elevated   Additional Comments Pt sat EOB x 10 min w/ mod I  Cervical ROM = pt is able to rotate R = L (pt unable to rotate beyond midline when seen on 3/7/2020)   Transfers   Sit to Stand 5  Supervision   Additional items Assist x 1; Increased time required   Stand to Sit 5  Supervision   Additional items Assist x 1; Increased time required   Ambulation/Elevation   Gait pattern Improper Weight shift;Decreased foot clearance; Excessively slow; Short stride  (decreased  LLE clearance > RLE)   Gait Assistance 5  Supervision   Additional items Assist x 1   Assistive Device Small base quad cane   Distance 120 ft, 400 ft  (seated rest break between)   Balance   Static Sitting Good   Static Standing Fair   Ambulatory Fair -   Activity Tolerance   Activity Tolerance Patient tolerated treatment well   Nurse Made Aware SI[facility] Martinez Chapstefano   Assessment   Prognosis Good   Problem List Decreased strength;Decreased range of motion; Impaired balance;Decreased mobility; Impaired sensation;Obesity;Pain   Assessment Patient tolerated treatment well  She demonstrates increased endurance w/ ambulation, able to ambulate 120 ft and then 400 ft w/ SBQC and S  She still is unable to full put weight on LLE, pt with small LOB that she is able to self-correct when attempting to perform step-through gait pattern  She demonstates understanding of cervical stretches and self trigger point massage as well as use of heat packs to improve stretches  Today she demonstrates R rotation = L rotation, which is greatly improved from eval when patient was unable to rotate head to R without compensating at the trunk  Patient would continue to benefit from skilled inpatient PT in order to continue gait and stair training w/ AD, therapeutic exercise and HEP to maximize function and improve independence to return to previous level of independence  Goals   Patient Goals pt sets an ambulation goal she would like to make today   STG Expiration Date 03/17/20   Short Term Goal #1 Patient will: Increase bilateral LE strength 1/2 grade to facilitate independent mobility, Perform all transfers independently to improve independence, Ambulate at least 200 ft  with least restrictive assistive device modified independent w/o LOB, Navigate 1 full flight of stairs modified independent with unilateral handrail to facilitate return to previous living environment, Increase all balance 1/2 grade to decrease risk for falls, Complete exercise program independently and Improve Barthel Index score to 100 or greater to facilitate independence   PT Treatment Day 2   Plan   Treatment/Interventions Functional transfer training;LE strengthening/ROM; Elevations; Therapeutic exercise; Endurance training;Patient/family training;Equipment eval/education; Bed mobility;Gait training; Compensatory technique education   Progress Progressing toward goals   PT Frequency Other (Comment)  (3-5x/wk)   Recommendation   Recommendation Home with family support; Outpatient PT   Equipment Recommended Other (Comment)  (3-5x/wk)     Skilled inpatient PT is still recommended in order to progress patient toward goals      Lilly Matthews, PT, DPT

## 2020-03-09 NOTE — PLAN OF CARE
Problem: Potential for Falls  Goal: Patient will remain free of falls  Description  INTERVENTIONS:  - Assess patient frequently for physical needs  -  Identify cognitive and physical deficits and behaviors that affect risk of falls  -  [place] fall precautions as indicated by assessment   - Educate patient/family on patient safety including physical limitations  - Instruct patient to call for assistance with activity based on assessment  - Modify environment to reduce risk of injury  - Consider OT/PT consult to assist with strengthening/mobility  3/9/2020 0144 by Jennifer Hernandez RN  Outcome: Progressing  3/9/2020 0144 by Jennifer Hernandez RN  Outcome: Progressing     Problem: Neurological Deficit  Goal: Neurological status is stable or improving  Description  Interventions:  - Monitor and assess patient's level of consciousness, motor function, sensory function, and level of assistance needed for ADLs  - Monitor and report changes from baseline  Collaborate with interdisciplinary team to initiate plan and implement interventions as ordered  - Provide and maintain a safe environment  - Consider seizure precautions  - Consider fall precautions  - Consider aspiration precautions  - Consider bleeding precautions  3/9/2020 0144 by Jennifer Hernandez RN  Outcome: Progressing  3/9/2020 0144 by Jennifer Hernandez RN  Outcome: Progressing     Problem: Activity Intolerance/Impaired Mobility  Goal: Mobility/activity is maintained at optimum level for patient  Description  Interventions:  - Assess and monitor patient  barriers to mobility and need for assistive/adaptive devices  - Assess patient's emotional response to limitations  - Collaborate with interdisciplinary team and initiate plans and interventions as ordered  - Encourage independent activity per ability   - Maintain proper body alignment  - Perform active/passive rom as tolerated/ordered    - Plan activities to conserve energy   - Turn patient as appropriate  3/9/2020 0144 by Gianna Lopez RN  Outcome: Progressing  3/9/2020 0144 by Gianna Lopez RN  Outcome: Progressing     Problem: PAIN - ADULT  Goal: Verbalizes/displays adequate comfort level or baseline comfort level  Description  Interventions:  - Encourage patient to monitor pain and request assistance  - Assess pain using appropriate pain scale  - Administer analgesics based on type and severity of pain and evaluate response  - Implement non-pharmacological measures as appropriate and evaluate response  - Consider cultural and social influences on pain and pain management  - Notify physician/advanced practitioner if interventions unsuccessful or patient reports new pain  3/9/2020 0144 by Gianna Lopez RN  Outcome: Progressing  3/9/2020 0144 by Gianna Lopez RN  Outcome: Progressing     Problem: SAFETY ADULT  Goal: Maintain or return to baseline ADL function  Description  INTERVENTIONS:  -  Assess patient's ability to carry out ADLs; assess patient's baseline for ADL function and identify physical deficits which impact ability to perform ADLs (bathing, care of mouth/teeth, toileting, grooming, dressing, etc )  - Assess/evaluate cause of self-care deficits   - Assess range of motion  - Assess patient's mobility; develop plan if impaired  - Assess patient's need for assistive devices and provide as appropriate  - Encourage maximum independence but intervene and supervise when necessary  - Involve family in performance of ADLs  - Assess for home care needs following discharge   - Consider OT consult to assist with ADL evaluation and planning for discharge  - Provide patient education as appropriate  3/9/2020 0144 by Gianna Lopez RN  Outcome: Progressing  3/9/2020 0144 by Gianna Lopez RN  Outcome: Progressing  Goal: Maintain or return mobility status to optimal level  Description  INTERVENTIONS:  - Assess patient's baseline mobility status (ambulation, transfers, stairs, etc )    - Identify cognitive and physical deficits and behaviors that affect mobility  - Identify mobility aids required to assist with transfers and/or ambulation (gait belt, sit-to-stand, lift, walker, cane, etc )  - [place] fall precautions as indicated by assessment  - Record patient progress and toleration of activity level on Mobility SBAR; progress patient to next Phase/Stage  - Instruct patient to call for assistance with activity based on assessment  - Consider rehabilitation consult to assist with strengthening/weightbearing, etc   3/9/2020 0144 by Leonid Holman RN  Outcome: Progressing  3/9/2020 0144 by Leonid Holman RN  Outcome: Progressing     Problem: DISCHARGE PLANNING  Goal: Discharge to home or other facility with appropriate resources  Description  INTERVENTIONS:  - Identify barriers to discharge w/patient and caregiver  - Arrange for needed discharge resources and transportation as appropriate  - Identify discharge learning needs (meds, wound care, etc )  - Arrange for interpretive services to assist at discharge as needed  - Refer to Case Management Department for coordinating discharge planning if the patient needs post-hospital services based on physician/advanced practitioner order or complex needs related to functional status, cognitive ability, or social support system  3/9/2020 0144 by Leonid Holman RN  Outcome: Progressing  3/9/2020 0144 by Leonid Holman RN  Outcome: Progressing     Problem: NEUROSENSORY - ADULT  Goal: Achieves stable or improved neurological status  Description  INTERVENTIONS  - Monitor and report changes in neurological status  - Monitor vital signs such as temperature, blood pressure, glucose, and any other labs ordered   - Initiate measures to prevent increased intracranial pressure  - Monitor for seizure activity and implement precautions if appropriate      3/9/2020 0144 by Leonid Holman RN  Outcome: Progressing  3/9/2020 0144 by Leonid Holman RN  Outcome: Progressing  Goal: Remains free of injury related to seizures activity  Description  INTERVENTIONS  - Maintain airway, patient safety  and administer oxygen as ordered  - Monitor patient for seizure activity, document and report duration and description of seizure to physician/advanced practitioner  - If seizure occurs,  ensure patient safety during seizure  - Reorient patient post seizure  - Seizure pads on all 4 side rails  - Instruct patient/family to notify RN of any seizure activity including if an aura is experienced  - Instruct patient/family to call for assistance with activity based on nursing assessment  - Administer anti-seizure medications if ordered    3/9/2020 0144 by Amada Gordon RN  Outcome: Progressing  3/9/2020 0144 by Amada Gordon RN  Outcome: Progressing  Goal: Achieves maximal functionality and self care  Description  INTERVENTIONS  - Monitor swallowing and airway patency with patient fatigue and changes in neurological status  - Encourage and assist patient to increase activity and self care     - Encourage visually impaired, hearing impaired and aphasic patients to use assistive/communication devices  3/9/2020 0144 by Amada Gordon RN  Outcome: Progressing  3/9/2020 0144 by Amada Gordon RN  Outcome: Progressing     Problem: MUSCULOSKELETAL - ADULT  Goal: Maintain or return mobility to safest level of function  Description  INTERVENTIONS:  - Assess patient's ability to carry out ADLs; assess patient's baseline for ADL function and identify physical deficits which impact ability to perform ADLs (bathing, care of mouth/teeth, toileting, grooming, dressing, etc )  - Assess/evaluate cause of self-care deficits   - Assess range of motion  - Assess patient's mobility  - Assess patient's need for assistive devices and provide as appropriate  - Encourage maximum independence but intervene and supervise when necessary  - Involve family in performance of ADLs  - Assess for home care needs following discharge   - Consider OT consult to assist with ADL evaluation and planning for discharge  - Provide patient education as appropriate  3/9/2020 0144 by Donnie Brown RN  Outcome: Progressing  3/9/2020 0144 by Donnie rBown RN  Outcome: Progressing  Goal: Maintain proper alignment of affected body part  Description  INTERVENTIONS:  - Support, maintain and protect limb and body alignment  - Provide patient/ family with appropriate education  3/9/2020 0144 by Donnie Brown RN  Outcome: Progressing  3/9/2020 0144 by Donnie Brown RN  Outcome: Progressing

## 2020-03-09 NOTE — PROGRESS NOTES
Progress Note - Neurology   Lesvia Spear  44 y o  adult [id number]  Unit/Bed#: S /S -01    Assessment:  Lesvia Spear  is a 44 y o  transgender male to female, with chronic cervical and lumbar pain, with known lumbar radiculopathy, who originally presented on 3/7 due to left sided weakness and numbness  MRI brain negative for acute infarction  MRI L-spine revealed a large left disc protrusion L3-4 and central disc herniation towards left L4-5  MRI C-spine was unremarkable  Unclear etiology to patient's symptoms  Lumbar radiculopathy would not explain the facial or upper extremity symptoms  Question if underlying somatoform component  Plan:  - Continue gabapentin 300 mg TID  - Continue tramadol, robaxin   - Medical management and supportive care per primary team  Correction of any metabolic or infectious disturbances  - Further recommendations per attending neurologist         Subjective: The patient reports that she is experiencing some mild neck pain, mostly related on the left lateral portion of her neck  It is similar to the pain she has had in the past   She feels that it is triggering a mild headache today  She reports that the left upper extremity weakness is improving  She continues to ambulate with assistance a cane  She continues to experience numbness of the left upper and lower extremity  She denies any in the extremities          Past Medical History:   Diagnosis Date    Anxiety     Asthma     seasonal    Hernia of abdominal wall     Hypertension     Seizures ([address] )     pseudoseizures since 2012     Past Surgical History:   Procedure Laterality Date    APPENDECTOMY      CHOLECYSTECTOMY      GASTRIC BYPASS      HERNIA REPAIR      x2     Family History   Problem Relation Age of Onset    Hypertension Mother     Diabetes Mother     Hypertension Sister      Social History     Socioeconomic History    Marital status: Single     Spouse name: None    Number of children: None    Years of education: None    Highest education level: None   Occupational History    None   Social Needs    Financial resource strain: None    Food insecurity:     Worry: None     Inability: None    Transportation needs:     Medical: None     Non-medical: None   Tobacco Use    Smoking status: Never Smoker    Smokeless tobacco: Never Used   Substance and Sexual Activity    Alcohol use: Yes     Alcohol/week: 3 0 standard drinks     Types: 3 Standard drinks or equivalent per week     Comment: 3 per month    Drug use: Never    Sexual activity: None   Lifestyle    Physical activity:     Days per week: None     Minutes per session: None    Stress: None   Relationships    Social connections:     Talks on phone: None     Gets together: None     Attends Nondenominational service: None     Active member of club or organization: None     Attends meetings of clubs or organizations: None     Relationship status: None    Intimate partner violence:     Fear of current or ex partner: None     Emotionally abused: None     Physically abused: None     Forced sexual activity: None   Other Topics Concern    None   Social History Narrative    None         Medications:   All current active meds have been reviewed and current meds:  Scheduled Meds:  Current Facility-Administered Medications:  albuterol 2 puff Inhalation Q6H PRN Malia Romano PA-C   amLODIPine 5 mg Oral Daily Malia Romano PA-C   bisacodyl 10 mg Rectal Daily PRN [place] Com[place], CRNP   calcium carbonate 1,000 mg Oral Daily PRN Malia Romano PA-C   chlorthalidone 25 mg Oral Daily Malia Romano PA-C   diphenhydrAMINE 25 mg Intravenous Q6H PRN Vivek Mcghee MD   docusate sodium 100 mg Oral BID PRN [place] Coma, CRNP   estradiol 2 mg Oral Daily Denae Vanegas, NATHAN   gabapentin 300 mg Oral TID Marco Shepherd PA-C   heparin (porcine) 5,000 Units Subcutaneous Q8H [address] Malia Romano PA-C   hydrocortisone 25 mg Rectal BID PRN Vivek Mcghee MD   LORazepam 1 mg Oral Q8H PRN Malia Romano PA-C   methocarbamol 500 mg Oral Daily PRN Malia Romano, YAJAIRA   methocarbamol 500 mg Oral Once NATHAN Mckeon   multivitamin-minerals 1 tablet Oral Daily Malia Romano PA-C   ondansetron 4 mg Intravenous Q6H PRN Malia Romano, PA-BEATRICE   ondansetron 4 mg Oral Q8H PRN Malia Romano, YAJAIRA   pantoprazole 40 mg Oral Early Morning Malia Romano PA-C   senna 1 tablet Oral Daily Malia Romano PA-C   traMADol 50 mg Oral Q4H PRN Carolyn Mazariegos MD     Continuous Infusions:   PRN Meds:   albuterol    bisacodyl    calcium carbonate    diphenhydrAMINE    docusate sodium    hydrocortisone    LORazepam    methocarbamol    ondansetron    ondansetron    traMADol       ROS:   Review of Systems  A 12 point ROS was completed  Other than the above mentioned complaints in the HPI, all remaining systems were negative:        Vitals:   /77 (BP Location: Right arm)   Pulse 75   Temp 98 1 °F (36 7 °C) (Oral)   Resp 18   Ht 5' 9" (1 753 m)   Wt 124 kg (273 lb)   SpO2 97%   BMI 40 32 kg/m²     Physical Exam:   Physical Exam   Constitutional: She is oriented to person, place, and time  She appears well-developed and well-nourished  No distress  Resting comfortably in bed   HENT:   Head: Normocephalic and atraumatic  Right Ear: External ear normal    Left Ear: External ear normal    Nose: Nose normal    Mouth/Throat: Oropharynx is clear and moist    Eyes: Pupils are equal, round, and reactive to light  Conjunctivae and EOM are normal  Right eye exhibits no discharge  Left eye exhibits no discharge  No scleral icterus  Neck: Normal range of motion  Neck supple  Tender to palpation of left lateral neck   Cardiovascular: Normal rate  Pulmonary/Chest: Effort normal  No respiratory distress  Musculoskeletal: Normal range of motion   She exhibits no edema, tenderness or deformity  Neurological: She is alert and oriented to person, place, and time  A sensory deficit is present  No cranial nerve deficit  She has a normal Finger-Nose-Finger Test    Reflex Scores:       Bicep reflexes are 1+ on the right side and 1+ on the left side  Brachioradialis reflexes are 1+ on the right side and 1+ on the left side  Patellar reflexes are 2+ on the right side and 2+ on the left side  Skin: Skin is warm and dry  No rash noted  She is not diaphoretic  No erythema  No pallor  Psychiatric: She has a normal mood and affect  Her behavior is normal  Judgment and thought content normal    Nursing note and vitals reviewed  Neurologic Exam     Mental Status   Oriented to person, place, and time  Patient is awake and alert  No evidence aphasia or dysarthria  Cranial Nerves     CN II   Visual fields full to confrontation  CN III, IV, VI   Pupils are equal, round, and reactive to light  Extraocular motions are normal    Right pupil: Shape: regular  Left pupil: Shape: regular  Nystagmus: none   Conjugate gaze: present    CN V   Left facial sensation deficit: Decreased sensation to light touch on the left  Intact to temperature and vibration  CN VII   Facial expression full, symmetric  CN VIII   Hearing impaired: Grossly intact  CN XI   Right sternocleidomastoid strength: normal  Left sternocleidomastoid strength: normal    CN XII   Tongue: not atrophic  Fasciculations: absent  Tongue deviation: none    Motor Exam   Bilateral  strength 4/5  Rest of right upper extremity and right lower extremity full strength  4+/5 left hip flexion  4+/5 left knee flexion/extension  4/5 left plantar flexion  Give-way weakness noted with dorsiflexion, initially exhibits maximum strength, but then persist with a 3-4/5     Sensory Exam   Decreased sensation to light touch, temperature, and vibration of the left lower extremity compared to the right    Decreased sensation to light touch of the left upper extremity compared to the right  Gait, Coordination, and Reflexes     Gait  Gait: (Deferred)    Coordination   Finger to nose coordination: normal    Tremor   Resting tremor: absent  Intention tremor: absent  Action tremor: absent    Reflexes   Right brachioradialis: 1+  Left brachioradialis: 1+  Right biceps: 1+  Left biceps: 1+  Right patellar: 2+  Left patellar: 2+  Right ankle clonus: absent  Left ankle clonus: absent          Labs: I have personally reviewed pertinent reports  Imaging: I have personally reviewed pertinent imaging in PACS, including MRI C and L-spine,  and I have personally reviewed PACS reports  EKG, Pathology, and Other Studies: I have personally reviewed pertinent reports  VTE Prophylaxis: Heparin    Counseling / Coordination of Care  Total time spent today 26 minutes  Greater than 50% of total time was spent with the patient and/or family counseling and/or coordination of care  A description of the counseling/coordination of care:  Patient was seen and evaluated  Discussed possible etiologies of symptoms, medications and side effects  Chart reviewed thoroughly including laboratory and imaging studies    Plan of care discussed with attending neurologist and primary team

## 2020-03-09 NOTE — PLAN OF CARE
Problem: PHYSICAL THERAPY ADULT  Goal: Performs mobility at highest level of function for planned discharge setting  See evaluation for individualized goals  Description  Treatment/Interventions: Functional transfer training, LE strengthening/ROM, Elevations, Therapeutic exercise, Endurance training, Patient/family training, Equipment eval/education, Bed mobility, Gait training  Equipment Recommended: Other (Comment)(SBQC, trial RW)       See flowsheet documentation for full assessment, interventions and recommendations  Outcome: Progressing  Note:   Prognosis: Good  Problem List: Decreased strength, Decreased range of motion, Impaired balance, Decreased mobility, Impaired sensation, Obesity, Pain  Assessment: Patient tolerated treatment well  She demonstrates increased endurance w/ ambulation, able to ambulate 120 ft and then 400 ft w/ SBQC and S  She still is unable to full put weight on LLE, pt with small LOB that she is able to self-correct when attempting to perform step-through gait pattern  She demonstates understanding of cervical stretches and self trigger point massage as well as use of heat packs to improve stretches  Today she demonstrates R rotation = L rotation, which is greatly improved from eval when patient was unable to rotate head to R without compensating at the trunk  Patient would continue to benefit from skilled inpatient PT in order to continue gait and stair training w/ AD, therapeutic exercise and HEP to maximize function and improve independence to return to previous level of independence  Recommendation: Home with family support, Outpatient PT          See flowsheet documentation for full assessment

## 2020-03-09 NOTE — ASSESSMENT & PLAN NOTE
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  Myelopathy   · MRI brain, C spine and L spine   · Neuro checks  · Echo   · Telemetry  · FLP, hgb A1c  · MRI C-spine and lumbar spine [id number], L4-L5 disc prolapse noted  · Neurosurgery consulted for further advice

## 2020-03-09 NOTE — ASSESSMENT & PLAN NOTE
· Known cervical disc disease, as patient has had long-standing issues with RLE in the past and has been following with Dr Ellamae Leventhal in the past  · Continue with muscle relaxers  · MRI C-spine noted

## 2020-03-09 NOTE — PROGRESS NOTES
Progress Note - Gianluca Hernández  1980, 44 y o  adult MRN: [id number]    Unit/Bed#: S -01 Encounter: [id number]    Primary Care Provider: Ebony White MD   Date and time admitted to hospital: 3/6/2020  5:41 PM    * Left-sided weakness and sensory deficits  Assessment & Plan  · Patient with 3 day history of left upper extremity and left lower extremity weakness and sensory deficits  · Patient has decreased strength in upper extremity and lower extremity-- approximately 4/5 on the left side in regards to strength; decreased sensation to crude touch left-sided face, left upper extremity, left neck, left torso, left lower extremity  · Fortunately, 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  Myelopathy   · MRI brain, C spine and L spine   · Neuro checks  · Echo   · Telemetry  · FLP, hgb A1c  · MRI C-spine and lumbar spine [id number], L4-L5 disc prolapse noted  · Neurosurgery consulted for further advice    Cervical disc disease  Assessment & Plan  · Known cervical disc disease, as patient has had long-standing issues with RLE in the past and has been following with Dr Saleem Lara in the past  · Continue with muscle relaxers  · MRI C-spine noted    Essential hypertension  Assessment & Plan  · BP slightly elevated at this time  · Continue home medications    GERD (gastroesophageal reflux disease)  Assessment & Plan  · Continue PPI    History of sleeve gastrectomy  Assessment & Plan  · Performed in [place]  · Continue with diet modifications, vitamin supplementations    Male-to-female transgender person  Assessment & Plan  · Continue estrace    VTE Pharmacologic Prophylaxis:   Pharmacologic: Heparin  Mechanical VTE Prophylaxis in Place: Yes    Patient Centered Rounds: I have performed bedside rounds with nursing staff today     Discussions with Specialists or Other Care Team Provider:     Education and Discussions with Family / Patient:     Time Spent for Care: 30 minutes  More than 50% of total time spent on counseling and coordination of care as described above  Current Length of Stay: 3 day(s)    Current Patient Status: Inpatient   Certification Statement: The patient will continue to require additional inpatient hospital stay due to Neck pain    Discharge Plan:  Pending neurosurgery evaluation    Code Status: Level 1 - Full Code      Subjective:   Patient continues to have stiffness and pain left side of neck and shoulder  Objective:     Vitals:   Temp (24hrs), Av 1 °F (36 7 °C), Min:97 9 °F (36 6 °C), Max:98 4 °F (36 9 °C)    Temp:  [97 9 °F (36 6 °C)-98 4 °F (36 9 °C)] 97 9 °F (36 6 °C)  HR:  [75-86] 86  Resp:  [18] 18  BP: (133-160)/(77-94) 141/94  SpO2:  [96 %-98 %] 97 %  Body mass index is 40 32 kg/m²  Input and Output Summary (last 24 hours): Intake/Output Summary (Last 24 hours) at 3/9/2020 1608  Last data filed at 3/9/2020 0101  Gross per 24 hour   Intake 0 ml   Output 0 ml   Net 0 ml       Physical Exam:     Physical Exam    Gen -Patient comfortable   Neck- Supple  No thyromegaly or lymphadenopathy  Lungs-Clear bilaterally without any wheeze or rales   Heart S1-S2, regular rate and rhythm, no murmurs  Abdomen-soft nontender, no organomegaly   Bowel sounds present  Extremities-no cyanosi,  clubbing or edema  Skin- no rash  Neuro-awake alert and oriented       Additional Data:     Labs:    Results from last 7 days   Lab Units 20  0516 20  1813   WBC Thousand/uL 8 50 11 01*   HEMOGLOBIN g/dL 14 4 15 3   HEMATOCRIT % 43 7 45 5   PLATELETS Thousands/uL 226 259   NEUTROS PCT %  --  74   LYMPHS PCT %  --  18   MONOS PCT %  --  6   EOS PCT %  --  1     Results from last 7 days   Lab Units 20  0516 20  1813   SODIUM mmol/L 138 139   POTASSIUM mmol/L 3 5 3 7   CHLORIDE mmol/L 103 102 CO2 mmol/L 28 25   BUN mg/dL 15 14   CREATININE mg/dL 1 06 1 03   ANION GAP mmol/L 7 12   CALCIUM mg/dL 8 8 9 1   ALBUMIN g/dL  --  4 0   TOTAL BILIRUBIN mg/dL  --  0 61   ALK PHOS U/L  --  66   ALT U/L  --  13   AST U/L  --  13   GLUCOSE RANDOM mg/dL 81 93     Results from last 7 days   Lab Units 03/06/20  1813   INR  1 04         Results from last 7 days   Lab Units 03/06/20  1813   HEMOGLOBIN A1C % 5 5               * I Have Reviewed All Lab Data Listed Above  * Additional Pertinent Lab Tests Reviewed:  All Labs Within Last 24 Hours Reviewed    Imaging:    Imaging Reports Reviewed Today Include:   Imaging Personally Reviewed by Myself Includes:      Recent Cultures (last 7 days):           Last 24 Hours Medication List:     Current Facility-Administered Medications:  albuterol 2 puff Inhalation Q6H PRN Malia Romano PA-C   amLODIPine 5 mg Oral Daily Malia Romano PA-C   bisacodyl 10 mg Rectal Daily PRN Alex Prydeinig, CRNP   calcium carbonate 1,000 mg Oral Daily PRN Malia Romano PA-C   chlorthalidone 25 mg Oral Daily Malia Romano PA-C   diphenhydrAMINE 25 mg Intravenous Q6H PRN Vivek Mcghee MD   docusate sodium 100 mg Oral BID PRN Alex Prydeinig, CRNP   estradiol 2 mg Oral Daily Alex Prydeinig, CRNP   gabapentin 300 mg Oral TID Sherly Noe PA-C   heparin (porcine) 5,000 Units Subcutaneous Q8H [address] Malia Romano PA-C   hydrocortisone 25 mg Rectal BID PRN Vivek Mcghee MD   LORazepam 1 mg Oral Q8H PRN Malia Romano PA-C   metaxalone 800 mg Oral TID Vivek Mcghee MD   methocarbamol 500 mg Oral Daily PRN Malia Romano PA-C   multivitamin-minerals 1 tablet Oral Daily Malia Romano PA-C   ondansetron 4 mg Intravenous Q6H PRN Malia Romano PA-C   ondansetron 4 mg Oral Q8H PRN Malia Romano PA-C   pantoprazole 40 mg Oral Early Morning Malia Romano PA-C   senna 1 tablet Oral Daily Malia Romano PA-C traMADol 50 mg Oral Q4H PRN Melinda Posey MD        Today, Patient Was Seen By: Norma Barlow MD    ** Please Note: Dictation voice to text software may have been used in the creation of this document   **

## 2020-03-10 VITALS
HEART RATE: 72 BPM | BODY MASS INDEX: 40.43 KG/M2 | SYSTOLIC BLOOD PRESSURE: 140 MMHG | RESPIRATION RATE: 18 BRPM | DIASTOLIC BLOOD PRESSURE: 89 MMHG | TEMPERATURE: 97.9 F | HEIGHT: 69 IN | OXYGEN SATURATION: 97 % | WEIGHT: 273 LBS

## 2020-03-10 PROCEDURE — 99239 HOSP IP/OBS DSCHRG MGMT >30: CPT | Performed by: INTERNAL MEDICINE

## 2020-03-10 PROCEDURE — 99254 IP/OBS CNSLTJ NEW/EST MOD 60: CPT | Performed by: PHYSICIAN ASSISTANT

## 2020-03-10 RX ORDER — GABAPENTIN 300 MG/1
300 CAPSULE ORAL 3 TIMES DAILY
Qty: 90 CAPSULE | Refills: 0 | Status: SHIPPED | OUTPATIENT
Start: 2020-03-10 | End: 2020-06-05 | Stop reason: SDUPTHER

## 2020-03-10 RX ADMIN — MAGNESIUM SULFATE HEPTAHYDRATE 1 G: 1 INJECTION, SOLUTION INTRAVENOUS at 08:33

## 2020-03-10 RX ADMIN — GABAPENTIN 300 MG: 300 CAPSULE ORAL at 08:32

## 2020-03-10 RX ADMIN — PANTOPRAZOLE SODIUM 40 MG: 40 TABLET, DELAYED RELEASE ORAL at 05:12

## 2020-03-10 RX ADMIN — KETOROLAC TROMETHAMINE 30 MG: 30 INJECTION, SOLUTION INTRAMUSCULAR at 05:12

## 2020-03-10 RX ADMIN — AMLODIPINE BESYLATE 5 MG: 5 TABLET ORAL at 08:32

## 2020-03-10 RX ADMIN — SENNOSIDES 8.6 MG: 8.6 TABLET, FILM COATED ORAL at 08:33

## 2020-03-10 RX ADMIN — METOCLOPRAMIDE HYDROCHLORIDE 10 MG: 5 INJECTION INTRAMUSCULAR; INTRAVENOUS at 05:12

## 2020-03-10 RX ADMIN — ESTRADIOL 2 MG: 1 TABLET ORAL at 08:34

## 2020-03-10 RX ADMIN — CHLORTHALIDONE 25 MG: 25 TABLET ORAL at 08:34

## 2020-03-10 NOTE — PROGRESS NOTES
Pt states that she is having new onset incontinence  Reports having 3 incidents in which she could not make it to the bathroom on time  Would like to further discuss this with SLIM in the morning  No other complaints at this time, will continue to monitor

## 2020-03-10 NOTE — PLAN OF CARE
Problem: Potential for Falls  Goal: Patient will remain free of falls  Description  INTERVENTIONS:  - Assess patient frequently for physical needs  -  Identify cognitive and physical deficits and behaviors that affect risk of falls  -  [place] fall precautions as indicated by assessment   - Educate patient/family on patient safety including physical limitations  - Instruct patient to call for assistance with activity based on assessment  - Modify environment to reduce risk of injury  - Consider OT/PT consult to assist with strengthening/mobility  Outcome: Progressing     Problem: Neurological Deficit  Goal: Neurological status is stable or improving  Description  Interventions:  - Monitor and assess patient's level of consciousness, motor function, sensory function, and level of assistance needed for ADLs  - Monitor and report changes from baseline  Collaborate with interdisciplinary team to initiate plan and implement interventions as ordered  - Provide and maintain a safe environment  - Consider seizure precautions  - Consider fall precautions  - Consider aspiration precautions  - Consider bleeding precautions  Outcome: Progressing     Problem: Activity Intolerance/Impaired Mobility  Goal: Mobility/activity is maintained at optimum level for patient  Description  Interventions:  - Assess and monitor patient  barriers to mobility and need for assistive/adaptive devices  - Assess patient's emotional response to limitations  - Collaborate with interdisciplinary team and initiate plans and interventions as ordered  - Encourage independent activity per ability   - Maintain proper body alignment  - Perform active/passive rom as tolerated/ordered    - Plan activities to conserve energy   - Turn patient as appropriate  Outcome: Progressing     Problem: PAIN - ADULT  Goal: Verbalizes/displays adequate comfort level or baseline comfort level  Description  Interventions:  - Encourage patient to monitor pain and request assistance  - Assess pain using appropriate pain scale  - Administer analgesics based on type and severity of pain and evaluate response  - Implement non-pharmacological measures as appropriate and evaluate response  - Consider cultural and social influences on pain and pain management  - Notify physician/advanced practitioner if interventions unsuccessful or patient reports new pain  Outcome: Progressing     Problem: SAFETY ADULT  Goal: Patient will remain free of falls  Description  INTERVENTIONS:  - Assess patient frequently for physical needs  -  Identify cognitive and physical deficits and behaviors that affect risk of falls    -  [place] fall precautions as indicated by assessment   - Educate patient/family on patient safety including physical limitations  - Instruct patient to call for assistance with activity based on assessment  - Modify environment to reduce risk of injury  - Consider OT/PT consult to assist with strengthening/mobility  Outcome: Progressing  Goal: Maintain or return to baseline ADL function  Description  INTERVENTIONS:  -  Assess patient's ability to carry out ADLs; assess patient's baseline for ADL function and identify physical deficits which impact ability to perform ADLs (bathing, care of mouth/teeth, toileting, grooming, dressing, etc )  - Assess/evaluate cause of self-care deficits   - Assess range of motion  - Assess patient's mobility; develop plan if impaired  - Assess patient's need for assistive devices and provide as appropriate  - Encourage maximum independence but intervene and supervise when necessary  - Involve family in performance of ADLs  - Assess for home care needs following discharge   - Consider OT consult to assist with ADL evaluation and planning for discharge  - Provide patient education as appropriate  Outcome: Progressing  Goal: Maintain or return mobility status to optimal level  Description  INTERVENTIONS:  - Assess patient's baseline mobility status (ambulation, transfers, stairs, etc )    - Identify cognitive and physical deficits and behaviors that affect mobility  - Identify mobility aids required to assist with transfers and/or ambulation (gait belt, sit-to-stand, lift, walker, cane, etc )  - [place] fall precautions as indicated by assessment  - Record patient progress and toleration of activity level on Mobility SBAR; progress patient to next Phase/Stage  - Instruct patient to call for assistance with activity based on assessment  - Consider rehabilitation consult to assist with strengthening/weightbearing, etc   Outcome: Progressing     Problem: DISCHARGE PLANNING  Goal: Discharge to home or other facility with appropriate resources  Description  INTERVENTIONS:  - Identify barriers to discharge w/patient and caregiver  - Arrange for needed discharge resources and transportation as appropriate  - Identify discharge learning needs (meds, wound care, etc )  - Arrange for interpretive services to assist at discharge as needed  - Refer to Case Management Department for coordinating discharge planning if the patient needs post-hospital services based on physician/advanced practitioner order or complex needs related to functional status, cognitive ability, or social support system  Outcome: Progressing     Problem: Knowledge Deficit  Goal: Patient/family/caregiver demonstrates understanding of disease process, treatment plan, medications, and discharge instructions  Description  Complete learning assessment and assess knowledge base    Interventions:  - Provide teaching at level of understanding  - Provide teaching via preferred learning methods  Outcome: Progressing     Problem: NEUROSENSORY - ADULT  Goal: Achieves stable or improved neurological status  Description  INTERVENTIONS  - Monitor and report changes in neurological status  - Monitor vital signs such as temperature, blood pressure, glucose, and any other labs ordered   - Initiate measures to prevent increased intracranial pressure  - Monitor for seizure activity and implement precautions if appropriate      Outcome: Progressing  Goal: Remains free of injury related to seizures activity  Description  INTERVENTIONS  - Maintain airway, patient safety  and administer oxygen as ordered  - Monitor patient for seizure activity, document and report duration and description of seizure to physician/advanced practitioner  - If seizure occurs,  ensure patient safety during seizure  - Reorient patient post seizure  - Seizure pads on all 4 side rails  - Instruct patient/family to notify RN of any seizure activity including if an aura is experienced  - Instruct patient/family to call for assistance with activity based on nursing assessment  - Administer anti-seizure medications if ordered    Outcome: Progressing  Goal: Achieves maximal functionality and self care  Description  INTERVENTIONS  - Monitor swallowing and airway patency with patient fatigue and changes in neurological status  - Encourage and assist patient to increase activity and self care     - Encourage visually impaired, hearing impaired and aphasic patients to use assistive/communication devices  Outcome: Progressing     Problem: MUSCULOSKELETAL - ADULT  Goal: Maintain or return mobility to safest level of function  Description  INTERVENTIONS:  - Assess patient's ability to carry out ADLs; assess patient's baseline for ADL function and identify physical deficits which impact ability to perform ADLs (bathing, care of mouth/teeth, toileting, grooming, dressing, etc )  - Assess/evaluate cause of self-care deficits   - Assess range of motion  - Assess patient's mobility  - Assess patient's need for assistive devices and provide as appropriate  - Encourage maximum independence but intervene and supervise when necessary  - Involve family in performance of ADLs  - Assess for home care needs following discharge   - Consider OT consult to assist with ADL evaluation and planning for discharge  - Provide patient education as appropriate  Outcome: Progressing  Goal: Maintain proper alignment of affected body part  Description  INTERVENTIONS:  - Support, maintain and protect limb and body alignment  - Provide patient/ family with appropriate education  Outcome: Progressing

## 2020-03-10 NOTE — PROGRESS NOTES
Pulled two tramadol out of accudose 3C without realizing, returned one with second nurse  Patient then decided they did not want the tramadol anymore  Was not able to return tramadol to accudose, wasted with Sonja Downing RN in the drug disposer  Reported to charge nurse, pharmacy, and hosp  Supervisor  [facility] does not currently have a discrepancy, but will next time a tramadol is pulled from that accudose

## 2020-03-10 NOTE — DISCHARGE INSTR - AVS FIRST PAGE
Dear Sujata Rai ,     It was our pleasure to care for you here at [facility], [facility]  It is our hope that we were always able to exceed the expected standards for your care during your stay  You were hospitalized due to left-sided numbness and weakness  You were cared for on the 3rd floor under the service of Sharif Kearns MD with the [facility] who covers for your primary care physician (PCP), Isabella Calix MD, while you were hospitalized  If you have any questions or concerns related to this hospitalization, you may contact us at [phone]  For follow up as well as medication refills, we recommend that you follow up with your primary care physician  A registered nurse will reach out to you by phone within a few days after your discharge to answer any additional questions that you may have after going home  However, at this time we provide for you here, the most important instructions / recommendations at discharge:     · Notable Medication Adjustments -   · Increase gabapentin to 300 mg 3 times daily  · Testing Required after Discharge -   · None  · Important follow up information -   · Follow with Neurosurgery  · Follow with outpatient physical therapy  · Other Instructions -   ·   · Please review this entire after visit summary as additional general instructions including medication list, appointments, activity, diet, any pertinent wound care, and other additional recommendations from your care team that may be provided for you        Sincerely,     Sharif Kearns MD

## 2020-03-10 NOTE — SOCIAL WORK
Pt received quad cane from [facility] storage as she is ready to be discharged

## 2020-03-10 NOTE — PLAN OF CARE
Problem: Potential for Falls  Goal: Patient will remain free of falls  Description  INTERVENTIONS:  - Assess patient frequently for physical needs  -  Identify cognitive and physical deficits and behaviors that affect risk of falls  -  [place] fall precautions as indicated by assessment   - Educate patient/family on patient safety including physical limitations  - Instruct patient to call for assistance with activity based on assessment  - Modify environment to reduce risk of injury  - Consider OT/PT consult to assist with strengthening/mobility  Outcome: Progressing     Problem: Neurological Deficit  Goal: Neurological status is stable or improving  Description  Interventions:  - Monitor and assess patient's level of consciousness, motor function, sensory function, and level of assistance needed for ADLs  - Monitor and report changes from baseline  Collaborate with interdisciplinary team to initiate plan and implement interventions as ordered  - Provide and maintain a safe environment  - Consider seizure precautions  - Consider fall precautions  - Consider aspiration precautions  - Consider bleeding precautions  Outcome: Progressing     Problem: Activity Intolerance/Impaired Mobility  Goal: Mobility/activity is maintained at optimum level for patient  Description  Interventions:  - Assess and monitor patient  barriers to mobility and need for assistive/adaptive devices  - Assess patient's emotional response to limitations  - Collaborate with interdisciplinary team and initiate plans and interventions as ordered  - Encourage independent activity per ability   - Maintain proper body alignment  - Perform active/passive rom as tolerated/ordered    - Plan activities to conserve energy   - Turn patient as appropriate  Outcome: Progressing     Problem: PAIN - ADULT  Goal: Verbalizes/displays adequate comfort level or baseline comfort level  Description  Interventions:  - Encourage patient to monitor pain and request assistance  - Assess pain using appropriate pain scale  - Administer analgesics based on type and severity of pain and evaluate response  - Implement non-pharmacological measures as appropriate and evaluate response  - Consider cultural and social influences on pain and pain management  - Notify physician/advanced practitioner if interventions unsuccessful or patient reports new pain  Outcome: Progressing     Problem: SAFETY ADULT  Goal: Maintain or return to baseline ADL function  Description  INTERVENTIONS:  -  Assess patient's ability to carry out ADLs; assess patient's baseline for ADL function and identify physical deficits which impact ability to perform ADLs (bathing, care of mouth/teeth, toileting, grooming, dressing, etc )  - Assess/evaluate cause of self-care deficits   - Assess range of motion  - Assess patient's mobility; develop plan if impaired  - Assess patient's need for assistive devices and provide as appropriate  - Encourage maximum independence but intervene and supervise when necessary  - Involve family in performance of ADLs  - Assess for home care needs following discharge   - Consider OT consult to assist with ADL evaluation and planning for discharge  - Provide patient education as appropriate  Outcome: Progressing  Goal: Maintain or return mobility status to optimal level  Description  INTERVENTIONS:  - Assess patient's baseline mobility status (ambulation, transfers, stairs, etc )    - Identify cognitive and physical deficits and behaviors that affect mobility  - Identify mobility aids required to assist with transfers and/or ambulation (gait belt, sit-to-stand, lift, walker, cane, etc )  - [place] fall precautions as indicated by assessment  - Record patient progress and toleration of activity level on Mobility SBAR; progress patient to next Phase/Stage  - Instruct patient to call for assistance with activity based on assessment  - Consider rehabilitation consult to assist with strengthening/weightbearing, etc   Outcome: Progressing     Problem: DISCHARGE PLANNING  Goal: Discharge to home or other facility with appropriate resources  Description  INTERVENTIONS:  - Identify barriers to discharge w/patient and caregiver  - Arrange for needed discharge resources and transportation as appropriate  - Identify discharge learning needs (meds, wound care, etc )  - Arrange for interpretive services to assist at discharge as needed  - Refer to Case Management Department for coordinating discharge planning if the patient needs post-hospital services based on physician/advanced practitioner order or complex needs related to functional status, cognitive ability, or social support system  Outcome: Progressing     Problem: NEUROSENSORY - ADULT  Goal: Achieves stable or improved neurological status  Description  INTERVENTIONS  - Monitor and report changes in neurological status  - Monitor vital signs such as temperature, blood pressure, glucose, and any other labs ordered   - Initiate measures to prevent increased intracranial pressure  - Monitor for seizure activity and implement precautions if appropriate      Outcome: Progressing  Goal: Remains free of injury related to seizures activity  Description  INTERVENTIONS  - Maintain airway, patient safety  and administer oxygen as ordered  - Monitor patient for seizure activity, document and report duration and description of seizure to physician/advanced practitioner  - If seizure occurs,  ensure patient safety during seizure  - Reorient patient post seizure  - Seizure pads on all 4 side rails  - Instruct patient/family to notify RN of any seizure activity including if an aura is experienced  - Instruct patient/family to call for assistance with activity based on nursing assessment  - Administer anti-seizure medications if ordered    Outcome: Progressing  Goal: Achieves maximal functionality and self care  Description  INTERVENTIONS  - Monitor swallowing and airway patency with patient fatigue and changes in neurological status  - Encourage and assist patient to increase activity and self care     - Encourage visually impaired, hearing impaired and aphasic patients to use assistive/communication devices  Outcome: Progressing     Problem: MUSCULOSKELETAL - ADULT  Goal: Maintain or return mobility to safest level of function  Description  INTERVENTIONS:  - Assess patient's ability to carry out ADLs; assess patient's baseline for ADL function and identify physical deficits which impact ability to perform ADLs (bathing, care of mouth/teeth, toileting, grooming, dressing, etc )  - Assess/evaluate cause of self-care deficits   - Assess range of motion  - Assess patient's mobility  - Assess patient's need for assistive devices and provide as appropriate  - Encourage maximum independence but intervene and supervise when necessary  - Involve family in performance of ADLs  - Assess for home care needs following discharge   - Consider OT consult to assist with ADL evaluation and planning for discharge  - Provide patient education as appropriate  Outcome: Progressing  Goal: Maintain proper alignment of affected body part  Description  INTERVENTIONS:  - Support, maintain and protect limb and body alignment  - Provide patient/ family with appropriate education  Outcome: Progressing

## 2020-03-10 NOTE — PLAN OF CARE
Problem: Potential for Falls  Goal: Patient will remain free of falls  Description  INTERVENTIONS:  - Assess patient frequently for physical needs  -  Identify cognitive and physical deficits and behaviors that affect risk of falls  -  [place] fall precautions as indicated by assessment   - Educate patient/family on patient safety including physical limitations  - Instruct patient to call for assistance with activity based on assessment  - Modify environment to reduce risk of injury  - Consider OT/PT consult to assist with strengthening/mobility  3/10/2020 1033 by Matt Green RN  Outcome: Adequate for Discharge  3/10/2020 4018 by Matt Green RN  Outcome: Progressing     Problem: Neurological Deficit  Goal: Neurological status is stable or improving  Description  Interventions:  - Monitor and assess patient's level of consciousness, motor function, sensory function, and level of assistance needed for ADLs  - Monitor and report changes from baseline  Collaborate with interdisciplinary team to initiate plan and implement interventions as ordered  - Provide and maintain a safe environment  - Consider seizure precautions  - Consider fall precautions  - Consider aspiration precautions  - Consider bleeding precautions  3/10/2020 1033 by Matt Green RN  Outcome: Adequate for Discharge  3/10/2020 2809 by Matt Green RN  Outcome: Progressing     Problem: Activity Intolerance/Impaired Mobility  Goal: Mobility/activity is maintained at optimum level for patient  Description  Interventions:  - Assess and monitor patient  barriers to mobility and need for assistive/adaptive devices  - Assess patient's emotional response to limitations  - Collaborate with interdisciplinary team and initiate plans and interventions as ordered  - Encourage independent activity per ability   - Maintain proper body alignment  - Perform active/passive rom as tolerated/ordered    - Plan activities to conserve energy   - Turn patient as appropriate  3/10/2020 1033 by Katherine Szymanski RN  Outcome: Adequate for Discharge  3/10/2020 3424 by Katherine Szymanski RN  Outcome: Progressing     Problem: PAIN - ADULT  Goal: Verbalizes/displays adequate comfort level or baseline comfort level  Description  Interventions:  - Encourage patient to monitor pain and request assistance  - Assess pain using appropriate pain scale  - Administer analgesics based on type and severity of pain and evaluate response  - Implement non-pharmacological measures as appropriate and evaluate response  - Consider cultural and social influences on pain and pain management  - Notify physician/advanced practitioner if interventions unsuccessful or patient reports new pain  3/10/2020 1033 by Katherine Szymanski RN  Outcome: Adequate for Discharge  3/10/2020 7154 by Katherine Szymanski RN  Outcome: Progressing     Problem: SAFETY ADULT  Goal: Patient will remain free of falls  Description  INTERVENTIONS:  - Assess patient frequently for physical needs  -  Identify cognitive and physical deficits and behaviors that affect risk of falls  -  [place] fall precautions as indicated by assessment   - Educate patient/family on patient safety including physical limitations  - Instruct patient to call for assistance with activity based on assessment  - Modify environment to reduce risk of injury  - Consider OT/PT consult to assist with strengthening/mobility  3/10/2020 1033 by Katherine Szymanski RN  Outcome: Adequate for Discharge  3/10/2020 2078 by Katherine Szymanski RN  Outcome: Progressing  Goal: Maintain or return to baseline ADL function  Description  INTERVENTIONS:  - Assess patient frequently for physical needs  -  Identify cognitive and physical deficits and behaviors that affect risk of falls    -  [place] fall precautions as indicated by assessment   - Educate patient/family on patient safety including physical limitations  - Instruct patient to call for assistance with activity based on assessment  - Modify environment to reduce risk of injury  - Consider OT/PT consult to assist with strengthening/mobility  3/10/2020 1033 by Luc Kohler RN  Outcome: Adequate for Discharge  3/10/2020 2450 by Luc Kohler RN  Outcome: Progressing  Goal: Maintain or return mobility status to optimal level  Description  INTERVENTIONS:  -  Assess patient's ability to carry out ADLs; assess patient's baseline for ADL function and identify physical deficits which impact ability to perform ADLs (bathing, care of mouth/teeth, toileting, grooming, dressing, etc )  - Assess/evaluate cause of self-care deficits   - Assess range of motion  - Assess patient's mobility; develop plan if impaired  - Assess patient's need for assistive devices and provide as appropriate  - Encourage maximum independence but intervene and supervise when necessary  - Involve family in performance of ADLs  - Assess for home care needs following discharge   - Consider OT consult to assist with ADL evaluation and planning for discharge  - Provide patient education as appropriate  3/10/2020 1033 by Luc Kohler RN  Outcome: Adequate for Discharge  3/10/2020 0929 by Luc Kohler RN  Outcome: Progressing     Problem: DISCHARGE PLANNING  Goal: Discharge to home or other facility with appropriate resources  Description  INTERVENTIONS:  - Identify barriers to discharge w/patient and caregiver  - Arrange for needed discharge resources and transportation as appropriate  - Identify discharge learning needs (meds, wound care, etc )  - Arrange for interpretive services to assist at discharge as needed  - Refer to Case Management Department for coordinating discharge planning if the patient needs post-hospital services based on physician/advanced practitioner order or complex needs related to functional status, cognitive ability, or social support system  3/10/2020 1033 by Luc Kohler RN  Outcome: Adequate for Discharge  3/10/2020 4013 by Matt Green RN  Outcome: Progressing     Problem: Knowledge Deficit  Goal: Patient/family/caregiver demonstrates understanding of disease process, treatment plan, medications, and discharge instructions  Description  Complete learning assessment and assess knowledge base    Interventions:  - Provide teaching at level of understanding  - Provide teaching via preferred learning methods  3/10/2020 1033 by Matt Green RN  Outcome: Adequate for Discharge  3/10/2020 7148 by Matt Green RN  Outcome: Progressing     Problem: NEUROSENSORY - ADULT  Goal: Achieves stable or improved neurological status  Description  INTERVENTIONS  - Monitor and report changes in neurological status  - Monitor vital signs such as temperature, blood pressure, glucose, and any other labs ordered   - Initiate measures to prevent increased intracranial pressure  - Monitor for seizure activity and implement precautions if appropriate      3/10/2020 1033 by Matt Green RN  Outcome: Adequate for Discharge  3/10/2020 6930 by Matt Green RN  Outcome: Progressing  Goal: Remains free of injury related to seizures activity  Description  INTERVENTIONS  - Maintain airway, patient safety  and administer oxygen as ordered  - Monitor patient for seizure activity, document and report duration and description of seizure to physician/advanced practitioner  - If seizure occurs,  ensure patient safety during seizure  - Reorient patient post seizure  - Seizure pads on all 4 side rails  - Instruct patient/family to notify RN of any seizure activity including if an aura is experienced  - Instruct patient/family to call for assistance with activity based on nursing assessment  - Administer anti-seizure medications if ordered    3/10/2020 1033 by Matt Green RN  Outcome: Adequate for Discharge  3/10/2020 0664 by Matt Green RN  Outcome: Progressing  Goal: Achieves maximal functionality and self care  Description  INTERVENTIONS  - Monitor swallowing and airway patency with patient fatigue and changes in neurological status  - Encourage and assist patient to increase activity and self care     - Encourage visually impaired, hearing impaired and aphasic patients to use assistive/communication devices  3/10/2020 1033 by Mani Garnica RN  Outcome: Adequate for Discharge  3/10/2020 9266 by Mani Garnica RN  Outcome: Progressing     Problem: MUSCULOSKELETAL - ADULT  Goal: Maintain or return mobility to safest level of function  Description  INTERVENTIONS:  - Assess patient's ability to carry out ADLs; assess patient's baseline for ADL function and identify physical deficits which impact ability to perform ADLs (bathing, care of mouth/teeth, toileting, grooming, dressing, etc )  - Assess/evaluate cause of self-care deficits   - Assess range of motion  - Assess patient's mobility  - Assess patient's need for assistive devices and provide as appropriate  - Encourage maximum independence but intervene and supervise when necessary  - Involve family in performance of ADLs  - Assess for home care needs following discharge   - Consider OT consult to assist with ADL evaluation and planning for discharge  - Provide patient education as appropriate  3/10/2020 1033 by Mani Garnica RN  Outcome: Adequate for Discharge  3/10/2020 2254 by Mani Garnica RN  Outcome: Progressing  Goal: Maintain proper alignment of affected body part  Description  INTERVENTIONS:  - Support, maintain and protect limb and body alignment  - Provide patient/ family with appropriate education  3/10/2020 1033 by Mani Garnica RN  Outcome: Adequate for Discharge  3/10/2020 7013 by Mani Garnica RN  Outcome: Progressing     Problem: MUSCULOSKELETAL - ADULT  Goal: Maintain proper alignment of affected body part  Description  INTERVENTIONS:  - Support, maintain and protect limb and body alignment  - Provide patient/ family with appropriate education  3/10/2020 1033 by Adam Marshall RN  Outcome: Adequate for Discharge  3/10/2020 5043 by Adam Marshall RN  Outcome: Progressing

## 2020-03-11 ENCOUNTER — TRANSITIONAL CARE MANAGEMENT (OUTPATIENT)
Dept: INTERNAL MEDICINE CLINIC | Facility: CLINIC | Age: 40
End: 2020-03-11

## 2020-03-24 ENCOUNTER — CLINICAL SUPPORT (OUTPATIENT)
Dept: NEUROLOGY | Facility: CLINIC | Age: 40
End: 2020-03-24
Payer: COMMERCIAL

## 2020-03-24 DIAGNOSIS — M62.81 LEFT-SIDED MUSCLE WEAKNESS: ICD-10-CM

## 2020-03-24 DIAGNOSIS — I10 ESSENTIAL HYPERTENSION: ICD-10-CM

## 2020-03-24 DIAGNOSIS — E53.8 B12 DEFICIENCY: Primary | ICD-10-CM

## 2020-03-24 DIAGNOSIS — M50.90 CERVICAL DISC DISEASE: ICD-10-CM

## 2020-03-24 DIAGNOSIS — R20.2 FACIAL PARESTHESIA: Primary | ICD-10-CM

## 2020-03-24 PROCEDURE — 96372 THER/PROPH/DIAG INJ SC/IM: CPT

## 2020-03-24 RX ORDER — CYANOCOBALAMIN 1000 UG/ML
1000 INJECTION INTRAMUSCULAR; SUBCUTANEOUS ONCE
Status: COMPLETED | OUTPATIENT
Start: 2020-03-24 | End: 2020-03-24

## 2020-03-24 RX ADMIN — CYANOCOBALAMIN 1000 MCG: 1000 INJECTION INTRAMUSCULAR; SUBCUTANEOUS at 10:57

## 2020-03-25 RX ORDER — AMLODIPINE BESYLATE 5 MG/1
TABLET ORAL
Qty: 30 TABLET | Refills: 0 | Status: SHIPPED | OUTPATIENT
Start: 2020-03-25 | End: 2020-04-03 | Stop reason: SDUPTHER

## 2020-03-25 RX ORDER — METHOCARBAMOL 500 MG/1
TABLET, FILM COATED ORAL
Qty: 30 TABLET | Refills: 0 | Status: SHIPPED | OUTPATIENT
Start: 2020-03-25 | End: 2020-04-03 | Stop reason: SDUPTHER

## 2020-03-31 ENCOUNTER — CLINICAL SUPPORT (OUTPATIENT)
Dept: NEUROLOGY | Facility: CLINIC | Age: 40
End: 2020-03-31
Payer: COMMERCIAL

## 2020-03-31 DIAGNOSIS — E53.8 B12 DEFICIENCY: Primary | ICD-10-CM

## 2020-03-31 DIAGNOSIS — E53.8 B12 DEFICIENCY: ICD-10-CM

## 2020-03-31 PROCEDURE — 96372 THER/PROPH/DIAG INJ SC/IM: CPT | Performed by: PSYCHIATRY & NEUROLOGY

## 2020-03-31 RX ORDER — CYANOCOBALAMIN 1000 UG/ML
1000 INJECTION INTRAMUSCULAR; SUBCUTANEOUS WEEKLY
Status: SHIPPED | OUTPATIENT
Start: 2020-03-31 | End: 2020-05-05

## 2020-03-31 RX ADMIN — CYANOCOBALAMIN 1000 MCG: 1000 INJECTION INTRAMUSCULAR; SUBCUTANEOUS at 13:38

## 2020-04-03 ENCOUNTER — TELEMEDICINE (OUTPATIENT)
Dept: INTERNAL MEDICINE CLINIC | Facility: CLINIC | Age: 40
End: 2020-04-03
Payer: COMMERCIAL

## 2020-04-03 VITALS
DIASTOLIC BLOOD PRESSURE: 92 MMHG | HEART RATE: 86 BPM | WEIGHT: 284 LBS | SYSTOLIC BLOOD PRESSURE: 150 MMHG | BODY MASS INDEX: 41.94 KG/M2

## 2020-04-03 DIAGNOSIS — Z98.84 S/P GASTRIC BYPASS: ICD-10-CM

## 2020-04-03 DIAGNOSIS — J01.00 ACUTE NON-RECURRENT MAXILLARY SINUSITIS: ICD-10-CM

## 2020-04-03 DIAGNOSIS — R51.9 WORSENING HEADACHES: ICD-10-CM

## 2020-04-03 DIAGNOSIS — E66.01 MORBID OBESITY WITH BMI OF 40.0-44.9, ADULT (HCC): ICD-10-CM

## 2020-04-03 DIAGNOSIS — K21.9 GASTROESOPHAGEAL REFLUX DISEASE WITHOUT ESOPHAGITIS: Primary | ICD-10-CM

## 2020-04-03 DIAGNOSIS — M50.90 CERVICAL DISC DISEASE: ICD-10-CM

## 2020-04-03 DIAGNOSIS — M51.26 HERNIATION OF INTERVERTEBRAL DISC OF LUMBAR SPINE: ICD-10-CM

## 2020-04-03 DIAGNOSIS — I10 ESSENTIAL HYPERTENSION: ICD-10-CM

## 2020-04-03 PROCEDURE — 3080F DIAST BP >= 90 MM HG: CPT | Performed by: INTERNAL MEDICINE

## 2020-04-03 PROCEDURE — 3077F SYST BP >= 140 MM HG: CPT | Performed by: INTERNAL MEDICINE

## 2020-04-03 PROCEDURE — 99214 OFFICE O/P EST MOD 30 MIN: CPT | Performed by: INTERNAL MEDICINE

## 2020-04-03 RX ORDER — METHOCARBAMOL 750 MG/1
750 TABLET, FILM COATED ORAL 3 TIMES DAILY PRN
Qty: 90 TABLET | Refills: 1 | Status: SHIPPED | OUTPATIENT
Start: 2020-04-03 | End: 2021-02-03 | Stop reason: DRUGHIGH

## 2020-04-03 RX ORDER — ALBUTEROL SULFATE 90 UG/1
2 AEROSOL, METERED RESPIRATORY (INHALATION) EVERY 6 HOURS PRN
Qty: 1 INHALER | Refills: 5 | Status: SHIPPED | OUTPATIENT
Start: 2020-04-03 | End: 2021-06-07 | Stop reason: SDUPTHER

## 2020-04-03 RX ORDER — LORAZEPAM 1 MG/1
1 TABLET ORAL AS NEEDED
Qty: 30 TABLET | Refills: 1 | Status: SHIPPED | OUTPATIENT
Start: 2020-04-03 | End: 2020-11-04

## 2020-04-03 RX ORDER — AMLODIPINE BESYLATE 5 MG/1
5 TABLET ORAL DAILY
Qty: 90 TABLET | Refills: 1 | Status: SHIPPED | OUTPATIENT
Start: 2020-04-03 | End: 2020-12-10

## 2020-04-03 RX ORDER — TRAMADOL HYDROCHLORIDE 50 MG/1
50 TABLET ORAL EVERY 6 HOURS PRN
Qty: 60 TABLET | Refills: 1 | Status: SHIPPED | OUTPATIENT
Start: 2020-04-03 | End: 2020-06-02

## 2020-04-03 RX ORDER — ASPIRIN 81 MG/1
81 TABLET ORAL DAILY
COMMUNITY

## 2020-04-07 ENCOUNTER — CLINICAL SUPPORT (OUTPATIENT)
Dept: NEUROLOGY | Facility: CLINIC | Age: 40
End: 2020-04-07
Payer: COMMERCIAL

## 2020-04-07 DIAGNOSIS — E53.8 B12 DEFICIENCY: ICD-10-CM

## 2020-04-07 PROCEDURE — 96372 THER/PROPH/DIAG INJ SC/IM: CPT | Performed by: PSYCHIATRY & NEUROLOGY

## 2020-04-07 RX ADMIN — CYANOCOBALAMIN 1000 MCG: 1000 INJECTION INTRAMUSCULAR; SUBCUTANEOUS at 15:49

## 2020-05-12 DIAGNOSIS — E53.8 B12 DEFICIENCY: Primary | ICD-10-CM

## 2020-05-12 RX ORDER — SYRINGE W-NEEDLE,DISPOSAB,3 ML 25GX5/8"
SYRINGE, EMPTY DISPOSABLE MISCELLANEOUS
Qty: 9 EACH | Refills: 1 | Status: SHIPPED | OUTPATIENT
Start: 2020-05-12 | End: 2020-12-15 | Stop reason: SDUPTHER

## 2020-05-19 ENCOUNTER — TRANSCRIBE ORDERS (OUTPATIENT)
Dept: NEUROLOGY | Facility: CLINIC | Age: 40
End: 2020-05-19

## 2020-05-19 DIAGNOSIS — M54.50 LOW BACK PAIN: Primary | ICD-10-CM

## 2020-05-28 ENCOUNTER — TRANSCRIBE ORDERS (OUTPATIENT)
Dept: LAB | Facility: CLINIC | Age: 40
End: 2020-05-28

## 2020-05-28 ENCOUNTER — APPOINTMENT (OUTPATIENT)
Dept: LAB | Facility: CLINIC | Age: 40
End: 2020-05-28
Payer: COMMERCIAL

## 2020-05-28 DIAGNOSIS — M54.2 CERVICALGIA: ICD-10-CM

## 2020-05-28 DIAGNOSIS — M54.2 CERVICALGIA: Primary | ICD-10-CM

## 2020-05-28 LAB
CRP SERPL QL: 6.3 MG/L
ERYTHROCYTE [DISTWIDTH] IN BLOOD BY AUTOMATED COUNT: 13 % (ref 11.6–15.1)
ERYTHROCYTE [SEDIMENTATION RATE] IN BLOOD: 8 MM/HOUR (ref 0–10)
HCT VFR BLD AUTO: 44.6 % (ref 36.5–46.1)
HGB BLD-MCNC: 14.5 G/DL (ref 12–15.4)
MCH RBC QN AUTO: 29.6 PG (ref 26.8–34.3)
MCHC RBC AUTO-ENTMCNC: 32.5 G/DL (ref 31.4–37.4)
MCV RBC AUTO: 91 FL (ref 82–98)
PLATELET # BLD AUTO: 280 THOUSANDS/UL (ref 149–390)
PMV BLD AUTO: 9.5 FL (ref 8.9–12.7)
RBC # BLD AUTO: 4.9 MILLION/UL (ref 3.88–5.12)
RHEUMATOID FACT SER QL LA: NEGATIVE
URATE SERPL-MCNC: 6.4 MG/DL (ref 4.2–6.8)
WBC # BLD AUTO: 8.03 THOUSAND/UL (ref 4.31–10.16)

## 2020-05-28 PROCEDURE — 86038 ANTINUCLEAR ANTIBODIES: CPT

## 2020-05-28 PROCEDURE — 86618 LYME DISEASE ANTIBODY: CPT

## 2020-05-28 PROCEDURE — 85652 RBC SED RATE AUTOMATED: CPT

## 2020-05-28 PROCEDURE — 85027 COMPLETE CBC AUTOMATED: CPT

## 2020-05-28 PROCEDURE — 84550 ASSAY OF BLOOD/URIC ACID: CPT

## 2020-05-28 PROCEDURE — 36415 COLL VENOUS BLD VENIPUNCTURE: CPT

## 2020-05-28 PROCEDURE — 86430 RHEUMATOID FACTOR TEST QUAL: CPT

## 2020-05-28 PROCEDURE — 86140 C-REACTIVE PROTEIN: CPT

## 2020-05-29 LAB
B BURGDOR IGG+IGM SER-ACNC: <0.91 ISR (ref 0–0.9)
RYE IGE QN: NEGATIVE

## 2020-06-04 ENCOUNTER — TELEPHONE (OUTPATIENT)
Dept: NEUROLOGY | Facility: CLINIC | Age: 40
End: 2020-06-04

## 2020-06-04 DIAGNOSIS — M62.81 LEFT-SIDED MUSCLE WEAKNESS: ICD-10-CM

## 2020-06-05 RX ORDER — GABAPENTIN 100 MG/1
300 CAPSULE ORAL DAILY
Qty: 90 CAPSULE | Refills: 3 | Status: SHIPPED | OUTPATIENT
Start: 2020-06-05 | End: 2021-04-05 | Stop reason: ALTCHOICE

## 2020-06-23 ENCOUNTER — TELEPHONE (OUTPATIENT)
Dept: INTERNAL MEDICINE CLINIC | Facility: CLINIC | Age: 40
End: 2020-06-23

## 2020-06-23 DIAGNOSIS — M54.50 MIDLINE LOW BACK PAIN WITHOUT SCIATICA, UNSPECIFIED CHRONICITY: Primary | ICD-10-CM

## 2020-06-23 RX ORDER — METHOCARBAMOL 500 MG/1
250 TABLET, FILM COATED ORAL
Qty: 30 TABLET | Refills: 5 | Status: SHIPPED | OUTPATIENT
Start: 2020-06-23 | End: 2021-02-03 | Stop reason: DRUGHIGH

## 2020-07-24 PROBLEM — M54.2 CHRONIC NECK PAIN: Status: ACTIVE | Noted: 2020-05-18

## 2020-07-24 PROBLEM — G89.29 CHRONIC NECK PAIN: Status: ACTIVE | Noted: 2020-05-18

## 2020-07-24 PROBLEM — M54.50 LOW BACK PAIN: Status: ACTIVE | Noted: 2020-05-18

## 2020-08-11 DIAGNOSIS — R51.9 WORSENING HEADACHES: ICD-10-CM

## 2020-08-11 DIAGNOSIS — I10 ESSENTIAL HYPERTENSION: ICD-10-CM

## 2020-08-11 RX ORDER — CHLORTHALIDONE 25 MG/1
TABLET ORAL
Qty: 90 TABLET | Refills: 0 | Status: SHIPPED | OUTPATIENT
Start: 2020-08-11 | End: 2020-12-11 | Stop reason: SDUPTHER

## 2020-11-03 DIAGNOSIS — Z98.84 S/P GASTRIC BYPASS: ICD-10-CM

## 2020-11-03 DIAGNOSIS — M50.90 CERVICAL DISC DISEASE: ICD-10-CM

## 2020-11-04 RX ORDER — LORAZEPAM 1 MG/1
TABLET ORAL
Qty: 30 TABLET | Refills: 0 | Status: SHIPPED | OUTPATIENT
Start: 2020-11-04 | End: 2021-02-03 | Stop reason: SDUPTHER

## 2020-11-11 ENCOUNTER — HOSPITAL ENCOUNTER (OUTPATIENT)
Dept: NEUROLOGY | Facility: CLINIC | Age: 40
Discharge: HOME | End: 2020-11-11
Payer: COMMERCIAL

## 2020-11-11 DIAGNOSIS — M54.50 LOW BACK PAIN: ICD-10-CM

## 2020-11-11 PROCEDURE — 95911 NRV CNDJ TEST 9-10 STUDIES: CPT | Performed by: PSYCHIATRY & NEUROLOGY

## 2020-11-11 PROCEDURE — 95886 MUSC TEST DONE W/N TEST COMP: CPT | Performed by: PSYCHIATRY & NEUROLOGY

## 2020-11-17 ENCOUNTER — TELEMEDICINE (OUTPATIENT)
Dept: INTERNAL MEDICINE CLINIC | Facility: CLINIC | Age: 40
End: 2020-11-17
Payer: COMMERCIAL

## 2020-11-17 DIAGNOSIS — J01.90 ACUTE SINUSITIS, RECURRENCE NOT SPECIFIED, UNSPECIFIED LOCATION: Primary | ICD-10-CM

## 2020-11-17 PROCEDURE — 99214 OFFICE O/P EST MOD 30 MIN: CPT | Performed by: INTERNAL MEDICINE

## 2020-11-17 PROCEDURE — 1036F TOBACCO NON-USER: CPT | Performed by: INTERNAL MEDICINE

## 2020-11-17 PROCEDURE — 3725F SCREEN DEPRESSION PERFORMED: CPT | Performed by: INTERNAL MEDICINE

## 2020-11-17 RX ORDER — AMOXICILLIN AND CLAVULANATE POTASSIUM 875; 125 MG/1; MG/1
1 TABLET, FILM COATED ORAL EVERY 12 HOURS SCHEDULED
Qty: 20 TABLET | Refills: 0 | Status: SHIPPED | OUTPATIENT
Start: 2020-11-17 | End: 2020-11-27

## 2020-12-03 ENCOUNTER — VBI (OUTPATIENT)
Dept: ADMINISTRATIVE | Facility: OTHER | Age: 40
End: 2020-12-03

## 2020-12-09 DIAGNOSIS — I10 ESSENTIAL HYPERTENSION: ICD-10-CM

## 2020-12-10 RX ORDER — AMLODIPINE BESYLATE 5 MG/1
TABLET ORAL
Qty: 90 TABLET | Refills: 0 | Status: SHIPPED | OUTPATIENT
Start: 2020-12-10 | End: 2021-02-03 | Stop reason: SDUPTHER

## 2020-12-11 ENCOUNTER — TELEPHONE (OUTPATIENT)
Dept: INTERNAL MEDICINE CLINIC | Facility: CLINIC | Age: 40
End: 2020-12-11

## 2020-12-11 DIAGNOSIS — I10 ESSENTIAL HYPERTENSION: ICD-10-CM

## 2020-12-11 DIAGNOSIS — R51.9 WORSENING HEADACHES: ICD-10-CM

## 2020-12-11 RX ORDER — CHLORTHALIDONE 25 MG/1
25 TABLET ORAL DAILY
Qty: 90 TABLET | Refills: 0 | Status: SHIPPED | OUTPATIENT
Start: 2020-12-11 | End: 2021-04-05 | Stop reason: SDUPTHER

## 2020-12-15 DIAGNOSIS — G56.02 CARPAL TUNNEL SYNDROME OF LEFT WRIST: Primary | ICD-10-CM

## 2020-12-15 DIAGNOSIS — E53.8 B12 DEFICIENCY: ICD-10-CM

## 2020-12-15 RX ORDER — SYRINGE W-NEEDLE,DISPOSAB,3 ML 25GX5/8"
SYRINGE, EMPTY DISPOSABLE MISCELLANEOUS
Qty: 10 EACH | Refills: 1 | Status: SHIPPED | OUTPATIENT
Start: 2020-12-15

## 2020-12-15 RX ORDER — CYANOCOBALAMIN 1000 UG/ML
INJECTION INTRAMUSCULAR; SUBCUTANEOUS
Qty: 10 ML | Refills: 1 | Status: SHIPPED | OUTPATIENT
Start: 2020-12-15 | End: 2021-02-03 | Stop reason: SDUPTHER

## 2020-12-16 DIAGNOSIS — K04.7 TOOTH INFECTION: Primary | ICD-10-CM

## 2020-12-16 RX ORDER — LEVOFLOXACIN 500 MG/1
500 TABLET, FILM COATED ORAL EVERY 24 HOURS
Qty: 5 TABLET | Refills: 0 | Status: SHIPPED | OUTPATIENT
Start: 2020-12-16 | End: 2020-12-21

## 2021-01-08 ENCOUNTER — IMMUNIZATIONS (OUTPATIENT)
Dept: FAMILY MEDICINE CLINIC | Facility: HOSPITAL | Age: 41
End: 2021-01-08

## 2021-01-08 DIAGNOSIS — Z23 ENCOUNTER FOR IMMUNIZATION: ICD-10-CM

## 2021-01-08 PROCEDURE — 91301 SARS-COV-2 / COVID-19 MRNA VACCINE (MODERNA) 100 MCG: CPT

## 2021-01-08 PROCEDURE — 0011A SARS-COV-2 / COVID-19 MRNA VACCINE (MODERNA) 100 MCG: CPT

## 2021-01-19 ENCOUNTER — TELEPHONE (OUTPATIENT)
Dept: INTERNAL MEDICINE CLINIC | Facility: CLINIC | Age: 41
End: 2021-01-19

## 2021-01-19 NOTE — TELEPHONE ENCOUNTER
Left message on machine for patient to call me at [place] office  patient is overdue for an appt with Dr Casie Pierce  Was a no show 1/4/2021      Please schedule

## 2021-01-22 RX ORDER — TRAMADOL HYDROCHLORIDE 50 MG/1
TABLET ORAL
Qty: 50 TABLET | Refills: 0 | OUTPATIENT
Start: 2021-01-22

## 2021-02-03 ENCOUNTER — TELEPHONE (OUTPATIENT)
Dept: INTERNAL MEDICINE CLINIC | Facility: CLINIC | Age: 41
End: 2021-02-03

## 2021-02-03 ENCOUNTER — TELEMEDICINE (OUTPATIENT)
Dept: INTERNAL MEDICINE CLINIC | Facility: CLINIC | Age: 41
End: 2021-02-03
Payer: COMMERCIAL

## 2021-02-03 VITALS
TEMPERATURE: 94.4 F | BODY MASS INDEX: 46.65 KG/M2 | HEIGHT: 69 IN | WEIGHT: 315 LBS | HEART RATE: 62 BPM | DIASTOLIC BLOOD PRESSURE: 83 MMHG | SYSTOLIC BLOOD PRESSURE: 145 MMHG | OXYGEN SATURATION: 98 %

## 2021-02-03 DIAGNOSIS — Z78.9 MALE-TO-FEMALE TRANSGENDER PERSON: ICD-10-CM

## 2021-02-03 DIAGNOSIS — M54.50 MIDLINE LOW BACK PAIN WITHOUT SCIATICA, UNSPECIFIED CHRONICITY: ICD-10-CM

## 2021-02-03 DIAGNOSIS — M50.90 CERVICAL DISC DISEASE: ICD-10-CM

## 2021-02-03 DIAGNOSIS — Z98.84 S/P GASTRIC BYPASS: ICD-10-CM

## 2021-02-03 DIAGNOSIS — I10 ESSENTIAL HYPERTENSION: ICD-10-CM

## 2021-02-03 DIAGNOSIS — R51.9 WORSENING HEADACHES: ICD-10-CM

## 2021-02-03 DIAGNOSIS — E66.01 MORBID OBESITY WITH BMI OF 40.0-44.9, ADULT (HCC): ICD-10-CM

## 2021-02-03 DIAGNOSIS — Z88.9 MULTIPLE ALLERGIES: Primary | ICD-10-CM

## 2021-02-03 PROCEDURE — 99214 OFFICE O/P EST MOD 30 MIN: CPT | Performed by: INTERNAL MEDICINE

## 2021-02-03 RX ORDER — MONTELUKAST SODIUM 10 MG/1
10 TABLET ORAL
Qty: 90 TABLET | Refills: 1 | Status: SHIPPED | OUTPATIENT
Start: 2021-02-03

## 2021-02-03 RX ORDER — METHOCARBAMOL 500 MG/1
250 TABLET, FILM COATED ORAL
Qty: 45 TABLET | Refills: 1 | Status: SHIPPED | OUTPATIENT
Start: 2021-02-03 | End: 2021-03-25 | Stop reason: SDUPTHER

## 2021-02-03 RX ORDER — TRAMADOL HYDROCHLORIDE 50 MG/1
50 TABLET ORAL DAILY
Qty: 30 TABLET | Refills: 0 | Status: SHIPPED | OUTPATIENT
Start: 2021-02-03 | End: 2021-03-26

## 2021-02-03 RX ORDER — LORAZEPAM 1 MG/1
0.5 TABLET ORAL DAILY
Qty: 30 TABLET | Refills: 0 | Status: SHIPPED | OUTPATIENT
Start: 2021-02-03 | End: 2021-04-05 | Stop reason: SDUPTHER

## 2021-02-03 RX ORDER — AMLODIPINE BESYLATE 10 MG/1
10 TABLET ORAL DAILY
Qty: 90 TABLET | Refills: 1 | Status: SHIPPED | OUTPATIENT
Start: 2021-02-03 | End: 2021-10-19 | Stop reason: SDUPTHER

## 2021-02-03 RX ORDER — ESTRADIOL 2 MG/1
2 TABLET ORAL 2 TIMES DAILY
Qty: 180 TABLET | Refills: 1 | Status: SHIPPED | OUTPATIENT
Start: 2021-02-03 | End: 2021-10-19 | Stop reason: SDUPTHER

## 2021-02-03 NOTE — ASSESSMENT & PLAN NOTE
Continues with methocarbamol, lorazepam and tramadol as needed  Pain seems to be exacerbated by stress

## 2021-02-03 NOTE — ASSESSMENT & PLAN NOTE
Currently on amlodipine and chlorthalidone  Blood pressure is still not optimally controlled  Will increase amlodipine to 10 mg daily    Follow-up labs to monitor electrolytes

## 2021-02-03 NOTE — TELEPHONE ENCOUNTER
PA for Estradiol tablets sent to [facility] in [address] [facility]

## 2021-02-03 NOTE — PROGRESS NOTES
Virtual Regular Visit      Assessment/Plan:    Problem List Items Addressed This Visit        Cardiovascular and Mediastinum    Essential hypertension      Currently on amlodipine and chlorthalidone  Blood pressure is still not optimally controlled  Will increase amlodipine to 10 mg daily  Follow-up labs to monitor electrolytes         Relevant Medications    amLODIPine (NORVASC) 10 mg tablet    Other Relevant Orders    Comprehensive metabolic panel       Musculoskeletal and Integument    Cervical disc disease      Patient continues on tramadol, methocarbamol, celecoxib, historically  Relevant Medications    LORazepam (ATIVAN) 1 mg tablet    Other Relevant Orders    Comprehensive metabolic panel       Other    Male-to-female transgender person      Estradiol 2 mg twice daily was renewed         Relevant Medications    estradiol (ESTRACE) 2 MG tablet    Other Relevant Orders    Comprehensive metabolic panel    Worsening headaches    Relevant Medications    traMADol (ULTRAM) 50 mg tablet    Other Relevant Orders    Comprehensive metabolic panel    Morbid obesity with BMI of 40 0-44 9, adult ([facility] [address] )      Encouraged patient to re-evaluate snacking tendencies  Needs to become more active physically  Suggested taking advantage of [employer]         Low back pain      Continues with methocarbamol, lorazepam and tramadol as needed  Pain seems to be exacerbated by stress           Relevant Medications    methocarbamol (ROBAXIN) 500 mg tablet    Other Relevant Orders    Comprehensive metabolic panel      Other Visit Diagnoses     Multiple allergies    -  Primary    Relevant Medications    montelukast (SINGULAIR) 10 mg tablet    Other Relevant Orders    Comprehensive metabolic panel    S/P gastric bypass        Relevant Orders    Comprehensive metabolic panel               Reason for visit is   Chief Complaint   Patient presents with    Follow-up     1 year f/u visit for headaches, neck spasm and allergies  Refill meds   Virtual Regular Visit        Encounter provider Gerhardt Kehr, MD    Provider located at [address] [address]      Recent Visits  No visits were found meeting these conditions  Showing recent visits within past 7 days and meeting all other requirements     Today's Visits  Date Type Provider Dept   02/03/21 Telemedicine Gerhardt Kehr,  [facility] today's visits and meeting all other requirements     Future Appointments  No visits were found meeting these conditions  Showing future appointments within next 150 days and meeting all other requirements        The patient was identified by name and date of birth  Vanessa Gleason  was informed that this is a telemedicine visit and that the visit is being conducted through [facility] and patient was informed that this is a secure, HIPAA-compliant platform  She agrees to proceed     My office door was closed  No one else was in the room  She acknowledged consent and understanding of privacy and security of the video platform  The patient has agreed to participate and understands they can discontinue the visit at any time  Patient is aware this is a billable service  Subjective  Vanessa Gleason  is a 36 y o  adult seen via virtual video visit due to poor travel conditions that persist after recent record snowstorm  Patient is seen virtually for follow-up visit  Patient states blood pressure has been doing better with the combination of chlorthalidone and amlodipine  Blood pressure control is still not optimal   Headaches have improved as well  Patient is also taking some pre-emptive measures to prevent what she feels are stress-induced headaches by utilizing methocarbamol and 1/2 of an Ativan tablet    Patient has also experienced Ace fairly significant weight gain which she attributes to frequent snacking and no significant physical activity  No recent blood work to review       Past Medical History:   Diagnosis Date    Anxiety     Asthma     seasonal    Hernia of abdominal wall     Hypertension     Seizures ([address] )     pseudoseizures since 2012       Past Surgical History:   Procedure Laterality Date    APPENDECTOMY      CHOLECYSTECTOMY      GASTRIC BYPASS      HERNIA REPAIR      x2       Current Outpatient Medications   Medication Sig Dispense Refill    albuterol (PROVENTIL HFA,VENTOLIN HFA) 90 mcg/act inhaler Inhale 2 puffs every 6 (six) hours as needed for wheezing 1 Inhaler 5    amLODIPine (NORVASC) 10 mg tablet Take 1 tablet (10 mg total) by mouth daily 90 tablet 1    aspirin (ECOTRIN LOW STRENGTH) 81 mg EC tablet Take 81 mg by mouth daily      chlorthalidone 25 mg tablet Take 1 tablet (25 mg total) by mouth daily 90 tablet 0    Cyanocobalamin (B-12) 1000 MCG/ML KIT Inject 1 ml IM in thigh/ buttocks or deltoid mm once weekly x 8 weeks  Afterward once monthly 8 mL 1    cyclobenzaprine (FLEXERIL) 10 mg tablet Take 1 tablet (10 mg total) by mouth daily as needed for muscle spasms for up to 30 days 30 tablet 1    diphenhydrAMINE (BENADRYL) 50 MG tablet Take one tablet 1 hour prior to contrast administration   1 tablet 0    estradiol (ESTRACE) 2 MG tablet Take 1 tablet (2 mg total) by mouth 2 (two) times a day 180 tablet 1    gabapentin (NEURONTIN) 100 mg capsule Take 3 capsules (300 mg total) by mouth daily 90 capsule 3    hydrocortisone (ANUSOL-HC) 25 mg suppository Insert 1 suppository (25 mg total) into the rectum 2 (two) times a day (Patient taking differently: Insert 25 mg into the rectum 2 (two) times a day prn) 60 suppository 1    LORazepam (ATIVAN) 1 mg tablet Take 0 5 tablets (0 5 mg total) by mouth daily 30 tablet 0    methocarbamol (ROBAXIN) 500 mg tablet Take 0 5 tablets (250 mg total) by mouth daily after breakfast 45 tablet 1    multivitamin (THERAGRAN) TABS Take 1 tablet by mouth daily      ondansetron (ZOFRAN-ODT) 4 mg disintegrating tablet Take 1 tablet (4 mg total) by mouth every 8 (eight) hours as needed for nausea or vomiting 60 tablet 1    pantoprazole (PROTONIX) 40 mg tablet Take 1 tablet (40 mg total) by mouth daily 30 tablet 10    Syringe/Needle, Disp, (SYRINGE 3CC/[id number]/4") 27G X 1-1/4" 3 ML MISC Use for B12 injections 10 each 1    traMADol (ULTRAM) 50 mg tablet Take 1 tablet (50 mg total) by mouth daily Prn  For migraines or neck spasms 30 tablet 0    celecoxib (CeleBREX) 200 mg capsule celecoxib 200 mg capsule      montelukast (SINGULAIR) 10 mg tablet Take 1 tablet (10 mg total) by mouth daily at bedtime 90 tablet 1     No current facility-administered medications for this visit  Allergies   Allergen Reactions    Gadolinium Anaphylaxis     "Oxygen dropped and coded during"       Iodinated Diagnostic Agents Anaphylaxis       Review of Systems   Constitutional: Negative  HENT: Negative  Eyes: Negative  Respiratory: Negative  Cardiovascular: Negative  Gastrointestinal: Negative  Endocrine: Negative  Genitourinary: Negative  Musculoskeletal: Positive for back pain  Negative for arthralgias, joint swelling, neck pain and neck stiffness  Skin: Negative  Allergic/Immunologic: Negative  Neurological: Negative  Hematological: Negative  Psychiatric/Behavioral: The patient is nervous/anxious  Video Exam    Vitals:    02/03/21 0851   BP: 145/83   BP Location: Left arm   Patient Position: Sitting   Cuff Size: Standard   Pulse: 62   Temp: (!) 94 4 °F (34 7 °C)   TempSrc: Temporal   SpO2: 98%   Weight: (!) 144 kg (318 lb)   Height: 5' 9" (1 753 m)   BMI Counseling: Body mass index is 46 96 kg/m²   The BMI is above normal  Nutrition recommendations include reducing portion sizes, decreasing overall calorie intake, 3-5 servings of fruits/vegetables daily, reducing fast food intake, consuming healthier snacks, decreasing soda and/or juice intake, moderation in carbohydrate intake, increasing intake of lean protein, reducing intake of saturated fat and trans fat and reducing intake of cholesterol  Exercise recommendations include exercising 3-5 times per week and joining a gym  Physical Exam  Vitals signs reviewed  Constitutional:       General: She is not in acute distress  Appearance: Normal appearance  She is obese  She is not ill-appearing, toxic-appearing or diaphoretic  HENT:      Head: Normocephalic and atraumatic  Right Ear: External ear normal       Left Ear: External ear normal    Eyes:      General: No scleral icterus  Conjunctiva/sclera: Conjunctivae normal       Pupils: Pupils are equal, round, and reactive to light  Cardiovascular:      Rate and Rhythm: Normal rate  Pulmonary:      Effort: Pulmonary effort is normal  No respiratory distress  Abdominal:      General: There is no distension  Tenderness: There is no abdominal tenderness  Musculoskeletal:         General: No tenderness  Right lower leg: No edema  Left lower leg: No edema  Skin:     Coloration: Skin is not jaundiced  Findings: No erythema or rash  Neurological:      General: No focal deficit present  Mental Status: She is alert and oriented to person, place, and time  Mental status is at baseline  Psychiatric:         Mood and Affect: Mood normal          Behavior: Behavior normal          Thought Content: Thought content normal          Judgment: Judgment normal           I spent 25 minutes directly with the patient during this visit      [address] [place] [address] Sri Pearce  acknowledges that she has consented to an online visit or consultation   She understands that the online visit is based solely on information provided by her, and that, in the absence of a face-to-face physical evaluation by the physician, the diagnosis she receives is both limited and provisional in terms of accuracy and completeness  This is not intended to replace a full medical face-to-face evaluation by the physician  Justin Sosa  understands and accepts these terms

## 2021-02-03 NOTE — ASSESSMENT & PLAN NOTE
Encouraged patient to re-evaluate snacking tendencies  Needs to become more active physically    Suggested taking advantage of [employer]

## 2021-02-04 ENCOUNTER — APPOINTMENT (EMERGENCY)
Dept: CT IMAGING | Facility: HOSPITAL | Age: 41
End: 2021-02-04
Payer: COMMERCIAL

## 2021-02-04 ENCOUNTER — HOSPITAL ENCOUNTER (EMERGENCY)
Facility: HOSPITAL | Age: 41
Discharge: HOME | End: 2021-02-04
Attending: EMERGENCY MEDICINE | Admitting: EMERGENCY MEDICINE
Payer: COMMERCIAL

## 2021-02-04 VITALS
DIASTOLIC BLOOD PRESSURE: 83 MMHG | OXYGEN SATURATION: 99 % | SYSTOLIC BLOOD PRESSURE: 149 MMHG | HEIGHT: 69 IN | RESPIRATION RATE: 15 BRPM | WEIGHT: 315 LBS | TEMPERATURE: 97.5 F | BODY MASS INDEX: 46.65 KG/M2 | HEART RATE: 65 BPM

## 2021-02-04 DIAGNOSIS — R31.29 MICROSCOPIC HEMATURIA: ICD-10-CM

## 2021-02-04 DIAGNOSIS — R10.9 FLANK PAIN: Primary | ICD-10-CM

## 2021-02-04 LAB
ANION GAP SERPL CALCULATED.3IONS-SCNC: 8 MMOL/L (ref 4–13)
BACTERIA UR QL AUTO: NORMAL /HPF
BASOPHILS # BLD AUTO: 0.04 THOUSANDS/ΜL (ref 0–0.1)
BASOPHILS NFR BLD AUTO: 1 % (ref 0–1)
BILIRUB UR QL STRIP: NEGATIVE
BUN SERPL-MCNC: 12 MG/DL (ref 5–25)
CALCIUM SERPL-MCNC: 8.9 MG/DL (ref 8.3–10.1)
CHLORIDE SERPL-SCNC: 99 MMOL/L (ref 100–108)
CLARITY UR: CLEAR
CO2 SERPL-SCNC: 29 MMOL/L (ref 21–32)
COLOR UR: YELLOW
CREAT SERPL-MCNC: 0.81 MG/DL (ref 0.6–1.3)
EOSINOPHIL # BLD AUTO: 0.13 THOUSAND/ΜL (ref 0–0.61)
EOSINOPHIL NFR BLD AUTO: 2 % (ref 0–6)
ERYTHROCYTE [DISTWIDTH] IN BLOOD BY AUTOMATED COUNT: 13 % (ref 11.6–15.1)
GLUCOSE SERPL-MCNC: 93 MG/DL (ref 65–140)
GLUCOSE UR STRIP-MCNC: NEGATIVE MG/DL
HCT VFR BLD AUTO: 40.6 % (ref 36.5–46.1)
HGB BLD-MCNC: 13.1 G/DL (ref 12–15.4)
HGB UR QL STRIP.AUTO: ABNORMAL
IMM GRANULOCYTES # BLD AUTO: 0.02 THOUSAND/UL (ref 0–0.2)
IMM GRANULOCYTES NFR BLD AUTO: 0 % (ref 0–2)
KETONES UR STRIP-MCNC: NEGATIVE MG/DL
LEUKOCYTE ESTERASE UR QL STRIP: NEGATIVE
LYMPHOCYTES # BLD AUTO: 2.1 THOUSANDS/ΜL (ref 0.6–4.47)
LYMPHOCYTES NFR BLD AUTO: 26 % (ref 14–44)
MCH RBC QN AUTO: 29.2 PG (ref 26.8–34.3)
MCHC RBC AUTO-ENTMCNC: 32.3 G/DL (ref 31.4–37.4)
MCV RBC AUTO: 91 FL (ref 82–98)
MONOCYTES # BLD AUTO: 0.44 THOUSAND/ΜL (ref 0.17–1.22)
MONOCYTES NFR BLD AUTO: 5 % (ref 4–12)
NEUTROPHILS # BLD AUTO: 5.5 THOUSANDS/ΜL (ref 1.85–7.62)
NEUTS SEG NFR BLD AUTO: 66 % (ref 43–75)
NITRITE UR QL STRIP: NEGATIVE
NON-SQ EPI CELLS URNS QL MICRO: NORMAL /HPF
NRBC BLD AUTO-RTO: 0 /100 WBCS
PH UR STRIP.AUTO: 7 [PH]
PLATELET # BLD AUTO: 334 THOUSANDS/UL (ref 149–390)
PMV BLD AUTO: 9.9 FL (ref 8.9–12.7)
POTASSIUM SERPL-SCNC: 4.3 MMOL/L (ref 3.5–5.3)
PROT UR STRIP-MCNC: NEGATIVE MG/DL
RBC # BLD AUTO: 4.48 MILLION/UL (ref 3.88–5.12)
RBC #/AREA URNS AUTO: NORMAL /HPF
SODIUM SERPL-SCNC: 136 MMOL/L (ref 136–145)
SP GR UR STRIP.AUTO: 1.02 (ref 1–1.03)
UROBILINOGEN UR QL STRIP.AUTO: 0.2 E.U./DL
WBC # BLD AUTO: 8.23 THOUSAND/UL (ref 4.31–10.16)
WBC #/AREA URNS AUTO: NORMAL /HPF

## 2021-02-04 PROCEDURE — G1004 CDSM NDSC: HCPCS

## 2021-02-04 PROCEDURE — 99284 EMERGENCY DEPT VISIT MOD MDM: CPT

## 2021-02-04 PROCEDURE — 80048 BASIC METABOLIC PNL TOTAL CA: CPT | Performed by: PHYSICIAN ASSISTANT

## 2021-02-04 PROCEDURE — 36415 COLL VENOUS BLD VENIPUNCTURE: CPT | Performed by: PHYSICIAN ASSISTANT

## 2021-02-04 PROCEDURE — 99284 EMERGENCY DEPT VISIT MOD MDM: CPT | Performed by: PHYSICIAN ASSISTANT

## 2021-02-04 PROCEDURE — 81001 URINALYSIS AUTO W/SCOPE: CPT | Performed by: PHYSICIAN ASSISTANT

## 2021-02-04 PROCEDURE — 85025 COMPLETE CBC W/AUTO DIFF WBC: CPT | Performed by: PHYSICIAN ASSISTANT

## 2021-02-04 PROCEDURE — 74176 CT ABD & PELVIS W/O CONTRAST: CPT

## 2021-02-04 RX ORDER — OXYCODONE HYDROCHLORIDE 5 MG/1
5 TABLET ORAL EVERY 8 HOURS PRN
Qty: 9 TABLET | Refills: 0 | Status: SHIPPED | OUTPATIENT
Start: 2021-02-04 | End: 2021-02-07

## 2021-02-04 RX ORDER — LIDOCAINE 50 MG/G
1 PATCH TOPICAL DAILY
Qty: 5 PATCH | Refills: 0 | Status: SHIPPED | OUTPATIENT
Start: 2021-02-04 | End: 2021-06-07 | Stop reason: ALTCHOICE

## 2021-02-04 RX ORDER — OXYCODONE HYDROCHLORIDE 5 MG/1
5 TABLET ORAL ONCE
Status: COMPLETED | OUTPATIENT
Start: 2021-02-04 | End: 2021-02-04

## 2021-02-04 RX ADMIN — OXYCODONE HYDROCHLORIDE 5 MG: 5 TABLET ORAL at 20:27

## 2021-02-05 ENCOUNTER — IMMUNIZATIONS (OUTPATIENT)
Dept: FAMILY MEDICINE CLINIC | Facility: HOSPITAL | Age: 41
End: 2021-02-05

## 2021-02-05 DIAGNOSIS — Z23 ENCOUNTER FOR IMMUNIZATION: Primary | ICD-10-CM

## 2021-02-05 PROCEDURE — 91301 SARS-COV-2 / COVID-19 MRNA VACCINE (MODERNA) 100 MCG: CPT

## 2021-02-05 PROCEDURE — 0012A SARS-COV-2 / COVID-19 MRNA VACCINE (MODERNA) 100 MCG: CPT

## 2021-02-05 NOTE — ED PROVIDER NOTES
History  Chief Complaint   Patient presents with    Flank Pain     left sided flank pain since 2pm , frequent urination     42-year-old female with past medical history significant for hypertension, morbid obesity, low back pain, status post gastric bypass, male to female gender change who presents to the emergency department for complaint of left-sided flank pain beginning at [facility] today  Patient denies experiencing this pain before  She describes the pain as a deep aching sensation, localizes pain to the left low back and left flank region, nonradiating, constant, unrelieved by daily use of tramadol for neck pain, accompanied by urinary frequency  She denies any fever chills, nausea or vomiting, dysuria or hematuria, urinary retention, rash or skin color change  She denies any new exercise or heavy lifting  Abdominal surgical history is significant for appendectomy, cholecystectomy, gastric bypass, and hernia repair x2  Prior to Admission Medications   Prescriptions Last Dose Informant Patient Reported? Taking? Cyanocobalamin (B-12) 1000 MCG/ML KIT   No No   Sig: Inject 1 ml IM in thigh/ buttocks or deltoid mm once weekly x 8 weeks   Afterward once monthly   LORazepam (ATIVAN) 1 mg tablet   No No   Sig: Take 0 5 tablets (0 5 mg total) by mouth daily   Syringe/Needle, Disp, (SYRINGE 3CC/77DA4-9/4") 27G X 1-1/4" 3 ML MISC   No No   Sig: Use for B12 injections   albuterol (PROVENTIL HFA,VENTOLIN HFA) 90 mcg/act inhaler   No No   Sig: Inhale 2 puffs every 6 (six) hours as needed for wheezing   amLODIPine (NORVASC) 10 mg tablet   No No   Sig: Take 1 tablet (10 mg total) by mouth daily   aspirin (ECOTRIN LOW STRENGTH) 81 mg EC tablet   Yes No   Sig: Take 81 mg by mouth daily   celecoxib (CeleBREX) 200 mg capsule   Yes No   Sig: celecoxib 200 mg capsule   chlorthalidone 25 mg tablet   No No   Sig: Take 1 tablet (25 mg total) by mouth daily   cyclobenzaprine (FLEXERIL) 10 mg tablet  Self No No   Sig: Take 1 tablet (10 mg total) by mouth daily as needed for muscle spasms for up to 30 days   diphenhydrAMINE (BENADRYL) 50 MG tablet   No No   Sig: Take one tablet 1 hour prior to contrast administration  estradiol (ESTRACE) 2 MG tablet   No No   Sig: Take 1 tablet (2 mg total) by mouth 2 (two) times a day   gabapentin (NEURONTIN) 100 mg capsule   No No   Sig: Take 3 capsules (300 mg total) by mouth daily   hydrocortisone (ANUSOL-HC) 25 mg suppository  Self No No   Sig: Insert 1 suppository (25 mg total) into the rectum 2 (two) times a day   Patient taking differently: Insert 25 mg into the rectum 2 (two) times a day prn   methocarbamol (ROBAXIN) 500 mg tablet   No No   Sig: Take 0 5 tablets (250 mg total) by mouth daily after breakfast   montelukast (SINGULAIR) 10 mg tablet   No No   Sig: Take 1 tablet (10 mg total) by mouth daily at bedtime   multivitamin (THERAGRAN) TABS  Self Yes No   Sig: Take 1 tablet by mouth daily   ondansetron (ZOFRAN-ODT) 4 mg disintegrating tablet   No No   Sig: Take 1 tablet (4 mg total) by mouth every 8 (eight) hours as needed for nausea or vomiting   pantoprazole (PROTONIX) 40 mg tablet   No No   Sig: Take 1 tablet (40 mg total) by mouth daily   traMADol (ULTRAM) 50 mg tablet   No No   Sig: Take 1 tablet (50 mg total) by mouth daily Prn  For migraines or neck spasms      Facility-Administered Medications: None       Past Medical History:   Diagnosis Date    Anxiety     Asthma     seasonal    Hernia of abdominal wall     Hypertension     Seizures (HCC)     pseudoseizures since 2012       Past Surgical History:   Procedure Laterality Date    APPENDECTOMY      CHOLECYSTECTOMY      GASTRIC BYPASS      HERNIA REPAIR      x2       Family History   Problem Relation Age of Onset    Hypertension Mother     Diabetes Mother     Hypertension Sister      I have reviewed and agree with the history as documented      E-Cigarette/Vaping    E-Cigarette Use Never User      E-Cigarette/Vaping Substances    Nicotine No     THC No     CBD No     Flavoring No     Other No     Unknown No      Social History     Tobacco Use    Smoking status: Never Smoker    Smokeless tobacco: Never Used   Substance Use Topics    Alcohol use: Yes     Alcohol/week: 3 0 standard drinks     Types: 3 Standard drinks or equivalent per week     Comment: 3 per month    Drug use: Never       Review of Systems   Constitutional: Negative for appetite change, chills, fatigue and fever  Respiratory: Negative for chest tightness and shortness of breath  Cardiovascular: Negative for chest pain and palpitations  Gastrointestinal: Negative for abdominal pain, constipation, diarrhea, nausea and vomiting  Genitourinary: Positive for flank pain and frequency  Negative for decreased urine volume, difficulty urinating, dysuria, hematuria and urgency  Musculoskeletal: Negative for back pain and myalgias  Skin: Negative for color change and rash  Neurological: Negative for dizziness, weakness, light-headedness and headaches  Hematological: Negative for adenopathy  All other systems reviewed and are negative  Physical Exam  Physical Exam  Vitals signs reviewed  Constitutional:       General: She is awake  She is not in acute distress  Appearance: Normal appearance  She is well-developed  She is not toxic-appearing  HENT:      Head: Normocephalic and atraumatic  Mouth/Throat:      Lips: Pink  Mouth: Mucous membranes are moist       Pharynx: Oropharynx is clear  Uvula midline  Eyes:      Extraocular Movements: Extraocular movements intact  Conjunctiva/sclera: Conjunctivae normal       Pupils: Pupils are equal, round, and reactive to light  Neck:      Musculoskeletal: Normal range of motion and neck supple  Cardiovascular:      Rate and Rhythm: Normal rate and regular rhythm  Pulses: Normal pulses     Pulmonary:      Effort: Pulmonary effort is normal       Breath sounds: Normal breath sounds  Chest:      Chest wall: No tenderness  Abdominal:      General: Bowel sounds are normal  There is no distension  Palpations: Abdomen is soft  There is no hepatomegaly, splenomegaly or mass  Tenderness: There is no abdominal tenderness  There is left CVA tenderness  There is no right CVA tenderness  Musculoskeletal: Normal range of motion  Skin:     General: Skin is warm  Capillary Refill: Capillary refill takes less than 2 seconds  Findings: No erythema, lesion or rash  Neurological:      Mental Status: She is alert and oriented to person, place, and time  Vital Signs  ED Triage Vitals   Temperature Pulse Respirations Blood Pressure SpO2   02/04/21 1824 02/04/21 1824 02/04/21 1824 02/04/21 1825 02/04/21 1824   97 5 °F (36 4 °C) 62 18 151/90 99 %      Temp src Heart Rate Source Patient Position - Orthostatic VS BP Location FiO2 (%)   -- 02/04/21 1824 02/04/21 1824 02/04/21 1824 --    Monitor Sitting Right arm       Pain Score       02/04/21 2027       6           Vitals:    02/04/21 1824 02/04/21 1825 02/04/21 2043   BP:  151/90 149/83   Pulse: 62  65   Patient Position - Orthostatic VS: Sitting           Visual Acuity      ED Medications  Medications   oxyCODONE (ROXICODONE) IR tablet 5 mg (5 mg Oral Given 2/4/21 2027)       Diagnostic Studies  Results Reviewed     Procedure Component Value Units Date/Time    Basic metabolic panel [[id number]]  (Abnormal) Collected: 02/04/21 2033    Lab Status: Final result Specimen: Blood from Arm, Right Updated: 02/04/21 2047     Sodium 136 mmol/L      Potassium 4 3 mmol/L      Chloride 99 mmol/L      CO2 29 mmol/L      ANION GAP 8 mmol/L      BUN 12 mg/dL      Creatinine 0 81 mg/dL      Glucose 93 mg/dL      Calcium 8 9 mg/dL      eGFR --    Narrative:      Notes:     1  eGFR calculation is only valid for adults 18 years and older    2  EGFR calculation cannot be performed for patients who are transgender, non-binary, or whose legal sex, sex at birth, and gender identity differ  Urine Microscopic [[id number]]  (Normal) Collected: 02/04/21 2019    Lab Status: Final result Specimen: Urine, Clean Catch Updated: 02/04/21 2046     RBC, UA 0-1 /hpf      WBC, UA 0-1 /hpf      Epithelial Cells Occasional /hpf      Bacteria, UA Occasional /hpf     CBC and differential [[id number]] Collected: 02/04/21 2033    Lab Status: Final result Specimen: Blood from Arm, Right Updated: 02/04/21 2039     WBC 8 23 Thousand/uL      RBC 4 48 Million/uL      Hemoglobin 13 1 g/dL      Hematocrit 40 6 %      MCV 91 fL      MCH 29 2 pg      MCHC 32 3 g/dL      RDW 13 0 %      MPV 9 9 fL      Platelets 524 Thousands/uL      nRBC 0 /100 WBCs      Neutrophils Relative 66 %      Immat GRANS % 0 %      Lymphocytes Relative 26 %      Monocytes Relative 5 %      Eosinophils Relative 2 %      Basophils Relative 1 %      Neutrophils Absolute 5 50 Thousands/µL      Immature Grans Absolute 0 02 Thousand/uL      Lymphocytes Absolute 2 10 Thousands/µL      Monocytes Absolute 0 44 Thousand/µL      Eosinophils Absolute 0 13 Thousand/µL      Basophils Absolute 0 04 Thousands/µL     UA w Reflex to Microscopic w Reflex to Culture [[id number]]  (Abnormal) Collected: 02/04/21 2019    Lab Status: Final result Specimen: Urine, Clean Catch Updated: 02/04/21 2036     Color, UA Yellow     Clarity, UA Clear     Specific [place], UA 1 020     pH, UA 7 0     Leukocytes, UA Negative     Nitrite, UA Negative     Protein, UA Negative mg/dl      Glucose, UA Negative mg/dl      Ketones, UA Negative mg/dl      Urobilinogen, UA 0 2 E U /dl      Bilirubin, UA Negative     Blood, UA Trace-Intact                 CT renal stone study abdomen pelvis wo contrast   Final Result by Heather Rodriguez DO (02/04 2124)      No evidence of hydronephrosis or urinary tract calculi               Workstation performed: [id number]                    Procedures  Procedures         ED Course  ED Course as of Feb 05 0410   Thu Feb 04, 2021 2144 Workup overall unremarkable  Small microscopic hematuria present  CT showing no acute findings  History and presentation may be consistent with recently passed kidney stone  No indication for admission  On reassessment, patient reports she is feeling much improved and pain is well controlled  Advised Tylenol and lidocaine patches prn first, with vigorous hydration  Patient is prescribed robaxin and tramadol for chronic neck pain; she took these, along with Tylenol (cannot take NSAIDs due to gastric bypass) and had minimal relief  Discussed pain should be transient and resolve after few days  Given moderate to severe pain affecting functional abilities, will prescribe short course of narcotic medication to be used prn for breakthrough severe pain  Risk versus benefit of narcotic medication discussed  Specifically, we discussed the goal of short-term duration of therapy, given abuse and addiction potential   Patient wishes to proceed with medication  We discussed she should not take this medication with tramadol and Robaxin  SBIRT 20yo+      Most Recent Value   SBIRT (22 yo +)   In order to provide better care to our patients, we are screening all of our patients for alcohol and drug use  Would it be okay to ask you these screening questions? Yes Filed at: 02/04/2021 2043   Initial Alcohol Screen: US AUDIT-C    1  How often do you have a drink containing alcohol?  0 Filed at: 02/04/2021 2043   2  How many drinks containing alcohol do you have on a typical day you are drinking? 0 Filed at: 02/04/2021 2043   3a  Male UNDER 65: How often do you have five or more drinks on one occasion? 0 Filed at: 02/04/2021 2043   3b  FEMALE Any Age, or MALE 65+: How often do you have 4 or more drinks on one occassion? 0 Filed at: 02/04/2021 2043   Audit-C Score  0 Filed at: 02/04/2021 2043   MILES: How many times in the past year have you       Used an illegal drug or used a prescription medication for non-medical reasons? Never Filed at: 02/04/2021 2043                    [facility]  Number of Diagnoses or Management Options  Flank pain:   Microscopic hematuria:   Diagnosis management comments: On exam, well-appearing female, no acute distress, nontoxic appearance, afebrile, vitals unremarkable, awake alert and oriented, abdomen soft and nontender, positive left flank and CVA tenderness, no rash or skin color change, remainder of exam unremarkable as above  Patient to be evaluated for UTI and kidney stone  Also consider musculoskeletal sprain/strain or spasm, exacerbation of chronic back pain  Amount and/or Complexity of Data Reviewed  Clinical lab tests: ordered and reviewed  Tests in the radiology section of CPT®: reviewed and ordered  Discussion of test results with the performing providers: yes  Decide to obtain previous medical records or to obtain history from someone other than the patient: yes  Obtain history from someone other than the patient: yes  Review and summarize past medical records: yes  Discuss the patient with other providers: yes  Independent visualization of images, tracings, or specimens: yes    Patient Progress  Patient progress: improved (See ED course note for dispo and plan  I reviewed and discussed all lab and imaging findings with the patient at bedside  I discussed emergency department return parameters  I answered any and all questions the patient had regarding emergency department course of evaluation and treatment   The patient verbalized understanding of and agreement with plan   )      Disposition  Final diagnoses:   Flank pain   Microscopic hematuria     Time reflects when diagnosis was documented in both MDM as applicable and the Disposition within this note     Time User Action Codes Description Comment    2/4/2021  9:42 PM Missy Becerrilut Add [R10 9] Flank pain     2/4/2021  9:42 PM Missy Quintanilla Add [R31 29] Microscopic hematuria ED Disposition     ED Disposition Condition Date/Time Comment    Discharge Stable Thu Feb 4, 2021  9:41 PM Georgeadriana Gleason  discharge to home/self care              Follow-up Information     Follow up With Specialties Details Why Contact Info Additional [address] [facility] Emergency Department Emergency Medicine Go to  If symptoms worsen [address] Emergency Department, [address] [facility] Lennox Chavarria MD Internal Medicine Schedule an appointment as soon as possible for a visit in 3 days For further evaluation [address] [facility] [address] [place] [address]  [phone]             Discharge Medication List as of 2/4/2021  9:43 PM      START taking these medications    Details   lidocaine (LIDODERM) 5 % Apply 1 patch topically daily for 5 days Remove & Discard patch within 12 hours or as directed by MD, Starting Thu 2/4/2021, Until Tue 2/9/2021, Normal      oxyCODONE (ROXICODONE) 5 mg immediate release tablet Take 1 tablet (5 mg total) by mouth every 8 (eight) hours as needed for severe pain for up to 3 daysMax Daily Amount: 15 mg, Starting Thu 2/4/2021, Until Sun 2/7/2021, Normal         CONTINUE these medications which have NOT CHANGED    Details   albuterol (PROVENTIL HFA,VENTOLIN HFA) 90 mcg/act inhaler Inhale 2 puffs every 6 (six) hours as needed for wheezing, Starting Fri 4/3/2020, Normal      amLODIPine (NORVASC) 10 mg tablet Take 1 tablet (10 mg total) by mouth daily, Starting Wed 2/3/2021, Until Mon 8/2/2021, Normal      aspirin (ECOTRIN LOW STRENGTH) 81 mg EC tablet Take 81 mg by mouth daily, Historical Med      celecoxib (CeleBREX) 200 mg capsule celecoxib 200 mg capsule, Historical Med      chlorthalidone 25 mg tablet Take 1 tablet (25 mg total) by mouth daily, Starting Fri 12/11/2020, Normal      Cyanocobalamin (B-12) 1000 MCG/ML KIT Inject 1 ml IM in thigh/ buttocks or deltoid mm once weekly x 8 weeks  Afterward once monthly, Normal      cyclobenzaprine (FLEXERIL) 10 mg tablet Take 1 tablet (10 mg total) by mouth daily as needed for muscle spasms for up to 30 days, Starting Wed 5/1/2019, Until Wed 2/3/2021, Normal      diphenhydrAMINE (BENADRYL) 50 MG tablet Take one tablet 1 hour prior to contrast administration  , Normal      estradiol (ESTRACE) 2 MG tablet Take 1 tablet (2 mg total) by mouth 2 (two) times a day, Starting Wed 2/3/2021, Until Tue 5/4/2021, Normal      gabapentin (NEURONTIN) 100 mg capsule Take 3 capsules (300 mg total) by mouth daily, Starting Fri 6/5/2020, Normal      hydrocortisone (ANUSOL-HC) 25 mg suppository Insert 1 suppository (25 mg total) into the rectum 2 (two) times a day, Starting Thu 10/3/2019, Normal      LORazepam (ATIVAN) 1 mg tablet Take 0 5 tablets (0 5 mg total) by mouth daily, Starting Wed 2/3/2021, Normal      methocarbamol (ROBAXIN) 500 mg tablet Take 0 5 tablets (250 mg total) by mouth daily after breakfast, Starting Wed 2/3/2021, Normal      montelukast (SINGULAIR) 10 mg tablet Take 1 tablet (10 mg total) by mouth daily at bedtime, Starting Wed 2/3/2021, Normal      multivitamin (THERAGRAN) TABS Take 1 tablet by mouth daily, Historical Med      ondansetron (ZOFRAN-ODT) 4 mg disintegrating tablet Take 1 tablet (4 mg total) by mouth every 8 (eight) hours as needed for nausea or vomiting, Starting Thu 10/17/2019, Normal      pantoprazole (PROTONIX) 40 mg tablet Take 1 tablet (40 mg total) by mouth daily, Starting Mon 1/20/2020, Normal      Syringe/Needle, Disp, (SYRINGE 3CC/31NR9-3/4") 27G X 1-1/4" 3 ML MISC Use for B12 injections, Normal      traMADol (ULTRAM) 50 mg tablet Take 1 tablet (50 mg total) by mouth daily Prn  For migraines or neck spasms, Starting Wed 2/3/2021, Normal           No discharge procedures on file      PDMP Review       Value Time User    PDMP Reviewed  Yes 4/3/2020  2:20 PM Lola Wheeler MD ED Provider  Electronically Signed by           Nima Maciel PA-C  02/05/21 0176

## 2021-02-10 DIAGNOSIS — K42.9 UMBILICAL HERNIA WITHOUT OBSTRUCTION AND WITHOUT GANGRENE: ICD-10-CM

## 2021-02-10 DIAGNOSIS — K21.9 GASTROESOPHAGEAL REFLUX DISEASE WITHOUT ESOPHAGITIS: ICD-10-CM

## 2021-02-10 RX ORDER — PANTOPRAZOLE SODIUM 40 MG/1
TABLET, DELAYED RELEASE ORAL
Qty: 90 TABLET | Refills: 0 | Status: SHIPPED | OUTPATIENT
Start: 2021-02-10 | End: 2021-06-07 | Stop reason: SDUPTHER

## 2021-03-03 ENCOUNTER — HOSPITAL ENCOUNTER (EMERGENCY)
Dept: ULTRASOUND IMAGING | Facility: HOSPITAL | Age: 41
Discharge: HOME | End: 2021-03-03
Payer: COMMERCIAL

## 2021-03-03 ENCOUNTER — HOSPITAL ENCOUNTER (EMERGENCY)
Facility: HOSPITAL | Age: 41
Discharge: HOME | End: 2021-03-03
Attending: EMERGENCY MEDICINE | Admitting: EMERGENCY MEDICINE
Payer: COMMERCIAL

## 2021-03-03 ENCOUNTER — APPOINTMENT (EMERGENCY)
Dept: CT IMAGING | Facility: HOSPITAL | Age: 41
End: 2021-03-03
Payer: COMMERCIAL

## 2021-03-03 VITALS
OXYGEN SATURATION: 98 % | SYSTOLIC BLOOD PRESSURE: 137 MMHG | HEART RATE: 69 BPM | DIASTOLIC BLOOD PRESSURE: 73 MMHG | WEIGHT: 286 LBS | TEMPERATURE: 97.9 F | BODY MASS INDEX: 42.36 KG/M2 | RESPIRATION RATE: 17 BRPM | HEIGHT: 69 IN

## 2021-03-03 DIAGNOSIS — M54.16 LEFT LUMBAR RADICULOPATHY: Primary | ICD-10-CM

## 2021-03-03 DIAGNOSIS — M54.50 LOW BACK PAIN: ICD-10-CM

## 2021-03-03 LAB
BACTERIA UR QL AUTO: NORMAL /HPF
BILIRUB UR QL STRIP: NEGATIVE
CLARITY UR: CLEAR
COLOR UR: YELLOW
GLUCOSE UR STRIP-MCNC: NEGATIVE MG/DL
HGB UR QL STRIP.AUTO: ABNORMAL
KETONES UR STRIP-MCNC: NEGATIVE MG/DL
LEUKOCYTE ESTERASE UR QL STRIP: ABNORMAL
NITRITE UR QL STRIP: NEGATIVE
NON-SQ EPI CELLS URNS QL MICRO: NORMAL /HPF
PH UR STRIP.AUTO: 6.5 [PH]
PROT UR STRIP-MCNC: NEGATIVE MG/DL
RBC #/AREA URNS AUTO: NORMAL /HPF
SP GR UR STRIP.AUTO: 1.01 (ref 1–1.03)
UROBILINOGEN UR QL STRIP.AUTO: 1 E.U./DL
WBC #/AREA URNS AUTO: NORMAL /HPF

## 2021-03-03 PROCEDURE — G1004 CDSM NDSC: HCPCS

## 2021-03-03 PROCEDURE — 99284 EMERGENCY DEPT VISIT MOD MDM: CPT

## 2021-03-03 PROCEDURE — 81001 URINALYSIS AUTO W/SCOPE: CPT | Performed by: EMERGENCY MEDICINE

## 2021-03-03 PROCEDURE — 96372 THER/PROPH/DIAG INJ SC/IM: CPT

## 2021-03-03 PROCEDURE — 99285 EMERGENCY DEPT VISIT HI MDM: CPT | Performed by: EMERGENCY MEDICINE

## 2021-03-03 PROCEDURE — 72131 CT LUMBAR SPINE W/O DYE: CPT

## 2021-03-03 PROCEDURE — 93971 EXTREMITY STUDY: CPT

## 2021-03-03 PROCEDURE — 93971 EXTREMITY STUDY: CPT | Performed by: SURGERY

## 2021-03-03 RX ORDER — DIAZEPAM 5 MG/1
5 TABLET ORAL ONCE
Status: COMPLETED | OUTPATIENT
Start: 2021-03-03 | End: 2021-03-03

## 2021-03-03 RX ORDER — OXYCODONE HYDROCHLORIDE AND ACETAMINOPHEN 5; 325 MG/1; MG/1
1 TABLET ORAL EVERY 4 HOURS PRN
Qty: 10 TABLET | Refills: 0 | Status: SHIPPED | OUTPATIENT
Start: 2021-03-03 | End: 2021-03-13

## 2021-03-03 RX ORDER — KETOROLAC TROMETHAMINE 30 MG/ML
30 INJECTION, SOLUTION INTRAMUSCULAR; INTRAVENOUS ONCE
Status: COMPLETED | OUTPATIENT
Start: 2021-03-03 | End: 2021-03-03

## 2021-03-03 RX ORDER — LIDOCAINE 50 MG/G
1 PATCH TOPICAL ONCE
Status: DISCONTINUED | OUTPATIENT
Start: 2021-03-03 | End: 2021-03-03 | Stop reason: HOSPADM

## 2021-03-03 RX ORDER — OXYCODONE HYDROCHLORIDE AND ACETAMINOPHEN 5; 325 MG/1; MG/1
1 TABLET ORAL ONCE
Status: COMPLETED | OUTPATIENT
Start: 2021-03-03 | End: 2021-03-03

## 2021-03-03 RX ADMIN — KETOROLAC TROMETHAMINE 30 MG: 30 INJECTION, SOLUTION INTRAMUSCULAR at 07:19

## 2021-03-03 RX ADMIN — LIDOCAINE 1 PATCH: 50 PATCH TOPICAL at 07:21

## 2021-03-03 RX ADMIN — DIAZEPAM 5 MG: 5 TABLET ORAL at 07:19

## 2021-03-03 RX ADMIN — OXYCODONE HYDROCHLORIDE AND ACETAMINOPHEN 1 TABLET: 5; 325 TABLET ORAL at 08:35

## 2021-03-03 NOTE — Clinical Note
Abdi Mcguire was seen and treated in our emergency department on 3/3/2021  Diagnosis: No heavy lifting for approximately 1 week    Marianna Vital  may return to work on return date  She may return on this date: 03/05/2021         If you have any questions or concerns, please don't hesitate to call        Magdi Thacker, DO    ______________________________           _______________          _______________  Hospital Representative                              Date                                Time

## 2021-03-03 NOTE — DISCHARGE INSTRUCTIONS
Acute Low Back Pain   WHAT YOU NEED TO KNOW:   Acute low back pain is sudden discomfort in your lower back area that lasts for up to 6 weeks  The discomfort makes it difficult to tolerate activity  DISCHARGE INSTRUCTIONS:   Seek care immediately or call 911 if:   · You have severe pain  · You have sudden stiffness and heaviness on both buttocks down to both legs  · You have numbness or weakness in one leg, or pain in both legs  · You have numbness in your genital area or across your lower back  · You cannot control your urine or bowel movements  Contact your healthcare provider if:   · You have a fever  · You have pain at night or when you rest     · Your pain does not get better with treatment  · You have pain that worsens when you cough or sneeze  · You suddenly feel something pop or snap in your back  · You have questions or concerns about your condition or care  Medicines: The following medicines may be ordered by your healthcare provider:  · Acetaminophen  decreases pain  It is available without a doctor's order  Ask how much to take and how often to take it  Follow directions  Acetaminophen can cause liver damage if not taken correctly  · NSAIDs  help decrease swelling and pain  This medicine is available with or without a doctor's order  NSAIDs can cause stomach bleeding or kidney problems in certain people  If you take blood thinner medicine, always ask your healthcare provider if NSAIDs are safe for you  Always read the medicine label and follow directions  · Prescription pain medicine  may be given  Ask your healthcare provider how to take this medicine safely  · Muscle relaxers  decrease pain by relaxing the muscles in your lower spine  · Take your medicine as directed  Contact your healthcare provider if you think your medicine is not helping or if you have side effects  Tell him of her if you are allergic to any medicine   Keep a list of the medicines, vitamins, and herbs you take  Include the amounts, and when and why you take them  Bring the list or the pill bottles to follow-up visits  Carry your medicine list with you in case of an emergency  Self-care:   · Stay active  as much as you can without causing more pain  Bed rest could make your back pain worse  Start with some light exercises such as walking  Avoid heavy lifting until your pain is gone  Ask for more information about the activities or exercises that are right for you  · Ice  helps decrease swelling, pain, and muscle spams  Put crushed ice in a plastic bag  Cover it with a towel  Place it on your lower back for 20 to 30 minutes every 2 hours  Do this for about 2 to 3 days after your pain starts, or as directed  · Heat  helps decrease pain and muscle spasms  Start to use heat after treatment with ice has stopped  Use a small towel dampened with warm water or a heating pad, or sit in a warm bath  Apply heat on the area for 20 to 30 minutes every 2 hours for as many days as directed  Alternate heat and ice  Prevent acute low back pain:   · Use proper body mechanics  ¨ Bend at the hips and knees when you  objects  Do not bend from the waist  Use your leg muscles as you lift the load  Do not use your back  Keep the object close to your chest as you lift it  Try not to twist or lift anything above your waist     ¨ Change your position often when you stand for long periods of time  Rest one foot on a small box or footrest, and then switch to the other foot often  ¨ Try not to sit for long periods of time  When you do, sit in a straight-backed chair with your feet flat on the floor  Never reach, pull, or push while you are sitting  · Do exercises that strengthen your back muscles  Warm up before you exercise  Ask your healthcare provider the best exercises for you  · Maintain a healthy weight  Ask your healthcare provider how much you should weigh   Ask him to help you create a weight loss plan if you are overweight  Follow up with your healthcare provider as directed:  Return for a follow-up visit if you still have pain after 1 to 3 weeks of treatment  You may need to visit an orthopedist if your back pain lasts more than 6 to 12 weeks  Write down your questions so you remember to ask them during your visits  © 2017 [address] Information is for End User's use only and may not be sold, redistributed or otherwise used for commercial purposes  All illustrations and images included in CareNotes® are the copyrighted property of [employer]  or Bertram Ch[employer]  The above information is an  only  It is not intended as medical advice for individual conditions or treatments  Talk to your doctor, nurse or pharmacist before following any medical regimen to see if it is safe and effective for you  Lumbar Radiculopathy   WHAT YOU NEED TO KNOW:   Lumbar radiculopathy is a painful condition that happens when a nerve in your lumbar spine (lower back) is pinched or irritated  Nerves control feeling and movement in your body  You may have numbness or pain that shoots down from your lower back towards your foot  DISCHARGE INSTRUCTIONS:   Medicines:   · Medicines:     ? NSAIDs , such as ibuprofen, help decrease swelling, pain, and fever  This medicine is available with or without a doctor's order  NSAIDs can cause stomach bleeding or kidney problems in certain people  If you take blood thinner medicine, always ask your healthcare provider if NSAIDs are safe for you  Always read the medicine label and follow directions  ? Muscle relaxers  help decrease pain and muscle spasms  ? Opioids: This is a strong medicine given to reduce severe pain  It is also called narcotic pain medicine  Take this medicine exactly as directed by your healthcare provider  ? Oral steroids: Steroids may also be given to reduce pain and swelling      ? Take your medicine as directed  Contact your healthcare provider if you think your medicine is not helping or if you have side effects  Tell him of her if you are allergic to any medicine  Keep a list of the medicines, vitamins, and herbs you take  Include the amounts, and when and why you take them  Bring the list or the pill bottles to follow-up visits  Carry your medicine list with you in case of an emergency  Follow up with your healthcare provider or spine specialist within 1 to 3 weeks:  After your first follow-up appointment, return to your healthcare provider or spine specialist every 2 weeks until you have healed  Ask for information about physical therapy for your condition  Write down your questions so you remember to ask them during your visits  Physical therapy:  You may need physical therapy to improve your condition  Your physical therapist may teach you certain exercises to improve posture (the way you stand and sit), flexibility, and strength in your lower back  Self care:   · Stay active: It is best to be active when you have lumbar radiculopathy  Your physical therapist or healthcare provider may tell you to take walks to ease yourself back into your daily routine  Avoid long periods of bed rest  Bed rest could worsen your symptoms  Do not move in ways that increase your pain  Ask for more information about the best ways to stay active  · Use ice or heat packs:  Use ice or heat packs as directed on the sore area of your body to decrease the pain and swelling  Put ice in a plastic bag covered with a towel on your low back  Cover heated items with a towel to avoid burns  Use ice and heat as directed  · Avoid heavy lifting: Your condition may worsen if you lift heavy things  Avoid lifting if possible  · Maintain a healthy weight:  Excess body weight may strain your back  Talk with your healthcare provider about ways to lose excess weight if you are overweight      Contact your healthcare provider or spine specialist if:   · Your pain does not improve within 1 to 3 weeks after treatment  · Your pain and weakness keep you from your normal activities at work, home, or school  · You lose more than 10 pounds in 6 months without trying  · You become depressed or sad because of the pain  · You have questions or concerns about your condition or care  Return to the emergency department if:   · You have a fever greater than 100 4°F for longer than 2 days  · You have new, severe back or leg pain, or your pain spreads to both legs  · You have any new signs of numbness or weakness, especially in your lower back, legs, arms, or genital area  · You have new trouble controlling your urine and bowel movements  · You do not feel like your bladder empties when you urinate  © Copyright [address] Information is for End User's use only and may not be sold, redistributed or otherwise used for commercial purposes  All illustrations and images included in CareNotes® are the copyrighted property of [employer]  or [facility] Meenakshi Jenkins   The above information is an  only  It is not intended as medical advice for individual conditions or treatments  Talk to your doctor, nurse or pharmacist before following any medical regimen to see if it is safe and effective for you

## 2021-03-03 NOTE — Clinical Note
Azeem Damon was seen and treated in our emergency department on 3/3/2021  Diagnosis: No heavy lifting for approximately 1 week    Justa Nava  may return to work on return date  She may return on this date: 03/05/2021         If you have any questions or concerns, please don't hesitate to call        Canelo Morrison, DO    ______________________________           _______________          _______________  Hospital Representative                              Date                                Time

## 2021-03-03 NOTE — ED PROVIDER NOTES
History  Chief Complaint   Patient presents with    Back Pain     started 7 days ago, on left side lower back and radiates down the left leg, progressively getting worse the last 2 days     Patient is a 43-year-old transgender male transitioning to female, with past medical history of anxiety, asthma, hypertension, nephrolithiasis, gastric sleeve procedure, pseudoseizures, currently on estradiol hormone therapy, presents to the emergency department complaining of 1-2 weeks of progressively worsening low back pain  Patient reports that 2 weeks ago she felt an ache in her low back, more on the left side that has quickly progressed to spasming and burning pain in her left low back localized to the SI joint region  She reports the pain radiates down into her buttocks and down her thigh  Pain is improved when she flexes her hip and knee in a stretching like position  She also reports numbness and tingling sensation in the left leg  She denies any extremity weakness, bowel or bladder dysfunction however does report some urinary urgency  She denies any discoloration of the leg or foot  She does report she feels as though the muscle in her left medial thigh becomes hard and spasms as well and she also notes some left thigh swelling  She denies any fevers, shaking chills, headache, dizziness or near syncope, cough or URI symptoms, chest pain, palpitations, dyspnea, abdominal pain, nausea, vomiting, diarrhea, constipation, dysuria, change in frequency, hematuria, flank pain, fecal or urinary retention or incontinence, saddle anesthesia, skin rash or color change, other focal neurologic deficits  Patient does report that she used to get injections in her back for SI joint dysfunction but has not seen an orthopedist in over 6 months  Denies any recent injury or motor vehicle collision  Denies any heavy lifting or strenuous activity  Patient has been taking tramadol and Robaxin with minimal relief    She has a history of gastric sleeve procedure and is not supposed to take NSAIDs but yesterday at work she did take Aleve with some relief  History provided by:  Patient   used: No    Back Pain  Associated symptoms: numbness    Associated symptoms: no abdominal pain, no chest pain, no dysuria, no fever, no headaches and no weakness        Prior to Admission Medications   Prescriptions Last Dose Informant Patient Reported? Taking? Cyanocobalamin (B-12) 1000 MCG/ML KIT   No No   Sig: Inject 1 ml IM in thigh/ buttocks or deltoid mm once weekly x 8 weeks  Afterward once monthly   LORazepam (ATIVAN) 1 mg tablet   No No   Sig: Take 0 5 tablets (0 5 mg total) by mouth daily   Syringe/Needle, Disp, (SYRINGE 3CC/81EG6-0/4") 27G X 1-1/4" 3 ML MISC   No No   Sig: Use for B12 injections   albuterol (PROVENTIL HFA,VENTOLIN HFA) 90 mcg/act inhaler   No No   Sig: Inhale 2 puffs every 6 (six) hours as needed for wheezing   amLODIPine (NORVASC) 10 mg tablet   No No   Sig: Take 1 tablet (10 mg total) by mouth daily   aspirin (ECOTRIN LOW STRENGTH) 81 mg EC tablet   Yes No   Sig: Take 81 mg by mouth daily   celecoxib (CeleBREX) 200 mg capsule   Yes No   Sig: celecoxib 200 mg capsule   chlorthalidone 25 mg tablet   No No   Sig: Take 1 tablet (25 mg total) by mouth daily   cyclobenzaprine (FLEXERIL) 10 mg tablet  Self No No   Sig: Take 1 tablet (10 mg total) by mouth daily as needed for muscle spasms for up to 30 days   diphenhydrAMINE (BENADRYL) 50 MG tablet   No No   Sig: Take one tablet 1 hour prior to contrast administration     estradiol (ESTRACE) 2 MG tablet   No No   Sig: Take 1 tablet (2 mg total) by mouth 2 (two) times a day   gabapentin (NEURONTIN) 100 mg capsule   No No   Sig: Take 3 capsules (300 mg total) by mouth daily   hydrocortisone (ANUSOL-HC) 25 mg suppository  Self No No   Sig: Insert 1 suppository (25 mg total) into the rectum 2 (two) times a day   Patient taking differently: Insert 25 mg into the rectum 2 (two) times a day prn   lidocaine (LIDODERM) 5 %   No No   Sig: Apply 1 patch topically daily for 5 days Remove & Discard patch within 12 hours or as directed by MD   methocarbamol (ROBAXIN) 500 mg tablet   No No   Sig: Take 0 5 tablets (250 mg total) by mouth daily after breakfast   montelukast (SINGULAIR) 10 mg tablet   No No   Sig: Take 1 tablet (10 mg total) by mouth daily at bedtime   multivitamin (THERAGRAN) TABS  Self Yes No   Sig: Take 1 tablet by mouth daily   ondansetron (ZOFRAN-ODT) 4 mg disintegrating tablet   No No   Sig: Take 1 tablet (4 mg total) by mouth every 8 (eight) hours as needed for nausea or vomiting   pantoprazole (PROTONIX) 40 mg tablet   No No   Sig: TAKE ONE TABLET BY MOUTH DAILY   traMADol (ULTRAM) 50 mg tablet   No No   Sig: Take 1 tablet (50 mg total) by mouth daily Prn  For migraines or neck spasms      Facility-Administered Medications: None       Past Medical History:   Diagnosis Date    Anxiety     Asthma     seasonal    Hernia of abdominal wall     Hypertension     Kidney stones     Seizures (HCC)     pseudoseizures since 2012       Past Surgical History:   Procedure Laterality Date    APPENDECTOMY      CHOLECYSTECTOMY      GASTRIC BYPASS      HERNIA REPAIR      x2       Family History   Problem Relation Age of Onset    Hypertension Mother     Diabetes Mother     Hypertension Sister      I have reviewed and agree with the history as documented  E-Cigarette/Vaping    E-Cigarette Use Never User      E-Cigarette/Vaping Substances    Nicotine No     THC No     CBD No     Flavoring No     Other No     Unknown No      Social History     Tobacco Use    Smoking status: Never Smoker    Smokeless tobacco: Never Used   Substance Use Topics    Alcohol use:  Yes     Alcohol/week: 3 0 standard drinks     Types: 3 Standard drinks or equivalent per week     Comment: 3 per month    Drug use: Never       Review of Systems   Constitutional: Negative for chills and fever    HENT: Negative for congestion, ear pain, rhinorrhea and sore throat  Respiratory: Negative for cough and shortness of breath  Cardiovascular: Positive for leg swelling  Negative for chest pain and palpitations  Gastrointestinal: Negative for abdominal pain, constipation, diarrhea, nausea and vomiting  Genitourinary: Positive for urgency  Negative for difficulty urinating, dysuria, flank pain, frequency and hematuria  Musculoskeletal: Positive for back pain  Negative for neck pain and neck stiffness  Skin: Negative for color change, pallor, rash and wound  Allergic/Immunologic: Negative for immunocompromised state  Neurological: Positive for numbness  Negative for dizziness, syncope, facial asymmetry, speech difficulty, weakness, light-headedness and headaches  Hematological: Negative for adenopathy  Does not bruise/bleed easily  Psychiatric/Behavioral: Negative for confusion and decreased concentration  All other systems reviewed and are negative  Physical Exam  Physical Exam  Vitals signs and nursing note reviewed  Constitutional:       General: She is not in acute distress  Appearance: Normal appearance  She is well-developed  She is obese  She is not ill-appearing, toxic-appearing or diaphoretic  HENT:      Head: Normocephalic and atraumatic  Right Ear: External ear normal       Left Ear: External ear normal       Mouth/Throat:      Comments: Orpharyngeal exam deferred at this time due to risk of exposure to COVID-19 during current pandemic  Patient has no oropharyngeal complaints  Eyes:      Extraocular Movements: Extraocular movements intact  Conjunctiva/sclera: Conjunctivae normal    Neck:      Musculoskeletal: Normal range of motion and neck supple  No neck rigidity  Vascular: No JVD  Cardiovascular:      Rate and Rhythm: Normal rate and regular rhythm  Pulses: Normal pulses  Heart sounds: Normal heart sounds  No murmur   No friction rub  No gallop  Pulmonary:      Effort: Pulmonary effort is normal  No respiratory distress  Breath sounds: Normal breath sounds  No wheezing, rhonchi or rales  Abdominal:      General: There is no distension  Palpations: Abdomen is soft  Tenderness: There is no abdominal tenderness  There is no guarding or rebound  Musculoskeletal: Normal range of motion  General: Tenderness present  No swelling or signs of injury  Right lower leg: No edema  Left lower leg: No edema  Comments: No midline C/T/L spine tenderness  Left lower lumbar paravertebral muscle and SI joint tenderness and muscle hypertonicity with palpable muscle knots in the left SI region  Compartments of the lower extremities are soft and compressible  No appreciable swelling of the left leg  Left lower extremity neurovascularly intact  Skin:     General: Skin is warm and dry  Coloration: Skin is not pale  Findings: No rash  Neurological:      General: No focal deficit present  Mental Status: She is alert and oriented to person, place, and time  Sensory: No sensory deficit  Motor: No weakness  Comments: 5/5 strength throughout  No gross objective sensory deficits     Psychiatric:         Mood and Affect: Mood normal          Behavior: Behavior normal          Vital Signs  ED Triage Vitals [03/03/21 0636]   Temperature Pulse Respirations Blood Pressure SpO2   97 9 °F (36 6 °C) 59 16 131/66 99 %      Temp Source Heart Rate Source Patient Position - Orthostatic VS BP Location FiO2 (%)   Oral Monitor Lying Right arm --      Pain Score       Worst Possible Pain         Vitals:    03/03/21 0636 03/03/21 0822 03/03/21 0940   BP: 131/66 128/90 137/73   BP Location: Right arm Right arm Right arm   Pulse: 59 76 69   Resp: 16 19 17   Temp: 97 9 °F (36 6 °C)     TempSrc: Oral     SpO2: 99% 95% 98%   Weight: 130 kg (286 lb)     Height: 5' 9" (1 753 m)         Visual Acuity      ED Medications  Medications   lidocaine (LIDODERM) 5 % patch 1 patch (1 patch Topical Medication Applied 3/3/21 0721)   ketorolac (TORADOL) injection 30 mg (30 mg Intramuscular Given 3/3/21 0719)   diazepam (VALIUM) tablet 5 mg (5 mg Oral Given 3/3/21 0719)   oxyCODONE-acetaminophen (PERCOCET) 5-325 mg per tablet 1 tablet (1 tablet Oral Given 3/3/21 0835)       Diagnostic Studies  Results Reviewed     Procedure Component Value Units Date/Time    Urine Microscopic [[id number]]  (Normal) Collected: 03/03/21 0941    Lab Status: Final result Specimen: Urine, Clean Catch Updated: 03/03/21 1002     RBC, UA 1-2 /hpf      WBC, UA 2-4 /hpf      Epithelial Cells Occasional /hpf      Bacteria, UA None Seen /hpf     UA (URINE) with reflex to Scope [[id number]]  (Abnormal) Collected: 03/03/21 0941    Lab Status: Final result Specimen: Urine, Clean Catch Updated: 03/03/21 0953     Color, UA Yellow     Clarity, UA Clear     Specific Gravity, UA 1 015     pH, UA 6 5     Leukocytes, UA Trace     Nitrite, UA Negative     Protein, UA Negative mg/dl      Glucose, UA Negative mg/dl      Ketones, UA Negative mg/dl      Urobilinogen, UA 1 0 E U /dl      Bilirubin, UA Negative     Blood, UA Trace-Intact                 CT spine lumbar without contrast   Final Result by Ajith Carlin MD (03/03 6495)      No acute abnormality identified  Stable left L3-4 foraminal extrusion, correlate for L3 radiculopathy  Workstation performed: [id number]         VAS lower limb venous duplex study, unilateral/limited    (Results Pending)              Procedures  Procedures         ED Course  ED Course as of Mar 03 1025   Wed Mar 03, 2021   0830 Patient reports no relief after Valium, Toradol and Lidoderm patch  Will give a Percocet and reassess  0930 Patient reassessed and updated of CT findings  Pain improved after Percocet  Will refer to pain management and orthopedic surgery  Patient providing urine specimen now    I recommended heating pad, continuation of her Robaxin at home, Tylenol for mild pain and Percocet for more severe pain  Discussed ED return parameters  80 Bacteria, UA: None Seen                             SBIRT 20yo+      Most Recent Value   SBIRT (22 yo +)   In order to provide better care to our patients, we are screening all of our patients for alcohol and drug use  Would it be okay to ask you these screening questions? No Filed at: 03/03/2021 0702                    [facility]  Number of Diagnoses or Management Options  Diagnosis management comments: 80-year-old male transitioning to female and on hormone therapy, past medical history of prior SI joint dysfunction, obesity, presents to the ED complaining of left low back pain radiating down the left leg  Although she reports some tingling in the left leg, no objective neurologic deficits  Most likely this is SI joint dysfunction, sciatica, possible disc herniation  Will obtain CT lumbar spine for evaluation  Given that she also complains of left leg pain and swelling, will obtain vascular duplex to rule out DVT  Will check urinalysis to rule out UTI given urgency  Very low clinical suspicion for cauda equina syndrome  Will treat with IM Toradol, Valium, Lidoderm patch  Will hold off on prescribing NSAIDs due to history of gastric sleeve procedure but benefit of 1 time dose of IM toradol outweighs risk          Amount and/or Complexity of Data Reviewed  Tests in the radiology section of CPT®: ordered and reviewed  Independent visualization of images, tracings, or specimens: yes        Disposition  Final diagnoses:   Left lumbar radiculopathy   Low back pain     Time reflects when diagnosis was documented in both MDM as applicable and the Disposition within this note     Time User Action Codes Description Comment    3/3/2021 10:19 AM Bohdan Cushing E Add [M54 16] Left lumbar radiculopathy     3/3/2021 10:20 AM Bohdan Cushing E Add [M54 5] Low back pain       ED Disposition ED Disposition Condition Date/Time Comment    Discharge Stable Wed Mar 3, 2021 10:19 AM Newton Tony  discharge to home/self care  Follow-up Information     Follow up With Specialties Details Why Contact Info Additional Information    Ruddy Segura MD Internal Medicine Schedule an appointment as soon as possible for a visit   [address] [facility] [address]  [phone]       521 [facility] Orthopedic Surgery Schedule an appointment as soon as possible for a visit   36 [facility] [address] [place] [address], [address] [address] [place], [place], [facility] Schedule an appointment as soon as possible for a visit   [address] [facility] [id number] [address], 800 [facility] Emergency Department Emergency Medicine Go to  If symptoms worsen 34 [facility] Emergency Department, [address] [place], [place], [address]          Patient's Medications   Discharge Prescriptions    OXYCODONE-ACETAMINOPHEN (PERCOCET) 5-325 MG PER TABLET    Take 1 tablet by mouth every 4 (four) hours as needed for moderate pain or severe pain for up to 10 daysMax Daily Amount: 6 tablets       Start Date: 3/3/2021  End Date: 3/13/2021       Order Dose: 1 tablet       Quantity: 10 tablet    Refills: 0     No discharge procedures on file      PDMP Review       Value Time User    PDMP Reviewed  Yes 4/3/2020  2:20 PM Ruddy Segura MD          ED Provider  Electronically Signed by           Brennon Blount DO  03/03/21 [address] [place] [address] DO  03/03/21 1025

## 2021-03-19 ENCOUNTER — APPOINTMENT (OUTPATIENT)
Dept: LAB | Facility: CLINIC | Age: 41
End: 2021-03-19
Payer: COMMERCIAL

## 2021-03-19 ENCOUNTER — OFFICE VISIT (OUTPATIENT)
Dept: OBGYN CLINIC | Facility: CLINIC | Age: 41
End: 2021-03-19
Payer: COMMERCIAL

## 2021-03-19 VITALS
WEIGHT: 303 LBS | BODY MASS INDEX: 44.88 KG/M2 | SYSTOLIC BLOOD PRESSURE: 131 MMHG | HEIGHT: 69 IN | HEART RATE: 65 BPM | DIASTOLIC BLOOD PRESSURE: 86 MMHG

## 2021-03-19 DIAGNOSIS — M50.90 CERVICAL DISC DISEASE: ICD-10-CM

## 2021-03-19 DIAGNOSIS — R51.9 WORSENING HEADACHES: ICD-10-CM

## 2021-03-19 DIAGNOSIS — Z98.84 S/P GASTRIC BYPASS: ICD-10-CM

## 2021-03-19 DIAGNOSIS — Z88.9 MULTIPLE ALLERGIES: ICD-10-CM

## 2021-03-19 DIAGNOSIS — M51.26 LUMBAR HERNIATED DISC: Primary | ICD-10-CM

## 2021-03-19 DIAGNOSIS — M54.50 MIDLINE LOW BACK PAIN WITHOUT SCIATICA, UNSPECIFIED CHRONICITY: ICD-10-CM

## 2021-03-19 DIAGNOSIS — M54.16 RADICULOPATHY, LUMBAR REGION: ICD-10-CM

## 2021-03-19 DIAGNOSIS — Z78.9 MALE-TO-FEMALE TRANSGENDER PERSON: ICD-10-CM

## 2021-03-19 DIAGNOSIS — I10 ESSENTIAL HYPERTENSION: ICD-10-CM

## 2021-03-19 LAB
ALBUMIN SERPL BCP-MCNC: 3.6 G/DL (ref 3.5–5)
ALP SERPL-CCNC: 69 U/L (ref 46–116)
ALT SERPL W P-5'-P-CCNC: 21 U/L (ref 12–78)
ANION GAP SERPL CALCULATED.3IONS-SCNC: 10 MMOL/L (ref 4–13)
AST SERPL W P-5'-P-CCNC: 15 U/L (ref 5–45)
BILIRUB SERPL-MCNC: 0.39 MG/DL (ref 0.2–1)
BUN SERPL-MCNC: 14 MG/DL (ref 5–25)
CALCIUM SERPL-MCNC: 9.1 MG/DL (ref 8.3–10.1)
CHLORIDE SERPL-SCNC: 100 MMOL/L (ref 100–108)
CO2 SERPL-SCNC: 30 MMOL/L (ref 21–32)
CREAT SERPL-MCNC: 0.96 MG/DL (ref 0.6–1.3)
GLUCOSE SERPL-MCNC: 96 MG/DL (ref 65–140)
POTASSIUM SERPL-SCNC: 3.3 MMOL/L (ref 3.5–5.3)
PROT SERPL-MCNC: 7.9 G/DL (ref 6.4–8.2)
SODIUM SERPL-SCNC: 140 MMOL/L (ref 136–145)

## 2021-03-19 PROCEDURE — 99203 OFFICE O/P NEW LOW 30 MIN: CPT | Performed by: FAMILY MEDICINE

## 2021-03-19 PROCEDURE — 80053 COMPREHEN METABOLIC PANEL: CPT

## 2021-03-19 PROCEDURE — 36415 COLL VENOUS BLD VENIPUNCTURE: CPT

## 2021-03-19 RX ORDER — PREDNISONE 20 MG/1
20 TABLET ORAL 2 TIMES DAILY WITH MEALS
Qty: 10 TABLET | Refills: 0 | Status: SHIPPED | OUTPATIENT
Start: 2021-03-19 | End: 2021-03-24

## 2021-03-19 NOTE — PROGRESS NOTES
Assessment/Plan:  Assessment/Plan   Diagnoses and all orders for this visit:    Lumbar herniated disc  -     Ambulatory referral to Pain Management; Future    Radiculopathy, lumbar region  -     predniSONE 20 mg tablet; Take 1 tablet (20 mg total) by mouth 2 (two) times a day with meals for 5 days  -     Ambulatory referral to Pain Management; Future        42-year-old female with low back and left lower extremity pain and numbness more than 1 year duration  Discussed with patient imaging studies, physical exam, impression and plan  MRI of lumbar spine is noted for L3-L4 large left neural foraminal disc protrusion, L4-5 central disc herniation extrusion slightly towards the left, and hypointense marrow  Physical exam is noted for midline tenderness L1-S1, and left lumbar paraspinal and left sacroiliac joint tenderness  She has limited range of motion with rotating and side bending to left  She has intact strength both lower extremities  She has decreased patellar reflex bilaterally  She has decreased sensation light touch left lower extremity dermatomes L3-S1  There is positive straight leg raise on the left  There is no groin pain with ALICE and FADDIR maneuvers of the hips  Clinical impression is that she is symptomatic from lumbar spine pathology causing symptoms to radiate to the left lower extremity  I discussed with patient that invasive management may be warranted  I provided her course of prednisone 20 mg twice a day for 5 days  She is to start taking tumeric 500 mg twice daily, tart cherry at least 1000 mg daily, and glucosamine-chondroitin supplement 2 to 3 times a day  Will refer to pain management for further evaluation and recommendation treatment  She was advised if she develops bowel or bladder incontinence she is to proceed to the emergency room  She is advised to follow-up primary care provider regarding abnormal bone marrow      Subjective:   Patient ID: Mary Kennedyamilcar christianson a 39 y o  adult  Chief Complaint   Patient presents with    Lower Back - Pain       49-year-old female presents for evaluation of low back and left lower extremity pain and numbness more than 1 year duration  She denies any particular trauma or inciting event  She presented to the hospital due to numbness on the entire left side her body  She had imaging studies which were noted for lumbar spine disc protrusions  Neurosurgery was consulted and surgical invention was deferred  She reports having had follow-up with pain management  She was experiencing pain described as localized medially lumbosacral spine but worse on the left, aching and burning and sometimes sharp, worse with activity, radiating to left lower extremity, and improved resting  She reports having received left SI joint injection which provided significant improvement in relief of pain  She was feeling much better up until about 3 weeks ago 1 pain returned and she started developing numbness in the entire left lower extremity  She reports numbness has been constant and also having burning sensation leg  She does report that when she sits and bends her leg/knee toward her chest, it helps relieve the numbness, but with upon sitting with the leg unsupported or with standing numbness and pain in left lower extremity worsen  She presented to the emergency room and CT evaluation was done  She was prescribed medication for pain and referred to orthopedic care and pain management  Back Pain  This is a chronic problem  The current episode started more than 1 year ago  The problem occurs constantly  The problem has been rapidly worsening  Associated symptoms include arthralgias, numbness and weakness  Pertinent negatives include no abdominal pain, chest pain, chills, fever, joint swelling, rash or sore throat  The symptoms are aggravated by standing, walking and twisting   She has tried rest, position changes and oral narcotics (Muscle relaxer) for the symptoms  The treatment provided mild relief  The following portions of the patient's history were reviewed and updated as appropriate: She  has a past medical history of Anxiety, Asthma, Hernia of abdominal wall, Hypertension, Kidney stones, and Seizures ([address] )  She  has a past surgical history that includes Gastric bypass; Appendectomy; Cholecystectomy; and Hernia repair  Her family history includes Diabetes in her mother; Hypertension in her mother and sister  She  reports that she has never smoked  She has never used smokeless tobacco  She reports current alcohol use of about 3 0 standard drinks of alcohol per week  She reports that she does not use drugs  She is allergic to gadolinium and iodinated diagnostic agents       Review of Systems   Constitutional: Negative for chills and fever  HENT: Negative for sore throat  Eyes: Negative for visual disturbance  Respiratory: Negative for shortness of breath  Cardiovascular: Negative for chest pain  Gastrointestinal: Negative for abdominal pain  Genitourinary: Negative for flank pain  Musculoskeletal: Positive for arthralgias and back pain  Negative for joint swelling  Skin: Negative for rash and wound  Neurological: Positive for weakness and numbness  Hematological: Does not bruise/bleed easily  Psychiatric/Behavioral: Negative for self-injury         Objective:  Vitals:    03/19/21 0936   BP: 131/86   Pulse: 65   Weight: (!) 137 kg (303 lb)   Height: 5' 9" (1 753 m)     Right Ankle Exam     Muscle Strength   Dorsiflexion:  5/5  Plantar flexion:  5/5      Left Ankle Exam     Muscle Strength   Dorsiflexion:  5/5   Plantar flexion:  5/5       Right Hip Exam     Muscle Strength   Flexion: 5/5     Tests   ALICE: negative    Comments:  Negative FADDIR      Left Hip Exam     Muscle Strength   Flexion: 5/5     Tests   ALICE: negative    Comments:  Negative FADDIR      Back Exam     Tenderness   The patient is experiencing tenderness in the lumbar and sacroiliac  Range of Motion   Extension: normal   Flexion: normal   Lateral bend right: normal   Lateral bend left: abnormal   Rotation right: normal   Rotation left: abnormal     Muscle Strength   Right Quadriceps:  5/5   Left Quadriceps:  5/5     Tests   Straight leg raise right: negative  Straight leg raise left: positive    Other   Sensation: decreased (Decreased sensation to light touch left lower extremity dermatomes L3-S1)          Strength/Myotome Testing     Left Ankle/Foot   Dorsiflexion: 5  Plantar flexion: 5    Right Ankle/Foot   Dorsiflexion: 5  Plantar flexion: 5      Physical Exam  Vitals signs and nursing note reviewed  Constitutional:       General: She is not in acute distress  Appearance: She is well-developed  HENT:      Head: Normocephalic  Right Ear: External ear normal       Left Ear: External ear normal    Eyes:      Conjunctiva/sclera: Conjunctivae normal    Neck:      Trachea: No tracheal deviation  Cardiovascular:      Rate and Rhythm: Normal rate  Pulmonary:      Effort: Pulmonary effort is normal  No respiratory distress  Abdominal:      General: There is no distension  Musculoskeletal:         General: Tenderness present  Skin:     General: Skin is warm and dry  Neurological:      Mental Status: She is alert and oriented to person, place, and time  Psychiatric:         Behavior: Behavior normal          I have personally reviewed pertinent films in PACS and my interpretation is Lumbar disc protrusion

## 2021-03-25 DIAGNOSIS — M50.90 CERVICAL DISC DISEASE: ICD-10-CM

## 2021-03-25 DIAGNOSIS — R51.9 WORSENING HEADACHES: ICD-10-CM

## 2021-03-25 DIAGNOSIS — M54.50 MIDLINE LOW BACK PAIN WITHOUT SCIATICA, UNSPECIFIED CHRONICITY: ICD-10-CM

## 2021-03-25 RX ORDER — METHOCARBAMOL 750 MG/1
750 TABLET, FILM COATED ORAL
Qty: 90 TABLET | Refills: 1 | Status: SHIPPED | OUTPATIENT
Start: 2021-03-25 | End: 2021-06-07 | Stop reason: ALTCHOICE

## 2021-03-25 RX ORDER — METHOCARBAMOL 500 MG/1
500 TABLET, FILM COATED ORAL
Qty: 90 TABLET | Refills: 1 | Status: SHIPPED | OUTPATIENT
Start: 2021-03-25 | End: 2022-05-26

## 2021-03-26 RX ORDER — TRAMADOL HYDROCHLORIDE 50 MG/1
TABLET ORAL
Qty: 30 TABLET | Refills: 0 | Status: SHIPPED | OUTPATIENT
Start: 2021-03-26 | End: 2021-10-19 | Stop reason: SDUPTHER

## 2021-03-26 RX ORDER — METHOCARBAMOL 750 MG/1
TABLET, FILM COATED ORAL
Qty: 90 TABLET | Refills: 0 | Status: SHIPPED | OUTPATIENT
Start: 2021-03-26 | End: 2021-06-07

## 2021-03-30 ENCOUNTER — CONSULT (OUTPATIENT)
Dept: PAIN MEDICINE | Facility: CLINIC | Age: 41
End: 2021-03-30
Payer: COMMERCIAL

## 2021-03-30 VITALS
HEART RATE: 84 BPM | HEIGHT: 69 IN | RESPIRATION RATE: 16 BRPM | WEIGHT: 308 LBS | SYSTOLIC BLOOD PRESSURE: 132 MMHG | BODY MASS INDEX: 45.62 KG/M2 | DIASTOLIC BLOOD PRESSURE: 94 MMHG

## 2021-03-30 DIAGNOSIS — M54.42 CHRONIC LEFT-SIDED LOW BACK PAIN WITH LEFT-SIDED SCIATICA: ICD-10-CM

## 2021-03-30 DIAGNOSIS — M51.16 LUMBAR DISC HERNIATION WITH RADICULOPATHY: ICD-10-CM

## 2021-03-30 DIAGNOSIS — G89.4 CHRONIC PAIN SYNDROME: Primary | ICD-10-CM

## 2021-03-30 DIAGNOSIS — G89.29 CHRONIC LEFT-SIDED LOW BACK PAIN WITH LEFT-SIDED SCIATICA: ICD-10-CM

## 2021-03-30 PROCEDURE — 99244 OFF/OP CNSLTJ NEW/EST MOD 40: CPT | Performed by: ANESTHESIOLOGY

## 2021-03-30 NOTE — H&P (VIEW-ONLY)
Assessment:  1  Chronic pain syndrome    2  Chronic left-sided low back pain with left-sided sciatica    3  Lumbar disc herniation with radiculopathy        Plan:  Orders Placed This Encounter   Procedures    FL spine and pain procedure     4 week follow up after injection     Standing Status:   Future     Standing Expiration Date:   3/30/2025     Order Specific Question:   Reason for Exam:     Answer:   Left L4 and L5 transforaminal epidural steroid injection (35990 and 27036)     Order Specific Question:   Is the patient pregnant? Answer:   No     Order Specific Question:   Anticoagulant hold needed? Answer:   unknown    Ambulatory referral to Neurosurgery     Standing Status:   Future     Standing Expiration Date:   3/30/2022     Referral Priority:   Routine     Referral Type:   Consult - AMB     Referral Reason:   Specialty Services Required     Referred to Provider:   Naomy Perez MD     Requested Specialty:   Neurosurgery     Number of Visits Requested:   1     Expiration Date:   3/30/2022     My impressions and treatment recommendations were discussed in detail with the patient, who verbalized understanding and had no further questions  I had a long discussion with the patient  She has significant weakness in her left lower extremity and I mentioned to her that the  Interventional pain procedures that I perform only help with pain and will not improve her weakness  Patient is interested in a surgical evaluation, so I felt it reasonable to have the patient see Dr Naomy Perez for an evaluation  In the interim, I did feel reasonable to offer the patient a left L4 and L5 transforaminal epidural steroid injection due to her lumbar disc herniation at this level and radicular symptoms in her left lower extremity  The procedures, its risks, and benefits were explained in detail to the patient   Risks include but are not limited to bleeding, infection, hematoma formation, abscess formation, weakness, headache, failure the pain to improve, nerve irritation or damage, and potential worsening of the pain  The patient verbalized understanding and wished to proceed with the procedure  Follow-up is planned in 4 weeks time or sooner as warranted  Discharge instructions were provided  I personally saw and examined the patient and I agree with the above discussed plan of care  History of Present Illness:    Mavis Santana  is a 39 y o  adult (male transitioning to female) who presents to [facility] for initial evaluation of the above stated pain complaints  The patient has a past medical and chronic pain history as outlined in the assessment section  She was referred by Dr [facility] [employer]  The patient reports pain primarily in her low back and left lower extremity what appears to be the left L4 and L5 distributions  She describes her pain as moderate to severe  Her pain began in 2018 after a fall  Her pain is constant in nature  She reports that her pain bothers her in the morning, afternoon, evening, and night  She describes her pain as burning, cramping, shooting, numbness, sharp, pins and needles, pressure like, dull/aching  She reports weakness in her left lower extremity  She ambulates with the assistance of a cane  Lying down and relaxation decreases pain  Standing, bending, sitting, walking, and exercise increases pain  The patient does drink alcohol occasionally  She reports that physical therapy has not been helpful for her pain  Acetaminophen and aspirin do not provide her any relief of symptoms  Review of Systems:    Review of Systems   Constitutional: Negative for fever and unexpected weight change  HENT: Negative for trouble swallowing  Eyes: Negative for visual disturbance  Respiratory: Negative for shortness of breath and wheezing  Cardiovascular: Negative for chest pain and palpitations     Gastrointestinal: Negative for constipation, diarrhea, nausea and vomiting  Endocrine: Negative for cold intolerance, heat intolerance and polydipsia  Genitourinary: Negative for difficulty urinating and frequency  Musculoskeletal: Positive for arthralgias and myalgias  Negative for gait problem and joint swelling  Skin: Negative for rash  Neurological: Positive for numbness  Negative for dizziness, seizures, syncope, weakness and headaches  Hematological: Does not bruise/bleed easily  Psychiatric/Behavioral: Negative for dysphoric mood  All other systems reviewed and are negative  Patient Active Problem List   Diagnosis    History of sleeve gastrectomy    Essential hypertension    Cervical disc disease    Male-to-female transgender person    Anxiety    Umbilical hernia    GERD (gastroesophageal reflux disease)    Worsening headaches    Left-sided weakness and sensory deficits    Herniation of intervertebral disc of lumbar spine    Morbid obesity with BMI of 40 0-44 9, adult (HCC)    Chronic neck pain    Low back pain    Acute sinusitis    Chronic pain syndrome    Lumbar disc herniation with radiculopathy       Past Medical History:   Diagnosis Date    Anxiety     Asthma     seasonal    Hernia of abdominal wall     Hypertension     Kidney stones     Seizures ([address] )     pseudoseizures since 2012       Past Surgical History:   Procedure Laterality Date    APPENDECTOMY      CHOLECYSTECTOMY      GASTRIC BYPASS      HERNIA REPAIR      x2       Family History   Problem Relation Age of Onset    Hypertension Mother     Diabetes Mother     Hypertension Sister        Social History     Occupational History    Not on file   Tobacco Use    Smoking status: Never Smoker    Smokeless tobacco: Never Used   Substance and Sexual Activity    Alcohol use:  Yes     Alcohol/week: 3 0 standard drinks     Types: 3 Standard drinks or equivalent per week     Comment: 3 per month    Drug use: Never    Sexual activity: Not on file         Current Outpatient Medications:     albuterol (PROVENTIL HFA,VENTOLIN HFA) 90 mcg/act inhaler, Inhale 2 puffs every 6 (six) hours as needed for wheezing, Disp: 1 Inhaler, Rfl: 5    amLODIPine (NORVASC) 10 mg tablet, Take 1 tablet (10 mg total) by mouth daily, Disp: 90 tablet, Rfl: 1    aspirin (ECOTRIN LOW STRENGTH) 81 mg EC tablet, Take 81 mg by mouth daily, Disp: , Rfl:     celecoxib (CeleBREX) 200 mg capsule, celecoxib 200 mg capsule, Disp: , Rfl:     chlorthalidone 25 mg tablet, Take 1 tablet (25 mg total) by mouth daily, Disp: 90 tablet, Rfl: 0    Cyanocobalamin (B-12) 1000 MCG/ML KIT, Inject 1 ml IM in thigh/ buttocks or deltoid mm once weekly x 8 weeks  Afterward once monthly, Disp: 8 mL, Rfl: 1    diphenhydrAMINE (BENADRYL) 50 MG tablet, Take one tablet 1 hour prior to contrast administration  , Disp: 1 tablet, Rfl: 0    estradiol (ESTRACE) 2 MG tablet, Take 1 tablet (2 mg total) by mouth 2 (two) times a day, Disp: 180 tablet, Rfl: 1    hydrocortisone (ANUSOL-HC) 25 mg suppository, Insert 1 suppository (25 mg total) into the rectum 2 (two) times a day (Patient taking differently: Insert 25 mg into the rectum 2 (two) times a day prn), Disp: 60 suppository, Rfl: 1    LORazepam (ATIVAN) 1 mg tablet, Take 0 5 tablets (0 5 mg total) by mouth daily, Disp: 30 tablet, Rfl: 0    methocarbamol (ROBAXIN) 500 mg tablet, Take 1 tablet (500 mg total) by mouth daily after breakfast, Disp: 90 tablet, Rfl: 1    methocarbamol (ROBAXIN) 750 mg tablet, TAKE ONE TABLET BY MOUTH 3 TIMES DAILY AS NEEDED FOR MUSCLE SPASMS, Disp: 90 tablet, Rfl: 0    methocarbamol (ROBAXIN) 750 mg tablet, Take 1 tablet (750 mg total) by mouth daily at bedtime, Disp: 90 tablet, Rfl: 1    montelukast (SINGULAIR) 10 mg tablet, Take 1 tablet (10 mg total) by mouth daily at bedtime, Disp: 90 tablet, Rfl: 1    multivitamin (THERAGRAN) TABS, Take 1 tablet by mouth daily, Disp: , Rfl:     ondansetron (ZOFRAN-ODT) 4 mg disintegrating tablet, Take 1 tablet (4 mg total) by mouth every 8 (eight) hours as needed for nausea or vomiting, Disp: 60 tablet, Rfl: 1    pantoprazole (PROTONIX) 40 mg tablet, TAKE ONE TABLET BY MOUTH DAILY, Disp: 90 tablet, Rfl: 0    Syringe/Needle, Disp, (SYRINGE 3CC/57QP0-2/4") 27G X 1-1/4" 3 ML MISC, Use for B12 injections, Disp: 10 each, Rfl: 1    traMADol (ULTRAM) 50 mg tablet, TAKE ONE TABLET BY MOUTH ONCE DAILY AS NEEDED FOR MIGRAINES OR NECK SPASMS, Disp: 30 tablet, Rfl: 0    cyclobenzaprine (FLEXERIL) 10 mg tablet, Take 1 tablet (10 mg total) by mouth daily as needed for muscle spasms for up to 30 days, Disp: 30 tablet, Rfl: 1    gabapentin (NEURONTIN) 100 mg capsule, Take 3 capsules (300 mg total) by mouth daily (Patient not taking: Reported on 3/30/2021), Disp: 90 capsule, Rfl: 3    lidocaine (LIDODERM) 5 %, Apply 1 patch topically daily for 5 days Remove & Discard patch within 12 hours or as directed by MD, Disp: 5 patch, Rfl: 0    Allergies   Allergen Reactions    Gadolinium Anaphylaxis     "Oxygen dropped and coded during"       Iodinated Diagnostic Agents Anaphylaxis       Physical Exam:    /94   Pulse 84   Resp 16   Ht 5' 9" (1 753 m)   Wt (!) 140 kg (308 lb)   BMI 45 48 kg/m²     Constitutional: obese  Eyes: anicteric  HEENT: grossly intact  Neck: supple, symmetric, trachea midline and no masses   Pulmonary:even and unlabored  Cardiovascular:No edema or pitting edema present  Skin:Normal without rashes or lesions and well hydrated  Psychiatric:Mood and affect appropriate  Neurologic:Cranial Nerves II-XII grossly intact  Musculoskeletal:antalgic and ambulates with cane     Lumbar Spine Exam    Appearance:  Normal lordosis  Palpation/Tenderness:  no tenderness or spasm  Sensory:  no sensory deficits noted  Range of Motion:  Flexion:   Moderately limited  with pain  Extension:  Moderately limited  with pain  Lateral Flexion - Left:  Moderately limited  with pain  Lateral Flexion - Right:  Moderately limited  with pain  Rotation - Left:  Moderately limited  with pain  Rotation - Right:  Moderately limited  with pain     Lumbar facet loading is negative bilaterally  Motor Strength:  Left hip flexion:  2/5  Left hip extension:  2/5  Right hip flexion:  5/5  Right hip extension:  5/5  Left knee flexion:  2/5  Left knee extension:  2/5  Right knee flexion:  5/5  Right knee extension:  5/5  Left foot dorsiflexion:  2/5  Left foot plantar flexion:  2/5  Right foot dorsiflexion:  5/5  Right foot plantar flexion:  5/5  Reflexes:  Left Patellar:  absent   Right Patellar:  absent   Left Achilles:  1+   Right Achilles:  1+   Special Tests:  Left Straight Leg Test:  positive  Right Straight Leg Test:  negative  Left Endy's Maneuver:  negative  Right Endy's Maneuver:  negative    Imaging    CT LUMBAR SPINE     INDICATION:   Back pain, progressive neurologic deficit  left lumbar radiculopathy, left leg numbness/tingling      COMPARISON: 2/17/2020     TECHNIQUE:  Contiguous axial images through the lumbar spine were obtained  Sagittal and coronal reconstructions were performed        Radiation dose length product (DLP) for this visit:  1816 mGy-cm   This examination, like all CT scans performed in the [facility], was performed utilizing techniques to minimize radiation dose exposure, including the use of iterative   reconstruction and automated exposure control        IMAGE QUALITY:  Diagnostic      FINDINGS:     ALIGNMENT:  There are 5 lumbar type vertebral bodies  Normal alignment of the lumbar spine  No spondylolisthesis or spondylolysis      VERTEBRAL BODIES:  No fracture  No lytic or blastic lesion      DEGENERATIVE CHANGES:     Lower Thoracic spine:  Normal lower thoracic disc spaces  ]     L1-2:  Normal disc height  No herniation  Normal facet joints    No canal or foraminal stenosis      L2-3:  Minor bulging again seen with minimal facet involvement      L3-4:  Mild loss of disc height is noted  Left foraminal extrusion is again seen left foramina stenosis again noted  Appearance is similar to prior exam      L4-5:  Decreased disc height and mild bilateral facet arthropathy is present  Mild diffuse disc bulge is present  Minimal bilateral foramina stenosis again noted      L5-S1:  Mild facet arthropathy is present      PARASPINAL SOFT TISSUES:   Normal      IMPRESSION:     No acute abnormality identified  Stable left L3-4 foraminal extrusion, correlate for L3 radiculopathy  MRI LUMBAR SPINE WITHOUT CONTRAST     INDICATION: paresthesia  Left-sided weakness     COMPARISON:  2/17/2020     TECHNIQUE:  Sagittal T1, sagittal T2, sagittal inversion recovery, axial T1 and axial T2, coronal T2     IMAGE QUALITY:  Diagnostic     FINDINGS:     VERTEBRAL BODIES:  There are 5 lumbar type vertebral bodies  Scattered degenerative endplate changes  Diffusely hypointense marrow on T1 and T2-weighted images without focally suspicious marrow lesions suggesting red marrow conversion  Small   hemangioma in the S1 vertebra noted  No bone marrow edema or compression abnormality  Normal marrow signal is identified within the visualized bony structures  No discrete marrow lesion      SACRUM:  Small hemangioma in the S1 vertebra noted      DISTAL CORD AND CONUS:  Normal size and signal within the distal cord and conus  Conus medullaris terminates at the L1 level      PARASPINAL SOFT TISSUES:  Paraspinal soft tissues are unremarkable      LOWER THORACIC DISC SPACES:  Normal disc height and signal   No disc herniation, canal stenosis or foraminal narrowing      LUMBAR DISC SPACES:     L1-L2:  Normal      L2-L3:  Normal      L3-L4:  There is a left neural foraminal disc herniation, protrusion type  There is severe left neural foraminal narrowing  Central canal and right neural foramen patent      L4-L5:  There is a central/left paracentral disc herniation, extrusion type    Herniated disc material extends cephalad along the dorsal margin of the L4 vertebra in the midline, seen on image 17, series 6, correlating to finding on image 7, series 3  Mild central canal narrowing  Moderate left neural foraminal narrowing  Mild right neural foraminal narrowing      L5-S1:  There is a diffuse disk bulge  No significant central canal or neural foraminal narrowing      IMPRESSION:        1  Large left neural foraminal disc protrusion at L3-4  Spine surgical assessment recommended  2   Central disc herniation extrusion type extending slightly towards the left L4-5   3   Hypointense marrow may be related to red marrow conversion  Does the patient have anemia? Other etiologies include smoking, living at high altitudes and other infiltrative marrow processes including myelofibrosis or other infiltrative/neoplastic   marrow processes, although discrete focally suspicious marrow lesion is identified    Further clinical assessment recommended         FL spine and pain procedure    (Results Pending)       Orders Placed This Encounter   Procedures    FL spine and pain procedure    Ambulatory referral to Neurosurgery

## 2021-04-01 ENCOUNTER — APPOINTMENT (OUTPATIENT)
Dept: LAB | Facility: CLINIC | Age: 41
End: 2021-04-01
Payer: COMMERCIAL

## 2021-04-01 DIAGNOSIS — E53.8 B12 DEFICIENCY: ICD-10-CM

## 2021-04-01 DIAGNOSIS — R93.7 ABNORMAL MRI, LUMBAR SPINE: Primary | ICD-10-CM

## 2021-04-01 DIAGNOSIS — R93.7 ABNORMAL MRI, LUMBAR SPINE: ICD-10-CM

## 2021-04-01 LAB
FERRITIN SERPL-MCNC: 36 NG/ML (ref 8–388)
IRON SATN MFR SERPL: 13 %
IRON SERPL-MCNC: 51 UG/DL (ref 65–170)
TIBC SERPL-MCNC: 407 UG/DL (ref 250–450)
VIT B12 SERPL-MCNC: 539 PG/ML (ref 100–900)

## 2021-04-01 PROCEDURE — 82607 VITAMIN B-12: CPT

## 2021-04-01 PROCEDURE — 83883 ASSAY NEPHELOMETRY NOT SPEC: CPT

## 2021-04-01 PROCEDURE — 83540 ASSAY OF IRON: CPT

## 2021-04-01 PROCEDURE — 84165 PROTEIN E-PHORESIS SERUM: CPT

## 2021-04-01 PROCEDURE — 84165 PROTEIN E-PHORESIS SERUM: CPT | Performed by: PATHOLOGY

## 2021-04-01 PROCEDURE — 36415 COLL VENOUS BLD VENIPUNCTURE: CPT

## 2021-04-01 PROCEDURE — 83550 IRON BINDING TEST: CPT

## 2021-04-01 PROCEDURE — 82728 ASSAY OF FERRITIN: CPT

## 2021-04-01 NOTE — PROGRESS NOTES
Reviewed mri l spine, given hypointense marrow changes non specific ordered iron panel, B12, spep free light chains,  CBC does not show any significant abnormalities  No systemic signs of cancer per patient's history   No cancer history

## 2021-04-02 ENCOUNTER — OFFICE VISIT (OUTPATIENT)
Dept: NEUROSURGERY | Facility: CLINIC | Age: 41
End: 2021-04-02
Payer: COMMERCIAL

## 2021-04-02 ENCOUNTER — TELEPHONE (OUTPATIENT)
Dept: PAIN MEDICINE | Facility: CLINIC | Age: 41
End: 2021-04-02

## 2021-04-02 VITALS
BODY MASS INDEX: 45.62 KG/M2 | SYSTOLIC BLOOD PRESSURE: 140 MMHG | HEIGHT: 69 IN | DIASTOLIC BLOOD PRESSURE: 90 MMHG | WEIGHT: 308 LBS | TEMPERATURE: 99.8 F

## 2021-04-02 DIAGNOSIS — E66.01 MORBID OBESITY WITH BMI OF 40.0-44.9, ADULT (HCC): Primary | ICD-10-CM

## 2021-04-02 DIAGNOSIS — M51.16 LUMBAR DISC HERNIATION WITH RADICULOPATHY: ICD-10-CM

## 2021-04-02 DIAGNOSIS — R53.1 LEFT-SIDED WEAKNESS: ICD-10-CM

## 2021-04-02 DIAGNOSIS — G89.4 CHRONIC PAIN SYNDROME: ICD-10-CM

## 2021-04-02 LAB
ALBUMIN SERPL ELPH-MCNC: 3.89 G/DL (ref 3.5–5)
ALBUMIN SERPL ELPH-MCNC: 51.9 % (ref 52–65)
ALPHA1 GLOB SERPL ELPH-MCNC: 0.36 G/DL (ref 0.1–0.4)
ALPHA1 GLOB SERPL ELPH-MCNC: 4.8 % (ref 2.5–5)
ALPHA2 GLOB SERPL ELPH-MCNC: 0.83 G/DL (ref 0.4–1.2)
ALPHA2 GLOB SERPL ELPH-MCNC: 11 % (ref 7–13)
BETA GLOB ABNORMAL SERPL ELPH-MCNC: 0.6 G/DL (ref 0.4–0.8)
BETA1 GLOB SERPL ELPH-MCNC: 8 % (ref 5–13)
BETA2 GLOB SERPL ELPH-MCNC: 7.7 % (ref 2–8)
BETA2+GAMMA GLOB SERPL ELPH-MCNC: 0.58 G/DL (ref 0.2–0.5)
GAMMA GLOB ABNORMAL SERPL ELPH-MCNC: 1.25 G/DL (ref 0.5–1.6)
GAMMA GLOB SERPL ELPH-MCNC: 16.6 % (ref 12–22)
IGG/ALB SER: 1.08 {RATIO} (ref 1.1–1.8)
PROT PATTERN SERPL ELPH-IMP: ABNORMAL
PROT SERPL-MCNC: 7.5 G/DL (ref 6.4–8.2)

## 2021-04-02 PROCEDURE — 99213 OFFICE O/P EST LOW 20 MIN: CPT | Performed by: NEUROLOGICAL SURGERY

## 2021-04-02 NOTE — TELEPHONE ENCOUNTER
Pt says she saw Dr Carline Smiley today and is requesting the procedure with dr Toan Gaytan   Ph# [phone]

## 2021-04-02 NOTE — PROGRESS NOTES
Office Note - Neurosurgery   Sydnie Wahl  39 y o  adult MRN: [id number]      Assessment:    Patient is gradually worsening  Jaison 60-year-old woman with diffuse left leg pain and lower back pain  Her pain is not following a clear radicular distribution and she describes dysesthesias and hyperesthesias in the left leg diffusely  I wonder if she may be developing a chronic regional pain syndrome  While CT imaging does demonstrate ongoing foraminal stenosis on the left at L3-4, I think it is unlikely that a minimally invasive decompression at this level would significantly improve her more diffuse pain  In addition she is at higher risk of wound healing issues and infection given her  body habitus  Would recommend she exhausted nonsurgical pain management strategies and try to improve her BMI to less than 40 prior to considering any surgical intervention  She has no objective findings on myelopathy or radiculopathy today  At some point repeat MRI of the lumbar and thoracic spine may be of benefit, though I think it is reasonable for her to proceed with epidural steroid injection as planned  A transforaminal injection at L3-4 may be helpful  She will follow up through this office on a p r n  basis  History, physical examination and diagnostic tests were reviewed and questions answered  Diagnosis, care plan and treatment options were discussed  The patient understand instructions and will follow up as directed  Plan:    Follow-up: prn    Problem List Items Addressed This Visit        Nervous and Auditory    Left-sided weakness and sensory deficits    Lumbar disc herniation with radiculopathy       Other    Morbid obesity with BMI of 40 0-44 9, adult (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 left-sided weakness approximately 2 years ago after falling  Her primary concern today is lower back pain radiating diffusely into the left leg with associated numbness and weakness in the left leg  She also describes an uncomfortable broken glass sensation over her entire left leg  There are days when she has minimal symptoms  On other days it is quite severe and is difficult to ambulate  Cold weather seems to exacerbate summer pain  She denies any weakness in her left arm  She denies any pain, weakness or numbness in her right arm or leg  She denies any difficulties with bowel bladder function or changing perineal sensation  Current pain medications are listed below  Physical therapy his somewhat helpful  She has not tried any injections  She presents today with a CT scan of the lumbar spine  ROS  ROS personally reviewed and updated  Review of Systems   Constitutional: Negative  HENT: Negative  Eyes: Negative  Respiratory: Negative  Cardiovascular: Negative  Gastrointestinal: Negative  Endocrine: Negative  Genitourinary: Positive for urgency  Musculoskeletal: Positive for back pain (Low back, burning pulling sensation, along with weakness) and gait problem (uses a cane)  Symptoms for about year but is getting worse overtime  Injections 6 months ago with Ortho had SI injection  Helped some  Skin: Negative  Allergic/Immunologic: Negative  Neurological: Positive for weakness (low back) and numbness (Left side of back and left leg)  Hematological: Negative  Psychiatric/Behavioral: Negative          Family History    Family History   Problem Relation Age of Onset    Hypertension Mother     Diabetes Mother     Hypertension Sister        Social History    Social History     Socioeconomic History    Marital status: Single     Spouse name: Not on file    Number of children: Not on file    Years of education: Not on file    Highest education level: Not on file Occupational History    Not on file   Social Needs    Financial resource strain: Not on file    Food insecurity     Worry: Not on file     Inability: Not on file    Transportation needs     Medical: Not on file     Non-medical: Not on file   Tobacco Use    Smoking status: Never Smoker    Smokeless tobacco: Never Used   Substance and Sexual Activity    Alcohol use:  Yes     Alcohol/week: 3 0 standard drinks     Types: 3 Standard drinks or equivalent per week     Comment: 3 per month    Drug use: Never    Sexual activity: Not on file   Lifestyle    Physical activity     Days per week: Not on file     Minutes per session: Not on file    Stress: Not on file   Relationships    Social connections     Talks on phone: Not on file     Gets together: Not on file     Attends [employer] service: Not on file     Active member of club or organization: Not on file     Attends meetings of clubs or organizations: Not on file     Relationship status: Not on file    Intimate partner violence     Fear of current or ex partner: Not on file     Emotionally abused: Not on file     Physically abused: Not on file     Forced sexual activity: Not on file   Other Topics Concern    Not on file   Social History Narrative    Not on file       Past Medical History    Past Medical History:   Diagnosis Date    Anxiety     Asthma     seasonal    Hernia of abdominal wall     Hypertension     Kidney stones     Seizures ([facility] [address] )     pseudoseizures since 2012       Surgical History    Past Surgical History:   Procedure Laterality Date    APPENDECTOMY      CHOLECYSTECTOMY      GASTRIC BYPASS      HERNIA REPAIR      x2       Medications      Current Outpatient Medications:     albuterol (PROVENTIL HFA,VENTOLIN HFA) 90 mcg/act inhaler, Inhale 2 puffs every 6 (six) hours as needed for wheezing, Disp: 1 Inhaler, Rfl: 5    amLODIPine (NORVASC) 10 mg tablet, Take 1 tablet (10 mg total) by mouth daily, Disp: 90 tablet, Rfl: 1    aspirin (ECOTRIN LOW STRENGTH) 81 mg EC tablet, Take 81 mg by mouth daily, Disp: , Rfl:     chlorthalidone 25 mg tablet, Take 1 tablet (25 mg total) by mouth daily, Disp: 90 tablet, Rfl: 0    Cyanocobalamin (B-12) 1000 MCG/ML KIT, Inject 1 ml IM in thigh/ buttocks or deltoid mm once weekly x 8 weeks  Afterward once monthly, Disp: 8 mL, Rfl: 1    diphenhydrAMINE (BENADRYL) 50 MG tablet, Take one tablet 1 hour prior to contrast administration  , Disp: 1 tablet, Rfl: 0    estradiol (ESTRACE) 2 MG tablet, Take 1 tablet (2 mg total) by mouth 2 (two) times a day, Disp: 180 tablet, Rfl: 1    hydrocortisone (ANUSOL-HC) 25 mg suppository, Insert 1 suppository (25 mg total) into the rectum 2 (two) times a day (Patient taking differently: Insert 25 mg into the rectum 2 (two) times a day prn), Disp: 60 suppository, Rfl: 1    LORazepam (ATIVAN) 1 mg tablet, Take 0 5 tablets (0 5 mg total) by mouth daily, Disp: 30 tablet, Rfl: 0    methocarbamol (ROBAXIN) 500 mg tablet, Take 1 tablet (500 mg total) by mouth daily after breakfast, Disp: 90 tablet, Rfl: 1    methocarbamol (ROBAXIN) 750 mg tablet, TAKE ONE TABLET BY MOUTH 3 TIMES DAILY AS NEEDED FOR MUSCLE SPASMS, Disp: 90 tablet, Rfl: 0    methocarbamol (ROBAXIN) 750 mg tablet, Take 1 tablet (750 mg total) by mouth daily at bedtime, Disp: 90 tablet, Rfl: 1    montelukast (SINGULAIR) 10 mg tablet, Take 1 tablet (10 mg total) by mouth daily at bedtime, Disp: 90 tablet, Rfl: 1    multivitamin (THERAGRAN) TABS, Take 1 tablet by mouth daily, Disp: , Rfl:     ondansetron (ZOFRAN-ODT) 4 mg disintegrating tablet, Take 1 tablet (4 mg total) by mouth every 8 (eight) hours as needed for nausea or vomiting, Disp: 60 tablet, Rfl: 1    pantoprazole (PROTONIX) 40 mg tablet, TAKE ONE TABLET BY MOUTH DAILY, Disp: 90 tablet, Rfl: 0    Syringe/Needle, Disp, (SYRINGE 3CC/50EK6-6/4") 27G X 1-1/4" 3 ML MISC, Use for B12 injections, Disp: 10 each, Rfl: 1    traMADol (ULTRAM) 50 mg tablet, TAKE ONE TABLET BY MOUTH ONCE DAILY AS NEEDED FOR MIGRAINES OR NECK SPASMS, Disp: 30 tablet, Rfl: 0    celecoxib (CeleBREX) 200 mg capsule, celecoxib 200 mg capsule, Disp: , Rfl:     cyclobenzaprine (FLEXERIL) 10 mg tablet, Take 1 tablet (10 mg total) by mouth daily as needed for muscle spasms for up to 30 days, Disp: 30 tablet, Rfl: 1    gabapentin (NEURONTIN) 100 mg capsule, Take 3 capsules (300 mg total) by mouth daily (Patient not taking: Reported on 3/30/2021), Disp: 90 capsule, Rfl: 3    lidocaine (LIDODERM) 5 %, Apply 1 patch topically daily for 5 days Remove & Discard patch within 12 hours or as directed by MD, Disp: 5 patch, Rfl: 0    Allergies    Allergies   Allergen Reactions    Gadolinium Anaphylaxis     "Oxygen dropped and coded during"       Iodinated Diagnostic Agents Anaphylaxis       The following portions of the patient's history were reviewed and updated as appropriate: allergies, current medications, past family history, past medical history, past social history, past surgical history and problem list     Investigations    I personally reviewed the CT results with the patient:      CT scan lumbar spine dated March 3 2021  Comparison to previous MRI from 2020  Normal lumbar lordosis  Mild degenerative changes throughout the lumbar spine  Left L3-4 foraminal disc herniation impinging on the exiting nerve root  No other areas of significant neural compression  No fractures  No bony lesions  Physical Exam    Vitals:  Blood pressure 140/90, temperature 99 8 °F (37 7 °C), temperature source Temporal, height 5' 9" (1 753 m), weight (!) 140 kg (308 lb)  ,Body mass index is 45 48 kg/m²  Physical Exam  Vitals signs reviewed  Constitutional:       General: She is not in acute distress  Eyes:      Extraocular Movements: Extraocular movements intact  Pulmonary:      Effort: Pulmonary effort is normal  No respiratory distress  Musculoskeletal:         General: No deformity     Skin:     General: Skin is warm and dry  Neurological:      Mental Status: She is alert and oriented to person, place, and time  Comments: 5/5 power in right lower extremity  Diffusely 4/5 power in left lower extremity secondary to pain  Walks with an antalgic gait favoring left leg  No clonus  Unable to elicit deep tendon reflexes in lower extremities  Reports hyperesthesias and dysesthesias over left anterior thigh shin and foot  Psychiatric:         Mood and Affect: Mood normal          Behavior: Behavior normal        Neurologic Exam     Mental Status   Oriented to person, place, and time

## 2021-04-03 LAB
KAPPA LC FREE SER-MCNC: 33 MG/L (ref 3.3–19.4)
KAPPA LC FREE/LAMBDA FREE SER: 1.4 {RATIO} (ref 0.26–1.65)
LAMBDA LC FREE SERPL-MCNC: 23.5 MG/L (ref 5.7–26.3)

## 2021-04-05 ENCOUNTER — OFFICE VISIT (OUTPATIENT)
Dept: INTERNAL MEDICINE CLINIC | Facility: CLINIC | Age: 41
End: 2021-04-05
Payer: COMMERCIAL

## 2021-04-05 VITALS
DIASTOLIC BLOOD PRESSURE: 76 MMHG | OXYGEN SATURATION: 97 % | HEART RATE: 75 BPM | BODY MASS INDEX: 44.52 KG/M2 | TEMPERATURE: 98.7 F | HEIGHT: 70 IN | WEIGHT: 311 LBS | SYSTOLIC BLOOD PRESSURE: 122 MMHG

## 2021-04-05 DIAGNOSIS — R51.9 WORSENING HEADACHES: ICD-10-CM

## 2021-04-05 DIAGNOSIS — R77.8 ABNORMAL SPEP: ICD-10-CM

## 2021-04-05 DIAGNOSIS — M51.16 LUMBAR DISC DISEASE WITH RADICULOPATHY: Primary | ICD-10-CM

## 2021-04-05 DIAGNOSIS — I10 ESSENTIAL HYPERTENSION: ICD-10-CM

## 2021-04-05 DIAGNOSIS — M50.90 CERVICAL DISC DISEASE: ICD-10-CM

## 2021-04-05 DIAGNOSIS — R76.8 ELEVATED SERUM IMMUNOGLOBULIN FREE LIGHT CHAIN LEVEL: ICD-10-CM

## 2021-04-05 DIAGNOSIS — E87.6 HYPOKALEMIA: ICD-10-CM

## 2021-04-05 PROCEDURE — 99214 OFFICE O/P EST MOD 30 MIN: CPT | Performed by: INTERNAL MEDICINE

## 2021-04-05 RX ORDER — CHLORTHALIDONE 25 MG/1
25 TABLET ORAL DAILY
Qty: 90 TABLET | Refills: 1 | Status: SHIPPED | OUTPATIENT
Start: 2021-04-05 | End: 2021-09-10 | Stop reason: SDUPTHER

## 2021-04-05 RX ORDER — LORAZEPAM 1 MG/1
0.5 TABLET ORAL DAILY
Qty: 30 TABLET | Refills: 0 | Status: SHIPPED | OUTPATIENT
Start: 2021-04-05 | End: 2021-06-07 | Stop reason: SDUPTHER

## 2021-04-05 RX ORDER — POTASSIUM CHLORIDE 750 MG/1
10 TABLET, EXTENDED RELEASE ORAL 2 TIMES DAILY
Qty: 60 TABLET | Refills: 1 | Status: SHIPPED | OUTPATIENT
Start: 2021-04-05 | End: 2021-05-18

## 2021-04-05 RX ORDER — PREGABALIN 50 MG/1
50 CAPSULE ORAL 3 TIMES DAILY
Qty: 90 CAPSULE | Refills: 1 | Status: SHIPPED | OUTPATIENT
Start: 2021-04-05 | End: 2021-12-10 | Stop reason: ALTCHOICE

## 2021-04-05 NOTE — PROGRESS NOTES
Assessment/Plan:    Essential hypertension  Patient's blood pressure stable  Continue chlorthalidone 25 mg QD  Patient's potassium on last labs on 3/19 was low at 3 3 and she will be started on a potassium supplement  Herniation of intervertebral disc of lumbar spine  Patient following with neurosurgery  Plan is for epidural steroid injections  Patient transitioning to lyrica from gabapentin secondary to side effects  Cervical disc disease  Patient no longer experiencing left upper extremity weakness  Elevated serum immunoglobulin free light chain level  Patient had an MRI of her lumbar spine on 3/09/2021 that showed an incidental finding of hypointense marrow that may be related to red marrow conversion, and a subsequent lab works showed elevated immunoglobulin kappa free light chain levels of 33 0  Patient will have a urine protein study to further investigate cause  Diagnoses and all orders for this visit:    Lumbar disc disease with radiculopathy  -     pregabalin (LYRICA) 50 mg capsule; Take 1 capsule (50 mg total) by mouth 3 (three) times a day    Essential hypertension  -     chlorthalidone 25 mg tablet; Take 1 tablet (25 mg total) by mouth daily  -     Basic metabolic panel; Future    Worsening headaches  -     chlorthalidone 25 mg tablet; Take 1 tablet (25 mg total) by mouth daily    Cervical disc disease  -     LORazepam (ATIVAN) 1 mg tablet; Take 0 5 tablets (0 5 mg total) by mouth daily    Abnormal SPEP  -     Protein electrophoresis, urine    Hypokalemia  -     potassium chloride (K-DUR,KLOR-CON) 10 mEq tablet; Take 1 tablet (10 mEq total) by mouth 2 (two) times a day  -     Basic metabolic panel; Future    Elevated serum immunoglobulin free light chain level          Subjective:      Patient ID: Osmin Burgos  is a 39 y o  adult      HPI     Patient is a 40 y/o transgender male to female on estradiol therapy with a hx of HTN, left sided radiculopathy, GERD, and anxiety presenting to the office today for a well-visit and labs f/u  Patient currently has no complaints other than chronic left lower extremity radiculopathy  She reports the left neck and arm pain has improved and she is following with neurosurgery for epidural spinal injections  Patient states she is transitioning to Lyrica from gabapentin secondary to "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  Patient's recent labs on 3/19/2021 were notable for mild hypokalemia at 3 3, likely secondary to chlorthalidone and she will be started on a potassium supplement  Otherwise patient is tolerating medication well, patient's BP recordings were wnl today, and will continue on current HTN regimen  Patient also had a low iron level of 51, but previous CBC in February showed no evidence of anemia  Will repeat CBC in a few months  In addition, the patient had an MRI of her lumbar spine to work up her back pain, which found an incidental finding of hypointense marrow that may be related to red marrow conversion concerning for possible myelofibrosis or other infiltrative/neoplastic   marrow processes  F/u labs showed elevated immunoglobulin kappa free light chain levels of 33 0  She will need further work up to r/o malignancy and a urine protein study is ordered  Family History   Problem Relation Age of Onset    Hypertension Mother     Diabetes Mother     Hypertension Sister      Social History     Socioeconomic History    Marital status: Single     Spouse name: Not on file    Number of children: Not on file    Years of education: Not on file    Highest education level: Not on file   Occupational History    Not on file   Social Needs    Financial resource strain: Not on file    Food insecurity     Worry: Not on file     Inability: Not on file    Transportation needs     Medical: Not on file     Non-medical: Not on file   Tobacco Use    Smoking status: Never Smoker    Smokeless tobacco: Never Used   Substance and Sexual Activity    Alcohol use:  Yes Alcohol/week: 3 0 standard drinks     Types: 3 Standard drinks or equivalent per week     Frequency: Monthly or less     Drinks per session: 1 or 2     Binge frequency: Never     Comment: 3 per month    Drug use: Never    Sexual activity: Not on file   Lifestyle    Physical activity     Days per week: Not on file     Minutes per session: Not on file    Stress: Not on file   Relationships    Social connections     Talks on phone: Not on file     Gets together: Not on file     Attends [employer] service: Not on file     Active member of club or organization: Not on file     Attends meetings of clubs or organizations: Not on file     Relationship status: Not on file    Intimate partner violence     Fear of current or ex partner: Not on file     Emotionally abused: Not on file     Physically abused: Not on file     Forced sexual activity: Not on file   Other Topics Concern    Not on file   Social History Narrative    Not on file     Past Medical History:   Diagnosis Date    Anxiety     Asthma     seasonal    Hernia of abdominal wall     Hypertension     Kidney stones     Seizures ([facility] [address] )     pseudoseizures since 2012       Current Outpatient Medications:     albuterol (PROVENTIL HFA,VENTOLIN HFA) 90 mcg/act inhaler, Inhale 2 puffs every 6 (six) hours as needed for wheezing, Disp: 1 Inhaler, Rfl: 5    amLODIPine (NORVASC) 10 mg tablet, Take 1 tablet (10 mg total) by mouth daily, Disp: 90 tablet, Rfl: 1    aspirin (ECOTRIN LOW STRENGTH) 81 mg EC tablet, Take 81 mg by mouth daily, Disp: , Rfl:     chlorthalidone 25 mg tablet, Take 1 tablet (25 mg total) by mouth daily, Disp: 90 tablet, Rfl: 1    Cyanocobalamin (B-12) 1000 MCG/ML KIT, Inject 1 ml IM in thigh/ buttocks or deltoid mm once weekly x 8 weeks  Afterward once monthly, Disp: 8 mL, Rfl: 1    diphenhydrAMINE (BENADRYL) 50 MG tablet, Take one tablet 1 hour prior to contrast administration  , Disp: 1 tablet, Rfl: 0    estradiol (ESTRACE) 2 MG tablet, Take 1 tablet (2 mg total) by mouth 2 (two) times a day, Disp: 180 tablet, Rfl: 1    hydrocortisone (ANUSOL-HC) 25 mg suppository, Insert 1 suppository (25 mg total) into the rectum 2 (two) times a day (Patient taking differently: Insert 25 mg into the rectum 2 (two) times a day prn), Disp: 60 suppository, Rfl: 1    LORazepam (ATIVAN) 1 mg tablet, Take 0 5 tablets (0 5 mg total) by mouth daily, Disp: 30 tablet, Rfl: 0    methocarbamol (ROBAXIN) 500 mg tablet, Take 1 tablet (500 mg total) by mouth daily after breakfast, Disp: 90 tablet, Rfl: 1    methocarbamol (ROBAXIN) 750 mg tablet, Take 1 tablet (750 mg total) by mouth daily at bedtime, Disp: 90 tablet, Rfl: 1    montelukast (SINGULAIR) 10 mg tablet, Take 1 tablet (10 mg total) by mouth daily at bedtime, Disp: 90 tablet, Rfl: 1    multivitamin (THERAGRAN) TABS, Take 1 tablet by mouth daily, Disp: , Rfl:     ondansetron (ZOFRAN-ODT) 4 mg disintegrating tablet, Take 1 tablet (4 mg total) by mouth every 8 (eight) hours as needed for nausea or vomiting, Disp: 60 tablet, Rfl: 1    pantoprazole (PROTONIX) 40 mg tablet, TAKE ONE TABLET BY MOUTH DAILY, Disp: 90 tablet, Rfl: 0    traMADol (ULTRAM) 50 mg tablet, TAKE ONE TABLET BY MOUTH ONCE DAILY AS NEEDED FOR MIGRAINES OR NECK SPASMS, Disp: 30 tablet, Rfl: 0    celecoxib (CeleBREX) 200 mg capsule, celecoxib 200 mg capsule, Disp: , Rfl:     cyclobenzaprine (FLEXERIL) 10 mg tablet, Take 1 tablet (10 mg total) by mouth daily as needed for muscle spasms for up to 30 days, Disp: 30 tablet, Rfl: 1    lidocaine (LIDODERM) 5 %, Apply 1 patch topically daily for 5 days Remove & Discard patch within 12 hours or as directed by MD, Disp: 5 patch, Rfl: 0    methocarbamol (ROBAXIN) 750 mg tablet, TAKE ONE TABLET BY MOUTH 3 TIMES DAILY AS NEEDED FOR MUSCLE SPASMS, Disp: 90 tablet, Rfl: 0    potassium chloride (K-DUR,KLOR-CON) 10 mEq tablet, Take 1 tablet (10 mEq total) by mouth 2 (two) times a day, Disp: 60 tablet, Rfl: 1    pregabalin (LYRICA) 50 mg capsule, Take 1 capsule (50 mg total) by mouth 3 (three) times a day, Disp: 90 capsule, Rfl: 1    Syringe/Needle, Disp, (SYRINGE 3CC/48XE2-0/4") 27G X 1-1/4" 3 ML MISC, Use for B12 injections, Disp: 10 each, Rfl: 1  Allergies   Allergen Reactions    Gadolinium Anaphylaxis     "Oxygen dropped and coded during"       Iodinated Diagnostic Agents Anaphylaxis     Past Surgical History:   Procedure Laterality Date    APPENDECTOMY      CHOLECYSTECTOMY      GASTRIC BYPASS      HERNIA REPAIR      x2     Review of Systems   Constitutional: Negative for chills and fever  HENT: Negative for congestion and sore throat  Eyes: Negative for photophobia, pain and visual disturbance  Respiratory: Negative for cough, chest tightness and shortness of breath  Cardiovascular: Negative for chest pain and palpitations  Gastrointestinal: Positive for nausea  Negative for abdominal pain and vomiting  Genitourinary: Negative for difficulty urinating and flank pain  Musculoskeletal: Positive for arthralgias and back pain  Skin: Negative for color change and rash  Neurological: Negative for seizures and facial asymmetry  Psychiatric/Behavioral: Negative for agitation and behavioral problems  Objective:    /76 (BP Location: Right arm, Patient Position: Sitting, Cuff Size: Large)   Pulse 75   Temp 98 7 °F (37 1 °C) (Tympanic)   Ht 5' 10" (1 778 m)   Wt (!) 141 kg (311 lb)   SpO2 97% Comment: Room Air  BMI 44 62 kg/m²      Physical Exam  Constitutional:       General: She is not in acute distress  Appearance: Normal appearance  She is obese  She is not ill-appearing, toxic-appearing or diaphoretic  HENT:      Head: Normocephalic and atraumatic  Eyes:      General: No scleral icterus  Extraocular Movements: Extraocular movements intact  Conjunctiva/sclera: Conjunctivae normal       Pupils: Pupils are equal, round, and reactive to light     Neck: Musculoskeletal: Normal range of motion  Cardiovascular:      Rate and Rhythm: Normal rate and regular rhythm  Pulses: Normal pulses  Heart sounds: Normal heart sounds  No murmur  No friction rub  No gallop  Pulmonary:      Effort: Pulmonary effort is normal  No respiratory distress  Breath sounds: Normal breath sounds  No stridor  No wheezing, rhonchi or rales  Musculoskeletal: Normal range of motion  General: No swelling  Right lower leg: No edema  Left lower leg: No edema  Skin:     General: Skin is warm and dry  Coloration: Skin is not jaundiced  Neurological:      General: No focal deficit present  Mental Status: She is alert and oriented to person, place, and time  Mental status is at baseline  Psychiatric:         Mood and Affect: Mood normal          Behavior: Behavior normal          Thought Content:  Thought content normal          Judgment: Judgment normal

## 2021-04-05 NOTE — ASSESSMENT & PLAN NOTE
Patient's blood pressure stable  Continue chlorthalidone 25 mg QD  Patient's potassium on last labs on 3/19 was low at 3 3 and she will be started on a potassium supplement

## 2021-04-05 NOTE — ASSESSMENT & PLAN NOTE
Patient following with neurosurgery  Plan is for epidural steroid injections  Patient transitioning to lyrica from gabapentin secondary to side effects

## 2021-04-05 NOTE — ASSESSMENT & PLAN NOTE
Patient had an MRI of her lumbar spine on 3/09/2021 that showed an incidental finding of hypointense marrow that may be related to red marrow conversion, and a subsequent lab works showed elevated immunoglobulin kappa free light chain levels of 33 0  Patient will have a urine protein study to further investigate cause

## 2021-04-05 NOTE — PROGRESS NOTES
Assessment/Plan:    No problem-specific Assessment & Plan notes found for this encounter  Subjective:      Patient ID: Iron Caro  is a 39 y o  adult  HPI    {Common ambulatory SmartLinks:79395}    Review of Systems      Objective: There were no vitals taken for this visit       Physical Exam

## 2021-04-07 NOTE — TELEPHONE ENCOUNTER
Rec auth for TFESI  S/w pt and scheduled TFESI for 4/14/21 9 am  Gave pre procedure instructions, masking//vaccine policy

## 2021-04-09 ENCOUNTER — TELEPHONE (OUTPATIENT)
Dept: NEUROLOGY | Facility: CLINIC | Age: 41
End: 2021-04-09

## 2021-04-09 NOTE — TELEPHONE ENCOUNTER
----- Message from Rosa Maria Mcdonald RN sent at 4/9/2021  2:10 PM EDT -----    ----- Message -----  From: [address] [place] [address],   Sent: 4/9/2021   2:05 PM EDT  To: Bob Cunningham MD, #    Please let patient know that her blood work is largely normal with no significant abnormalities  Her iron is mildly low at 51 she should follow-up with her PCP in regards to this      Am CCing Dr Camacho    Thanks

## 2021-04-14 ENCOUNTER — HOSPITAL ENCOUNTER (OUTPATIENT)
Dept: RADIOLOGY | Facility: CLINIC | Age: 41
Discharge: HOME | End: 2021-04-14
Attending: ANESTHESIOLOGY
Payer: COMMERCIAL

## 2021-04-14 VITALS
SYSTOLIC BLOOD PRESSURE: 140 MMHG | RESPIRATION RATE: 20 BRPM | DIASTOLIC BLOOD PRESSURE: 92 MMHG | TEMPERATURE: 97.4 F | OXYGEN SATURATION: 98 % | HEART RATE: 75 BPM

## 2021-04-14 DIAGNOSIS — M54.42 CHRONIC LEFT-SIDED LOW BACK PAIN WITH LEFT-SIDED SCIATICA: ICD-10-CM

## 2021-04-14 DIAGNOSIS — M51.16 LUMBAR DISC HERNIATION WITH RADICULOPATHY: ICD-10-CM

## 2021-04-14 DIAGNOSIS — G89.4 CHRONIC PAIN SYNDROME: ICD-10-CM

## 2021-04-14 DIAGNOSIS — G89.29 CHRONIC LEFT-SIDED LOW BACK PAIN WITH LEFT-SIDED SCIATICA: ICD-10-CM

## 2021-04-14 PROCEDURE — A9585 GADOBUTROL INJECTION: HCPCS | Performed by: ANESTHESIOLOGY

## 2021-04-14 PROCEDURE — 64483 NJX AA&/STRD TFRM EPI L/S 1: CPT | Performed by: ANESTHESIOLOGY

## 2021-04-14 PROCEDURE — 64484 NJX AA&/STRD TFRM EPI L/S EA: CPT | Performed by: ANESTHESIOLOGY

## 2021-04-14 RX ORDER — LIDOCAINE HYDROCHLORIDE 10 MG/ML
5 INJECTION, SOLUTION EPIDURAL; INFILTRATION; INTRACAUDAL; PERINEURAL ONCE
Status: COMPLETED | OUTPATIENT
Start: 2021-04-14 | End: 2021-04-14

## 2021-04-14 RX ORDER — METHYLPREDNISOLONE ACETATE 80 MG/ML
80 INJECTION, SUSPENSION INTRA-ARTICULAR; INTRALESIONAL; INTRAMUSCULAR; PARENTERAL; SOFT TISSUE ONCE
Status: COMPLETED | OUTPATIENT
Start: 2021-04-14 | End: 2021-04-14

## 2021-04-14 RX ORDER — BUPIVACAINE HCL/PF 2.5 MG/ML
5 VIAL (ML) INJECTION ONCE
Status: COMPLETED | OUTPATIENT
Start: 2021-04-14 | End: 2021-04-14

## 2021-04-14 RX ADMIN — GADOBUTROL 1 ML: 604.72 INJECTION INTRAVENOUS at 09:12

## 2021-04-14 RX ADMIN — Medication 5 ML: at 09:11

## 2021-04-14 RX ADMIN — LIDOCAINE HYDROCHLORIDE 5 ML: 10 INJECTION, SOLUTION EPIDURAL; INFILTRATION; INTRACAUDAL; PERINEURAL at 09:12

## 2021-04-14 RX ADMIN — METHYLPREDNISOLONE ACETATE 80 MG: 80 INJECTION, SUSPENSION INTRA-ARTICULAR; INTRALESIONAL; INTRAMUSCULAR; PARENTERAL; SOFT TISSUE at 09:12

## 2021-04-14 NOTE — INTERVAL H&P NOTE
Update: (This section must be completed if the H&P was completed greater than 24 hrs to procedure or admission)    H&P reviewed  After examining the patient, I find no changed to the H&P since it had been written  Patient re-evaluated   Accept as history and physical     Belkis Cristobal MD/April 14, 2021/8:54 AM

## 2021-04-14 NOTE — DISCHARGE INSTR - LAB
Epidural Steroid Injection   WHAT YOU NEED TO KNOW:   An epidural steroid injection (IJEOMA) is a procedure to inject steroid medicine into the epidural space  The epidural space is between your spinal cord and vertebrae  Steroids reduce inflammation and fluid buildup in your spine that may be causing pain  You may be given pain medicine along with the steroids  ACTIVITY  · Do not drive or operate machinery today  · No strenuous activity today - bending, lifting, etc   · You may resume normal activites starting tomorrow - start slowly and as tolerated  · You may shower today, but no tub baths or hot tubs  · You may have numbness for several hours from the local anesthetic  Please use caution and common sense, especially with weight-bearing activities  CARE OF THE INJECTION SITE  · If you have soreness or pain, apply ice to the area today (20 minutes on/20 minutes off)  · Starting tomorrow, you may use warm, moist heat or ice if needed  · You may have an increase or change in your discomfort for 36-48 hours after your treatment  · Apply ice and continue with any pain medication you have been prescribed  · Notify [facility] if you have any of the following: redness, drainage, swelling, headache, stiff neck or fever above 100°F     SPECIAL INSTRUCTIONS  · Our office will contact you in approximately 7 days for a progress report  MEDICATIONS  · Continue to take all routine medications  · Our office may have instructed you to hold some medications  As no general anesthesia was used in today's procedure, you should not experience any side effects related to anesthesia  If you have a problem specifically related to your procedure, please call our office at [phone]  Problems not related to your procedure should be directed to your primary care physician

## 2021-04-21 ENCOUNTER — TELEPHONE (OUTPATIENT)
Dept: PAIN MEDICINE | Facility: CLINIC | Age: 41
End: 2021-04-21

## 2021-04-21 DIAGNOSIS — G89.4 CHRONIC PAIN SYNDROME: Primary | ICD-10-CM

## 2021-04-21 DIAGNOSIS — M54.42 CHRONIC LEFT-SIDED LOW BACK PAIN WITH LEFT-SIDED SCIATICA: ICD-10-CM

## 2021-04-21 DIAGNOSIS — M51.16 LUMBAR DISC HERNIATION WITH RADICULOPATHY: ICD-10-CM

## 2021-04-21 DIAGNOSIS — G89.29 CHRONIC LEFT-SIDED LOW BACK PAIN WITH LEFT-SIDED SCIATICA: ICD-10-CM

## 2021-04-23 NOTE — TELEPHONE ENCOUNTER
S/w pt, she reports 100% improvement from the injection, she will f/u PRN  Pt would like to know if she should start PT? She would need an order

## 2021-05-05 ENCOUNTER — APPOINTMENT (OUTPATIENT)
Dept: LAB | Facility: CLINIC | Age: 41
End: 2021-05-05
Payer: COMMERCIAL

## 2021-05-05 ENCOUNTER — TRANSCRIBE ORDERS (OUTPATIENT)
Dept: LAB | Facility: CLINIC | Age: 41
End: 2021-05-05

## 2021-05-05 DIAGNOSIS — I10 ESSENTIAL HYPERTENSION: ICD-10-CM

## 2021-05-05 DIAGNOSIS — E87.6 HYPOKALEMIA: ICD-10-CM

## 2021-05-05 LAB
ANION GAP SERPL CALCULATED.3IONS-SCNC: 8 MMOL/L (ref 4–13)
BUN SERPL-MCNC: 10 MG/DL (ref 5–25)
CALCIUM SERPL-MCNC: 8.2 MG/DL (ref 8.3–10.1)
CHLORIDE SERPL-SCNC: 102 MMOL/L (ref 100–108)
CO2 SERPL-SCNC: 30 MMOL/L (ref 21–32)
CREAT SERPL-MCNC: 0.68 MG/DL (ref 0.6–1.3)
GLUCOSE SERPL-MCNC: 78 MG/DL (ref 65–140)
POTASSIUM SERPL-SCNC: 3.1 MMOL/L (ref 3.5–5.3)
SODIUM SERPL-SCNC: 140 MMOL/L (ref 136–145)

## 2021-05-05 PROCEDURE — 84166 PROTEIN E-PHORESIS/URINE/CSF: CPT | Performed by: PATHOLOGY

## 2021-05-05 PROCEDURE — 84166 PROTEIN E-PHORESIS/URINE/CSF: CPT | Performed by: INTERNAL MEDICINE

## 2021-05-05 PROCEDURE — 36415 COLL VENOUS BLD VENIPUNCTURE: CPT

## 2021-05-05 PROCEDURE — 80048 BASIC METABOLIC PNL TOTAL CA: CPT

## 2021-05-06 LAB
ALBUMIN UR ELPH-MCNC: 100 %
ALPHA1 GLOB MFR UR ELPH: 0 %
ALPHA2 GLOB MFR UR ELPH: 0 %
B-GLOBULIN MFR UR ELPH: 0 %
GAMMA GLOB MFR UR ELPH: 0 %
PROT PATTERN UR ELPH-IMP: NORMAL
PROT UR-MCNC: 6 MG/DL

## 2021-05-18 DIAGNOSIS — E87.6 HYPOKALEMIA: ICD-10-CM

## 2021-05-18 DIAGNOSIS — E87.6 HYPOKALEMIA: Primary | ICD-10-CM

## 2021-05-18 RX ORDER — POTASSIUM CHLORIDE 20 MEQ/1
20 TABLET, EXTENDED RELEASE ORAL 2 TIMES DAILY
Qty: 60 TABLET | Refills: 1 | Status: SHIPPED | OUTPATIENT
Start: 2021-05-18 | End: 2021-06-07

## 2021-06-04 ENCOUNTER — APPOINTMENT (OUTPATIENT)
Dept: LAB | Facility: CLINIC | Age: 41
End: 2021-06-04
Payer: COMMERCIAL

## 2021-06-04 DIAGNOSIS — E87.6 HYPOKALEMIA: ICD-10-CM

## 2021-06-04 LAB
ANION GAP SERPL CALCULATED.3IONS-SCNC: 9 MMOL/L (ref 4–13)
BUN SERPL-MCNC: 14 MG/DL (ref 5–25)
CALCIUM SERPL-MCNC: 8.8 MG/DL (ref 8.3–10.1)
CHLORIDE SERPL-SCNC: 102 MMOL/L (ref 100–108)
CO2 SERPL-SCNC: 30 MMOL/L (ref 21–32)
CREAT SERPL-MCNC: 0.98 MG/DL (ref 0.6–1.3)
GLUCOSE SERPL-MCNC: 122 MG/DL (ref 65–140)
POTASSIUM SERPL-SCNC: 3.1 MMOL/L (ref 3.5–5.3)
SODIUM SERPL-SCNC: 141 MMOL/L (ref 136–145)

## 2021-06-04 PROCEDURE — 36415 COLL VENOUS BLD VENIPUNCTURE: CPT

## 2021-06-04 PROCEDURE — 80048 BASIC METABOLIC PNL TOTAL CA: CPT

## 2021-06-07 ENCOUNTER — OFFICE VISIT (OUTPATIENT)
Dept: INTERNAL MEDICINE CLINIC | Facility: CLINIC | Age: 41
End: 2021-06-07
Payer: COMMERCIAL

## 2021-06-07 VITALS
HEIGHT: 69 IN | HEART RATE: 64 BPM | TEMPERATURE: 98.2 F | SYSTOLIC BLOOD PRESSURE: 120 MMHG | DIASTOLIC BLOOD PRESSURE: 76 MMHG | WEIGHT: 315 LBS | OXYGEN SATURATION: 98 % | BODY MASS INDEX: 46.65 KG/M2

## 2021-06-07 DIAGNOSIS — M50.90 CERVICAL DISC DISEASE: ICD-10-CM

## 2021-06-07 DIAGNOSIS — K21.9 GASTROESOPHAGEAL REFLUX DISEASE WITHOUT ESOPHAGITIS: ICD-10-CM

## 2021-06-07 DIAGNOSIS — E87.6 HYPOKALEMIA: ICD-10-CM

## 2021-06-07 DIAGNOSIS — K42.9 UMBILICAL HERNIA WITHOUT OBSTRUCTION AND WITHOUT GANGRENE: ICD-10-CM

## 2021-06-07 DIAGNOSIS — J45.20 MILD INTERMITTENT ASTHMA WITHOUT COMPLICATION: Primary | ICD-10-CM

## 2021-06-07 DIAGNOSIS — E66.01 MORBID OBESITY WITH BMI OF 45.0-49.9, ADULT (HCC): ICD-10-CM

## 2021-06-07 PROCEDURE — 99214 OFFICE O/P EST MOD 30 MIN: CPT | Performed by: INTERNAL MEDICINE

## 2021-06-07 RX ORDER — ALBUTEROL SULFATE 90 UG/1
2 AEROSOL, METERED RESPIRATORY (INHALATION) EVERY 6 HOURS PRN
Qty: 6.7 G | Refills: 2 | Status: SHIPPED | OUTPATIENT
Start: 2021-06-07 | End: 2021-09-10 | Stop reason: SDUPTHER

## 2021-06-07 RX ORDER — POTASSIUM CHLORIDE 20 MEQ/1
20 TABLET, EXTENDED RELEASE ORAL 3 TIMES DAILY
Qty: 90 TABLET | Refills: 2 | Status: SHIPPED | OUTPATIENT
Start: 2021-06-28 | End: 2021-10-19 | Stop reason: SDUPTHER

## 2021-06-07 RX ORDER — LORAZEPAM 1 MG/1
0.5 TABLET ORAL DAILY
Qty: 30 TABLET | Refills: 0 | Status: SHIPPED | OUTPATIENT
Start: 2021-06-07 | End: 2021-09-10 | Stop reason: SDUPTHER

## 2021-06-07 RX ORDER — PANTOPRAZOLE SODIUM 40 MG/1
40 TABLET, DELAYED RELEASE ORAL DAILY
Qty: 90 TABLET | Refills: 0 | Status: SHIPPED | OUTPATIENT
Start: 2021-06-07 | End: 2021-07-19

## 2021-06-07 NOTE — ASSESSMENT & PLAN NOTE
Utilize foods high in potassium, increase K door supplementation to 60 mEq daily    Maintain adequate hydration

## 2021-06-07 NOTE — PATIENT INSTRUCTIONS
Hypokalemia   WHAT YOU NEED TO KNOW:   Hypokalemia is a low level of potassium in your blood  Potassium helps control how your muscles, heart, and digestive system work  Hypokalemia occurs when your body loses too much potassium or does not absorb enough from food  DISCHARGE INSTRUCTIONS:   Return to the emergency department if:   · You cannot move your arm or leg  · You have a fast or irregular heartbeat  · You are too tired or weak to stand up  Contact your healthcare provider if:   · You are vomiting, or you have diarrhea  · You have numbness or tingling in your arms or legs  · Your symptoms do not go away or they get worse  · You have questions or concerns about your condition or care  Medicines:   · Potassium  will be given to bring your potassium levels back to normal     · Take your medicine as directed  Contact your healthcare provider if you think your medicine is not helping or if you have side effects  Tell him of her if you are allergic to any medicine  Keep a list of the medicines, vitamins, and herbs you take  Include the amounts, and when and why you take them  Bring the list or the pill bottles to follow-up visits  Carry your medicine list with you in case of an emergency  Eat foods that are high in potassium:  Foods that are high in potassium include bananas, oranges, tomatoes, potatoes, and avocado  Brown beans, turkey, salmon, lean beef, yogurt, and milk are also high in potassium  Ask your healthcare provider or dietitian for more information about foods that are high in potassium  Follow up with your healthcare provider as directed:  Write down your questions so you remember to ask them during your visits  © Copyright [address] Information is for End User's use only and may not be sold, redistributed or otherwise used for commercial purposes   All illustrations and images included in CareNotes® are the copyrighted property of [employer]  or [employer]  The above information is an  only  It is not intended as medical advice for individual conditions or treatments  Talk to your doctor, nurse or pharmacist before following any medical regimen to see if it is safe and effective for you

## 2021-06-07 NOTE — ASSESSMENT & PLAN NOTE
Encouraged the patient to incorporate some high potassium foods in a calorie reduction diet, limit carbohydrates, exercise more

## 2021-06-07 NOTE — PROGRESS NOTES
Assessment/Plan: Morbid obesity with BMI of 45 0-49 9, adult ([facility] [address] )    Encouraged the patient to incorporate some high potassium foods in a calorie reduction diet, limit carbohydrates, exercise more  GERD (gastroesophageal reflux disease)    Pantoprazole was renewed  Symptoms stable  Cervical disc disease    Range-of-motion exercise  Flexeril and Robaxin as needed  Hypokalemia   Utilize foods high in potassium, increase K door supplementation to 60 mEq daily  Maintain adequate hydration       Diagnoses and all orders for this visit:    Mild intermittent asthma without complication  -     albuterol (PROVENTIL HFA,VENTOLIN HFA) 90 mcg/act inhaler; Inhale 2 puffs every 6 (six) hours as needed for wheezing    Gastroesophageal reflux disease without esophagitis  -     pantoprazole (PROTONIX) 40 mg tablet; Take 1 tablet (40 mg total) by mouth daily    Umbilical hernia without obstruction and without gangrene  -     pantoprazole (PROTONIX) 40 mg tablet; Take 1 tablet (40 mg total) by mouth daily    Cervical disc disease  -     LORazepam (ATIVAN) 1 mg tablet; Take 0 5 tablets (0 5 mg total) by mouth daily    Hypokalemia  -     potassium chloride (K-DUR,KLOR-CON) 20 mEq tablet; Take 1 tablet (20 mEq total) by mouth 3 (three) times a day  -     Basic metabolic panel; Future    Morbid obesity with BMI of 45 0-49 9, adult ([facility])          Subjective:      Patient ID: Katherine Montanez  is a 39 y o  adult  Patient presents for follow-up visit for hypertension, hypokalemia, cervical spine pain,  GERD  Patient is noted to have his experienced a weight gain of 31 lb in the past year  She is status post sleeve gastrectomy  Admits to some dietary noncompliance  Still complaining of leg cramps at night  Follow-up potassium was remains low at 3 1  No evidence of any peripheral edema  Blood pressure appears to be well controlled    Lumbar radicular pain is much better following epidural steroid injection  Family History   Problem Relation Age of Onset    Hypertension Mother     Diabetes Mother     Hypertension Sister      Social History     Socioeconomic History    Marital status: Single     Spouse name: Not on file    Number of children: Not on file    Years of education: Not on file    Highest education level: Not on file   Occupational History    Not on file   Social Needs    Financial resource strain: Not on file    Food insecurity     Worry: Not on file     Inability: Not on file    Transportation needs     Medical: Not on file     Non-medical: Not on file   Tobacco Use    Smoking status: Never Smoker    Smokeless tobacco: Never Used   Substance and Sexual Activity    Alcohol use:  Yes     Alcohol/week: 3 0 standard drinks     Types: 3 Standard drinks or equivalent per week     Frequency: Monthly or less     Drinks per session: 1 or 2     Binge frequency: Never     Comment: 3 per month    Drug use: Never    Sexual activity: Not on file   Lifestyle    Physical activity     Days per week: Not on file     Minutes per session: Not on file    Stress: Not on file   Relationships    Social connections     Talks on phone: Not on file     Gets together: Not on file     Attends Che[employer] service: Not on file     Active member of club or organization: Not on file     Attends meetings of clubs or organizations: Not on file     Relationship status: Not on file    Intimate partner violence     Fear of current or ex partner: Not on file     Emotionally abused: Not on file     Physically abused: Not on file     Forced sexual activity: Not on file   Other Topics Concern    Not on file   Social History Narrative    Not on file     Past Medical History:   Diagnosis Date    Anxiety     Asthma     seasonal    Hernia of abdominal wall     Hypertension     Kidney stones     Seizures ([facility] [address] )     pseudoseizures since 2012       Current Outpatient Medications:     albuterol (PROVENTIL HFA,VENTOLIN HFA) 90 mcg/act inhaler, Inhale 2 puffs every 6 (six) hours as needed for wheezing, Disp: 6 7 g, Rfl: 2    amLODIPine (NORVASC) 10 mg tablet, Take 1 tablet (10 mg total) by mouth daily, Disp: 90 tablet, Rfl: 1    aspirin (ECOTRIN LOW STRENGTH) 81 mg EC tablet, Take 81 mg by mouth daily, Disp: , Rfl:     chlorthalidone 25 mg tablet, Take 1 tablet (25 mg total) by mouth daily, Disp: 90 tablet, Rfl: 1    Cyanocobalamin (B-12) 1000 MCG/ML KIT, Inject 1 ml IM in thigh/ buttocks or deltoid mm once weekly x 8 weeks  Afterward once monthly, Disp: 8 mL, Rfl: 1    diphenhydrAMINE (BENADRYL) 50 MG tablet, Take one tablet 1 hour prior to contrast administration  , Disp: 1 tablet, Rfl: 0    estradiol (ESTRACE) 2 MG tablet, Take 1 tablet (2 mg total) by mouth 2 (two) times a day, Disp: 180 tablet, Rfl: 1    hydrocortisone (ANUSOL-HC) 25 mg suppository, Insert 1 suppository (25 mg total) into the rectum 2 (two) times a day (Patient taking differently: Insert 25 mg into the rectum 2 (two) times a day prn), Disp: 60 suppository, Rfl: 1    LORazepam (ATIVAN) 1 mg tablet, Take 0 5 tablets (0 5 mg total) by mouth daily, Disp: 30 tablet, Rfl: 0    methocarbamol (ROBAXIN) 500 mg tablet, Take 1 tablet (500 mg total) by mouth daily after breakfast, Disp: 90 tablet, Rfl: 1    montelukast (SINGULAIR) 10 mg tablet, Take 1 tablet (10 mg total) by mouth daily at bedtime, Disp: 90 tablet, Rfl: 1    multivitamin (THERAGRAN) TABS, Take 1 tablet by mouth daily, Disp: , Rfl:     ondansetron (ZOFRAN-ODT) 4 mg disintegrating tablet, Take 1 tablet (4 mg total) by mouth every 8 (eight) hours as needed for nausea or vomiting, Disp: 60 tablet, Rfl: 1    pantoprazole (PROTONIX) 40 mg tablet, Take 1 tablet (40 mg total) by mouth daily, Disp: 90 tablet, Rfl: 0    [START ON 6/28/2021] potassium chloride (K-DUR,KLOR-CON) 20 mEq tablet, Take 1 tablet (20 mEq total) by mouth 3 (three) times a day, Disp: 90 tablet, Rfl: 2    pregabalin (LYRICA) 50 mg capsule, Take 1 capsule (50 mg total) by mouth 3 (three) times a day, Disp: 90 capsule, Rfl: 1    traMADol (ULTRAM) 50 mg tablet, TAKE ONE TABLET BY MOUTH ONCE DAILY AS NEEDED FOR MIGRAINES OR NECK SPASMS, Disp: 30 tablet, Rfl: 0    Syringe/Needle, Disp, (SYRINGE 3CC/36WW9-0/4") 27G X 1-1/4" 3 ML MISC, Use for B12 injections, Disp: 10 each, Rfl: 1  Allergies   Allergen Reactions    Gadolinium Anaphylaxis     "Oxygen dropped and coded during"       Iodinated Diagnostic Agents Anaphylaxis     Past Surgical History:   Procedure Laterality Date    APPENDECTOMY      CHOLECYSTECTOMY      GASTRIC BYPASS      HERNIA REPAIR      x2         Review of Systems   Constitutional: Positive for activity change (Does not do very much physical exercise)  HENT: Negative  Eyes: Negative  Respiratory: Negative  Negative for wheezing  Cardiovascular: Negative  Gastrointestinal: Negative  Endocrine: Negative  Genitourinary: Negative  Musculoskeletal: Positive for myalgias (Leg cramping at night) and neck stiffness (Occasional)  Skin: Negative  Allergic/Immunologic: Negative  Neurological: Negative  Hematological: Negative  Psychiatric/Behavioral: Negative  Objective:      /76 (BP Location: Right arm, Patient Position: Sitting, Cuff Size: Large)   Pulse 64   Temp 98 2 °F (36 8 °C) (Tympanic)   Ht 5' 9" (1 753 m)   Wt (!) 145 kg (319 lb 3 2 oz)   SpO2 98% Comment: Room Air  BMI 47 14 kg/m²          Physical Exam  Constitutional:       General: She is not in acute distress  Appearance: She is obese  She is not ill-appearing, toxic-appearing or diaphoretic  HENT:      Head: Normocephalic and atraumatic  Right Ear: External ear normal       Left Ear: External ear normal    Eyes:      General: No scleral icterus  Conjunctiva/sclera: Conjunctivae normal       Pupils: Pupils are equal, round, and reactive to light     Neck:      Musculoskeletal: Neck supple  No muscular tenderness  Cardiovascular:      Rate and Rhythm: Normal rate and regular rhythm  Pulses: Normal pulses  Heart sounds: Normal heart sounds  No murmur  Pulmonary:      Effort: Pulmonary effort is normal  No respiratory distress  Breath sounds: Normal breath sounds  No wheezing or rales  Abdominal:      General: Abdomen is protuberant  There is no distension  Tenderness: There is no abdominal tenderness  Musculoskeletal:         General: No swelling, tenderness or deformity  Right lower leg: No edema  Left lower leg: No edema  Skin:     General: Skin is warm  Capillary Refill: Capillary refill takes less than 2 seconds  Coloration: Skin is not jaundiced  Findings: No bruising, erythema or rash  Neurological:      General: No focal deficit present  Mental Status: She is alert and oriented to person, place, and time  Mental status is at baseline  Psychiatric:         Mood and Affect: Mood normal          Behavior: Behavior normal          Thought Content:  Thought content normal          Judgment: Judgment normal

## 2021-06-18 ENCOUNTER — CLINICAL SUPPORT (OUTPATIENT)
Dept: NEUROLOGY | Facility: CLINIC | Age: 41
End: 2021-06-18
Payer: COMMERCIAL

## 2021-06-18 VITALS
SYSTOLIC BLOOD PRESSURE: 120 MMHG | BODY MASS INDEX: 47.14 KG/M2 | HEIGHT: 69 IN | HEART RATE: 65 BPM | DIASTOLIC BLOOD PRESSURE: 77 MMHG

## 2021-06-18 DIAGNOSIS — M79.18 CERVICAL MYOFASCIAL PAIN SYNDROME: Primary | ICD-10-CM

## 2021-06-18 PROCEDURE — 20553 NJX 1/MLT TRIGGER POINTS 3/>: CPT | Performed by: PHYSICIAN ASSISTANT

## 2021-06-18 NOTE — PROGRESS NOTES
Patient ID: Chiki Singh  is a 39 y o  adult  Assessment/Plan:    No problem-specific Assessment & Plan notes found for this encounter  There are no diagnoses linked to this encounter  Subjective:    HPI      The following portions of the patient's history were reviewed and updated as appropriate:   She  has a past medical history of Anxiety, Asthma, Hernia of abdominal wall, Hypertension, Kidney stones, and Seizures ([facility] [address] )  She   Patient Active Problem List    Diagnosis Date Noted    Hypokalemia 06/07/2021    Elevated serum immunoglobulin free light chain level 04/05/2021    Chronic pain syndrome 03/30/2021    Lumbar disc herniation with radiculopathy 03/30/2021    Acute sinusitis 11/17/2020    Chronic neck pain 05/18/2020    Low back pain 05/18/2020    Herniation of intervertebral disc of lumbar spine 04/03/2020    Morbid obesity with BMI of 45 0-49 9, adult ([facility] [address] ) 04/03/2020    Left-sided weakness and sensory deficits 03/06/2020    Worsening headaches 82/30/6370    Umbilical hernia 56/04/3039    GERD (gastroesophageal reflux disease) 05/31/2019    History of sleeve gastrectomy 03/19/2019    Essential hypertension 03/19/2019    Cervical disc disease 03/19/2019    Male-to-female transgender person 03/19/2019    Anxiety 03/19/2019     She  has a past surgical history that includes Gastric bypass; Appendectomy; Cholecystectomy; and Hernia repair  Her family history includes Diabetes in her mother; Hypertension in her mother and sister  She  reports that she has never smoked  She has never used smokeless tobacco  She reports current alcohol use of about 3 0 standard drinks of alcohol per week  She reports that she does not use drugs    Current Outpatient Medications   Medication Sig Dispense Refill    albuterol (PROVENTIL HFA,VENTOLIN HFA) 90 mcg/act inhaler Inhale 2 puffs every 6 (six) hours as needed for wheezing 6 7 g 2    amLODIPine (NORVASC) 10 mg tablet Take 1 tablet (10 mg total) by mouth daily 90 tablet 1    aspirin (ECOTRIN LOW STRENGTH) 81 mg EC tablet Take 81 mg by mouth daily      chlorthalidone 25 mg tablet Take 1 tablet (25 mg total) by mouth daily 90 tablet 1    Cyanocobalamin (B-12) 1000 MCG/ML KIT Inject 1 ml IM in thigh/ buttocks or deltoid mm once weekly x 8 weeks  Afterward once monthly 8 mL 1    diphenhydrAMINE (BENADRYL) 50 MG tablet Take one tablet 1 hour prior to contrast administration   1 tablet 0    estradiol (ESTRACE) 2 MG tablet Take 1 tablet (2 mg total) by mouth 2 (two) times a day 180 tablet 1    hydrocortisone (ANUSOL-HC) 25 mg suppository Insert 1 suppository (25 mg total) into the rectum 2 (two) times a day (Patient taking differently: Insert 25 mg into the rectum 2 (two) times a day prn) 60 suppository 1    LORazepam (ATIVAN) 1 mg tablet Take 0 5 tablets (0 5 mg total) by mouth daily 30 tablet 0    methocarbamol (ROBAXIN) 500 mg tablet Take 1 tablet (500 mg total) by mouth daily after breakfast 90 tablet 1    montelukast (SINGULAIR) 10 mg tablet Take 1 tablet (10 mg total) by mouth daily at bedtime 90 tablet 1    multivitamin (THERAGRAN) TABS Take 1 tablet by mouth daily      ondansetron (ZOFRAN-ODT) 4 mg disintegrating tablet Take 1 tablet (4 mg total) by mouth every 8 (eight) hours as needed for nausea or vomiting 60 tablet 1    pantoprazole (PROTONIX) 40 mg tablet Take 1 tablet (40 mg total) by mouth daily 90 tablet 0    [START ON 6/28/2021] potassium chloride (K-DUR,KLOR-CON) 20 mEq tablet Take 1 tablet (20 mEq total) by mouth 3 (three) times a day 90 tablet 2    pregabalin (LYRICA) 50 mg capsule Take 1 capsule (50 mg total) by mouth 3 (three) times a day 90 capsule 1    Syringe/Needle, Disp, (SYRINGE 3CC/93HS0-5/4") 27G X 1-1/4" 3 ML MISC Use for B12 injections 10 each 1    traMADol (ULTRAM) 50 mg tablet TAKE ONE TABLET BY MOUTH ONCE DAILY AS NEEDED FOR MIGRAINES OR NECK SPASMS 30 tablet 0     No current facility-administered medications for this visit  She is allergic to gadolinium and iodinated diagnostic agents            Objective:    Blood pressure 120/77, pulse 65, height 5' 9" (1 753 m)  Physical Exam    Neurological Exam      ROS:    Review of Systems   Constitutional: Negative  Negative for appetite change and fever  HENT: Negative  Negative for hearing loss, tinnitus, trouble swallowing and voice change  Eyes: Negative  Negative for photophobia and pain  Respiratory: Negative  Negative for shortness of breath  Cardiovascular: Negative  Negative for palpitations  Gastrointestinal: Negative  Negative for nausea and vomiting  Endocrine: Negative  Negative for cold intolerance  Genitourinary: Negative  Negative for dysuria, frequency and urgency  Musculoskeletal: Positive for neck pain and neck stiffness  Negative for myalgias  Skin: Negative  Negative for rash  Neurological: Positive for headaches (scale 4/10 pain level)  Negative for dizziness, tremors, seizures, syncope, facial asymmetry, speech difficulty, weakness, light-headedness and numbness  Hematological: Negative  Does not bruise/bleed easily  Psychiatric/Behavioral: Negative  Negative for confusion, hallucinations and sleep disturbance  The following portions of the patient's history were reviewed and updated as appropriate: allergies, current medications/ medication history, past family history, past medical history, past social history, past surgical history and problem list     Review of systems was reviewed and otherwise unremarkable from a neurological perspective

## 2021-06-20 PROBLEM — M79.18 CERVICAL MYOFASCIAL PAIN SYNDROME: Status: ACTIVE | Noted: 2021-06-20

## 2021-06-20 PROCEDURE — S0020 INJECTION, BUPIVICAINE HYDRO: HCPCS | Performed by: PHYSICIAN ASSISTANT

## 2021-06-20 RX ORDER — BUPIVACAINE HYDROCHLORIDE 2.5 MG/ML
5 INJECTION, SOLUTION EPIDURAL; INFILTRATION; INTRACAUDAL ONCE
Status: COMPLETED | OUTPATIENT
Start: 2021-06-20 | End: 2021-06-20

## 2021-06-20 RX ADMIN — BUPIVACAINE HYDROCHLORIDE 5 ML: 2.5 INJECTION, SOLUTION EPIDURAL; INFILTRATION; INTRACAUDAL at 09:25

## 2021-07-16 ENCOUNTER — CLINICAL SUPPORT (OUTPATIENT)
Dept: NEUROLOGY | Facility: CLINIC | Age: 41
End: 2021-07-16
Payer: COMMERCIAL

## 2021-07-16 DIAGNOSIS — M79.18 CERVICAL MYOFASCIAL PAIN SYNDROME: Primary | ICD-10-CM

## 2021-07-16 PROCEDURE — 20553 NJX 1/MLT TRIGGER POINTS 3/>: CPT | Performed by: PHYSICIAN ASSISTANT

## 2021-07-16 PROCEDURE — S0020 INJECTION, BUPIVICAINE HYDRO: HCPCS | Performed by: PHYSICIAN ASSISTANT

## 2021-07-16 RX ORDER — BUPIVACAINE HYDROCHLORIDE 2.5 MG/ML
5 INJECTION, SOLUTION EPIDURAL; INFILTRATION; INTRACAUDAL ONCE
Status: COMPLETED | OUTPATIENT
Start: 2021-07-16 | End: 2021-07-16

## 2021-07-16 RX ADMIN — BUPIVACAINE HYDROCHLORIDE 5 ML: 2.5 INJECTION, SOLUTION EPIDURAL; INFILTRATION; INTRACAUDAL at 17:07

## 2021-07-16 NOTE — PROGRESS NOTES
Inj Trigger Point Single/Multiple     Date/Time 7/16/2021 4:51 PM     Performed by  Vahid Person PA-C     Authorized by Vahid Person PA-C      Universal Protocol   Consent: The procedure was performed in an emergent situation  Verbal consent obtained  Risks and benefits: risks, benefits and alternatives were discussed  Consent given by: patient       Procedure Details   Procedure Notes: Trigger point injections were performed on an emergent basis today and they are part of ongoing therapy  Trigger point injections were performed in the sites of maximal tenderness as reported by the patient, using a 30 gauge 1/2 inch needle with a total of 5 mL 0 25% bupivacaine, w/o epi, w/o steroid  The following locations were injected:  Right > left upper traps, right and left middle traps and R and L cervical paraspinal muscles  The patient tolerated the injections with no complications  She reports improved range of motion and relief of pain following the injections  After the last injection, she continues to have reduction of headaches (following the occipital muscle injections)    Patient tolerance: patient tolerated the procedure well with no immediate complications

## 2021-07-17 DIAGNOSIS — K21.9 GASTROESOPHAGEAL REFLUX DISEASE WITHOUT ESOPHAGITIS: ICD-10-CM

## 2021-07-17 DIAGNOSIS — K42.9 UMBILICAL HERNIA WITHOUT OBSTRUCTION AND WITHOUT GANGRENE: ICD-10-CM

## 2021-07-19 RX ORDER — PANTOPRAZOLE SODIUM 40 MG/1
TABLET, DELAYED RELEASE ORAL
Qty: 90 TABLET | Refills: 0 | Status: SHIPPED | OUTPATIENT
Start: 2021-07-19 | End: 2021-10-19 | Stop reason: SDUPTHER

## 2021-07-27 DIAGNOSIS — K64.9 HEMORRHOIDS, UNSPECIFIED HEMORRHOID TYPE: ICD-10-CM

## 2021-07-28 RX ORDER — HYDROCORTISONE ACETATE 25 MG/1
SUPPOSITORY RECTAL
Qty: 12 SUPPOSITORY | Refills: 0 | Status: SHIPPED | OUTPATIENT
Start: 2021-07-28 | End: 2022-06-27 | Stop reason: SDUPTHER

## 2021-08-26 ENCOUNTER — APPOINTMENT (OUTPATIENT)
Dept: LAB | Facility: CLINIC | Age: 41
End: 2021-08-26
Payer: COMMERCIAL

## 2021-08-26 DIAGNOSIS — E87.6 HYPOKALEMIA: ICD-10-CM

## 2021-08-26 LAB
ANION GAP SERPL CALCULATED.3IONS-SCNC: 8 MMOL/L (ref 4–13)
BUN SERPL-MCNC: 14 MG/DL (ref 5–25)
CALCIUM SERPL-MCNC: 8.7 MG/DL (ref 8.3–10.1)
CHLORIDE SERPL-SCNC: 103 MMOL/L (ref 100–108)
CO2 SERPL-SCNC: 29 MMOL/L (ref 21–32)
CREAT SERPL-MCNC: 1.05 MG/DL (ref 0.6–1.3)
GLUCOSE SERPL-MCNC: 91 MG/DL (ref 65–140)
POTASSIUM SERPL-SCNC: 3.3 MMOL/L (ref 3.5–5.3)
SODIUM SERPL-SCNC: 140 MMOL/L (ref 136–145)

## 2021-08-26 PROCEDURE — 36415 COLL VENOUS BLD VENIPUNCTURE: CPT

## 2021-08-26 PROCEDURE — 80048 BASIC METABOLIC PNL TOTAL CA: CPT

## 2021-09-10 ENCOUNTER — OFFICE VISIT (OUTPATIENT)
Dept: INTERNAL MEDICINE CLINIC | Facility: CLINIC | Age: 41
End: 2021-09-10
Payer: COMMERCIAL

## 2021-09-10 VITALS
DIASTOLIC BLOOD PRESSURE: 76 MMHG | OXYGEN SATURATION: 98 % | HEIGHT: 69 IN | WEIGHT: 315 LBS | SYSTOLIC BLOOD PRESSURE: 117 MMHG | TEMPERATURE: 99.1 F | BODY MASS INDEX: 46.65 KG/M2 | HEART RATE: 65 BPM

## 2021-09-10 DIAGNOSIS — M50.90 CERVICAL DISC DISEASE: ICD-10-CM

## 2021-09-10 DIAGNOSIS — J45.20 MILD INTERMITTENT ASTHMA WITHOUT COMPLICATION: ICD-10-CM

## 2021-09-10 DIAGNOSIS — R51.9 WORSENING HEADACHES: ICD-10-CM

## 2021-09-10 DIAGNOSIS — E66.01 MORBID OBESITY WITH BMI OF 45.0-49.9, ADULT (HCC): ICD-10-CM

## 2021-09-10 DIAGNOSIS — R53.1 LEFT-SIDED WEAKNESS: ICD-10-CM

## 2021-09-10 DIAGNOSIS — K21.9 GASTROESOPHAGEAL REFLUX DISEASE WITHOUT ESOPHAGITIS: Primary | ICD-10-CM

## 2021-09-10 DIAGNOSIS — E87.6 HYPOKALEMIA: ICD-10-CM

## 2021-09-10 DIAGNOSIS — I10 ESSENTIAL HYPERTENSION: ICD-10-CM

## 2021-09-10 DIAGNOSIS — Z90.3 HISTORY OF SLEEVE GASTRECTOMY: ICD-10-CM

## 2021-09-10 PROCEDURE — 99214 OFFICE O/P EST MOD 30 MIN: CPT | Performed by: INTERNAL MEDICINE

## 2021-09-10 RX ORDER — ALBUTEROL SULFATE 90 UG/1
2 AEROSOL, METERED RESPIRATORY (INHALATION) EVERY 6 HOURS PRN
Qty: 6.7 G | Refills: 2 | Status: SHIPPED | OUTPATIENT
Start: 2021-09-10

## 2021-09-10 RX ORDER — TIZANIDINE HYDROCHLORIDE 2 MG/1
2 CAPSULE, GELATIN COATED ORAL
Qty: 30 CAPSULE | Refills: 0 | Status: SHIPPED | OUTPATIENT
Start: 2021-09-10 | End: 2022-05-26 | Stop reason: SDUPTHER

## 2021-09-10 RX ORDER — LORAZEPAM 1 MG/1
0.5 TABLET ORAL DAILY
Qty: 30 TABLET | Refills: 0 | Status: SHIPPED | OUTPATIENT
Start: 2021-09-10 | End: 2021-12-10 | Stop reason: SDUPTHER

## 2021-09-10 RX ORDER — CHLORTHALIDONE 25 MG/1
25 TABLET ORAL DAILY
Qty: 90 TABLET | Refills: 1 | Status: SHIPPED | OUTPATIENT
Start: 2021-09-10 | End: 2022-06-27

## 2021-09-10 NOTE — ASSESSMENT & PLAN NOTE
Potassium increased to 2 tablets in the morning 1 tablet in the evening    We will follow-up BMP in 3 months

## 2021-09-10 NOTE — PROGRESS NOTES
Assessment/Plan:    Cervical disc disease   Patient having significant myofascial pain  Has received a series of trigger point injections with some relief  We will add some bedtime Zanaflex to see if this will help relieve the muscle tension to any additional degree    Essential hypertension   Blood pressure is stable and well controlled on current medications  GERD (gastroesophageal reflux disease)    GERD affecting the patient's ability to eat certain types of foods  Primarily consuming or iced based diet which is not good for her weight  Will refer to bariatric medicine for consultation and evaluation by dietitian    History of sleeve gastrectomy   Bariatric medicine follow-up for dietary guidance    Hypokalemia   Potassium increased to 2 tablets in the morning 1 tablet in the evening  We will follow-up BMP in 3 months    Left-sided weakness and sensory deficits   Following with Neurology  Most likely consequence of myofascial pain syndrome    Morbid obesity with BMI of 45 0-49 9, adult [facility])   Follow-up with bariatric medicine for dietary guidance as noted previously    Worsening headaches   Continue medical therapy /trigger point injections       Diagnoses and all orders for this visit:    Gastroesophageal reflux disease without esophagitis    Essential hypertension  -     chlorthalidone 25 mg tablet; Take 1 tablet (25 mg total) by mouth daily    Worsening headaches  -     chlorthalidone 25 mg tablet; Take 1 tablet (25 mg total) by mouth daily    Cervical disc disease  -     LORazepam (ATIVAN) 1 mg tablet; Take 0 5 tablets (0 5 mg total) by mouth daily  -     TiZANidine (ZANAFLEX) 2 MG capsule; Take 1 capsule (2 mg total) by mouth daily at bedtime as needed for muscle spasms    Left-sided weakness and sensory deficits    History of sleeve gastrectomy  -     Ambulatory referral to Weight Management;  Future    Morbid obesity with BMI of 45 0-49 9, adult ([facility] )  -     Ambulatory referral to Weight Management; Future    Hypokalemia  -     Basic metabolic panel; Future    Mild intermittent asthma without complication  -     albuterol (PROVENTIL HFA,VENTOLIN HFA) 90 mcg/act inhaler; Inhale 2 puffs every 6 (six) hours as needed for wheezing          Subjective:      Patient ID: Glenis Santiago  is a 39 y o  adult  Patient presents to the office for follow-up visit for multiple issues  She has been experiencing significant neck and shoulder muscle spasms at times almost have stroke-like symptoms with unilateral weakness  She was evaluated by Neurology and was determined that her symptoms are myofascial in origin  She has some significant issues with her diet  She is intolerant to multiple different foods and is currently consuming a diet that is primarily rice based because it is what is tolerated  Labs are reviewed  Potassium is improved but still low at 3 3  We will increase potassium supplementation  Labs otherwise unremarkable  Family History   Problem Relation Age of Onset    Hypertension Mother     Diabetes Mother     Hypertension Sister      Social History     Socioeconomic History    Marital status: Single     Spouse name: Not on file    Number of children: Not on file    Years of education: Not on file    Highest education level: Not on file   Occupational History    Not on file   Tobacco Use    Smoking status: Never Smoker    Smokeless tobacco: Never Used   Vaping Use    Vaping Use: Never used   Substance and Sexual Activity    Alcohol use:  Yes     Alcohol/week: 3 0 standard drinks     Types: 3 Standard drinks or equivalent per week     Comment: 3 per month    Drug use: Never    Sexual activity: Not on file   Other Topics Concern    Not on file   Social History Narrative    Not on file     Social Determinants of Health     Financial Resource Strain:     Difficulty of Paying Living Expenses:    Food Insecurity:     Worried About Running Out of Food in the Last Year:  Ran Out of Food in the Last Year:    Transportation Needs:     Lack of Transportation (Medical):  Lack of Transportation (Non-Medical):    Physical Activity:     Days of Exercise per Week:     Minutes of Exercise per Session:    Stress:     Feeling of Stress :    Social Connections:     Frequency of Communication with Friends and Family:     Frequency of Social Gatherings with Friends and Family:     Attends [employer] Services:     Active Member of Clubs or Organizations:     Attends Club or Organization Meetings:     Marital Status:    Intimate Partner Violence:     Fear of Current or Ex-Partner:     Emotionally Abused:     Physically Abused:     Sexually Abused:      Past Medical History:   Diagnosis Date    Anxiety     Asthma     seasonal    Hernia of abdominal wall     Hypertension     Kidney stones     Seizures ([facility] )     pseudoseizures since 2012       Current Outpatient Medications:     albuterol (PROVENTIL HFA,VENTOLIN HFA) 90 mcg/act inhaler, Inhale 2 puffs every 6 (six) hours as needed for wheezing, Disp: 6 7 g, Rfl: 2    amLODIPine (NORVASC) 10 mg tablet, Take 1 tablet (10 mg total) by mouth daily, Disp: 90 tablet, Rfl: 1    aspirin (ECOTRIN LOW STRENGTH) 81 mg EC tablet, Take 81 mg by mouth daily, Disp: , Rfl:     chlorthalidone 25 mg tablet, Take 1 tablet (25 mg total) by mouth daily, Disp: 90 tablet, Rfl: 1    Cyanocobalamin (B-12) 1000 MCG/ML KIT, Inject 1 ml IM in thigh/ buttocks or deltoid mm once weekly x 8 weeks   Afterward once monthly, Disp: 8 mL, Rfl: 1    estradiol (ESTRACE) 2 MG tablet, Take 1 tablet (2 mg total) by mouth 2 (two) times a day, Disp: 180 tablet, Rfl: 1    hydrocortisone (ANUSOL-HC) 25 mg suppository, UNWRAP AND INSERT 1 SUPPOSITORY RECTALLY TWICE DAILY, Disp: 12 suppository, Rfl: 0    LORazepam (ATIVAN) 1 mg tablet, Take 0 5 tablets (0 5 mg total) by mouth daily, Disp: 30 tablet, Rfl: 0    methocarbamol (ROBAXIN) 500 mg tablet, Take 1 tablet (500 mg total) by mouth daily after breakfast, Disp: 90 tablet, Rfl: 1    montelukast (SINGULAIR) 10 mg tablet, Take 1 tablet (10 mg total) by mouth daily at bedtime, Disp: 90 tablet, Rfl: 1    multivitamin (THERAGRAN) TABS, Take 1 tablet by mouth daily, Disp: , Rfl:     ondansetron (ZOFRAN-ODT) 4 mg disintegrating tablet, Take 1 tablet (4 mg total) by mouth every 8 (eight) hours as needed for nausea or vomiting, Disp: 60 tablet, Rfl: 1    pantoprazole (PROTONIX) 40 mg tablet, TAKE ONE TABLET BY MOUTH EVERY DAY, Disp: 90 tablet, Rfl: 0    potassium chloride (K-DUR,KLOR-CON) 20 mEq tablet, Take 1 tablet (20 mEq total) by mouth 3 (three) times a day, Disp: 90 tablet, Rfl: 2    pregabalin (LYRICA) 50 mg capsule, Take 1 capsule (50 mg total) by mouth 3 (three) times a day (Patient taking differently: Take 50 mg by mouth 3 (three) times a day as needed ), Disp: 90 capsule, Rfl: 1    Syringe/Needle, Disp, (SYRINGE 3CC/49DT9-1/4") 27G X 1-1/4" 3 ML MISC, Use for B12 injections, Disp: 10 each, Rfl: 1    traMADol (ULTRAM) 50 mg tablet, TAKE ONE TABLET BY MOUTH ONCE DAILY AS NEEDED FOR MIGRAINES OR NECK SPASMS, Disp: 30 tablet, Rfl: 0    TiZANidine (ZANAFLEX) 2 MG capsule, Take 1 capsule (2 mg total) by mouth daily at bedtime as needed for muscle spasms, Disp: 30 capsule, Rfl: 0  Allergies   Allergen Reactions    Gadolinium Anaphylaxis     "Oxygen dropped and coded during"       Iodinated Diagnostic Agents Anaphylaxis     Past Surgical History:   Procedure Laterality Date    APPENDECTOMY      CHOLECYSTECTOMY      GASTRIC BYPASS      HERNIA REPAIR      x2         Review of Systems   Constitutional: Positive for activity change (Due to neck and shoulder muscle spasms)  Negative for chills, diaphoresis, fatigue and fever  HENT: Negative  Eyes: Negative  Respiratory: Positive for wheezing (Rarely)  Cardiovascular: Negative  Gastrointestinal: Negative           GERD and intolerance to many different foods   Genitourinary: Negative  Musculoskeletal: Positive for myalgias (Neck and shoulder)  Skin: Negative  Allergic/Immunologic: Negative  Neurological: Positive for facial asymmetry (Consequent to trigger point injection), weakness (Left side), numbness (Left side) and headaches (Occasion)  Hematological: Negative  Psychiatric/Behavioral: The patient is nervous/anxious  Objective:      /76   Pulse 65   Temp 99 1 °F (37 3 °C) (Tympanic)   Ht 5' 9" (1 753 m)   Wt (!) 145 kg (319 lb 6 4 oz)   SpO2 98%   BMI 47 17 kg/m²   BMI Counseling: Body mass index is 47 17 kg/m²  The BMI is above normal  Patient referred to weight management due to patient being morbidly obese  Physical Exam  Vitals reviewed  Constitutional:       General: She is not in acute distress  Appearance: She is obese  She is not ill-appearing, toxic-appearing or diaphoretic  HENT:      Head: Normocephalic and atraumatic  Right Ear: External ear normal       Left Ear: External ear normal    Eyes:      General: No scleral icterus  Pupils: Pupils are equal, round, and reactive to light  Cardiovascular:      Rate and Rhythm: Normal rate and regular rhythm  Pulses: Normal pulses  Heart sounds: Normal heart sounds  No murmur heard  Pulmonary:      Effort: Pulmonary effort is normal  No respiratory distress  Breath sounds: Normal breath sounds  No wheezing  Abdominal:      General: Abdomen is flat  There is no distension  Palpations: Abdomen is soft  Musculoskeletal:      Cervical back: Neck supple  No rigidity  Right lower leg: No edema  Left lower leg: No edema  Skin:     General: Skin is warm  Coloration: Skin is not jaundiced  Findings: No bruising, erythema or rash  Neurological:      General: No focal deficit present  Mental Status: She is alert and oriented to person, place, and time  Mental status is at baseline     Psychiatric: Mood and Affect: Mood normal          Behavior: Behavior normal

## 2021-09-10 NOTE — ASSESSMENT & PLAN NOTE
GERD affecting the patient's ability to eat certain types of foods  Primarily consuming or iced based diet which is not good for her weight    Will refer to bariatric medicine for consultation and evaluation by dietitian

## 2021-10-07 NOTE — CONSULTS
Consultation - Neurosurgery   Milan Land  44 y o  adult MRN: [id number]  Unit/Bed#: S -01 Encounter: [id number]    Images reviewed at morning rounds on 03/10/2020 at 7:30 a m  Patient was seen and examined on 03/10/2020 at 7:35 a m  Consults    Assessment/Plan               Assessment:  1  Left-sided body weakness  2  Muscle spasm  3  Lower back pain with left leg radiculopathy  4  History of seizure  5  Hypertension  6  Anxiety  7  Asthma      Plan:   · Exam: A&OX3, EOMI bilateral conjugate  [place], finger-to-nose normal and without drift bilaterally  Slight weakness in the left  strength, 4+/5, left dorsiflexion and plantar flexion 4+/5, right side upper and lower extremity 5/5  Sensation to light touch decreased in the left face, and left upper and lower extremities   DTR 2+ without clonus and Babinski negative bilaterally  · Imagings reviewed personally and by attending is as follows:  · MRI of lumbar spine without contrast on 03/09/2020 demonstrates large left neural foraminal disc protrusion at L3-4, central disc herniation extrusion type extending slight were the left L4-5, hyperintense tomorrow also noted  · Cervical spine MRI without contrast on 03/09/2020 demonstrate none specific straightening of the cervical lordosis without subluxation, mild spondylosis no cord compression or cord signal abnormality  Red marrow compression noted  · CT head without contrast on 03/06/2020 no acute intracranial abnormality  · Pain control:  Per primary team  · DVT ppx:   · SCDs bilateral legs  · Continue heparin subcu  · Activity:  As tolerated  · PT/OT evaluation & treatment  · Brace:  None  · Medical Mx:  Per primary team  · Neurocheck:  Routine  · Patient admitted with left-sided weakness, patient currently improving with her walking, imagings as described above  From Neurosurgery perspective no emergency surgical procedure is indicated    Patient can continue with outpatient Pain Management-[facility] physical therapy  Can keep her outpatient Neurosurgery follow-up  Will sign off  Call for question or concern  History of Present Illness     HPI: Eddy Sierra  is a 44 y o  adult with PMH anxiety, hypertension, seizure, asthma, known to our service for her neck and back pain  MRI of cervical and lumbar spines were ordered as outpatient, and she was started on physical therapy pending Pain Management for epidural steroid injection  However, Patient developed left-sided body weakness while at work on Friday and was admitted  Patient also reports difficulty turning her neck and lower back pain  He also reports numbness in left face, upper left arm, and left lower extremity  This morning she started feeling urinary urgency with incontinent  Patient has some gait instability but denies history of fall  She started on different pain medications and felt better this morning  Patient denies history of fever, chills, rigors, cough or chest pain  Denies history of diabetes mellitus, congestive heart failure, stroke, bleeding disorder or taking anticoagulant medications  Denies history of smoking cigarettes  MRI of cervical and lumbar spine findings as described above in the assessment section  Review of Systems   Constitutional: Negative for activity change, chills and fever  HENT: Negative for trouble swallowing and voice change  Eyes: Negative for photophobia and visual disturbance  Respiratory: Negative for cough, chest tightness and shortness of breath  Cardiovascular: Negative for chest pain, palpitations and leg swelling  Gastrointestinal: Negative for diarrhea, nausea and vomiting  Genitourinary: Positive for urgency  Musculoskeletal: Positive for back pain, gait problem, neck pain and neck stiffness  Neurological: Positive for seizures, weakness and numbness   Negative for dizziness, tremors, syncope, facial asymmetry, speech difficulty, light-headedness and headaches  Psychiatric/Behavioral: Negative for confusion and decreased concentration         Historical Information   Past Medical History:   Diagnosis Date    Anxiety     Asthma     seasonal    Hernia of abdominal wall     Hypertension     Seizures ([address] )     pseudoseizures since 2012     Past Surgical History:   Procedure Laterality Date    APPENDECTOMY      CHOLECYSTECTOMY      GASTRIC BYPASS      HERNIA REPAIR      x2     Social History     Substance and Sexual Activity   Alcohol Use Yes    Alcohol/week: 3 0 standard drinks    Types: 3 Standard drinks or equivalent per week    Comment: 3 per month     Social History     Substance and Sexual Activity   Drug Use Never     Social History     Tobacco Use   Smoking Status Never Smoker   Smokeless Tobacco Never Used     Family History   Problem Relation Age of Onset    Hypertension Mother     Diabetes Mother     Hypertension Sister        Meds/Allergies   all current active meds have been reviewed, current meds:   Current Facility-Administered Medications   Medication Dose Route Frequency    albuterol (PROVENTIL HFA,VENTOLIN HFA) inhaler 2 puff  2 puff Inhalation Q6H PRN    amLODIPine (NORVASC) tablet 5 mg  5 mg Oral Daily    baclofen tablet 10 mg  10 mg Oral TID    bisacodyl (DULCOLAX) rectal suppository 10 mg  10 mg Rectal Daily PRN    calcium carbonate (TUMS) chewable tablet 1,000 mg  1,000 mg Oral Daily PRN    chlorthalidone tablet 25 mg  25 mg Oral Daily    diphenhydrAMINE (BENADRYL) injection 25 mg  25 mg Intravenous Q6H PRN    docusate sodium (COLACE) capsule 100 mg  100 mg Oral BID PRN    estradiol (ESTRACE) tablet 2 mg  2 mg Oral Daily    gabapentin (NEURONTIN) capsule 300 mg  300 mg Oral TID    heparin (porcine) subcutaneous injection 5,000 Units  5,000 Units Subcutaneous Q8H [facility]    hydrocortisone (ANUSOL-HC) rectal suppository 25 mg  25 mg Rectal BID PRN    ketorolac (TORADOL) injection 30 mg  30 mg Intravenous Q6H [facility]    LORazepam (ATIVAN) tablet 1 mg  1 mg Oral Q8H PRN    magnesium sulfate IVPB (premix) SOLN 1 g  1 g Intravenous BID    methocarbamol (ROBAXIN) tablet 500 mg  500 mg Oral Daily PRN    metoclopramide (REGLAN) injection 10 mg  10 mg Intravenous Q6H [address]    multivitamin-minerals (CENTRUM) tablet 1 tablet  1 tablet Oral Daily    ondansetron (ZOFRAN) injection 4 mg  4 mg Intravenous Q6H PRN    ondansetron (ZOFRAN-ODT) dispersible tablet 4 mg  4 mg Oral Q8H PRN    pantoprazole (PROTONIX) EC tablet 40 mg  40 mg Oral Early Morning    senna (SENOKOT) tablet 8 6 mg  1 tablet Oral Daily    traMADol (ULTRAM) tablet 50 mg  50 mg Oral Q4H PRN    valproate (DEPACON) 1,000 mg in sodium chloride 0 9 % 50 mL IVPB  1,000 mg Intravenous HS    and PTA meds:   Prior to Admission Medications   Prescriptions Last Dose Informant Patient Reported? Taking? LORazepam (ATIVAN) 1 mg tablet   No No   Sig: Take 1 tablet (1 mg total) by mouth as needed for anxiety   albuterol (PROVENTIL HFA,VENTOLIN HFA) 90 mcg/act inhaler  Self No No   Sig: Inhale 2 puffs every 6 (six) hours as needed for wheezing   amLODIPine (NORVASC) 5 mg tablet   No No   Sig: TAKE ONE TABLET BY MOUTH DAILY   cetirizine (ZyrTEC) 10 mg tablet  Self No No   Sig: Take 1 tablet (10 mg total) by mouth daily   Patient not taking: Reported on 3/3/2020   chlorthalidone 25 mg tablet   No No   Sig: Take 1 tablet (25 mg total) by mouth daily   cyclobenzaprine (FLEXERIL) 10 mg tablet  Self No No   Sig: Take 1 tablet (10 mg total) by mouth daily as needed for muscle spasms for up to 30 days   diphenhydrAMINE (BENADRYL) 50 MG tablet   No No   Sig: Take one tablet 1 hour prior to contrast administration     estradiol (ESTRACE) 2 MG tablet   No No   Sig: Take 1 tablet (2 mg total) by mouth 2 (two) times a day   gabapentin (NEURONTIN) 100 mg capsule   No No   Sig: Take 1 tab AM and afternoon daily   gabapentin (NEURONTIN) 300 mg capsule   No No   Sig: Take 1 capsule (300 mg total) by mouth daily at bedtime   hydrocortisone (ANUSOL-HC) 25 mg suppository   No No   Sig: Insert 1 suppository (25 mg total) into the rectum 2 (two) times a day   methocarbamol (ROBAXIN) 500 mg tablet   No No   Sig: Take 1 tablet (500 mg total) by mouth daily as needed for muscle spasms   multivitamin (THERAGRAN) TABS  Self Yes No   Sig: Take 1 tablet by mouth daily   ondansetron (ZOFRAN-ODT) 4 mg disintegrating tablet   No No   Sig: Take 1 tablet (4 mg total) by mouth every 8 (eight) hours as needed for nausea or vomiting   pantoprazole (PROTONIX) 40 mg tablet   No No   Sig: Take 1 tablet (40 mg total) by mouth daily   predniSONE 20 mg tablet   No No   Si tabs x 3 days, 3 tabs x 3 days, 2 tabs x 2 days, 1 tab x 2 days, then stop  Take in AM with food   Patient not taking: Reported on 2020   spironolactone (ALDACTONE) 100 mg tablet Not Taking at Unknown time Self No No   Sig: Take 1 tablet (100 mg total) by mouth 2 (two) times a day   Patient not taking: Reported on 3/6/2020   traMADol (ULTRAM) 50 mg tablet   No No   Sig: Take 1 tablet (50 mg total) by mouth every 6 (six) hours as needed for moderate pain      Facility-Administered Medications: None     Allergies   Allergen Reactions    Contrast Dye [Iodinated Diagnostic Agents] Anaphylaxis       Objective   I/O        07 -  0700  07 - 03/10 0700    P  O  0 120    Total Intake(mL/kg) 0 (0) 120 (1)    Urine (mL/kg/hr) 0 (0)     Total Output 0     Net 0 +120                Physical Exam   Constitutional: She is oriented to person, place, and time  She appears well-developed and well-nourished  HENT:   Head: Normocephalic and atraumatic  Eyes: EOM are normal    Neck: Normal range of motion  Cardiovascular: Normal rate  Pulmonary/Chest: Effort normal    Musculoskeletal: Normal range of motion  Neurological: She is alert and oriented to person, place, and time  She has normal strength and normal reflexes  A sensory deficit is present   No cranial nerve deficit  She has a normal Finger-Nose-Finger Test  GCS eye subscore is 4  GCS verbal subscore is 5  GCS motor subscore is 6  She displays no Babinski's sign on the right side  She displays no Babinski's sign on the left side  Reflex Scores:       Tricep reflexes are 2+ on the right side and 2+ on the left side  Bicep reflexes are 2+ on the right side and 2+ on the left side  Brachioradialis reflexes are 2+ on the right side and 2+ on the left side  Patellar reflexes are 2+ on the right side and 2+ on the left side  Achilles reflexes are 2+ on the right side and 2+ on the left side  Psychiatric: Her speech is normal      Neurologic Exam     Mental Status   Oriented to person, place, and time  Speech: speech is normal   Level of consciousness: alert    Cranial Nerves     CN II   Visual fields full to confrontation  CN III, IV, VI   Extraocular motions are normal    Nystagmus: none     CN V   Right facial sensation deficit: none  Left facial sensation deficit: forehead, cheeks and mandible    CN VII   Facial expression full, symmetric  CN XI   CN XI normal    Right sternocleidomastoid strength: normal  Left sternocleidomastoid strength: normal    CN XII   CN XII normal    4+/5 left upper and lower extremity weakness noted     Motor Exam   Muscle bulk: normal  Overall muscle tone: normal  Right arm tone: normal  Left arm tone: normal  Right arm pronator drift: absent  Left arm pronator drift: absent  Right leg tone: normal  Left leg tone: normal    Strength   Strength 5/5 throughout       Sensory Exam   Right arm light touch: normal  Left arm light touch: decreased from elbow  Right leg light touch: normal  Left leg light touch: decreased from knee    Gait, Coordination, and Reflexes     Coordination   Finger to nose coordination: normal    Reflexes   Right brachioradialis: 2+  Left brachioradialis: 2+  Right biceps: 2+  Left biceps: 2+  Right triceps: 2+  Left triceps: 2+  Right patellar: 2+  Left patellar: 2+  Right achilles: 2+  Left achilles: 2+  Right : 2+  Left : 2+  Right plantar: normal  Left plantar: normal  Right [place]: absent  Left [place]: absent  Right ankle clonus: absent  Left ankle clonus: absent      Vitals:Blood pressure 136/89, pulse 94, temperature 98 3 °F (36 8 °C), temperature source Oral, resp  rate 17, height 5' 9" (1 753 m), weight 124 kg (273 lb), SpO2 96 %  ,Body mass index is 40 32 kg/m²       Lab Results:   Results from last 7 days   Lab Units 03/07/20  0516 03/06/20  1813 03/03/20  2223   WBC Thousand/uL 8 50 11 01* 11 65*   HEMOGLOBIN g/dL 14 4 15 3 14 7   HEMATOCRIT % 43 7 45 5 44 7   PLATELETS Thousands/uL 226 259 256   NEUTROS PCT %  --  74 75   MONOS PCT %  --  6 6     Results from last 7 days   Lab Units 03/07/20  0516 03/06/20  1813 03/03/20  2223   POTASSIUM mmol/L 3 5 3 7 3 5   CHLORIDE mmol/L 103 102 101   CO2 mmol/L 28 25 27   BUN mg/dL 15 14 18   CREATININE mg/dL 1 06 1 03 1 17   CALCIUM mg/dL 8 8 9 1 8 8   ALK PHOS U/L  --  66 60   ALT U/L  --  13 13   AST U/L  --  13 14     Results from last 7 days   Lab Units 03/06/20  1813 03/03/20  2223   MAGNESIUM mg/dL 2 1 1 9         Results from last 7 days   Lab Units 03/06/20  1813   INR  1 04   PTT seconds 28     No results found for: TROPONINT  ABG:No results found for: PHART, PLP3XGK, PO2ART, RNR1JIE, X1RWLAHH, BEART, SOURCE    Imaging Studies: I have personally reviewed pertinent reports   , I have personally reviewed pertinent films in PACS and I have personally reviewed pertinent films in PACS with a Radiologist     EKG, Pathology, and Other Studies: I have personally reviewed pertinent reports   , I have personally reviewed pertinent films in PACS and I have personally reviewed pertinent films in PACS with a Radiologist     VTE Prophylaxis: Sequential compression device (Venodyne)     Code Status: Level 1 - Full Code  Advance Directive and Living Will:      Power of : POLST:      Counseling / Coordination of Care  I spent 20 minutes with the patient  77

## 2021-10-19 DIAGNOSIS — K42.9 UMBILICAL HERNIA WITHOUT OBSTRUCTION AND WITHOUT GANGRENE: ICD-10-CM

## 2021-10-19 DIAGNOSIS — E87.6 HYPOKALEMIA: ICD-10-CM

## 2021-10-19 DIAGNOSIS — K21.9 GASTROESOPHAGEAL REFLUX DISEASE WITHOUT ESOPHAGITIS: ICD-10-CM

## 2021-10-19 DIAGNOSIS — R51.9 WORSENING HEADACHES: ICD-10-CM

## 2021-10-19 DIAGNOSIS — I10 ESSENTIAL HYPERTENSION: ICD-10-CM

## 2021-10-19 DIAGNOSIS — Z78.9 MALE-TO-FEMALE TRANSGENDER PERSON: ICD-10-CM

## 2021-10-21 RX ORDER — TRAMADOL HYDROCHLORIDE 50 MG/1
50 TABLET ORAL DAILY PRN
Qty: 30 TABLET | Refills: 0 | Status: SHIPPED | OUTPATIENT
Start: 2021-10-21 | End: 2021-12-10 | Stop reason: SDUPTHER

## 2021-10-21 RX ORDER — POTASSIUM CHLORIDE 20 MEQ/1
20 TABLET, EXTENDED RELEASE ORAL 3 TIMES DAILY
Qty: 270 TABLET | Refills: 1 | Status: SHIPPED | OUTPATIENT
Start: 2021-10-21 | End: 2021-12-10 | Stop reason: ALTCHOICE

## 2021-10-21 RX ORDER — PANTOPRAZOLE SODIUM 40 MG/1
40 TABLET, DELAYED RELEASE ORAL DAILY
Qty: 90 TABLET | Refills: 1 | Status: SHIPPED | OUTPATIENT
Start: 2021-10-21 | End: 2022-07-21 | Stop reason: SDUPTHER

## 2021-10-21 RX ORDER — AMLODIPINE BESYLATE 10 MG/1
10 TABLET ORAL DAILY
Qty: 90 TABLET | Refills: 1 | Status: SHIPPED | OUTPATIENT
Start: 2021-10-21 | End: 2022-04-29 | Stop reason: SDUPTHER

## 2021-10-21 RX ORDER — ESTRADIOL 2 MG/1
2 TABLET ORAL 2 TIMES DAILY
Qty: 180 TABLET | Refills: 1 | Status: SHIPPED | OUTPATIENT
Start: 2021-10-21 | End: 2022-04-29 | Stop reason: SDUPTHER

## 2021-11-07 ENCOUNTER — OFFICE VISIT (OUTPATIENT)
Dept: URGENT CARE | Facility: CLINIC | Age: 41
End: 2021-11-07
Payer: COMMERCIAL

## 2021-11-07 VITALS
WEIGHT: 315 LBS | BODY MASS INDEX: 46.65 KG/M2 | HEIGHT: 69 IN | TEMPERATURE: 97.7 F | OXYGEN SATURATION: 97 % | HEART RATE: 78 BPM | RESPIRATION RATE: 18 BRPM

## 2021-11-07 DIAGNOSIS — M10.9 ACUTE GOUT INVOLVING TOE OF RIGHT FOOT, UNSPECIFIED CAUSE: ICD-10-CM

## 2021-11-07 DIAGNOSIS — M79.671 RIGHT FOOT PAIN: Primary | ICD-10-CM

## 2021-11-07 PROCEDURE — G0382 LEV 3 HOSP TYPE B ED VISIT: HCPCS | Performed by: PHYSICIAN ASSISTANT

## 2021-11-07 RX ORDER — PREDNISONE 10 MG/1
40 TABLET ORAL DAILY
Qty: 20 TABLET | Refills: 0 | Status: SHIPPED | OUTPATIENT
Start: 2021-11-07 | End: 2021-11-12

## 2021-11-07 RX ORDER — COLCHICINE 0.6 MG/1
0.6 TABLET ORAL DAILY
Qty: 20 TABLET | Refills: 0 | Status: SHIPPED | OUTPATIENT
Start: 2021-11-07 | End: 2022-06-27 | Stop reason: SDUPTHER

## 2021-12-09 ENCOUNTER — APPOINTMENT (OUTPATIENT)
Dept: LAB | Facility: CLINIC | Age: 41
End: 2021-12-09
Payer: COMMERCIAL

## 2021-12-09 DIAGNOSIS — E87.6 HYPOKALEMIA: ICD-10-CM

## 2021-12-09 LAB
ANION GAP SERPL CALCULATED.3IONS-SCNC: 8 MMOL/L (ref 4–13)
BUN SERPL-MCNC: 11 MG/DL (ref 5–25)
CALCIUM SERPL-MCNC: 8.5 MG/DL (ref 8.3–10.1)
CHLORIDE SERPL-SCNC: 101 MMOL/L (ref 100–108)
CO2 SERPL-SCNC: 29 MMOL/L (ref 21–32)
CREAT SERPL-MCNC: 1.16 MG/DL (ref 0.6–1.3)
GLUCOSE SERPL-MCNC: 95 MG/DL (ref 65–140)
POTASSIUM SERPL-SCNC: 3.1 MMOL/L (ref 3.5–5.3)
SODIUM SERPL-SCNC: 138 MMOL/L (ref 136–145)

## 2021-12-09 PROCEDURE — 80048 BASIC METABOLIC PNL TOTAL CA: CPT

## 2021-12-09 PROCEDURE — 36415 COLL VENOUS BLD VENIPUNCTURE: CPT

## 2021-12-10 ENCOUNTER — OFFICE VISIT (OUTPATIENT)
Dept: INTERNAL MEDICINE CLINIC | Facility: CLINIC | Age: 41
End: 2021-12-10
Payer: COMMERCIAL

## 2021-12-10 VITALS
DIASTOLIC BLOOD PRESSURE: 82 MMHG | OXYGEN SATURATION: 98 % | SYSTOLIC BLOOD PRESSURE: 120 MMHG | HEIGHT: 69 IN | HEART RATE: 81 BPM | BODY MASS INDEX: 46.65 KG/M2 | TEMPERATURE: 98.7 F | WEIGHT: 315 LBS

## 2021-12-10 DIAGNOSIS — M54.40 ACUTE BILATERAL LOW BACK PAIN WITH SCIATICA, SCIATICA LATERALITY UNSPECIFIED: Primary | ICD-10-CM

## 2021-12-10 DIAGNOSIS — I10 ESSENTIAL HYPERTENSION: ICD-10-CM

## 2021-12-10 DIAGNOSIS — M50.90 CERVICAL DISC DISEASE: ICD-10-CM

## 2021-12-10 DIAGNOSIS — E87.6 HYPOKALEMIA: ICD-10-CM

## 2021-12-10 DIAGNOSIS — M51.16 LUMBAR DISC HERNIATION WITH RADICULOPATHY: ICD-10-CM

## 2021-12-10 DIAGNOSIS — E66.01 MORBID OBESITY WITH BMI OF 45.0-49.9, ADULT (HCC): ICD-10-CM

## 2021-12-10 DIAGNOSIS — R51.9 WORSENING HEADACHES: ICD-10-CM

## 2021-12-10 DIAGNOSIS — M10.9 GOUTY ARTHRITIS: ICD-10-CM

## 2021-12-10 PROCEDURE — 99214 OFFICE O/P EST MOD 30 MIN: CPT | Performed by: INTERNAL MEDICINE

## 2021-12-10 RX ORDER — SPIRONOLACTONE 50 MG/1
50 TABLET, FILM COATED ORAL DAILY
Qty: 30 TABLET | Refills: 5 | Status: SHIPPED | OUTPATIENT
Start: 2021-12-10 | End: 2022-02-01

## 2021-12-10 RX ORDER — LORAZEPAM 1 MG/1
0.5 TABLET ORAL DAILY
Qty: 30 TABLET | Refills: 0 | Status: SHIPPED | OUTPATIENT
Start: 2021-12-10 | End: 2022-02-01 | Stop reason: SDUPTHER

## 2021-12-10 RX ORDER — ACETAMINOPHEN 500 MG
500 TABLET ORAL 2 TIMES DAILY
COMMUNITY

## 2021-12-10 RX ORDER — TRAMADOL HYDROCHLORIDE 50 MG/1
50 TABLET ORAL DAILY PRN
Qty: 30 TABLET | Refills: 0 | Status: SHIPPED | OUTPATIENT
Start: 2021-12-10 | End: 2022-01-21 | Stop reason: SDUPTHER

## 2021-12-20 ENCOUNTER — EVALUATION (OUTPATIENT)
Dept: PHYSICAL THERAPY | Facility: CLINIC | Age: 41
End: 2021-12-20
Payer: COMMERCIAL

## 2021-12-20 DIAGNOSIS — M54.42 CHRONIC BILATERAL LOW BACK PAIN WITH BILATERAL SCIATICA: ICD-10-CM

## 2021-12-20 DIAGNOSIS — G89.29 CHRONIC BILATERAL LOW BACK PAIN WITH BILATERAL SCIATICA: ICD-10-CM

## 2021-12-20 DIAGNOSIS — M54.41 CHRONIC BILATERAL LOW BACK PAIN WITH BILATERAL SCIATICA: ICD-10-CM

## 2021-12-20 DIAGNOSIS — M54.12 CERVICAL RADICULOPATHY: Primary | ICD-10-CM

## 2021-12-20 PROCEDURE — 97110 THERAPEUTIC EXERCISES: CPT | Performed by: PHYSICAL THERAPIST

## 2021-12-20 PROCEDURE — 97161 PT EVAL LOW COMPLEX 20 MIN: CPT | Performed by: PHYSICAL THERAPIST

## 2021-12-26 ENCOUNTER — HOSPITAL ENCOUNTER (EMERGENCY)
Facility: HOSPITAL | Age: 41
Discharge: HOME | End: 2021-12-26
Attending: EMERGENCY MEDICINE | Admitting: EMERGENCY MEDICINE
Payer: COMMERCIAL

## 2021-12-26 ENCOUNTER — APPOINTMENT (EMERGENCY)
Dept: RADIOLOGY | Facility: HOSPITAL | Age: 41
End: 2021-12-26
Payer: COMMERCIAL

## 2021-12-26 VITALS
HEART RATE: 106 BPM | RESPIRATION RATE: 20 BRPM | OXYGEN SATURATION: 99 % | BODY MASS INDEX: 46.65 KG/M2 | SYSTOLIC BLOOD PRESSURE: 167 MMHG | TEMPERATURE: 98.6 F | WEIGHT: 315 LBS | DIASTOLIC BLOOD PRESSURE: 73 MMHG | HEIGHT: 69 IN

## 2021-12-26 DIAGNOSIS — J45.909 ASTHMA: ICD-10-CM

## 2021-12-26 DIAGNOSIS — U07.1 COVID-19 VIRUS INFECTION: Primary | ICD-10-CM

## 2021-12-26 LAB
ALBUMIN SERPL BCP-MCNC: 3.7 G/DL (ref 3.5–5)
ALP SERPL-CCNC: 72 U/L (ref 46–116)
ALT SERPL W P-5'-P-CCNC: 12 U/L (ref 12–78)
ANION GAP SERPL CALCULATED.3IONS-SCNC: 8 MMOL/L (ref 4–13)
AST SERPL W P-5'-P-CCNC: 27 U/L (ref 5–45)
BILIRUB SERPL-MCNC: 0.44 MG/DL (ref 0.2–1)
BUN SERPL-MCNC: 13 MG/DL (ref 5–25)
CALCIUM SERPL-MCNC: 9 MG/DL (ref 8.3–10.1)
CARDIAC TROPONIN I PNL SERPL HS: 4 NG/L
CHLORIDE SERPL-SCNC: 101 MMOL/L (ref 100–108)
CO2 SERPL-SCNC: 27 MMOL/L (ref 21–32)
CREAT SERPL-MCNC: 1.14 MG/DL (ref 0.6–1.3)
FLUAV RNA RESP QL NAA+PROBE: NEGATIVE
FLUBV RNA RESP QL NAA+PROBE: NEGATIVE
GLUCOSE SERPL-MCNC: 89 MG/DL (ref 65–140)
NT-PROBNP SERPL-MCNC: 66 PG/ML
POTASSIUM SERPL-SCNC: 3.8 MMOL/L (ref 3.5–5.3)
PROT SERPL-MCNC: 8.3 G/DL (ref 6.4–8.2)
RSV RNA RESP QL NAA+PROBE: NEGATIVE
SARS-COV-2 RNA RESP QL NAA+PROBE: POSITIVE
SODIUM SERPL-SCNC: 136 MMOL/L (ref 136–145)

## 2021-12-26 PROCEDURE — 99285 EMERGENCY DEPT VISIT HI MDM: CPT | Performed by: EMERGENCY MEDICINE

## 2021-12-26 PROCEDURE — 71045 X-RAY EXAM CHEST 1 VIEW: CPT

## 2021-12-26 PROCEDURE — 99285 EMERGENCY DEPT VISIT HI MDM: CPT

## 2021-12-26 PROCEDURE — 84484 ASSAY OF TROPONIN QUANT: CPT | Performed by: EMERGENCY MEDICINE

## 2021-12-26 PROCEDURE — 0241U HB NFCT DS VIR RESP RNA 4 TRGT: CPT | Performed by: EMERGENCY MEDICINE

## 2021-12-26 PROCEDURE — 93005 ELECTROCARDIOGRAM TRACING: CPT

## 2021-12-26 PROCEDURE — 80053 COMPREHEN METABOLIC PANEL: CPT | Performed by: EMERGENCY MEDICINE

## 2021-12-26 PROCEDURE — 36415 COLL VENOUS BLD VENIPUNCTURE: CPT

## 2021-12-26 PROCEDURE — 83880 ASSAY OF NATRIURETIC PEPTIDE: CPT | Performed by: EMERGENCY MEDICINE

## 2021-12-26 RX ORDER — ONDANSETRON 4 MG/1
4 TABLET, ORALLY DISINTEGRATING ORAL EVERY 6 HOURS PRN
Qty: 10 TABLET | Refills: 0 | Status: SHIPPED | OUTPATIENT
Start: 2021-12-26 | End: 2022-02-01 | Stop reason: ALTCHOICE

## 2021-12-26 RX ORDER — PREDNISONE 20 MG/1
40 TABLET ORAL DAILY
Qty: 10 TABLET | Refills: 0 | Status: SHIPPED | OUTPATIENT
Start: 2021-12-26 | End: 2022-02-01 | Stop reason: ALTCHOICE

## 2021-12-27 ENCOUNTER — APPOINTMENT (OUTPATIENT)
Dept: PHYSICAL THERAPY | Facility: CLINIC | Age: 41
End: 2021-12-27
Payer: COMMERCIAL

## 2021-12-27 PROBLEM — U07.1 COVID-19 VIRUS INFECTION: Status: ACTIVE | Noted: 2021-12-27

## 2021-12-27 PROBLEM — J06.9 VIRAL URI WITH COUGH: Status: ACTIVE | Noted: 2021-12-27

## 2021-12-27 NOTE — ED PROVIDER NOTES
History  Chief Complaint   Patient presents with    Shortness of Breath     Patient reports x2 days sore throat, chest tightness, SOB, DEY, pain with breathing  History provided by:  Patient   used: No    80-year-old female presented for evaluation of a 2 days of mild sore throat, some dyspnea, chest tightness  Has some pain with breathing and dyspnea increases with exertion  No fever, chills  Vaccinated for COVID but has not had booster  Normal oxygen saturation  Normal respiratory effort  Breath sounds are clear  Plan EKG, labs, chest x-ray, COVID test and will re-evaluate  Prior to Admission Medications   Prescriptions Last Dose Informant Patient Reported? Taking? Cyanocobalamin (B-12) 1000 MCG/ML KIT  Self No No   Sig: Inject 1 ml IM in thigh/ buttocks or deltoid mm once weekly x 8 weeks  Afterward once monthly   LORazepam (ATIVAN) 1 mg tablet   No No   Sig: Take 0 5 tablets (0 5 mg total) by mouth daily   Syringe/Needle, Disp, (SYRINGE 3CC/52QI7-6/4") 27G X 1-1/4" 3 ML MISC  Self No No   Sig: Use for B12 injections   TiZANidine (ZANAFLEX) 2 MG capsule   No No   Sig: Take 1 capsule (2 mg total) by mouth daily at bedtime as needed for muscle spasms   acetaminophen (TYLENOL) 500 mg tablet   Yes No   Sig: Take 500 mg by mouth 2 (two) times a day   albuterol (PROVENTIL HFA,VENTOLIN HFA) 90 mcg/act inhaler   No No   Sig: Inhale 2 puffs every 6 (six) hours as needed for wheezing   amLODIPine (NORVASC) 10 mg tablet   No No   Sig: Take 1 tablet (10 mg total) by mouth daily   aspirin (ECOTRIN LOW STRENGTH) 81 mg EC tablet  Self Yes No   Sig: Take 81 mg by mouth daily   chlorthalidone 25 mg tablet   No No   Sig: Take 1 tablet (25 mg total) by mouth daily   colchicine (COLCRYS) 0 6 mg tablet   No No   Sig: Take 1 tablet (0 6 mg total) by mouth daily 2 pills now and 1 in an hour  One daily until resolved     estradiol (ESTRACE) 2 MG tablet   No No   Sig: Take 1 tablet (2 mg total) by mouth 2 (two) times a day   hydrocortisone (ANUSOL-HC) 25 mg suppository   No No   Sig: UNWRAP AND INSERT 1 SUPPOSITORY RECTALLY TWICE DAILY   methocarbamol (ROBAXIN) 500 mg tablet  Self No No   Sig: Take 1 tablet (500 mg total) by mouth daily after breakfast   montelukast (SINGULAIR) 10 mg tablet  Self No No   Sig: Take 1 tablet (10 mg total) by mouth daily at bedtime   multivitamin (THERAGRAN) TABS  Self Yes No   Sig: Take 1 tablet by mouth daily   ondansetron (ZOFRAN-ODT) 4 mg disintegrating tablet  Self No No   Sig: Take 1 tablet (4 mg total) by mouth every 8 (eight) hours as needed for nausea or vomiting   pantoprazole (PROTONIX) 40 mg tablet   No No   Sig: Take 1 tablet (40 mg total) by mouth daily   spironolactone (ALDACTONE) 50 mg tablet   No No   Sig: Take 1 tablet (50 mg total) by mouth daily   traMADol (ULTRAM) 50 mg tablet   No No   Sig: Take 1 tablet (50 mg total) by mouth daily as needed for moderate pain      Facility-Administered Medications: None       Past Medical History:   Diagnosis Date    Anxiety     Asthma     seasonal    Hernia of abdominal wall     Hypertension     Kidney stones     Seizures (HCC)     pseudoseizures since 2012       Past Surgical History:   Procedure Laterality Date    APPENDECTOMY      CHOLECYSTECTOMY      GASTRIC BYPASS      HERNIA REPAIR      x2       Family History   Problem Relation Age of Onset    Hypertension Mother     Diabetes Mother     Hypertension Sister     No Known Problems Father         head injury    Stroke Maternal Grandmother         brain aneurysm    Aneurysm Maternal Uncle         brain     I have reviewed and agree with the history as documented      E-Cigarette/Vaping    E-Cigarette Use Never User      E-Cigarette/Vaping Substances    Nicotine No     THC No     CBD No     Flavoring No     Other No     Unknown No      Social History     Tobacco Use    Smoking status: Never Smoker    Smokeless tobacco: Never Used   Vaping Use    Vaping Use: Never used   Substance Use Topics    Alcohol use: Yes     Comment: 3 per month    Drug use: Never       Review of Systems   Constitutional: Negative for activity change, appetite change and fatigue  Respiratory: Positive for cough, chest tightness and shortness of breath  Cardiovascular: Negative for chest pain, palpitations and leg swelling  Gastrointestinal: Positive for nausea  Negative for abdominal pain and vomiting  Musculoskeletal: Negative for back pain and neck pain  Neurological: Negative for dizziness, weakness, light-headedness and headaches  All other systems reviewed and are negative  Physical Exam  Physical Exam  Vitals and nursing note reviewed  Constitutional:       Appearance: She is well-developed  HENT:      Head: Normocephalic and atraumatic  Neck:      Vascular: No JVD  Cardiovascular:      Rate and Rhythm: Normal rate and regular rhythm  Pulmonary:      Effort: Pulmonary effort is normal       Breath sounds: Normal breath sounds  Musculoskeletal:      Cervical back: Normal range of motion  Right lower leg: No tenderness  No edema  Left lower leg: No tenderness  No edema  Skin:     General: Skin is warm and dry  Neurological:      General: No focal deficit present  Mental Status: She is alert and oriented to person, place, and time     Psychiatric:         Mood and Affect: Mood normal          Behavior: Behavior normal          Vital Signs  ED Triage Vitals [12/26/21 1901]   Temperature Pulse Respirations Blood Pressure SpO2   98 6 °F (37 °C) (!) 106 20 167/73 99 %      Temp Source Heart Rate Source Patient Position - Orthostatic VS BP Location FiO2 (%)   Oral Monitor Sitting Right arm --      Pain Score       6           Vitals:    12/26/21 1901   BP: 167/73   Pulse: (!) 106   Patient Position - Orthostatic VS: Sitting         Visual Acuity      ED Medications  Medications - No data to display    Diagnostic Studies  Results Reviewed Procedure Component Value Units Date/Time    COVID/FLU/RSV [[id number]]  (Abnormal) Collected: 12/26/21 1905    Lab Status: Final result Specimen: Nares from Nasopharyngeal Swab Updated: 12/26/21 2013     SARS-CoV-2 Positive     INFLUENZA A PCR Negative     INFLUENZA B PCR Negative     RSV PCR Negative    Narrative:      FOR PEDIATRIC PATIENTS - copy/paste COVID Guidelines URL to browser: [email or web address]  [facility]     This test has been authorized by FDA under an EUA (Emergency Use Assay) for use by authorized laboratories  Clinical caution and judgement should be used with the interpretation of these results with consideration of the clinical impression and other laboratory testing  Testing reported as "Positive" or "Negative" has been proven to be accurate according to standard laboratory validation requirements  All testing is performed with control materials showing appropriate reactivity at standard intervals  NT-BNP PRO [[id number]]  (Normal) Collected: 12/26/21 1908    Lab Status: Final result Specimen: Blood from Arm, Right Updated: 12/26/21 1952     NT-proBNP 66 pg/mL     HS Troponin 0hr (reflex protocol) [[id number]]  (Normal) Collected: 12/26/21 1908    Lab Status: Final result Specimen: Blood from Arm, Right Updated: 12/26/21 1945     hs TnI 0hr 4 ng/L     Comprehensive metabolic panel [[id number]]  (Abnormal) Collected: 12/26/21 1908    Lab Status: Final result Specimen: Blood from Arm, Right Updated: 12/26/21 1944     Sodium 136 mmol/L      Potassium 3 8 mmol/L      Chloride 101 mmol/L      CO2 27 mmol/L      ANION GAP 8 mmol/L      BUN 13 mg/dL      Creatinine 1 14 mg/dL      Glucose 89 mg/dL      Calcium 9 0 mg/dL      AST 27 U/L      ALT 12 U/L      Alkaline Phosphatase 72 U/L      Total Protein 8 3 g/dL      Albumin 3 7 g/dL      Total Bilirubin 0 44 mg/dL      eGFR --    Narrative:      Notes:     1   eGFR calculation is only valid for adults 18 years and older  2  EGFR calculation cannot be performed for patients who are transgender, non-binary, or whose legal sex, sex at birth, and gender identity differ  XR chest 1 view portable   ED Interpretation by Micah Camacho MD (12/26 2027)   No infiltrates      Final Result by Maye Dinh MD (12/27 0966)      No acute cardiopulmonary disease  Workstation performed: [id number]                    Procedures  ECG 12 Lead Documentation Only    Date/Time: 12/26/2021 8:00 PM  Performed by: Micah Camacho MD  Authorized by: Micah Camacho MD     Indications / Diagnosis:  Dyspnea  ECG reviewed by me, the ED Provider: yes    Patient location:  ED  Previous ECG:     Previous ECG:  Compared to current    Comparison ECG info:  3/6/20  Rate:     ECG rate:  101  Rhythm:     Rhythm: sinus tachycardia    Ectopy:     Ectopy: none    QRS:     QRS axis:  Normal  Conduction:     Conduction: normal    ST segments:     ST segments:  Normal             ED Course  ED Course as of 01/12/22 1644   Sun Dec 26, 2021   2022 SARS-COV-2(!): Positive                               SBIRT 20yo+      Most Recent Value   SBIRT (25 yo +)    In order to provide better care to our patients, we are screening all of our patients for alcohol and drug use  Would it be okay to ask you these screening questions? Unable to answer at this time Filed at: 12/26/2021 1903                    [facility]  Number of Diagnoses or Management Options  Asthma: new and requires workup  COVID-19 virus infection: new and requires workup  Diagnosis management comments: 51-year-old female with 2 days of sore throat, cough, some chest tightness, dyspnea on exertion, nausea  EKG and chest x-ray within normal limits  COVID test positive  Maintaining normal oxygen saturation on room air  History of asthma  May have concurrent exacerbation    Plan supportive care as well as Zofran for nausea, prednisone burst   Advised returning if worsening  Amount and/or Complexity of Data Reviewed  Clinical lab tests: ordered and reviewed  Tests in the radiology section of CPT®: ordered and reviewed  Independent visualization of images, tracings, or specimens: yes        Disposition  Final diagnoses:   COVID-19 virus infection   Asthma     Time reflects when diagnosis was documented in both MDM as applicable and the Disposition within this note     Time User Action Codes Description Comment    12/26/2021  8:41 PM Patricia Latch Add [U07 1] COVID-19 virus infection     12/26/2021  8:41 PM Patricia Latch Add [J45 909] Asthma       ED Disposition     ED Disposition Condition Date/Time Comment    Discharge Stable Sun Dec 26, 2021  8:41 PM Kiko Coy  discharge to home/self care  Follow-up Information     Follow up With Specialties Details Why Contact Info Additional [address] [facility]   [address] [facility] [phone]       [facility] Emergency Department Emergency Medicine  If symptoms worsen [address] Emergency Department, [address], [place], [place], [address]          Discharge Medication List as of 12/26/2021  8:43 PM      START taking these medications    Details   !! ondansetron (ZOFRAN-ODT) 4 mg disintegrating tablet Take 1 tablet (4 mg total) by mouth every 6 (six) hours as needed for nausea or vomiting, Starting Sun 12/26/2021, Normal      predniSONE 20 mg tablet Take 2 tablets (40 mg total) by mouth daily, Starting Sun 12/26/2021, Normal       !! - Potential duplicate medications found  Please discuss with provider        CONTINUE these medications which have NOT CHANGED    Details   acetaminophen (TYLENOL) 500 mg tablet Take 500 mg by mouth 2 (two) times a day, Historical Med      albuterol (PROVENTIL HFA,VENTOLIN HFA) 90 mcg/act inhaler Inhale 2 puffs every 6 (six) hours as needed for wheezing, Starting Fri 9/10/2021, Normal      amLODIPine (NORVASC) 10 mg tablet Take 1 tablet (10 mg total) by mouth daily, Starting Thu 10/21/2021, Normal      aspirin (ECOTRIN LOW STRENGTH) 81 mg EC tablet Take 81 mg by mouth daily, Historical Med      chlorthalidone 25 mg tablet Take 1 tablet (25 mg total) by mouth daily, Starting Fri 9/10/2021, Normal      colchicine (COLCRYS) 0 6 mg tablet Take 1 tablet (0 6 mg total) by mouth daily 2 pills now and 1 in an hour  One daily until resolved , Starting Sun 11/7/2021, Normal      Cyanocobalamin (B-12) 1000 MCG/ML KIT Inject 1 ml IM in thigh/ buttocks or deltoid mm once weekly x 8 weeks   Afterward once monthly, Normal      estradiol (ESTRACE) 2 MG tablet Take 1 tablet (2 mg total) by mouth 2 (two) times a day, Starting Thu 10/21/2021, Normal      hydrocortisone (ANUSOL-HC) 25 mg suppository UNWRAP AND INSERT 1 SUPPOSITORY RECTALLY TWICE DAILY, Normal      LORazepam (ATIVAN) 1 mg tablet Take 0 5 tablets (0 5 mg total) by mouth daily, Starting Fri 12/10/2021, Normal      methocarbamol (ROBAXIN) 500 mg tablet Take 1 tablet (500 mg total) by mouth daily after breakfast, Starting Thu 3/25/2021, Normal      montelukast (SINGULAIR) 10 mg tablet Take 1 tablet (10 mg total) by mouth daily at bedtime, Starting Wed 2/3/2021, Normal      multivitamin (THERAGRAN) TABS Take 1 tablet by mouth daily, Historical Med      !! ondansetron (ZOFRAN-ODT) 4 mg disintegrating tablet Take 1 tablet (4 mg total) by mouth every 8 (eight) hours as needed for nausea or vomiting, Starting Thu 10/17/2019, Normal      pantoprazole (PROTONIX) 40 mg tablet Take 1 tablet (40 mg total) by mouth daily, Starting Thu 10/21/2021, Normal      spironolactone (ALDACTONE) 50 mg tablet Take 1 tablet (50 mg total) by mouth daily, Starting Fri 12/10/2021, Normal      Syringe/Needle, Disp, (SYRINGE 3CC/56VZ1-5/4") 27G X 1-1/4" 3 ML MISC Use for B12 injections, Normal      TiZANidine (ZANAFLEX) 2 MG capsule Take 1 capsule (2 mg total) by mouth daily at bedtime as needed for muscle spasms, Starting Fri 9/10/2021, Normal      traMADol (ULTRAM) 50 mg tablet Take 1 tablet (50 mg total) by mouth daily as needed for moderate pain, Starting Fri 12/10/2021, Normal       !! - Potential duplicate medications found  Please discuss with provider  No discharge procedures on file      PDMP Review       Value Time User    PDMP Reviewed  Yes 9/10/2021 11:42 AM Anamaria Garcia MD          ED Provider  Electronically Signed by           Sara Richards MD  01/12/22 4788

## 2021-12-28 LAB
ATRIAL RATE: 101 BPM
P AXIS: 56 DEGREES
PR INTERVAL: 168 MS
QRS AXIS: 0 DEGREES
QRSD INTERVAL: 96 MS
QT INTERVAL: 358 MS
QTC INTERVAL: 464 MS
T WAVE AXIS: 47 DEGREES
VENTRICULAR RATE: 101 BPM

## 2021-12-28 PROCEDURE — 93010 ELECTROCARDIOGRAM REPORT: CPT | Performed by: INTERNAL MEDICINE

## 2021-12-30 ENCOUNTER — APPOINTMENT (OUTPATIENT)
Dept: PHYSICAL THERAPY | Facility: CLINIC | Age: 41
End: 2021-12-30
Payer: COMMERCIAL

## 2021-12-30 ENCOUNTER — TELEPHONE (OUTPATIENT)
Dept: INTERNAL MEDICINE CLINIC | Facility: CLINIC | Age: 41
End: 2021-12-30

## 2022-01-03 ENCOUNTER — APPOINTMENT (OUTPATIENT)
Dept: PHYSICAL THERAPY | Facility: CLINIC | Age: 42
End: 2022-01-03
Payer: COMMERCIAL

## 2022-01-06 ENCOUNTER — APPOINTMENT (OUTPATIENT)
Dept: PHYSICAL THERAPY | Facility: CLINIC | Age: 42
End: 2022-01-06
Payer: COMMERCIAL

## 2022-01-10 ENCOUNTER — OFFICE VISIT (OUTPATIENT)
Dept: PHYSICAL THERAPY | Facility: CLINIC | Age: 42
End: 2022-01-10
Payer: COMMERCIAL

## 2022-01-10 DIAGNOSIS — M54.41 CHRONIC BILATERAL LOW BACK PAIN WITH BILATERAL SCIATICA: Primary | ICD-10-CM

## 2022-01-10 DIAGNOSIS — M54.42 CHRONIC BILATERAL LOW BACK PAIN WITH BILATERAL SCIATICA: Primary | ICD-10-CM

## 2022-01-10 DIAGNOSIS — M54.12 CERVICAL RADICULOPATHY: ICD-10-CM

## 2022-01-10 DIAGNOSIS — G89.29 CHRONIC BILATERAL LOW BACK PAIN WITH BILATERAL SCIATICA: Primary | ICD-10-CM

## 2022-01-10 PROCEDURE — 97164 PT RE-EVAL EST PLAN CARE: CPT | Performed by: PHYSICAL THERAPIST

## 2022-01-10 PROCEDURE — 97110 THERAPEUTIC EXERCISES: CPT | Performed by: PHYSICAL THERAPIST

## 2022-01-10 NOTE — PROGRESS NOTES
PT Re-Evaluation     Today's date: 1/10/2022  Patient name: Homer Vargas  : 1980  MRN: [id number]  Referring provider: No ref  provider found  Dx:   Encounter Diagnosis     ICD-10-CM    1  Chronic bilateral low back pain with bilateral sciatica  M54 42     M54 41     G89 29    2  Cervical radiculopathy  M54 12                   Assessment  Assessment details: Patient seen today for evaluation of L/spine  Clinical evaluation is consistent with lumbar posterior derangement that responds to repeated lumbar extension and postural correction  Patient will benefit from skilled PT 2x weekly for 4-6 weeks  Impairments: abnormal or restricted ROM, lacks appropriate home exercise program, pain with function, weight-bearing intolerance and poor posture   Functional limitations: IADLs; Sitting tolerance  Symptom irritability: lowUnderstanding of Dx/Px/POC: good   Prognosis: good    Goals  Short Term goals - 4 weeks  1  Patient will be independent and compliant with a HEP  2   Patient will report a 50% decrease in pain complaints  Long Term goals - 8 weeks  1  Patient will report elimination of pain complaints  2   Patient will return to all work related activities without restriction  3   Patient will return to all recreational activities without restriction        Plan  Patient would benefit from: skilled physical therapy  Planned therapy interventions: manual therapy, joint mobilization, abdominal trunk stabilization, neuromuscular re-education, therapeutic exercise, postural training, patient education and home exercise program  Frequency: 2x week  Duration in visits: 10  Duration in weeks: 5  Treatment plan discussed with: patient        Subjective Evaluation    History of Present Illness  Mechanism of injury: Chronic  Quality of life: good    Pain  Current pain rating: 3  At best pain ratin  At worst pain ratin  Location: B LB/B LE to knee  Quality: discomfort and dull ache  Relieving factors: rest  Aggravating factors: sitting (Rising from seated positoin)  Progression: no change    Treatments  Current treatment: physical therapy  Patient Goals  Patient goals for therapy: decreased pain, increased motion, return to sport/leisure activities and independence with ADLs/IADLs          Objective     Active Range of Motion     Lumbar   Flexion:  with pain Restriction level: moderate  Extension:  with pain Restriction level: maximal  Mechanical Assessment    Cervical      Thoracic      Lumbar    Standing extension: repeated movements  Pain location: no change  Pain level: increased  Lying extension: repeated movements  Pain intensity: better  Pain level: decreased    Tests     Lumbar     Left   Negative crossed SLR and passive SLR  Right   Negative crossed SLR and passive SLR                Precautions: none      Manuals                                                                 Neuro Re-Ed                                                                                                        Ther Ex                                                                                                                     Ther Activity                                       Gait Training                                       Modalities

## 2022-01-13 ENCOUNTER — APPOINTMENT (EMERGENCY)
Dept: CT IMAGING | Facility: HOSPITAL | Age: 42
End: 2022-01-13
Payer: COMMERCIAL

## 2022-01-13 ENCOUNTER — APPOINTMENT (OUTPATIENT)
Dept: MRI IMAGING | Facility: HOSPITAL | Age: 42
End: 2022-01-13
Payer: COMMERCIAL

## 2022-01-13 ENCOUNTER — APPOINTMENT (OUTPATIENT)
Dept: NON INVASIVE DIAGNOSTICS | Facility: HOSPITAL | Age: 42
End: 2022-01-13
Payer: COMMERCIAL

## 2022-01-13 ENCOUNTER — APPOINTMENT (EMERGENCY)
Dept: RADIOLOGY | Facility: HOSPITAL | Age: 42
End: 2022-01-13
Payer: COMMERCIAL

## 2022-01-13 ENCOUNTER — HOSPITAL ENCOUNTER (OUTPATIENT)
Dept: VASCULAR ULTRASOUND | Facility: HOSPITAL | Age: 42
Setting detail: OBSERVATION
Discharge: HOME | End: 2022-01-13
Payer: COMMERCIAL

## 2022-01-13 ENCOUNTER — HOSPITAL ENCOUNTER (OUTPATIENT)
Facility: HOSPITAL | Age: 42
Setting detail: OBSERVATION
Discharge: HOME | End: 2022-01-13
Attending: EMERGENCY MEDICINE | Admitting: INTERNAL MEDICINE
Payer: COMMERCIAL

## 2022-01-13 ENCOUNTER — APPOINTMENT (OUTPATIENT)
Dept: PHYSICAL THERAPY | Facility: CLINIC | Age: 42
End: 2022-01-13
Payer: COMMERCIAL

## 2022-01-13 VITALS
RESPIRATION RATE: 16 BRPM | HEART RATE: 66 BPM | BODY MASS INDEX: 46.65 KG/M2 | WEIGHT: 315 LBS | OXYGEN SATURATION: 97 % | TEMPERATURE: 97.9 F | DIASTOLIC BLOOD PRESSURE: 50 MMHG | HEIGHT: 69 IN | SYSTOLIC BLOOD PRESSURE: 101 MMHG

## 2022-01-13 DIAGNOSIS — R07.9 CHEST PAIN, UNSPECIFIED TYPE: ICD-10-CM

## 2022-01-13 DIAGNOSIS — R00.2 PALPITATIONS: ICD-10-CM

## 2022-01-13 DIAGNOSIS — R20.2 PARESTHESIA: Primary | ICD-10-CM

## 2022-01-13 DIAGNOSIS — R79.89 ELEVATED TSH: ICD-10-CM

## 2022-01-13 DIAGNOSIS — E87.6 HYPOKALEMIA: ICD-10-CM

## 2022-01-13 LAB
2HR DELTA HS TROPONIN: 1 NG/L
4HR DELTA HS TROPONIN: 1 NG/L
ALBUMIN SERPL BCP-MCNC: 3.1 G/DL (ref 3.5–5)
ALP SERPL-CCNC: 53 U/L (ref 46–116)
ALT SERPL W P-5'-P-CCNC: 20 U/L (ref 12–78)
ANION GAP SERPL CALCULATED.3IONS-SCNC: 6 MMOL/L (ref 4–13)
AORTIC ROOT: 3.1 CM
APICAL FOUR CHAMBER EJECTION FRACTION: 63 %
APTT PPP: 27 SECONDS (ref 23–37)
AST SERPL W P-5'-P-CCNC: 15 U/L (ref 5–45)
ATRIAL RATE: 89 BPM
BASOPHILS # BLD AUTO: 0.04 THOUSANDS/ΜL (ref 0–0.1)
BASOPHILS NFR BLD AUTO: 0 % (ref 0–1)
BILIRUB SERPL-MCNC: 0.33 MG/DL (ref 0.2–1)
BUN SERPL-MCNC: 12 MG/DL (ref 5–25)
CALCIUM ALBUM COR SERPL-MCNC: 9.2 MG/DL (ref 8.3–10.1)
CALCIUM SERPL-MCNC: 8.5 MG/DL (ref 8.3–10.1)
CARDIAC TROPONIN I PNL SERPL HS: 4 NG/L
CARDIAC TROPONIN I PNL SERPL HS: 5 NG/L
CARDIAC TROPONIN I PNL SERPL HS: 5 NG/L
CHLORIDE SERPL-SCNC: 103 MMOL/L (ref 100–108)
CHOLEST SERPL-MCNC: 153 MG/DL
CO2 SERPL-SCNC: 30 MMOL/L (ref 21–32)
CREAT SERPL-MCNC: 0.95 MG/DL (ref 0.6–1.3)
D DIMER PPP FEU-MCNC: 0.31 UG/ML FEU
E WAVE DECELERATION TIME: 256 MS
EOSINOPHIL # BLD AUTO: 0.08 THOUSAND/ΜL (ref 0–0.61)
EOSINOPHIL NFR BLD AUTO: 1 % (ref 0–6)
ERYTHROCYTE [DISTWIDTH] IN BLOOD BY AUTOMATED COUNT: 13.3 % (ref 11.6–15.1)
EST. AVERAGE GLUCOSE BLD GHB EST-MCNC: 120 MG/DL
FRACTIONAL SHORTENING: 34 % (ref 28–44)
GLUCOSE SERPL-MCNC: 88 MG/DL (ref 65–140)
GLUCOSE SERPL-MCNC: 93 MG/DL (ref 65–140)
HBA1C MFR BLD: 5.8 %
HCT VFR BLD AUTO: 36.9 % (ref 36.5–46.1)
HDLC SERPL-MCNC: 65 MG/DL
HGB BLD-MCNC: 12.3 G/DL (ref 12–15.4)
IMM GRANULOCYTES # BLD AUTO: 0.03 THOUSAND/UL (ref 0–0.2)
IMM GRANULOCYTES NFR BLD AUTO: 0 % (ref 0–2)
INR PPP: 0.92 (ref 0.84–1.19)
INTERVENTRICULAR SEPTUM IN DIASTOLE (PARASTERNAL SHORT AXIS VIEW): 0.9 CM
LDLC SERPL CALC-MCNC: 79 MG/DL (ref 0–100)
LEFT ATRIUM AREA SYSTOLE SINGLE PLANE A4C: 24.1 CM2
LEFT ATRIUM SIZE: 3.5 CM
LEFT INTERNAL DIMENSION IN SYSTOLE: 2.9 CM (ref 2.1–4)
LEFT VENTRICULAR INTERNAL DIMENSION IN DIASTOLE: 4.4 CM (ref 28.12–41.93)
LEFT VENTRICULAR POSTERIOR WALL IN END DIASTOLE: 1 CM
LEFT VENTRICULAR STROKE VOLUME: 54 ML
LYMPHOCYTES # BLD AUTO: 1.61 THOUSANDS/ΜL (ref 0.6–4.47)
LYMPHOCYTES NFR BLD AUTO: 17 % (ref 14–44)
MAGNESIUM SERPL-MCNC: 1.8 MG/DL (ref 1.6–2.6)
MCH RBC QN AUTO: 28.8 PG (ref 26.8–34.3)
MCHC RBC AUTO-ENTMCNC: 33.3 G/DL (ref 31.4–37.4)
MCV RBC AUTO: 86 FL (ref 82–98)
MONOCYTES # BLD AUTO: 0.61 THOUSAND/ΜL (ref 0.17–1.22)
MONOCYTES NFR BLD AUTO: 7 % (ref 4–12)
MV E'TISSUE VEL-SEP: 8 CM/S
MV PEAK A VEL: 0.56 M/S
MV PEAK E VEL: 73 CM/S
MV STENOSIS PRESSURE HALF TIME: 0 MS
NEUTROPHILS # BLD AUTO: 6.88 THOUSANDS/ΜL (ref 1.85–7.62)
NEUTS SEG NFR BLD AUTO: 75 % (ref 43–75)
NRBC BLD AUTO-RTO: 0 /100 WBCS
P AXIS: 53 DEGREES
PLATELET # BLD AUTO: 300 THOUSANDS/UL (ref 149–390)
PLATELET # BLD AUTO: 300 THOUSANDS/UL (ref 149–390)
PMV BLD AUTO: 9.1 FL (ref 8.9–12.7)
PMV BLD AUTO: 9.1 FL (ref 8.9–12.7)
POTASSIUM SERPL-SCNC: 3.1 MMOL/L (ref 3.5–5.3)
PR INTERVAL: 186 MS
PROT SERPL-MCNC: 7.3 G/DL (ref 6.4–8.2)
PROTHROMBIN TIME: 12.4 SECONDS (ref 11.6–14.5)
QRS AXIS: -8 DEGREES
QRSD INTERVAL: 94 MS
QT INTERVAL: 374 MS
QTC INTERVAL: 445 MS
RBC # BLD AUTO: 4.27 MILLION/UL (ref 3.88–5.12)
RIGHT ATRIUM AREA SYSTOLE A4C: 14.4 CM2
RIGHT VENTRICLE ID DIMENSION: 3.9 CM
SL CV LV EF: 60
SL CV PED ECHO LEFT VENTRICLE DIASTOLIC VOLUME (MOD BIPLANE) 2D: 87 ML
SL CV PED ECHO LEFT VENTRICLE SYSTOLIC VOLUME (MOD BIPLANE) 2D: 33 ML
SODIUM SERPL-SCNC: 139 MMOL/L (ref 136–145)
T WAVE AXIS: 40 DEGREES
T4 FREE SERPL-MCNC: 1.29 NG/DL (ref 0.76–1.46)
TRIGL SERPL-MCNC: 44 MG/DL
TSH SERPL DL<=0.05 MIU/L-ACNC: 5.75 UIU/ML (ref 0.36–3.74)
VENTRICULAR RATE: 85 BPM
WBC # BLD AUTO: 9.25 THOUSAND/UL (ref 4.31–10.16)
Z-SCORE OF LEFT VENTRICULAR DIMENSION IN END SYSTOLE: -20.55

## 2022-01-13 PROCEDURE — 36415 COLL VENOUS BLD VENIPUNCTURE: CPT | Performed by: PHYSICIAN ASSISTANT

## 2022-01-13 PROCEDURE — 83036 HEMOGLOBIN GLYCOSYLATED A1C: CPT | Performed by: NURSE PRACTITIONER

## 2022-01-13 PROCEDURE — 99245 OFF/OP CONSLTJ NEW/EST HI 55: CPT | Performed by: PSYCHIATRY & NEUROLOGY

## 2022-01-13 PROCEDURE — 80061 LIPID PANEL: CPT | Performed by: NURSE PRACTITIONER

## 2022-01-13 PROCEDURE — 93005 ELECTROCARDIOGRAM TRACING: CPT

## 2022-01-13 PROCEDURE — 84443 ASSAY THYROID STIM HORMONE: CPT | Performed by: PHYSICIAN ASSISTANT

## 2022-01-13 PROCEDURE — 93306 TTE W/DOPPLER COMPLETE: CPT | Performed by: INTERNAL MEDICINE

## 2022-01-13 PROCEDURE — 83735 ASSAY OF MAGNESIUM: CPT | Performed by: PHYSICIAN ASSISTANT

## 2022-01-13 PROCEDURE — 85025 COMPLETE CBC W/AUTO DIFF WBC: CPT | Performed by: PHYSICIAN ASSISTANT

## 2022-01-13 PROCEDURE — 99285 EMERGENCY DEPT VISIT HI MDM: CPT

## 2022-01-13 PROCEDURE — 85610 PROTHROMBIN TIME: CPT | Performed by: PHYSICIAN ASSISTANT

## 2022-01-13 PROCEDURE — 84439 ASSAY OF FREE THYROXINE: CPT | Performed by: PHYSICIAN ASSISTANT

## 2022-01-13 PROCEDURE — 99285 EMERGENCY DEPT VISIT HI MDM: CPT | Performed by: PHYSICIAN ASSISTANT

## 2022-01-13 PROCEDURE — 85379 FIBRIN DEGRADATION QUANT: CPT | Performed by: PHYSICIAN ASSISTANT

## 2022-01-13 PROCEDURE — 93010 ELECTROCARDIOGRAM REPORT: CPT | Performed by: INTERNAL MEDICINE

## 2022-01-13 PROCEDURE — 85730 THROMBOPLASTIN TIME PARTIAL: CPT | Performed by: PHYSICIAN ASSISTANT

## 2022-01-13 PROCEDURE — G1004 CDSM NDSC: HCPCS

## 2022-01-13 PROCEDURE — 71045 X-RAY EXAM CHEST 1 VIEW: CPT

## 2022-01-13 PROCEDURE — 99236 HOSP IP/OBS SAME DATE HI 85: CPT | Performed by: INTERNAL MEDICINE

## 2022-01-13 PROCEDURE — 93880 EXTRACRANIAL BILAT STUDY: CPT

## 2022-01-13 PROCEDURE — 85049 AUTOMATED PLATELET COUNT: CPT | Performed by: NURSE PRACTITIONER

## 2022-01-13 PROCEDURE — 93306 TTE W/DOPPLER COMPLETE: CPT

## 2022-01-13 PROCEDURE — 82948 REAGENT STRIP/BLOOD GLUCOSE: CPT

## 2022-01-13 PROCEDURE — 70551 MRI BRAIN STEM W/O DYE: CPT

## 2022-01-13 PROCEDURE — 80053 COMPREHEN METABOLIC PANEL: CPT | Performed by: PHYSICIAN ASSISTANT

## 2022-01-13 PROCEDURE — 84484 ASSAY OF TROPONIN QUANT: CPT | Performed by: PHYSICIAN ASSISTANT

## 2022-01-13 RX ORDER — ALBUTEROL SULFATE 90 UG/1
2 AEROSOL, METERED RESPIRATORY (INHALATION) EVERY 6 HOURS PRN
Status: DISCONTINUED | OUTPATIENT
Start: 2022-01-13 | End: 2022-01-13 | Stop reason: HOSPADM

## 2022-01-13 RX ORDER — TIZANIDINE 2 MG/1
2 TABLET ORAL
Status: DISCONTINUED | OUTPATIENT
Start: 2022-01-13 | End: 2022-01-13 | Stop reason: HOSPADM

## 2022-01-13 RX ORDER — BENZONATATE 100 MG/1
200 CAPSULE ORAL 3 TIMES DAILY PRN
Status: DISCONTINUED | OUTPATIENT
Start: 2022-01-13 | End: 2022-01-13 | Stop reason: HOSPADM

## 2022-01-13 RX ORDER — CHLORTHALIDONE 25 MG/1
25 TABLET ORAL DAILY
Status: DISCONTINUED | OUTPATIENT
Start: 2022-01-13 | End: 2022-01-13 | Stop reason: HOSPADM

## 2022-01-13 RX ORDER — ACETAMINOPHEN 325 MG/1
650 TABLET ORAL EVERY 6 HOURS PRN
Status: DISCONTINUED | OUTPATIENT
Start: 2022-01-13 | End: 2022-01-13 | Stop reason: HOSPADM

## 2022-01-13 RX ORDER — POTASSIUM CHLORIDE 20 MEQ/1
40 TABLET, EXTENDED RELEASE ORAL ONCE
Status: COMPLETED | OUTPATIENT
Start: 2022-01-13 | End: 2022-01-13

## 2022-01-13 RX ORDER — SPIRONOLACTONE 25 MG/1
50 TABLET ORAL DAILY
Status: DISCONTINUED | OUTPATIENT
Start: 2022-01-13 | End: 2022-01-13 | Stop reason: HOSPADM

## 2022-01-13 RX ORDER — METHOCARBAMOL 500 MG/1
500 TABLET, FILM COATED ORAL
Status: DISCONTINUED | OUTPATIENT
Start: 2022-01-13 | End: 2022-01-13 | Stop reason: HOSPADM

## 2022-01-13 RX ORDER — TRAMADOL HYDROCHLORIDE 50 MG/1
50 TABLET ORAL DAILY PRN
Status: DISCONTINUED | OUTPATIENT
Start: 2022-01-13 | End: 2022-01-13 | Stop reason: HOSPADM

## 2022-01-13 RX ORDER — POTASSIUM CHLORIDE 20 MEQ/1
40 TABLET, EXTENDED RELEASE ORAL 3 TIMES DAILY
COMMUNITY
End: 2022-02-01 | Stop reason: ALTCHOICE

## 2022-01-13 RX ORDER — LORAZEPAM 2 MG/ML
1 INJECTION INTRAMUSCULAR ONCE AS NEEDED
Status: COMPLETED | OUTPATIENT
Start: 2022-01-13 | End: 2022-01-13

## 2022-01-13 RX ORDER — AMLODIPINE BESYLATE 5 MG/1
10 TABLET ORAL DAILY
Status: DISCONTINUED | OUTPATIENT
Start: 2022-01-13 | End: 2022-01-13 | Stop reason: HOSPADM

## 2022-01-13 RX ORDER — ATORVASTATIN CALCIUM 40 MG/1
40 TABLET, FILM COATED ORAL EVERY EVENING
Status: DISCONTINUED | OUTPATIENT
Start: 2022-01-13 | End: 2022-01-13 | Stop reason: HOSPADM

## 2022-01-13 RX ORDER — MONTELUKAST SODIUM 10 MG/1
10 TABLET ORAL
Status: DISCONTINUED | OUTPATIENT
Start: 2022-01-13 | End: 2022-01-13 | Stop reason: HOSPADM

## 2022-01-13 RX ORDER — PANTOPRAZOLE SODIUM 40 MG/1
40 TABLET, DELAYED RELEASE ORAL DAILY
Status: DISCONTINUED | OUTPATIENT
Start: 2022-01-13 | End: 2022-01-13 | Stop reason: HOSPADM

## 2022-01-13 RX ORDER — ESTRADIOL 1 MG/1
2 TABLET ORAL 2 TIMES DAILY
Status: DISCONTINUED | OUTPATIENT
Start: 2022-01-13 | End: 2022-01-13 | Stop reason: HOSPADM

## 2022-01-13 RX ORDER — LORAZEPAM 0.5 MG/1
0.5 TABLET ORAL DAILY
Status: DISCONTINUED | OUTPATIENT
Start: 2022-01-13 | End: 2022-01-13 | Stop reason: HOSPADM

## 2022-01-13 RX ORDER — ASPIRIN 81 MG/1
81 TABLET ORAL DAILY
Status: DISCONTINUED | OUTPATIENT
Start: 2022-01-13 | End: 2022-01-13 | Stop reason: HOSPADM

## 2022-01-13 RX ORDER — POTASSIUM CHLORIDE 20 MEQ/1
40 TABLET, EXTENDED RELEASE ORAL 3 TIMES DAILY
Status: DISCONTINUED | OUTPATIENT
Start: 2022-01-13 | End: 2022-01-13 | Stop reason: HOSPADM

## 2022-01-13 RX ADMIN — CHLORTHALIDONE 25 MG: 25 TABLET ORAL at 08:48

## 2022-01-13 RX ADMIN — TRAMADOL HYDROCHLORIDE 50 MG: 50 TABLET, FILM COATED ORAL at 09:25

## 2022-01-13 RX ADMIN — LORAZEPAM 1 MG: 2 INJECTION INTRAMUSCULAR; INTRAVENOUS at 12:24

## 2022-01-13 RX ADMIN — SPIRONOLACTONE 50 MG: 25 TABLET ORAL at 08:48

## 2022-01-13 RX ADMIN — ESTRADIOL 2 MG: 1 TABLET ORAL at 08:48

## 2022-01-13 RX ADMIN — POTASSIUM CHLORIDE 40 MEQ: 1500 TABLET, EXTENDED RELEASE ORAL at 08:48

## 2022-01-13 RX ADMIN — PANTOPRAZOLE SODIUM 40 MG: 40 TABLET, DELAYED RELEASE ORAL at 08:48

## 2022-01-13 RX ADMIN — LORAZEPAM 0.5 MG: 0.5 TABLET ORAL at 08:48

## 2022-01-13 RX ADMIN — B-COMPLEX W/ C & FOLIC ACID TAB 1 TABLET: TAB at 08:48

## 2022-01-13 RX ADMIN — ASPIRIN 81 MG: 81 TABLET, COATED ORAL at 08:48

## 2022-01-13 RX ADMIN — METHOCARBAMOL 500 MG: 500 TABLET ORAL at 08:48

## 2022-01-13 RX ADMIN — AMLODIPINE BESYLATE 10 MG: 5 TABLET ORAL at 09:23

## 2022-01-13 RX ADMIN — POTASSIUM CHLORIDE 40 MEQ: 1500 TABLET, EXTENDED RELEASE ORAL at 05:24

## 2022-01-13 RX ADMIN — ENOXAPARIN SODIUM 40 MG: 40 INJECTION SUBCUTANEOUS at 09:22

## 2022-01-13 RX ADMIN — POTASSIUM CHLORIDE 40 MEQ: 1500 TABLET, EXTENDED RELEASE ORAL at 16:13

## 2022-01-13 NOTE — QUICK NOTE
Stroke Alert Note   Osmani March  39 y o  transgender female / male-to-female  MRN: [id number]   Unit/Bed#: FT 03 Encounter: [id number]     Stroke alert time: 4:39am  Neurology response by phone was immediate    38 y/o woman (transgender male to female) [employer] employee with history of chronic neck and low back pain, left carpal tunnel syndrome, and nonepileptic seizures, presented to the ED with chest pain and palpitations, and later also reported having numbness and tingling in the left face and hand starting around 2:30am         NIHSSS 1    CT head wo: normal  CTA head/neck: not completed due to severe allergy to IV contrast (anaphylaxis) and mild symptoms not suggestive of LVO  IV tPA was not given due to low NIHSS with nondisabling symptoms  Not necessarily consistent with a stroke or TIA given her known left hand carpal tunnel syndrome and she had a prior presentation (3/2020) that included left-sided face arm and leg numbness and weakness that was also determined not to be a stroke or TIA  - Admit to stroke pathway  - Recommend loading with aspirin 325mg once, then continuing 81 mg daily  - start lipitor 40mg Qday  - defer decision regarding obtaining MRI brain wo and echo to inpatient neurology team   - lipid panel, HbA1c  - monitor on telemetry  - normothermia, euglycemia  - avoid hypotonic fluids        Angelia Barbosa MD

## 2022-01-13 NOTE — ASSESSMENT & PLAN NOTE
Blood pressure is reviewed and acceptable  Last recorded pressure: 139/91  Continue amlodipine, spironolactone and chlorthalidone  Monitor blood pressure

## 2022-01-13 NOTE — SPEECH THERAPY NOTE
Speech Language/Pathology  Speech/Language Pathology  Assessment    Patient Name: Sydnie Wahl  Today's Date: 1/13/2022     Problem List  Principal Problem:    Chest pain  Active Problems:    Essential hypertension    Cervical disc disease    GERD (gastroesophageal reflux disease)    Left-sided weakness and sensory deficits    Hypokalemia    Abnormal TSH    Past Medical History  Past Medical History:   Diagnosis Date    Anxiety     Asthma     seasonal    Hernia of abdominal wall     Hypertension     Kidney stones     Seizures ([address] )     pseudoseizures since 2012     Past Surgical History  Past Surgical History:   Procedure Laterality Date    APPENDECTOMY      CHOLECYSTECTOMY      GASTRIC BYPASS      HERNIA REPAIR      x2     Sydnie Wahl  is a 39 y o  adult with a PMH of asthma, HTN, anxiety and cervical radiculopathy who presents with chest pain  Patient presents with complaints of chest pain that began around 0230 this am, 01/13/2022  Patient reports associative symptoms of shortness of breath and back pain; however, patient reports chronic back pain  She denies nausea, vomiting, lightheadedness or dizziness  While in the ED she reported left sided tingling and numbness while in the ED  Patient will be admitted for ACS rule out and TIA workup including neurology consult  Consult received for speech/swallow eval on stroke pathway  Pt passed nsg swallow screen; tolerating regular diet w/o s/s dysphagia or aspiration  No speech/language deficits reported  NIH score is 0  MRI: 1/13/22 No acute intracranial abnormality  No evidence of CVA  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

## 2022-01-13 NOTE — LETTER
[address]  [address] [address]  Dept: [phone]    January 13, 2022     Patient: Alondra Underwood  YOB: 1980   Date of Visit: 1/13/2022       To Whom it May Concern:    Stephanie Burdick is under my professional care  She was seen in the hospital from 1/13/2022   to 01/13/22  She may return to work on 1/14 without limitations  If you have any questions or concerns, please don't hesitate to call           Sincerely,            Timur Mayfield PA-C

## 2022-01-13 NOTE — CASE MANAGEMENT
Case Management Assessment & Discharge Planning Note    Patient name Daniel Baez  Location FT FT  MRN [id number]  : 1980 Date 2022       Current Admission Date: 2022  Current Admission Diagnosis:Left-sided weakness and sensory deficits   Patient Active Problem List    Diagnosis Date Noted    Palpitations 2022    Abnormal TSH 2022    Viral URI with cough 2021    COVID-19 virus infection 2021    Cervical myofascial pain syndrome 2021    Hypokalemia 2021    Elevated serum immunoglobulin free light chain level 2021    Chronic pain syndrome 2021    Lumbar disc herniation with radiculopathy 2021    Acute sinusitis 2020    Chronic neck pain 2020    Low back pain 2020    Herniation of intervertebral disc of lumbar spine 2020    Morbid obesity with BMI of 45 0-49 9, adult ([facility] [address] ) 2020    Left-sided weakness and sensory deficits 2020    Worsening headaches     Umbilical hernia     GERD (gastroesophageal reflux disease) 2019    History of sleeve gastrectomy 2019    Essential hypertension 2019    Cervical disc disease 2019    Male-to-female transgender person 2019    Anxiety 2019      LOS (days): 0  Geometric Mean LOS (GMLOS) (days):   Days to GMLOS:     OBJECTIVE:              Current admission status: Observation       Preferred Pharmacy:   [address] [facility] [address] ([place] [address]  [address] [place] [address]  Phone: [phone] Fax: [phone]    [address] [place], [address] [place] [facility]  Phone: [phone] Fax: [phone]    [id number] GEETHA Sears - [address] [facility] [address]  Phone: [phone] Fax: [phone]    Primary Care Provider: Sonny Lau MD    Primary Insurance: CAPITAL  Secondary Insurance:     ASSESSMENT:  Chacho Montiel, [address]   Primary Phone: [phone] (Mobile)                    Obs Notice Signed: 01/13/22    Readmission Root Cause  30 Day Readmission: No    Patient Information  Admitted from[de-identified] Home  Mental Status: Alert  During Assessment patient was accompanied by: Not accompanied during assessment  Assessment information provided by[de-identified] Patient  Primary Caregiver: Self  Support Systems: [place] of Residence: [address] [place] [address] do you live in?: [place] entry access options   Select all that apply : No steps to enter home  Type of Current Residence: Apartment  Floor Level: 2  Upon entering residence, is there a bedroom on the main floor (no further steps)?: Yes  Upon entering residence, is there a bathroom on the main floor (no further steps)?: Yes  In the last 12 months, was there a time when you were not able to pay the mortgage or rent on time?: No  In the last 12 months, how many places have you lived?: 1  In the last 12 months, was there a time when you did not have a steady place to sleep or slept in a shelter (including now)?: No  Homeless/housing insecurity resource given?: N/A  Living Arrangements: Lives Alone  Is patient a ?: No    Activities of Daily Living Prior to Admission  Functional Status: Independent  Completes ADLs independently?: Yes  Ambulates independently?: Yes  Does patient use assisted devices?: No  Does patient currently own DME?: Yes  What DME does the patient currently own?: [employer]  Does patient have a history of Outpatient Therapy (PT/OT)?: Yes ([address]  at [address] [facility])  Does the patient have a history of Short-Term Rehab?: No  Does patient have a history of HHC?: No  Does patient currently have [facility]?: No         Patient Information Continued  Income Source: Employed  Does patient have prescription coverage?: Yes  Within the past 12 months, you worried that your food would run out before you got the money to buy more : Never true  Within the past 12 months, the food you bought just didnt last and you didnt have money to get more : Never true  Food insecurity resource given?: N/A  Does patient receive dialysis treatments?: No  Does patient have a history of substance abuse?: No  Does patient have a history of Mental Health Diagnosis?: No         Means of Transportation  Means of Transport to Appts[de-identified] Drives Self  In the past 12 months, has lack of transportation kept you from medical appointments or from getting medications?: No  In the past 12 months, has lack of transportation kept you from meetings, work, or from getting things needed for daily living?: No  Was application for public transport provided?: N/A        DISCHARGE DETAILS:    Discharge planning discussed with[de-identified] patient at bedside in the ED  Freedom of Choice: Yes  Comments - Freedom of Choice: CM met with patient at bedside to discuss discharge planning  Patient is under OBS for stroke protocol  OBS notice signed  Patient is independent with her ADLs, drives, is employed, goes to PT/OT Outpatient at [place] [address]    CM contacted family/caregiver?: No- see comments  Were Treatment Team discharge recommendations reviewed with patient/caregiver?: Yes  Did patient/caregiver verbalize understanding of patient care needs?: Yes  Were patient/caregiver advised of the risks associated with not following Treatment Team discharge recommendations?: Yes         Requested [address]         Is the patient interested in [place] [address] at discharge?: No    DME Referral Provided  Referral made for DME?: No    Other Referral/Resources/Interventions Provided:  Referral Comments: Stroke Workup negative, patient is independent and employed, no anticipated CM needs         Treatment Team Recommendation: Home  Discharge Destination Plan[de-identified] Home  Transport at Discharge : Self

## 2022-01-13 NOTE — ASSESSMENT & PLAN NOTE
 Patient Presentation: Patient presents with complaints of chest pain that began around 0230 this am, 01/13/2022  Patient reports associative symptoms of shortness of breath and back pain; however, patient reports chronic back pain  She denies nausea, vomiting, lightheadedness or dizziness  While in the ED she reported left sided tingling and numbness while in the ED   Likely etiology: Anxiety versus ACS versus hypertensive urgency   ITZ: 2  o Initial Troponin: 4 --> 5  o Trend x3 or to peak   Initial EKG: NSR, heart rate 85, no ischemic changes noted    o Monitor on telemetry   Check fasting lipid panel and A1c   Admit under Observation Status

## 2022-01-13 NOTE — ASSESSMENT & PLAN NOTE
· Per ED provider, patient reported complaints of left sided tingling and numbness while in the ED; therefore, a stroke alert was called  · Per discussion with patient, she has a history of these symptoms and they are intermittent in nature  · Etiology: Cervical radiculopathy versus TIA  CT brain: "No acute intracranial hemorrhage "  Stroke Pathway    Stroke work up negative, stable for discharge

## 2022-01-13 NOTE — ASSESSMENT & PLAN NOTE
Osmin Burgos  is a 39 y o  adult who presented as stroke alert on 1/13/2022  3:17 AM with initial NIHSS of 1 and LKW the night prior , initial BP was Blood Pressure: 156/97  Initial presenting deficits were worsening numbness of her baseline left sided numbness   As a result of low NIHSS  pt was determined to not be a candidate for tPA   F/u exam: Intermittent Left sided numbness   BP over last 24 hours: Blood Pressure: 128/75  current BP: Blood Pressure: 128/75    Lab Results   Component Value Date    HGBA1C 5 5 03/06/2020    CHOLESTEROL 153 01/13/2022    LDLCALC 79 01/13/2022    TRIG 44 01/13/2022    INR 0 92 01/13/2022      Impression: Patient on stroke pathway, lower suspicion for stroke, would like to rule out any cardiac etiology for symptoms given symptoms of lightheadedness, nausea, sweating and palpitations   May benefit from cardiac event monitoring    Imaging:   CTH: No acute intracranial pathology   CTA:  Unable to obtain due to contrast allergy   MRI:  Ordered and pending   Carotid ultrasound:  Ordered and pending   Echocardiogram:  Ordered and pending   Telemetry:  Continue    Plan:   Received full-dose aspirin in the emergency department, continue aspirin 81 mg   Continue Statin   A1c/lipid panel as above   Maintain glucose below 200   MRI pending   Echo pending   Neuro checks   Monitor on telemetry   Medical management as per primary team appreciated   PT/OT/Speech/PMR consults appreciated when able

## 2022-01-13 NOTE — DISCHARGE SUMMARY
[facility]  Discharge- Beth Mata  1980, 39 y o  adult MRN: [id number]  Unit/Bed#: FT 03 Encounter: [id number]  Primary Care Provider: Bob Cunningham MD   Date and time admitted to hospital: 1/13/2022  3:17 AM    * Left-sided weakness and sensory deficits  Assessment & Plan  · Per ED provider, patient reported complaints of left sided tingling and numbness while in the ED; therefore, a stroke alert was called  · Per discussion with patient, she has a history of these symptoms and they are intermittent in nature  · Etiology: Cervical radiculopathy versus TIA  CT brain: "No acute intracranial hemorrhage "  Stroke Pathway  Stroke work up negative, stable for discharge     Palpitations  Assessment & Plan   Patient Presentation: Patient presents with complaints of chest pain that began around 0230 this am, 01/13/2022  Patient reports associative symptoms of shortness of breath and back pain; however, patient reports chronic back pain  She denies nausea, vomiting, lightheadedness or dizziness  While in the ED she reported left sided tingling and numbness while in the ED   Likely etiology: Anxiety versus ACS versus hypertensive urgency   ITZ: 2  o Initial Troponin: 4 --> 5  o Trend x3 or to peak   Initial EKG: NSR, heart rate 85, no ischemic changes noted    o Monitor on telemetry   Check fasting lipid panel and A1c      Stable for discharge, troponin negative, telemetry unremarkable, echo negative    Follow up Cardiology for Zio if indicated     Hypokalemia  Assessment & Plan  · Received 80 mEq PO   Mag normal      Essential hypertension  Assessment & Plan  Blood pressure is reviewed and acceptable  Continue amlodipine, spironolactone and chlorthalidone        Medical Problems             Resolved Problems  Date Reviewed: 1/13/2022    None              Discharging Physician / Practitioner: Wesley Lechuga PA-C  PCP: Bob Cunningham MD  Admission Date: Admission Orders (From admission, onward)     Ordered        01/13/22 0518  Place in Observation  Once                      Discharge Date: 01/13/22    Consultations During Hospital Stay:  · Neurology - Dr Kasi Rice     Procedures Performed:   · CXR  · CT Brain   · MRI Brain   · Carotid Duplex  · Echo    Significant Findings / Test Results:   · CXR: No acute pulmonary disease   · CT Brain: No acute intracranial hemorrhage  · MRI brain: No acute intracranial abnormality  No evidence of CVA  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  · Carotid Duplex: Pending   · Echo: Normal     Incidental Findings:   · None     Test Results Pending at Discharge (will require follow up): · None     Outpatient Tests Requested:  · Follow up with Cardiology     Complications:  None    Reason for Admission: [address] Course: Prudence Petersen  is a 39 y o  adult patient who originally presented to the hospital on 1/13/2022 due to paresthesias and palpitations  She was admitted under stroke pathway which was negative  Neurology saw the patient in consultation and deemed her stable for discharge  Her potassium was noted to be low and this was replenished  She did mention palpitations, but her echo and telemetry was negative  She will be referred to Cardiology for further work up of this matter  Please see above list of diagnoses and related plan for additional information  Condition at Discharge: stable    Discharge Day Visit / Exam:   * Please refer to separate progress note for these details *    Discussion with Family: Patient declined call to   Discharge instructions/Information to patient and family:   See after visit summary for information provided to patient and family  Provisions for Follow-Up Care:  See after visit summary for information related to follow-up care and any pertinent home health orders         Disposition:   Home    Planned Readmission: None     Discharge Statement:  I spent 45 minutes discharging the patient  This time was spent on the day of discharge  I had direct contact with the patient on the day of discharge  Greater than 50% of the total time was spent examining patient, answering all patient questions, arranging and discussing plan of care with patient as well as directly providing post-discharge instructions  Additional time then spent on discharge activities  Discharge Medications:  See after visit summary for reconciled discharge medications provided to patient and/or family        **Please Note: This note may have been constructed using a voice recognition system**

## 2022-01-13 NOTE — ASSESSMENT & PLAN NOTE
 Patient Presentation: Patient presents with complaints of chest pain that began around 0230 this am, 01/13/2022  Patient reports associative symptoms of shortness of breath and back pain; however, patient reports chronic back pain  She denies nausea, vomiting, lightheadedness or dizziness  While in the ED she reported left sided tingling and numbness while in the ED   Likely etiology: Anxiety versus ACS versus hypertensive urgency   ITZ: 2  o Initial Troponin: 4 --> 5  o Trend x3 or to peak   Initial EKG: NSR, heart rate 85, no ischemic changes noted    o Monitor on telemetry     Check fasting lipid panel and A1c      Stable for discharge, troponin negative, telemetry unremarkable, echo negative    Follow up Cardiology for Zio if indicated

## 2022-01-13 NOTE — ED NOTES
Pt returned from CT and requested to use the bathroom  With the help of a second RN kaela pt needed assitance, pt stood from the bed, demonstrated balance on her feet, and ambulated to the bathroom without difficulty  Pt denied lightheadedness and dizziness        Danielle Whitfield, SIERRA  01/13/22 5394

## 2022-01-13 NOTE — H&P
[facility]  1980, 39 y o  adult MRN: [id number]  Unit/Bed#: FT 03 Encounter: [id number]  Primary Care Provider: Yvonne Rondon MD   Date and time admitted to hospital: 1/13/2022  3:17 AM    * Chest pain  Assessment & Plan   Patient Presentation: Patient presents with complaints of chest pain that began around 0230 this am, 01/13/2022  Patient reports associative symptoms of shortness of breath and back pain; however, patient reports chronic back pain  She denies nausea, vomiting, lightheadedness or dizziness  While in the ED she reported left sided tingling and numbness while in the ED   Likely etiology: Anxiety versus ACS versus hypertensive urgency   ITZ: 2  o Initial Troponin: 4 --> 5  o Trend x3 or to peak   Initial EKG: NSR, heart rate 85, no ischemic changes noted    o Monitor on telemetry   Check fasting lipid panel and A1c   Admit under Observation Status  Left-sided weakness and sensory deficits  Assessment & Plan  · Per ED provider, patient reported complaints of left sided tingling and numbness while in the ED; therefore, a stroke alert was called  · Per discussion with patient, she has a history of these symptoms and they are intermittent in nature  · Etiology: Cervical radiculopathy versus TIA  CT brain: "No acute intracranial hemorrhage "  Stroke Pathway  Frequent vital signs  Neuro checks  Telemetry  Check lipid panel and A1c  Aspirin and statin  Defer MRI brain to neurology  Consult Neurology  PT/OT consultation  Essential hypertension  Assessment & Plan  Blood pressure is reviewed and acceptable  Last recorded pressure: 139/91  Continue amlodipine, spironolactone and chlorthalidone  Monitor blood pressure  Cervical disc disease  Assessment & Plan  · Continue home regimen of scheduled robaxin, tramadol and tizanidine p r n      GERD (gastroesophageal reflux disease)  Assessment & Plan  · Continue PPI     VTE Pharmacologic Prophylaxis: VTE Score: 5 High Risk (Score >/= 5) - Pharmacological DVT Prophylaxis Ordered: enoxaparin (Lovenox)  Sequential Compression Devices Ordered  Code Status: Level 1 - Full Code     Anticipated Length of Stay: Patient will be admitted on an observation basis with an anticipated length of stay of less than 2 midnights secondary to TIA and ACS rule out  Total Time for Visit, including Counseling / Coordination of Care: 70 minutes Greater than 50% of this total time spent on direct patient counseling and coordination of care  Chief Complaint: Chest pain    History of Present Illness:  Cleta Runner  is a 39 y o  adult with a PMH of asthma, HTN, anxiety and cervical radiculopathy who presents with chest pain  Patient presents with complaints of chest pain that began around 0230 this am, 01/13/2022  Patient reports associative symptoms of shortness of breath and back pain; however, patient reports chronic back pain  She denies nausea, vomiting, lightheadedness or dizziness  While in the ED she reported left sided tingling and numbness while in the ED  Patient will be admitted for ACS rule out and TIA workup including neurology consult  Review of Systems:  Review of Systems   Constitutional: Negative for chills and fever  Respiratory: Positive for shortness of breath  Cardiovascular: Positive for chest pain  Gastrointestinal: Negative for abdominal pain, diarrhea, nausea and vomiting  Musculoskeletal: Positive for back pain  Neurological: Positive for numbness  Negative for dizziness, facial asymmetry, weakness, light-headedness and headaches  All other systems reviewed and are negative        Past Medical and Surgical History:   Past Medical History:   Diagnosis Date    Anxiety     Asthma     seasonal    Hernia of abdominal wall     Hypertension     Kidney stones     Seizures ([employer] [facility] [address] )     pseudoseizures since 2012       Past Surgical History: Procedure Laterality Date    APPENDECTOMY      CHOLECYSTECTOMY      GASTRIC BYPASS      HERNIA REPAIR      x2       Meds/Allergies:  Prior to Admission medications    Medication Sig Start Date End Date Taking?  Authorizing Provider   acetaminophen (TYLENOL) 500 mg tablet Take 500 mg by mouth 2 (two) times a day   Yes Historical Provider, MD   albuterol (PROVENTIL HFA,VENTOLIN HFA) 90 mcg/act inhaler Inhale 2 puffs every 6 (six) hours as needed for wheezing 9/10/21  Yes Marleen Mazariegos MD   amLODIPine (NORVASC) 10 mg tablet Take 1 tablet (10 mg total) by mouth daily 10/21/21  Yes Marleen Mazariegos MD   aspirin (ECOTRIN LOW STRENGTH) 81 mg EC tablet Take 81 mg by mouth daily   Yes Historical Provider, MD   benzonatate (TESSALON) 200 MG capsule Take 1 capsule (200 mg total) by mouth 3 (three) times a day as needed for cough 12/27/21  Yes Marleen Mazariegos MD   chlorthalidone 25 mg tablet Take 1 tablet (25 mg total) by mouth daily 9/10/21  Yes Marleen Mazariegos MD   estradiol (ESTRACE) 2 MG tablet Take 1 tablet (2 mg total) by mouth 2 (two) times a day 10/21/21  Yes Marleen Mazariegos MD   LORazepam (ATIVAN) 1 mg tablet Take 0 5 tablets (0 5 mg total) by mouth daily 12/10/21  Yes Marleen Mazariegos MD   methocarbamol (ROBAXIN) 500 mg tablet Take 1 tablet (500 mg total) by mouth daily after breakfast 3/25/21  Yes Bertha Baeza MD   montelukast (SINGULAIR) 10 mg tablet Take 1 tablet (10 mg total) by mouth daily at bedtime 2/3/21  Yes Marleen Mazariegos MD   multivitamin [facility]) TABS Take 1 tablet by mouth daily   Yes Historical Provider, MD   ondansetron (ZOFRAN-ODT) 4 mg disintegrating tablet Take 1 tablet (4 mg total) by mouth every 8 (eight) hours as needed for nausea or vomiting 10/17/19  Yes Rayshawn Amaral III, MD   pantoprazole (PROTONIX) 40 mg tablet Take 1 tablet (40 mg total) by mouth daily 10/21/21  Yes Marleen Mazariegos MD   potassium chloride (K-DUR,KLOR-CON) 20 mEq tablet Take 40 mEq by mouth 3 (three) times a day   Yes Historical Provider, MD   predniSONE 20 mg tablet Take 2 tablets (40 mg total) by mouth daily 12/26/21  Yes Briseida Thomas MD   spironolactone (ALDACTONE) 50 mg tablet Take 1 tablet (50 mg total) by mouth daily 12/10/21  Yes Faustino Monique MD   traMADol [employer] [employer]) 50 mg tablet Take 1 tablet (50 mg total) by mouth daily as needed for moderate pain 12/10/21  Yes Faustino Monique MD   colchicine (COLCRYS) 0 6 mg tablet Take 1 tablet (0 6 mg total) by mouth daily 2 pills now and 1 in an hour  One daily until resolved  11/7/21   Chaya Cornejo PA-C   Cyanocobalamin (B-12) 1000 MCG/ML KIT Inject 1 ml IM in thigh/ buttocks or deltoid mm once weekly x 8 weeks  Afterward once monthly 5/12/20   Shawna Taylor DO   hydrocortisone (ANUSOL-HC) 25 mg suppository UNWRAP AND INSERT 1 SUPPOSITORY RECTALLY TWICE DAILY 7/28/21   Faustino Monique MD   ondansetron (ZOFRAN-ODT) 4 mg disintegrating tablet Take 1 tablet (4 mg total) by mouth every 6 (six) hours as needed for nausea or vomiting  Patient not taking: Reported on 1/13/2022 12/26/21   Briseida Thomas MD   Syringe/Needle, Disp, (SYRINGE 3CC/53FX4-8/4") 27G X 1-1/4" 3 ML MISC Use for B12 injections 12/15/20   Shawna Taylor DO   TiZANidine (ZANAFLEX) 2 MG capsule Take 1 capsule (2 mg total) by mouth daily at bedtime as needed for muscle spasms 9/10/21   Faustino Monique MD     I have reviewed home medications with patient personally  Allergies:    Allergies   Allergen Reactions    Gadolinium Anaphylaxis     "Oxygen dropped and coded during"       Iodinated Diagnostic Agents Anaphylaxis       Social History:  Marital Status: Single   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  Patient Pre-hospital Living Situation: Home  Patient Pre-hospital Level of Mobility: walks  Patient Pre-hospital Diet Restrictions: None  Substance Use History: Social History     Substance and Sexual Activity   Alcohol Use Yes    Comment: 3 per month     Social History     Tobacco Use   Smoking Status Never Smoker   Smokeless Tobacco Never Used     Social History     Substance and Sexual Activity   Drug Use Never       Family History:  Family History   Problem Relation Age of Onset    Hypertension Mother     Diabetes Mother     Hypertension Sister     No Known Problems Father         head injury    Stroke Maternal Grandmother         brain aneurysm    Aneurysm Maternal Uncle         brain       Physical Exam:     Vitals:   Blood Pressure: 139/91 (01/13/22 0727)  Pulse: 76 (01/13/22 0727)  Temperature: 97 9 °F (36 6 °C) (01/13/22 0713)  Temp Source: Oral (01/13/22 0713)  Respirations: 18 (01/13/22 0727)  Height: 5' 9" (175 3 cm) (01/13/22 0322)  Weight - Scale: (!) 150 kg (330 lb 11 oz) (01/13/22 0322)  SpO2: 99 % (01/13/22 0727)    Physical Exam  Vitals and nursing note reviewed  Constitutional:       General: She is not in acute distress  Appearance: She is obese  She is not ill-appearing, toxic-appearing or diaphoretic  HENT:      Head: Normocephalic  Nose: Nose normal       Mouth/Throat:      Pharynx: Oropharynx is clear  Eyes:      General: No visual field deficit or scleral icterus  Conjunctiva/sclera: Conjunctivae normal    Cardiovascular:      Rate and Rhythm: Normal rate and regular rhythm  Pulses: Normal pulses  Heart sounds: Normal heart sounds  No murmur heard  Pulmonary:      Effort: Pulmonary effort is normal  No respiratory distress  Breath sounds: Normal breath sounds  No wheezing, rhonchi or rales  Chest:      Chest wall: No tenderness  Abdominal:      General: Bowel sounds are normal  There is no distension  Palpations: Abdomen is soft  Tenderness: There is no abdominal tenderness  Musculoskeletal:         General: No swelling  Normal range of motion  Cervical back: Normal range of motion  Skin:     General: Skin is warm and dry  Neurological:      General: No focal deficit present  Mental Status: She is alert and oriented to person, place, and time  GCS: GCS eye subscore is 4  GCS verbal subscore is 5  GCS motor subscore is 6  Cranial Nerves: No cranial nerve deficit, dysarthria or facial asymmetry  Psychiatric:         Speech: Speech normal           Additional Data:     Lab Results:  Results from last 7 days   Lab Units 01/13/22  0354   WBC Thousand/uL 9 25   HEMOGLOBIN g/dL 12 3   HEMATOCRIT % 36 9   PLATELETS Thousands/uL 300   NEUTROS PCT % 75   LYMPHS PCT % 17   MONOS PCT % 7   EOS PCT % 1     Results from last 7 days   Lab Units 01/13/22  0354   SODIUM mmol/L 139   POTASSIUM mmol/L 3 1*   CHLORIDE mmol/L 103   CO2 mmol/L 30   BUN mg/dL 12   CREATININE mg/dL 0 95   ANION GAP mmol/L 6   CALCIUM mg/dL 8 5   ALBUMIN g/dL 3 1*   TOTAL BILIRUBIN mg/dL 0 33   ALK PHOS U/L 53   ALT U/L 20   AST U/L 15   GLUCOSE RANDOM mg/dL 93     Results from last 7 days   Lab Units 01/13/22  0508   INR  0 92     Results from last 7 days   Lab Units 01/13/22  0436   POC GLUCOSE mg/dl 88               Imaging: Reviewed radiology reports from this admission including: CT head and Personally reviewed the following imaging: chest xray  CT stroke alert brain   Final Result by Geovany Griffin MD (01/13 0458)      1  No acute intracranial hemorrhage  2   If there is continued clinical concern for acute infarct, further evaluation with MRI may be obtained which is more sensitive  Findings were directly discussed with Dr Ruthy Hernandez at 4:57 AM on 1/13/2022  Workstation performed: [id number]         XR chest 1 view portable    (Results Pending)       EKG and Other Studies Reviewed on Admission:   · EKG: NSR  HR 85     ** Please Note: This note has been constructed using a voice recognition system   **

## 2022-01-13 NOTE — ED NOTES
Pt ambulated to the bathroom without difficulty  Pt was balanced and showed no deviation when walking  Pt stated "the numbness and tingling in my fingers and leg have gotten better " Pt denied using any assistive devises at home and denied any lightheadedness and dizziness       Laurent Ferreira RN  01/13/22 2766

## 2022-01-13 NOTE — Clinical Note
Jazmin Nelson was seen and treated in our emergency department on 1/13/2022  Diagnosis:     Oswaldo Tee  may return to work on return date  She may return on this date: 01/14/2022         If you have any questions or concerns, please don't hesitate to call        Jaqui Herring RN    ______________________________           _______________          _______________  Hospital Representative                              Date                                Time

## 2022-01-13 NOTE — ASSESSMENT & PLAN NOTE
· Per ED provider, patient reported complaints of left sided tingling and numbness while in the ED; therefore, a stroke alert was called  · Per discussion with patient, she has a history of these symptoms and they are intermittent in nature  · Etiology: Cervical radiculopathy versus TIA  CT brain: "No acute intracranial hemorrhage "  Stroke Pathway  Frequent vital signs  Neuro checks  Telemetry  Check lipid panel and A1c  Aspirin and statin  Defer MRI brain to neurology  Consult Neurology  PT/OT consultation

## 2022-01-13 NOTE — CONSULTS
NEUROLOGY RESIDENCY CONSULT NOTE     Name: Osmani March  Age & Sex: 39 y o  adult   MRN: [id number]  Unit/Bed#: FT 03   Encounter: [id number]  Length of Stay: 0    ASSESSMENT & PLAN     Left-sided weakness and sensory deficits  Assessment & Plan  Osmani March  is a 39 y o  adult who presented as stroke alert on 1/13/2022  3:17 AM with initial NIHSS of 1 and LKW the night prior , initial BP was Blood Pressure: 156/97  Initial presenting deficits were worsening numbness of her baseline left sided numbness   As a result of low NIHSS  pt was determined to not be a candidate for tPA   F/u exam: Intermittent Left sided numbness   BP over last 24 hours: Blood Pressure: 128/75  current BP: Blood Pressure: 128/75    Lab Results   Component Value Date    HGBA1C 5 5 03/06/2020    CHOLESTEROL 153 01/13/2022    LDLCALC 79 01/13/2022    TRIG 44 01/13/2022    INR 0 92 01/13/2022      Impression: Patient on stroke pathway, lower suspicion for stroke, would like to rule out any cardiac etiology for symptoms given symptoms of lightheadedness, nausea, sweating and palpitations  May benefit from cardiac event monitoring    Imaging:   CTH: No acute intracranial pathology   CTA:  Unable to obtain due to contrast allergy   MRI:  Ordered and pending   Carotid ultrasound:  Ordered and pending   Echocardiogram:  Ordered and pending   Telemetry:  Continue    Plan:   Received full-dose aspirin in the emergency department, continue aspirin 81 mg   Continue Statin   A1c/lipid panel as above   Maintain glucose below 200   MRI pending   Echo pending   Neuro checks   Monitor on telemetry   Medical management as per primary team appreciated   PT/OT/Speech/PMR consults appreciated when able          Osmani March  will need follow up in at the next regular appointment with general patient follows with outpatient neurology   She will not require outpatient neurological testing           SUBJECTIVE     Reason for Consult / Principal Problem: Stroke Alert   Hx and PE limited by: none     HPI: Chiki Singh  is a 39 y o   adult with medical history pertinent Chronic cervical and lower back pain, lumbar radiculopathy, HTN, S/p gastric bypass surgery, PNEA, Transition MTF, who initially presented on 1/13/2022 for chest pain and stroke like symptoms  Patient states she woke up at 0230 am and had chest pain, lightheadedness, nausea, palpitations, sweating and also noticed worsening numbness and weakness on her left side  She called 911 and was brought to the ED  On Arrival to the ED, initial Blood pressure was 156/97 remaining vitals were stable  A stroke alert was called and neurology response was immediate  Initial NIHSS was 1  Head CT was negative for acute pathology  CTA was unable to be performed due to severe allergy to IV dye   Labs consisting of CBC,CMP, D-dimer, Troponin were within normal limits  TSH was elevated to 5 7  She was not a tPA candidate due to low NIHSS  Patient received a full dose  in the ED and was admitted to stroke pathway  To review, patient reports she had chronic left sided symptoms consisting of left-sided weakness and numbness  She is working with PT currently for her back pain and gait disturbance  She was seen by PT on Monday and reports having benefit from back exercises but noted a numbness sensation at that time as well  She went to bed last night at baseline     She does have a histroy of neck pain and occasion headaches  She follows with outpatient neurology  She reports having a syncopal event 4 years ago durng which she had LOC and head strike  During evaluation she reports overall she feels better  She continues to have intermittent numbness         Inpatient consult to Neurology  Consult performed by: Coy Bella MD  Consult ordered by: NATHAN Mcneal    Inpatient consult to Neurology  Consult performed by: Coy Bella MD  Consult ordered by: Mariah Wray PA-C          Historical Information   Past Medical History:   Diagnosis Date    Anxiety     Asthma     seasonal    Hernia of abdominal wall     Hypertension     Kidney stones     Seizures ([address] )     pseudoseizures since 2012     Past Surgical History:   Procedure Laterality Date    APPENDECTOMY      CHOLECYSTECTOMY      GASTRIC BYPASS      HERNIA REPAIR      x2     Social History   Social History     Substance and Sexual Activity   Alcohol Use Yes    Comment: 3 per month     Social History     Substance and Sexual Activity   Drug Use Never     E-Cigarette/Vaping    E-Cigarette Use Never User      E-Cigarette/Vaping Substances    Nicotine No     THC No     CBD No     Flavoring No     Other No     Unknown No      Social History     Tobacco Use   Smoking Status Never Smoker   Smokeless Tobacco Never Used     Family History:   Family History   Problem Relation Age of Onset    Hypertension Mother     Diabetes Mother     Hypertension Sister     No Known Problems Father         head injury    Stroke Maternal Grandmother         brain aneurysm    Aneurysm Maternal Uncle         brain     Meds/Allergies   all current active meds have been reviewed, current meds:   Current Facility-Administered Medications   Medication Dose Route Frequency    acetaminophen (TYLENOL) tablet 650 mg  650 mg Oral Q6H PRN    albuterol (PROVENTIL HFA,VENTOLIN HFA) inhaler 2 puff  2 puff Inhalation Q6H PRN    amLODIPine (NORVASC) tablet 10 mg  10 mg Oral Daily    aspirin (ECOTRIN LOW STRENGTH) EC tablet 81 mg  81 mg Oral Daily    atorvastatin (LIPITOR) tablet 40 mg  40 mg Oral QPM    benzonatate (TESSALON PERLES) capsule 200 mg  200 mg Oral TID PRN    chlorthalidone tablet 25 mg  25 mg Oral Daily    enoxaparin (LOVENOX) subcutaneous injection 40 mg  40 mg Subcutaneous BID    estradiol (ESTRACE) tablet 2 mg  2 mg Oral BID    LORazepam (ATIVAN) tablet 0 5 mg  0 5 mg Oral Daily    methocarbamol (ROBAXIN) tablet 500 mg  500 mg Oral After Breakfast    montelukast (SINGULAIR) tablet 10 mg  10 mg Oral HS    multivitamin stress formula tablet 1 tablet  1 tablet Oral Daily    pantoprazole (PROTONIX) EC tablet 40 mg  40 mg Oral Daily    potassium chloride (K-DUR,KLOR-CON) CR tablet 40 mEq  40 mEq Oral TID    spironolactone (ALDACTONE) tablet 50 mg  50 mg Oral Daily    tiZANidine (ZANAFLEX) tablet 2 mg  2 mg Oral HS PRN    traMADol (ULTRAM) tablet 50 mg  50 mg Oral Daily PRN    and PTA meds:   Prior to Admission Medications   Prescriptions Last Dose Informant Patient Reported? Taking? Cyanocobalamin (B-12) 1000 MCG/ML KIT More than a month at Unknown time Self No No   Sig: Inject 1 ml IM in thigh/ buttocks or deltoid mm once weekly x 8 weeks   Afterward once monthly   LORazepam (ATIVAN) 1 mg tablet 1/13/2022 at Unknown time  No Yes   Sig: Take 0 5 tablets (0 5 mg total) by mouth daily   Syringe/Needle, Disp, (SYRINGE 3CC/04TC3-4/4") 27G X 1-1/4" 3 ML MISC More than a month at Unknown time Self No No   Sig: Use for B12 injections   TiZANidine (ZANAFLEX) 2 MG capsule Unknown at Unknown time  No No   Sig: Take 1 capsule (2 mg total) by mouth daily at bedtime as needed for muscle spasms   acetaminophen (TYLENOL) 500 mg tablet Past Week at Unknown time  Yes Yes   Sig: Take 500 mg by mouth 2 (two) times a day   albuterol (PROVENTIL HFA,VENTOLIN HFA) 90 mcg/act inhaler Past Week at Unknown time  No Yes   Sig: Inhale 2 puffs every 6 (six) hours as needed for wheezing   amLODIPine (NORVASC) 10 mg tablet 1/12/2022 at Unknown time  No Yes   Sig: Take 1 tablet (10 mg total) by mouth daily   aspirin (ECOTRIN LOW STRENGTH) 81 mg EC tablet 1/12/2022 at Unknown time Self Yes Yes   Sig: Take 81 mg by mouth daily   benzonatate (TESSALON) 200 MG capsule Past Week at Unknown time  No Yes   Sig: Take 1 capsule (200 mg total) by mouth 3 (three) times a day as needed for cough   chlorthalidone 25 mg tablet 1/12/2022 at Unknown time  No Yes   Sig: Take 1 tablet (25 mg total) by mouth daily   colchicine (COLCRYS) 0 6 mg tablet More than a month at Unknown time  No No   Sig: Take 1 tablet (0 6 mg total) by mouth daily 2 pills now and 1 in an hour  One daily until resolved     estradiol (ESTRACE) 2 MG tablet 1/12/2022 at Unknown time  No Yes   Sig: Take 1 tablet (2 mg total) by mouth 2 (two) times a day   hydrocortisone (ANUSOL-HC) 25 mg suppository More than a month at Unknown time  No No   Sig: UNWRAP AND INSERT 1 SUPPOSITORY RECTALLY TWICE DAILY   methocarbamol (ROBAXIN) 500 mg tablet Past Week at Unknown time Self No Yes   Sig: Take 1 tablet (500 mg total) by mouth daily after breakfast   montelukast (SINGULAIR) 10 mg tablet Past Month at Unknown time Self No Yes   Sig: Take 1 tablet (10 mg total) by mouth daily at bedtime   multivitamin (THERAGRAN) TABS 1/12/2022 at Unknown time Self Yes Yes   Sig: Take 1 tablet by mouth daily   ondansetron (ZOFRAN-ODT) 4 mg disintegrating tablet Past Week at Unknown time Self No Yes   Sig: Take 1 tablet (4 mg total) by mouth every 8 (eight) hours as needed for nausea or vomiting   ondansetron (ZOFRAN-ODT) 4 mg disintegrating tablet Not Taking at Unknown time  No No   Sig: Take 1 tablet (4 mg total) by mouth every 6 (six) hours as needed for nausea or vomiting   Patient not taking: Reported on 1/13/2022    pantoprazole (PROTONIX) 40 mg tablet 1/12/2022 at Unknown time  No Yes   Sig: Take 1 tablet (40 mg total) by mouth daily   potassium chloride (K-DUR,KLOR-CON) 20 mEq tablet Past Week at Unknown time  Yes Yes   Sig: Take 40 mEq by mouth 3 (three) times a day   predniSONE 20 mg tablet Past Month at Unknown time  No Yes   Sig: Take 2 tablets (40 mg total) by mouth daily   spironolactone (ALDACTONE) 50 mg tablet 1/12/2022 at Unknown time  No Yes   Sig: Take 1 tablet (50 mg total) by mouth daily   traMADol (ULTRAM) 50 mg tablet 1/12/2022 at Unknown time  No Yes   Sig: Take 1 tablet (50 mg total) by mouth daily as needed for moderate pain      Facility-Administered Medications: None     Allergies   Allergen Reactions    Gadolinium Anaphylaxis     "Oxygen dropped and coded during"       Iodinated Diagnostic Agents Anaphylaxis       Review of previous medical records was  completed  Review of Systems   Constitutional: Negative for chills and fever  HENT: Negative for ear pain, sore throat and voice change  Eyes: Negative for pain and visual disturbance  Respiratory: Negative for cough and shortness of breath  Cardiovascular: Negative for chest pain and palpitations  Gastrointestinal: Negative for abdominal pain, nausea and vomiting  Genitourinary: Negative for dysuria and hematuria  Musculoskeletal: Positive for back pain  Negative for arthralgias  Skin: Negative for color change and rash  Neurological: Positive for light-headedness (improved) and numbness  Negative for dizziness, tremors, seizures, syncope, facial asymmetry, speech difficulty, weakness and headaches  All other systems reviewed and are negative  OBJECTIVE     Patient ID: Willian Quintero  is a 39 y o  adult  Vitals:   Vitals:    22 1000 22 1001 22 1115 22 1141   BP: 154/82 154/82 131/69 128/75   BP Location:  Right arm Right arm Right arm   Pulse: 80 79 66 79   Resp:  20 18 16   Temp:       TempSrc:       SpO2: 96% 96% 96% 97%   Weight:       Height:          Body mass index is 48 83 kg/m²  No intake or output data in the 24 hours ending 22 1300    Temperature:   Temp (24hrs), Av 9 °F (36 6 °C), Min:97 9 °F (36 6 °C), Max:97 9 °F (36 6 °C)    Temperature: 97 9 °F (36 6 °C)    Invasive Devices: Invasive Devices  Report    Peripheral Intravenous Line            Peripheral IV 22 Right Hand <1 day                Physical Exam  Vitals and nursing note reviewed  Constitutional:       Appearance: Normal appearance  HENT:      Head: Normocephalic and atraumatic        Nose: Nose normal       Mouth/Throat:      Mouth: Mucous membranes are moist    Eyes:      Extraocular Movements: Extraocular movements intact  Pupils: Pupils are equal, round, and reactive to light  Musculoskeletal:         General: Normal range of motion  Neurological:      Mental Status: She is alert and oriented to person, place, and time  Coordination: Finger-Nose-Finger Test and Heel to [place] isra Test normal       Gait: Gait is intact  Deep Tendon Reflexes: Strength normal    Psychiatric:         Speech: Speech normal           Neurologic Exam     Mental Status   Oriented to person, place, and time  Follows 2 step commands  Attention: normal    Speech: speech is normal   Level of consciousness: alert  Able to name object  Able to repeat  Cranial Nerves   Cranial nerves II through XII intact  CN II   Visual fields full to confrontation  CN III, IV, VI   Pupils are equal, round, and reactive to light  Nystagmus: none   Diplopia: none  Conjugate gaze: present    CN V   Facial sensation intact  CN XI   CN XI normal      CN XII   CN XII normal    Tongue: not atrophic  Fasciculations: absent  Tongue deviation: none    Motor Exam   Muscle bulk: normal  Overall muscle tone: normal  Right arm pronator drift: absent  Left arm pronator drift: absent    Strength   Strength 5/5 throughout     Right deltoid: 5/5  Left deltoid: 5/5  Right biceps: 5/5  Left biceps: 5/5  Right triceps: 5/5  Left triceps: 5/5  Right hamstrin/5  Left hamstrin/5  Right peroneal: 5/5  Left peroneal: 5/5  Right gastroc: 5/5  Left gastroc: 5/5    Sensory Exam   Light touch normal    Pin prick reduced on left side of face, upper and lower extremity   Temperature reduced on left side of upper and lower extremity      Gait, Coordination, and Reflexes     Gait  Gait: normal    Coordination   Finger to nose coordination: normal  Heel to shin coordination: normal    Tremor   Resting tremor: absent  Intention tremor: absent  Action tremor: absent    Reflexes   Reflexes 2+ except as noted  Right plantar: normal  Left plantar: equivocalSlightly brick reflex on left patellar         LABORATORY DATA     Labs: I have personally reviewed pertinent reports  Results from last 7 days   Lab Units 01/13/22  0354   WBC Thousand/uL 9 25   HEMOGLOBIN g/dL 12 3   HEMATOCRIT % 36 9   PLATELETS Thousands/uL 300  300   NEUTROS PCT % 75   MONOS PCT % 7      Results from last 7 days   Lab Units 01/13/22  0354   POTASSIUM mmol/L 3 1*   CHLORIDE mmol/L 103   CO2 mmol/L 30   BUN mg/dL 12   CREATININE mg/dL 0 95   CALCIUM mg/dL 8 5   ALK PHOS U/L 53   ALT U/L 20   AST U/L 15     Results from last 7 days   Lab Units 01/13/22  0846   MAGNESIUM mg/dL 1 8          Results from last 7 days   Lab Units 01/13/22  0508   INR  0 92   PTT seconds 27               IMAGING & DIAGNOSTIC TESTING     Radiology Results: I have personally reviewed pertinent reports  CT stroke alert brain   Final Result by Shady Casillas MD (01/13 5743)      1  No acute intracranial hemorrhage  2   If there is continued clinical concern for acute infarct, further evaluation with MRI may be obtained which is more sensitive  Findings were directly discussed with Dr Soraya Sexton at 4:57 AM on 1/13/2022  Workstation performed: [id number]         XR chest 1 view portable   Final Result by Pee Mcknight MD (01/13 6378)      No acute cardiopulmonary disease  Workstation performed: [id number]         VAS carotid complete study    (Results Pending)   MRI brain wo contrast    (Results Pending)       Other Diagnostic Testing: I have personally reviewed pertinent reports        ACTIVE MEDICATIONS     Current Facility-Administered Medications   Medication Dose Route Frequency    acetaminophen (TYLENOL) tablet 650 mg  650 mg Oral Q6H PRN    albuterol (PROVENTIL HFA,VENTOLIN HFA) inhaler 2 puff  2 puff Inhalation Q6H PRN    amLODIPine (NORVASC) tablet 10 mg  10 mg Oral Daily    aspirin (ECOTRIN LOW STRENGTH) EC tablet 81 mg  81 mg Oral Daily    atorvastatin (LIPITOR) tablet 40 mg  40 mg Oral QPM    benzonatate (TESSALON PERLES) capsule 200 mg  200 mg Oral TID PRN    chlorthalidone tablet 25 mg  25 mg Oral Daily    enoxaparin (LOVENOX) subcutaneous injection 40 mg  40 mg Subcutaneous BID    estradiol (ESTRACE) tablet 2 mg  2 mg Oral BID    LORazepam (ATIVAN) tablet 0 5 mg  0 5 mg Oral Daily    methocarbamol (ROBAXIN) tablet 500 mg  500 mg Oral After Breakfast    montelukast (SINGULAIR) tablet 10 mg  10 mg Oral HS    multivitamin stress formula tablet 1 tablet  1 tablet Oral Daily    pantoprazole (PROTONIX) EC tablet 40 mg  40 mg Oral Daily    potassium chloride (K-DUR,KLOR-CON) CR tablet 40 mEq  40 mEq Oral TID    spironolactone (ALDACTONE) tablet 50 mg  50 mg Oral Daily    tiZANidine (ZANAFLEX) tablet 2 mg  2 mg Oral HS PRN    traMADol (ULTRAM) tablet 50 mg  50 mg Oral Daily PRN       Prior to Admission medications    Medication Sig Start Date End Date Taking?  Authorizing Provider   acetaminophen (TYLENOL) 500 mg tablet Take 500 mg by mouth 2 (two) times a day   Yes Historical Provider, MD   albuterol (PROVENTIL HFA,VENTOLIN HFA) 90 mcg/act inhaler Inhale 2 puffs every 6 (six) hours as needed for wheezing 9/10/21  Yes Jose Daniel MD   amLODIPine (NORVASC) 10 mg tablet Take 1 tablet (10 mg total) by mouth daily 10/21/21  Yes Jose Daniel MD   aspirin (ECOTRIN LOW STRENGTH) 81 mg EC tablet Take 81 mg by mouth daily   Yes Historical Provider, MD   benzonatate (TESSALON) 200 MG capsule Take 1 capsule (200 mg total) by mouth 3 (three) times a day as needed for cough 12/27/21  Yes Jose Daniel MD   chlorthalidone 25 mg tablet Take 1 tablet (25 mg total) by mouth daily 9/10/21  Yes Jose Daniel MD   estradiol (ESTRACE) 2 MG tablet Take 1 tablet (2 mg total) by mouth 2 (two) times a day 10/21/21  Yes Iron Ruano Angelito Diez MD   LORazepam (ATIVAN) 1 mg tablet Take 0 5 tablets (0 5 mg total) by mouth daily 12/10/21  Yes Judie Kelsey MD   methocarbamol (ROBAXIN) 500 mg tablet Take 1 tablet (500 mg total) by mouth daily after breakfast 3/25/21  Yes Blanche Jean MD   montelukast (SINGULAIR) 10 mg tablet Take 1 tablet (10 mg total) by mouth daily at bedtime 2/3/21  Yes Judie Kelsey MD   multivitamin [facility]) TABS Take 1 tablet by mouth daily   Yes Historical Provider, MD   ondansetron (ZOFRAN-ODT) 4 mg disintegrating tablet Take 1 tablet (4 mg total) by mouth every 8 (eight) hours as needed for nausea or vomiting 10/17/19  Yes Abby Simpson III, MD   pantoprazole (PROTONIX) 40 mg tablet Take 1 tablet (40 mg total) by mouth daily 10/21/21  Yes Judie Kelsey MD   potassium chloride (K-DUR,KLOR-CON) 20 mEq tablet Take 40 mEq by mouth 3 (three) times a day   Yes Historical Provider, MD   predniSONE 20 mg tablet Take 2 tablets (40 mg total) by mouth daily 12/26/21  Yes Khris Polk MD   spironolactone (ALDACTONE) 50 mg tablet Take 1 tablet (50 mg total) by mouth daily 12/10/21  Yes Judie Kelsey MD   traMADol [place] ) 50 mg tablet Take 1 tablet (50 mg total) by mouth daily as needed for moderate pain 12/10/21  Yes Judie Kelsey MD   colchicine (COLCRYS) 0 6 mg tablet Take 1 tablet (0 6 mg total) by mouth daily 2 pills now and 1 in an hour  One daily until resolved  11/7/21   Mikayla Vallejo PA-C   Cyanocobalamin (B-12) 1000 MCG/ML KIT Inject 1 ml IM in thigh/ buttocks or deltoid mm once weekly x 8 weeks   Afterward once monthly 5/12/20   Shawna Taylor DO   hydrocortisone (ANUSOL-HC) 25 mg suppository UNWRAP AND INSERT 1 SUPPOSITORY RECTALLY TWICE DAILY 7/28/21   Judie Kelsey MD   ondansetron (ZOFRAN-ODT) 4 mg disintegrating tablet Take 1 tablet (4 mg total) by mouth every 6 (six) hours as needed for nausea or vomiting  Patient not taking: Reported on 1/13/2022 12/26/21   Ary Mabry MD   Syringe/Needle, Disp, (SYRINGE 3CC/56HV8-5/4") 27G X 1-1/4" 3 ML MISC Use for B12 injections 12/15/20   Shawna Taylor DO   TiZANidine (ZANAFLEX) 2 MG capsule Take 1 capsule (2 mg total) by mouth daily at bedtime as needed for muscle spasms 9/10/21   Al Betancur MD         CODE STATUS & ADVANCED DIRECTIVES     Code Status: Level 1 - Full Code  Advance Directive and Living Will:      Power of :    POLST:        VTE Pharmacologic Prophylaxis: Enoxaparin (Lovenox)  VTE Mechanical Prophylaxis: sequential compression device    ==  MD Lorenza Short [place], PGY-3

## 2022-01-14 PROCEDURE — 93880 EXTRACRANIAL BILAT STUDY: CPT | Performed by: SURGERY

## 2022-01-14 NOTE — ED PROVIDER NOTES
History  Chief Complaint   Patient presents with    Chest Pain     pt arrives via EMS with chest tightness, palpitations, SOB and mid to lower back pain  pt states everything started around 0230 when she was awoken from sleep  Greg Trotter  is a 39 y o  adult with a PMH of asthma, HTN, anxiety and cervical radiculopathy who presents with chest pain  Patient presents with complaints of chest pain that began around 0230 this am, 01/13/2022  Patient reports associative symptoms of shortness of breath and back pain; however, patient reports chronic back pain  She denies nausea, vomiting, lightheadedness or dizziness  While in the ED she reported left sided tingling and numbness while in the ED  History provided by:  Patient   used: No        Prior to Admission Medications   Prescriptions Last Dose Informant Patient Reported? Taking? Cyanocobalamin (B-12) 1000 MCG/ML KIT More than a month at Unknown time Self No No   Sig: Inject 1 ml IM in thigh/ buttocks or deltoid mm once weekly x 8 weeks   Afterward once monthly   LORazepam (ATIVAN) 1 mg tablet 1/13/2022 at Unknown time  No Yes   Sig: Take 0 5 tablets (0 5 mg total) by mouth daily   Syringe/Needle, Disp, (SYRINGE 3CC/38YS6-9/4") 27G X 1-1/4" 3 ML MISC More than a month at Unknown time Self No No   Sig: Use for B12 injections   TiZANidine (ZANAFLEX) 2 MG capsule Unknown at Unknown time  No No   Sig: Take 1 capsule (2 mg total) by mouth daily at bedtime as needed for muscle spasms   acetaminophen (TYLENOL) 500 mg tablet Past Week at Unknown time  Yes Yes   Sig: Take 500 mg by mouth 2 (two) times a day   albuterol (PROVENTIL HFA,VENTOLIN HFA) 90 mcg/act inhaler Past Week at Unknown time  No Yes   Sig: Inhale 2 puffs every 6 (six) hours as needed for wheezing   amLODIPine (NORVASC) 10 mg tablet 1/12/2022 at Unknown time  No Yes   Sig: Take 1 tablet (10 mg total) by mouth daily   aspirin (ECOTRIN LOW STRENGTH) 81 mg EC tablet 1/12/2022 at Unknown time Self Yes Yes   Sig: Take 81 mg by mouth daily   benzonatate (TESSALON) 200 MG capsule Past Week at Unknown time  No Yes   Sig: Take 1 capsule (200 mg total) by mouth 3 (three) times a day as needed for cough   chlorthalidone 25 mg tablet 1/12/2022 at Unknown time  No Yes   Sig: Take 1 tablet (25 mg total) by mouth daily   colchicine (COLCRYS) 0 6 mg tablet More than a month at Unknown time  No No   Sig: Take 1 tablet (0 6 mg total) by mouth daily 2 pills now and 1 in an hour  One daily until resolved     estradiol (ESTRACE) 2 MG tablet 1/12/2022 at Unknown time  No Yes   Sig: Take 1 tablet (2 mg total) by mouth 2 (two) times a day   hydrocortisone (ANUSOL-HC) 25 mg suppository More than a month at Unknown time  No No   Sig: UNWRAP AND INSERT 1 SUPPOSITORY RECTALLY TWICE DAILY   methocarbamol (ROBAXIN) 500 mg tablet Past Week at Unknown time Self No Yes   Sig: Take 1 tablet (500 mg total) by mouth daily after breakfast   montelukast (SINGULAIR) 10 mg tablet Past Month at Unknown time Self No Yes   Sig: Take 1 tablet (10 mg total) by mouth daily at bedtime   multivitamin (THERAGRAN) TABS 1/12/2022 at Unknown time Self Yes Yes   Sig: Take 1 tablet by mouth daily   ondansetron (ZOFRAN-ODT) 4 mg disintegrating tablet Past Week at Unknown time Self No Yes   Sig: Take 1 tablet (4 mg total) by mouth every 8 (eight) hours as needed for nausea or vomiting   ondansetron (ZOFRAN-ODT) 4 mg disintegrating tablet Not Taking at Unknown time  No No   Sig: Take 1 tablet (4 mg total) by mouth every 6 (six) hours as needed for nausea or vomiting   Patient not taking: Reported on 1/13/2022    pantoprazole (PROTONIX) 40 mg tablet 1/12/2022 at Unknown time  No Yes   Sig: Take 1 tablet (40 mg total) by mouth daily   potassium chloride (K-DUR,KLOR-CON) 20 mEq tablet Past Week at Unknown time  Yes Yes   Sig: Take 40 mEq by mouth 3 (three) times a day   predniSONE 20 mg tablet Past Month at Unknown time  No Yes   Sig: Take 2 tablets (40 mg total) by mouth daily   spironolactone (ALDACTONE) 50 mg tablet 1/12/2022 at Unknown time  No Yes   Sig: Take 1 tablet (50 mg total) by mouth daily   traMADol (ULTRAM) 50 mg tablet 1/12/2022 at Unknown time  No Yes   Sig: Take 1 tablet (50 mg total) by mouth daily as needed for moderate pain      Facility-Administered Medications: None       Past Medical History:   Diagnosis Date    Anxiety     Asthma     seasonal    Hernia of abdominal wall     Hypertension     Kidney stones     Seizures (HCC)     pseudoseizures since 2012       Past Surgical History:   Procedure Laterality Date    APPENDECTOMY      CHOLECYSTECTOMY      GASTRIC BYPASS      HERNIA REPAIR      x2       Family History   Problem Relation Age of Onset    Hypertension Mother     Diabetes Mother     Hypertension Sister     No Known Problems Father         head injury    Stroke Maternal Grandmother         brain aneurysm    Aneurysm Maternal Uncle         brain     I have reviewed and agree with the history as documented  E-Cigarette/Vaping    E-Cigarette Use Never User      E-Cigarette/Vaping Substances    Nicotine No     THC No     CBD No     Flavoring No     Other No     Unknown No      Social History     Tobacco Use    Smoking status: Never Smoker    Smokeless tobacco: Never Used   Vaping Use    Vaping Use: Never used   Substance Use Topics    Alcohol use: Yes     Comment: 3 per month    Drug use: Never       Review of Systems   Constitutional: Negative for chills and fever  HENT: Negative for ear pain and sore throat  Eyes: Negative for pain and visual disturbance  Respiratory: Positive for shortness of breath  Negative for cough  Cardiovascular: Positive for chest pain and palpitations  Gastrointestinal: Negative for abdominal pain and vomiting  Genitourinary: Negative for dysuria and hematuria  Musculoskeletal: Negative for arthralgias and back pain     Skin: Negative for color change and rash    Neurological: Negative for seizures and syncope  Numbness and tingling to left side of face and left arm   All other systems reviewed and are negative  Physical Exam  Physical Exam  Vitals and nursing note reviewed  Constitutional:       General: She is not in acute distress  Appearance: She is well-developed  HENT:      Head: Normocephalic and atraumatic  Eyes:      Conjunctiva/sclera: Conjunctivae normal    Cardiovascular:      Rate and Rhythm: Normal rate and regular rhythm  Heart sounds: No murmur heard  Pulmonary:      Effort: Pulmonary effort is normal  No respiratory distress  Breath sounds: Normal breath sounds  Abdominal:      Palpations: Abdomen is soft  Tenderness: There is no abdominal tenderness  Musculoskeletal:      Cervical back: Neck supple  Skin:     General: Skin is warm and dry  Capillary Refill: Capillary refill takes less than 2 seconds  Neurological:      Mental Status: She is alert and oriented to person, place, and time  GCS: GCS eye subscore is 4  GCS verbal subscore is 5  GCS motor subscore is 6  Cranial Nerves: No cranial nerve deficit  Sensory: Sensory deficit (minimal deficit to left face and left arm) present  Motor: Motor function is intact  No weakness  Coordination: Coordination is intact           Vital Signs  ED Triage Vitals   Temperature Pulse Respirations Blood Pressure SpO2   01/13/22 0713 01/13/22 0322 01/13/22 0322 01/13/22 0322 01/13/22 0322   97 9 °F (36 6 °C) 87 18 156/97 99 %      Temp Source Heart Rate Source Patient Position - Orthostatic VS BP Location FiO2 (%)   01/13/22 0713 01/13/22 0322 01/13/22 0322 01/13/22 0322 --   Oral Monitor Lying Right arm       Pain Score       01/13/22 0322       3           Vitals:    01/13/22 1141 01/13/22 1215 01/13/22 1315 01/13/22 1500   BP: 128/75 126/78 136/88 101/50   Pulse: 79 86 78 66   Patient Position - Orthostatic VS: Sitting Visual Acuity  Visual Acuity      Most Recent Value   L Pupil Size (mm) 3   R Pupil Size (mm) 3          ED Medications  Medications   potassium chloride (K-DUR,KLOR-CON) CR tablet 40 mEq (40 mEq Oral Given 1/13/22 0524)   LORazepam (ATIVAN) injection 1 mg (1 mg Intravenous Given 1/13/22 1224)       Diagnostic Studies  Results Reviewed     Procedure Component Value Units Date/Time    Hemoglobin A1c w/EAG Estimation [[id number]]  (Abnormal) Collected: 01/13/22 0354    Lab Status: Final result Specimen: Blood from Hand, Right Updated: 01/13/22 1329     Hemoglobin A1C 5 8 %       mg/dl     Lipid Panel with Direct LDL reflex [[id number]] Collected: 01/13/22 0846    Lab Status: Final result Specimen: Blood from Arm, Right Updated: 01/13/22 1221     Cholesterol 153 mg/dL      Triglycerides 44 mg/dL      HDL, Direct 65 mg/dL      LDL Calculated 79 mg/dL     Platelet count [[id number]]  (Normal) Collected: 01/13/22 0354    Lab Status: Final result Specimen: Blood from Hand, Right Updated: 01/13/22 0936     Platelets 908 Thousands/uL      MPV 9 1 fL     T4, free [[id number]]  (Normal) Collected: 01/13/22 0354    Lab Status: Final result Specimen: Blood from Hand, Right Updated: 01/13/22 0934     Free T4 1 29 ng/dL     HS Troponin I 4hr [[id number]] Collected: 01/13/22 0846    Lab Status: Final result Specimen: Blood from Arm, Right Updated: 01/13/22 0934     hs TnI 4hr 5 ng/L      Delta 4hr hsTnI 1 ng/L     Magnesium [[id number]]  (Normal) Collected: 01/13/22 0846    Lab Status: Final result Specimen: Blood from Arm, Right Updated: 01/13/22 0926     Magnesium 1 8 mg/dL     HS Troponin I 2hr [[id number]] Collected: 01/13/22 0604    Lab Status: Final result Specimen: Blood from Hand, Right Updated: 01/13/22 0706     hs TnI 2hr 5 ng/L      Delta 2hr hsTnI 1 ng/L     Protime-INR [[id number]]  (Normal) Collected: 01/13/22 0507    Lab Status: Final result Specimen: Blood from Arm, Right Updated: 01/13/22 0534     Protime 12 4 seconds      INR 0 92    APTT [[id number]]  (Normal) Collected: 01/13/22 0508    Lab Status: Final result Specimen: Blood from Arm, Right Updated: 01/13/22 0534     PTT 27 seconds     Fingerstick Glucose (POCT) [[id number]]  (Normal) Collected: 01/13/22 0436    Lab Status: Final result Updated: 01/13/22 0442     POC Glucose 88 mg/dl     TSH, 3rd generation with Free T4 reflex [[id number]]  (Abnormal) Collected: 01/13/22 0354    Lab Status: Final result Specimen: Blood from Hand, Right Updated: 01/13/22 0437     TSH 3RD GENERATON 5 750 uIU/mL     Narrative:      Patients undergoing fluorescein dye angiography may retain small amounts of fluorescein in the body for 48-72 hours post procedure  Samples containing fluorescein can produce falsely depressed TSH values  If the patient had this procedure,a specimen should be resubmitted post fluorescein clearance  HS Troponin 0hr (reflex protocol) [[id number]]  (Normal) Collected: 01/13/22 0354    Lab Status: Final result Specimen: Blood from Hand, Right Updated: 01/13/22 0436     hs TnI 0hr 4 ng/L     Comprehensive metabolic panel [[id number]]  (Abnormal) Collected: 01/13/22 0354    Lab Status: Final result Specimen: Blood from Hand, Right Updated: 01/13/22 0428     Sodium 139 mmol/L      Potassium 3 1 mmol/L      Chloride 103 mmol/L      CO2 30 mmol/L      ANION GAP 6 mmol/L      BUN 12 mg/dL      Creatinine 0 95 mg/dL      Glucose 93 mg/dL      Calcium 8 5 mg/dL      Corrected Calcium 9 2 mg/dL      AST 15 U/L      ALT 20 U/L      Alkaline Phosphatase 53 U/L      Total Protein 7 3 g/dL      Albumin 3 1 g/dL      Total Bilirubin 0 33 mg/dL      eGFR --    Narrative:      Notes:     1  eGFR calculation is only valid for adults 18 years and older  2  EGFR calculation cannot be performed for patients who are transgender, non-binary, or whose legal sex, sex at birth, and gender identity differ      D-dimer, quantitative [[id number]]  (Normal) Collected: 01/13/22 0354 Lab Status: Final result Specimen: Blood from Arm, Right Updated: 01/13/22 0424     D-Dimer, Quant 0 31 ug/ml FEU     CBC and differential [[id number]] Collected: 01/13/22 0354    Lab Status: Final result Specimen: Blood from Hand, Right Updated: 01/13/22 0412     WBC 9 25 Thousand/uL      RBC 4 27 Million/uL      Hemoglobin 12 3 g/dL      Hematocrit 36 9 %      MCV 86 fL      MCH 28 8 pg      MCHC 33 3 g/dL      RDW 13 3 %      MPV 9 1 fL      Platelets 160 Thousands/uL      nRBC 0 /100 WBCs      Neutrophils Relative 75 %      Immat GRANS % 0 %      Lymphocytes Relative 17 %      Monocytes Relative 7 %      Eosinophils Relative 1 %      Basophils Relative 0 %      Neutrophils Absolute 6 88 Thousands/µL      Immature Grans Absolute 0 03 Thousand/uL      Lymphocytes Absolute 1 61 Thousands/µL      Monocytes Absolute 0 61 Thousand/µL      Eosinophils Absolute 0 08 Thousand/µL      Basophils Absolute 0 04 Thousands/µL                  MRI brain wo contrast   Final Result by Lazaro Gómez DO (01/13 4796)      No acute intracranial abnormality  No evidence of CVA  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  Workstation performed: [id number]         CT stroke alert brain   Final Result by Omi Alan MD (01/13 3011)      1  No acute intracranial hemorrhage  2   If there is continued clinical concern for acute infarct, further evaluation with MRI may be obtained which is more sensitive  Findings were directly discussed with Dr Ilia Johnson at 4:57 AM on 1/13/2022  Workstation performed: [id number]         XR chest 1 view portable   Final Result by Michael Patel MD (01/13 9495)      No acute cardiopulmonary disease  Workstation performed: [id number]         VAS carotid complete study    (Results Pending)            ED Course  ED Course as of 01/13/22 2318   Thu Jan 13, 2022   0514 Patient now complaining of left sided facial tingling and numbness   She states it is improving  Since patient is on estrogen, code stroke was called at this time  [place] with neurology, Dr Ilia Johnson, he states his suspicion for CVA is low at this time, not a TPA candidate because she has minimal symptoms                   Stroke Assessment     Row Name 01/13/22 0436             NIH Stroke Scale    Interval Baseline      Level of Consciousness (1a ) 0      LOC Questions (1b ) 0      LOC Commands (1c ) 0      Best Gaze (2 ) 0      Visual (3 ) 0      Facial Palsy (4 ) 0      Motor Arm, Left (5a ) 0      Motor Arm, Right (5b ) 0      Motor Leg, Left (6a ) 0      Motor Leg, Right (6b ) 0      Limb Ataxia (7 ) 0      Sensory (8 ) 1      Best Language (9 ) 0      Dysarthria (10 ) 0      Extinction and Inattention (11 ) (Formerly Neglect) 0      Total 1                Most Recent Value   TPA Decision Options    TPA Decision Patient not a TPA candidate  ITZ Risk Score      Most Recent Value   Age >= 65 0 Filed at: 01/13/2022 0730   Known CAD (stenosis >= 50%) 0 Filed at: 01/13/2022 0730   Recent (<=24 hrs) Service Angina 0 Filed at: 01/13/2022 0730   ST Deviation >= 0 5 mm 0 Filed at: 01/13/2022 0730   3+ CAD Risk Factors (FHx, HTN, HLP, DM, Smoker) 1 Filed at: 01/13/2022 0730   Aspirin Use Past 7 Days 1 Filed at: 01/13/2022 0730   Elevated Cardiac Markers 0 Filed at: 01/13/2022 0730   ITZ Risk Score (Calculated) 2 Filed at: 01/13/2022 0730                  MDM  Number of Diagnoses or Management Options  Chest pain, unspecified type: new and requires workup  Elevated TSH: new and requires workup  Hypokalemia: new and requires workup  Palpitations: new and requires workup  Paresthesia: new and requires workup  Diagnosis management comments: Patient is a 57-year-old female presenting to the emergency room for evaluation of palpitations, shortness of breath, and feeling flushed since 2:30 a m  Gerhardt Sep Patient is taking exogenous estrogen    Patient also complaining of numbness and tingling to the left side of her face and left arm  On exam patient is obese  She does have a minimal sensory deficit to the left side of her face and arm  Code stroke was called  Neurology was consulted, they do not believe the patient is a tPA candidate, as the patient does not have disabling symptoms  CT of the head was unremarkable  Patient's troponins and EKG were within normal limits  Patient was admitted to the medical service for further evaluation  Amount and/or Complexity of Data Reviewed  Clinical lab tests: ordered and reviewed  Tests in the radiology section of CPT®: ordered and reviewed    Patient Progress  Patient progress: stable      Disposition  Final diagnoses:   Paresthesia   Chest pain, unspecified type   Elevated TSH   Palpitations   Hypokalemia     Time reflects when diagnosis was documented in both MDM as applicable and the Disposition within this note     Time User Action Codes Description Comment    1/13/2022  4:35 AM Lasha Aurelia Add [R20 2] Paresthesia     1/13/2022  5:03 AM Lasha Aurelia Add [R07 9] Chest pain, unspecified type     1/13/2022  5:03 AM Lasha Aurelia Add [R79 89] Elevated TSH     1/13/2022  5:03 AM Lasha Aurelia Add [R00 2] Palpitations     1/13/2022  5:04 AM Lasha Aurelia Add [E87 6] Hypokalemia       ED Disposition     ED Disposition Condition Date/Time Comment    Admit Stable Thu Jan 13, 2022  5:18 AM Case was discussed with ELMER and the patient's admission status was agreed to be Admission Status: observation status to the service of Dr Maverick Santiago           Follow-up Information     Follow up With Specialties Details Why [facility] [address], MD Internal Medicine Schedule an appointment as soon as possible for a visit  [address] [place] [address]  [phone]            Discharge Medication List as of 1/13/2022  4:19 PM      CONTINUE these medications which have NOT CHANGED    Details   acetaminophen (TYLENOL) 500 mg tablet Take 500 mg by mouth 2 (two) times a day, Historical Med      albuterol (PROVENTIL HFA,VENTOLIN HFA) 90 mcg/act inhaler Inhale 2 puffs every 6 (six) hours as needed for wheezing, Starting Fri 9/10/2021, Normal      amLODIPine (NORVASC) 10 mg tablet Take 1 tablet (10 mg total) by mouth daily, Starting Thu 10/21/2021, Normal      aspirin (ECOTRIN LOW STRENGTH) 81 mg EC tablet Take 81 mg by mouth daily, Historical Med      benzonatate (TESSALON) 200 MG capsule Take 1 capsule (200 mg total) by mouth 3 (three) times a day as needed for cough, Starting Mon 12/27/2021, Normal      chlorthalidone 25 mg tablet Take 1 tablet (25 mg total) by mouth daily, Starting Fri 9/10/2021, Normal      estradiol (ESTRACE) 2 MG tablet Take 1 tablet (2 mg total) by mouth 2 (two) times a day, Starting Thu 10/21/2021, Normal      LORazepam (ATIVAN) 1 mg tablet Take 0 5 tablets (0 5 mg total) by mouth daily, Starting Fri 12/10/2021, Normal      methocarbamol (ROBAXIN) 500 mg tablet Take 1 tablet (500 mg total) by mouth daily after breakfast, Starting Thu 3/25/2021, Normal      montelukast (SINGULAIR) 10 mg tablet Take 1 tablet (10 mg total) by mouth daily at bedtime, Starting Wed 2/3/2021, Normal      multivitamin (THERAGRAN) TABS Take 1 tablet by mouth daily, Historical Med      !! ondansetron (ZOFRAN-ODT) 4 mg disintegrating tablet Take 1 tablet (4 mg total) by mouth every 8 (eight) hours as needed for nausea or vomiting, Starting Thu 10/17/2019, Normal      pantoprazole (PROTONIX) 40 mg tablet Take 1 tablet (40 mg total) by mouth daily, Starting Thu 10/21/2021, Normal      potassium chloride (K-DUR,KLOR-CON) 20 mEq tablet Take 40 mEq by mouth 3 (three) times a day, Historical Med      predniSONE 20 mg tablet Take 2 tablets (40 mg total) by mouth daily, Starting Sun 12/26/2021, Normal      spironolactone (ALDACTONE) 50 mg tablet Take 1 tablet (50 mg total) by mouth daily, Starting Fri 12/10/2021, Normal      traMADol (ULTRAM) 50 mg tablet Take 1 tablet (50 mg total) by mouth daily as needed for moderate pain, Starting Fri 12/10/2021, Normal      colchicine (COLCRYS) 0 6 mg tablet Take 1 tablet (0 6 mg total) by mouth daily 2 pills now and 1 in an hour  One daily until resolved , Starting Sun 11/7/2021, Normal      Cyanocobalamin (B-12) 1000 MCG/ML KIT Inject 1 ml IM in thigh/ buttocks or deltoid mm once weekly x 8 weeks  Afterward once monthly, Normal      hydrocortisone (ANUSOL-HC) 25 mg suppository UNWRAP AND INSERT 1 SUPPOSITORY RECTALLY TWICE DAILY, Normal      !! ondansetron (ZOFRAN-ODT) 4 mg disintegrating tablet Take 1 tablet (4 mg total) by mouth every 6 (six) hours as needed for nausea or vomiting, Starting Sun 12/26/2021, Normal      Syringe/Needle, Disp, (SYRINGE 3CC/95CT5-8/4") 27G X 1-1/4" 3 ML MISC Use for B12 injections, Normal      TiZANidine (ZANAFLEX) 2 MG capsule Take 1 capsule (2 mg total) by mouth daily at bedtime as needed for muscle spasms, Starting Fri 9/10/2021, Normal       !! - Potential duplicate medications found  Please discuss with provider  Outpatient Discharge Orders   Ambulatory referral to Cardiology   Standing Status: Future Standing Exp   Date: 01/13/23      Discharge Diet     Activity as tolerated       PDMP Review       Value Time User    PDMP Reviewed  Yes 9/10/2021 11:42 AM Lenard Meigs, MD          ED Provider  Electronically Signed by           Jose Patrick PA-C  01/13/22 4522

## 2022-01-15 LAB
ATRIAL RATE: 102 BPM
P AXIS: 68 DEGREES
PR INTERVAL: 172 MS
QRS AXIS: 1 DEGREES
QRSD INTERVAL: 98 MS
QT INTERVAL: 352 MS
QTC INTERVAL: 450 MS
T WAVE AXIS: 49 DEGREES
VENTRICULAR RATE: 98 BPM

## 2022-01-15 PROCEDURE — 93010 ELECTROCARDIOGRAM REPORT: CPT | Performed by: INTERNAL MEDICINE

## 2022-01-17 ENCOUNTER — APPOINTMENT (OUTPATIENT)
Dept: PHYSICAL THERAPY | Facility: CLINIC | Age: 42
End: 2022-01-17
Payer: COMMERCIAL

## 2022-01-17 ENCOUNTER — TELEPHONE (OUTPATIENT)
Dept: INTERNAL MEDICINE CLINIC | Facility: OTHER | Age: 42
End: 2022-01-17

## 2022-01-17 ENCOUNTER — TRANSITIONAL CARE MANAGEMENT (OUTPATIENT)
Dept: INTERNAL MEDICINE CLINIC | Facility: OTHER | Age: 42
End: 2022-01-17

## 2022-01-17 NOTE — TELEPHONE ENCOUNTER
LMOM for pt to call me at [facility] office    Please schedule TCM on or before 1/26/22

## 2022-01-18 ENCOUNTER — TELEPHONE (OUTPATIENT)
Dept: INTERNAL MEDICINE CLINIC | Facility: CLINIC | Age: 42
End: 2022-01-18

## 2022-01-21 DIAGNOSIS — R51.9 WORSENING HEADACHES: ICD-10-CM

## 2022-01-21 RX ORDER — TRAMADOL HYDROCHLORIDE 50 MG/1
50 TABLET ORAL DAILY PRN
Qty: 30 TABLET | Refills: 0 | Status: SHIPPED | OUTPATIENT
Start: 2022-01-21 | End: 2022-02-01 | Stop reason: SDUPTHER

## 2022-01-24 ENCOUNTER — OFFICE VISIT (OUTPATIENT)
Dept: PHYSICAL THERAPY | Facility: CLINIC | Age: 42
End: 2022-01-24
Payer: COMMERCIAL

## 2022-01-24 DIAGNOSIS — M54.41 CHRONIC BILATERAL LOW BACK PAIN WITH BILATERAL SCIATICA: ICD-10-CM

## 2022-01-24 DIAGNOSIS — M54.12 CERVICAL RADICULOPATHY: Primary | ICD-10-CM

## 2022-01-24 DIAGNOSIS — M54.42 CHRONIC BILATERAL LOW BACK PAIN WITH BILATERAL SCIATICA: ICD-10-CM

## 2022-01-24 DIAGNOSIS — G89.29 CHRONIC BILATERAL LOW BACK PAIN WITH BILATERAL SCIATICA: ICD-10-CM

## 2022-01-24 NOTE — PROGRESS NOTES
Patient presents to PT today reporting she was admitted to the hospital after having bout with  L sided body numbness  Work up performed to rule out stroke  Will follow up with PCP next week and cardiologist in March  Instructed patient she will need a new Rx to clear her for return to PT because of hospital admission

## 2022-01-25 ENCOUNTER — TELEPHONE (OUTPATIENT)
Dept: NEUROLOGY | Facility: CLINIC | Age: 42
End: 2022-01-25

## 2022-01-25 NOTE — TELEPHONE ENCOUNTER
Spoke with Pt sched HFU appt, pt stated will call back to sche when she is ready  HFU/SLAN/ATT/AP/Left-sided weakness and sensory deficits/DC1/13    Note from Chart:      Zachary Cotton  will need follow up in at the next regular appointment with general patient follows with outpatient neurology   She will not require outpatient neurological testing

## 2022-01-27 ENCOUNTER — APPOINTMENT (OUTPATIENT)
Dept: PHYSICAL THERAPY | Facility: CLINIC | Age: 42
End: 2022-01-27
Payer: COMMERCIAL

## 2022-01-31 ENCOUNTER — APPOINTMENT (OUTPATIENT)
Dept: PHYSICAL THERAPY | Facility: CLINIC | Age: 42
End: 2022-01-31
Payer: COMMERCIAL

## 2022-02-01 ENCOUNTER — OFFICE VISIT (OUTPATIENT)
Dept: INTERNAL MEDICINE CLINIC | Facility: CLINIC | Age: 42
End: 2022-02-01
Payer: COMMERCIAL

## 2022-02-01 VITALS
BODY MASS INDEX: 44.1 KG/M2 | OXYGEN SATURATION: 97 % | HEART RATE: 63 BPM | HEIGHT: 71 IN | DIASTOLIC BLOOD PRESSURE: 88 MMHG | WEIGHT: 315 LBS | SYSTOLIC BLOOD PRESSURE: 124 MMHG | TEMPERATURE: 98.1 F

## 2022-02-01 DIAGNOSIS — R51.9 WORSENING HEADACHES: ICD-10-CM

## 2022-02-01 DIAGNOSIS — M54.2 CHRONIC NECK PAIN: ICD-10-CM

## 2022-02-01 DIAGNOSIS — R40.0 DAYTIME SLEEPINESS: ICD-10-CM

## 2022-02-01 DIAGNOSIS — E66.01 MORBID OBESITY WITH BMI OF 45.0-49.9, ADULT (HCC): ICD-10-CM

## 2022-02-01 DIAGNOSIS — K21.9 GASTROESOPHAGEAL REFLUX DISEASE WITHOUT ESOPHAGITIS: ICD-10-CM

## 2022-02-01 DIAGNOSIS — E87.6 HYPOKALEMIA: ICD-10-CM

## 2022-02-01 DIAGNOSIS — G89.29 CHRONIC NECK PAIN: ICD-10-CM

## 2022-02-01 DIAGNOSIS — M50.90 CERVICAL DISC DISEASE: ICD-10-CM

## 2022-02-01 DIAGNOSIS — I10 ESSENTIAL HYPERTENSION: ICD-10-CM

## 2022-02-01 DIAGNOSIS — R79.89 ABNORMAL TSH: Primary | ICD-10-CM

## 2022-02-01 PROBLEM — U07.1 COVID-19 VIRUS INFECTION: Status: RESOLVED | Noted: 2021-12-27 | Resolved: 2022-02-01

## 2022-02-01 PROBLEM — J06.9 VIRAL URI WITH COUGH: Status: RESOLVED | Noted: 2021-12-27 | Resolved: 2022-02-01

## 2022-02-01 PROCEDURE — 99214 OFFICE O/P EST MOD 30 MIN: CPT | Performed by: INTERNAL MEDICINE

## 2022-02-01 RX ORDER — TRAMADOL HYDROCHLORIDE 50 MG/1
50 TABLET ORAL 2 TIMES DAILY
Qty: 60 TABLET | Refills: 1 | Status: SHIPPED | OUTPATIENT
Start: 2022-02-01 | End: 2022-04-29 | Stop reason: SDUPTHER

## 2022-02-01 RX ORDER — SPIRONOLACTONE 100 MG/1
100 TABLET, FILM COATED ORAL DAILY
Qty: 90 TABLET | Refills: 1 | Status: SHIPPED | OUTPATIENT
Start: 2022-02-01 | End: 2022-06-27 | Stop reason: SINTOL

## 2022-02-01 RX ORDER — LORAZEPAM 1 MG/1
0.5 TABLET ORAL EVERY 6 HOURS PRN
Qty: 60 TABLET | Refills: 1 | Status: SHIPPED | OUTPATIENT
Start: 2022-02-01 | End: 2022-06-27 | Stop reason: SDUPTHER

## 2022-02-01 NOTE — ASSESSMENT & PLAN NOTE
Patient to be referred for home sleep study  She has a prior history of obstructive sleep apnea which improved following gastric bypass surgery  With continued weight gain patient most likely has developed sleep apnea once again

## 2022-02-01 NOTE — PROGRESS NOTES
Assessment/Plan:    Abnormal TSH  Suspect subclinical hypothyroidism  Will check T3 levels and thyroid antibodies  Cervical disc disease  Continue physical therapy  Chronic neck pain  Continue physical therapy  Patient was given a new referral     Essential hypertension  Blood pressure currently stable 124/88  Will increase Aldactone to 100 mg daily given the recent finding of hypokalemia, most likely secondary to the use of chlorthalidone  Patient has not been able to tolerate the large potassium chloride tablets  GERD (gastroesophageal reflux disease)  Encouraged patient to continue the use of pantoprazole 40 mg daily  Hypokalemia  Most likely secondary to chlorthalidone  Will increase Aldactone to 100 mg daily and recheck a BMP in 2 weeks  If potassium not improved will consider reinstituting potassium chloride at that time    Morbid obesity with BMI of 45 0-49 9, adult ([facility] )  Strongly encouraged diet and exercise activity as tolerated    Worsening headaches  Patient to be referred for home sleep study  She has a prior history of obstructive sleep apnea which improved following gastric bypass surgery  With continued weight gain patient most likely has developed sleep apnea once again  Diagnoses and all orders for this visit:    Abnormal TSH  -     T3; Future  -     Thyroid Antibodies Panel; Future    Worsening headaches  -     traMADol (ULTRAM) 50 mg tablet; Take 1 tablet (50 mg total) by mouth 2 (two) times a day  -     Home Study; Future    Cervical disc disease  -     LORazepam (ATIVAN) 1 mg tablet; Take 0 5 tablets (0 5 mg total) by mouth every 6 (six) hours as needed for anxiety  -     Ambulatory Referral to Physical Therapy; Future    Chronic neck pain  -     Ambulatory Referral to Physical Therapy; Future    Essential hypertension  -     Basic metabolic panel; Future    Hypokalemia  -     spironolactone (ALDACTONE) 100 mg tablet;  Take 1 tablet (100 mg total) by mouth daily  - Basic metabolic panel; Future    Gastroesophageal reflux disease without esophagitis    Morbid obesity with BMI of 45 0-49 9, adult ([facility] )  -     Home Study; Future    Daytime sleepiness  -     Home Study; Future          Subjective:      Patient ID: Юлия Hanks  is a 39 y o  adult  Patient seen for hospital follow-up  Hospitalized earlier this month due to what appears to be more of a panic attack than a cardiac related symptoms  Workup was negative for ACS  Patient has some ongoing issues with cervical radicular pains and headaches and began to experience some chest discomfort which is typical of previous anxiety attacks  While hospitalized some laboratory abnormalities were noted, specifically an elevated TSH and a low potassium level  TSH may represent subclinical hypothyroidism  We will obtain some additional thyroid function studies following today's visit  Potassium was likely low because of the use of chlorthalidone for hypertension  Patient has been intolerant of the large potassium chloride tablets and subsequently had not been taking them  She had been attending physical therapy for her cervical radicular symptoms prior to her hospitalization is now requesting a renewal of the physical therapy evaluation because of the hospitalization  Otherwise, patient feels somewhat improved but still continues to complain of worsening morning headaches, excessive daytime fatigue and difficulty concentrating  She has previous history of obstructive sleep apnea which actually resolved after the original gastric bypass surgery  She has regained a significant amount of weight that was lost and may now once again be experiencing symptoms related to obstructive sleep apnea  We discussed obtaining a home sleep study and progressing from there        Family History   Problem Relation Age of Onset    Hypertension Mother     Diabetes Mother     Hypertension Sister     No Known Problems Father head injury    Stroke Maternal Grandmother         brain aneurysm    Aneurysm Maternal Uncle         brain     Social History     Socioeconomic History    Marital status: Single     Spouse name: Not on file    Number of children: Not on file    Years of education: Not on file    Highest education level: Not on file   Occupational History    Not on file   Tobacco Use    Smoking status: Never Smoker    Smokeless tobacco: Never Used   Vaping Use    Vaping Use: Never used   Substance and Sexual Activity    Alcohol use: Yes     Comment: 3 per month    Drug use: Never    Sexual activity: Not on file   Other Topics Concern    Not on file   Social History Narrative    Not on file     Social Determinants of Health     Financial Resource Strain: Not on file   Food Insecurity: No Food Insecurity    Worried About [address] in the Last Year: Never true    [employer] in the Last Year: Never true   Transportation Needs: No Transportation Needs    Lack of Transportation (Medical): No    Lack of Transportation (Non-Medical):  No   Physical Activity: Not on file   Stress: Not on file   Social Connections: Not on file   Intimate Partner Violence: Not on file   Housing Stability: Low Risk     Unable to Pay for Housing in the Last Year: No    Number of Places Lived in the Last Year: 1    Unstable Housing in the Last Year: No     Past Medical History:   Diagnosis Date    Anxiety     Asthma     seasonal    COVID-19 virus infection 12/27/2021    Hernia of abdominal wall     Hypertension     Kidney stones     Seizures ([address] )     pseudoseizures since 2012    Viral URI with cough 12/27/2021       Current Outpatient Medications:     acetaminophen (TYLENOL) 500 mg tablet, Take 500 mg by mouth 2 (two) times a day, Disp: , Rfl:     albuterol (PROVENTIL HFA,VENTOLIN HFA) 90 mcg/act inhaler, Inhale 2 puffs every 6 (six) hours as needed for wheezing, Disp: 6 7 g, Rfl: 2    amLODIPine (NORVASC) 10 mg tablet, Take 1 tablet (10 mg total) by mouth daily, Disp: 90 tablet, Rfl: 1    aspirin (ECOTRIN LOW STRENGTH) 81 mg EC tablet, Take 81 mg by mouth daily, Disp: , Rfl:     benzonatate (TESSALON) 200 MG capsule, Take 1 capsule (200 mg total) by mouth 3 (three) times a day as needed for cough, Disp: 30 capsule, Rfl: 0    chlorthalidone 25 mg tablet, Take 1 tablet (25 mg total) by mouth daily, Disp: 90 tablet, Rfl: 1    Cyanocobalamin (B-12) 1000 MCG/ML KIT, Inject 1 ml IM in thigh/ buttocks or deltoid mm once weekly x 8 weeks   Afterward once monthly, Disp: 8 mL, Rfl: 1    estradiol (ESTRACE) 2 MG tablet, Take 1 tablet (2 mg total) by mouth 2 (two) times a day, Disp: 180 tablet, Rfl: 1    hydrocortisone (ANUSOL-HC) 25 mg suppository, UNWRAP AND INSERT 1 SUPPOSITORY RECTALLY TWICE DAILY, Disp: 12 suppository, Rfl: 0    LORazepam (ATIVAN) 1 mg tablet, Take 0 5 tablets (0 5 mg total) by mouth every 6 (six) hours as needed for anxiety, Disp: 60 tablet, Rfl: 1    methocarbamol (ROBAXIN) 500 mg tablet, Take 1 tablet (500 mg total) by mouth daily after breakfast, Disp: 90 tablet, Rfl: 1    montelukast (SINGULAIR) 10 mg tablet, Take 1 tablet (10 mg total) by mouth daily at bedtime, Disp: 90 tablet, Rfl: 1    multivitamin (THERAGRAN) TABS, Take 1 tablet by mouth daily, Disp: , Rfl:     ondansetron (ZOFRAN-ODT) 4 mg disintegrating tablet, Take 1 tablet (4 mg total) by mouth every 8 (eight) hours as needed for nausea or vomiting, Disp: 60 tablet, Rfl: 1    pantoprazole (PROTONIX) 40 mg tablet, Take 1 tablet (40 mg total) by mouth daily, Disp: 90 tablet, Rfl: 1    spironolactone (ALDACTONE) 100 mg tablet, Take 1 tablet (100 mg total) by mouth daily, Disp: 90 tablet, Rfl: 1    Syringe/Needle, Disp, (SYRINGE 3CC/81XB5-7/4") 27G X 1-1/4" 3 ML MISC, Use for B12 injections, Disp: 10 each, Rfl: 1    traMADol (ULTRAM) 50 mg tablet, Take 1 tablet (50 mg total) by mouth 2 (two) times a day, Disp: 60 tablet, Rfl: 1   colchicine (COLCRYS) 0 6 mg tablet, Take 1 tablet (0 6 mg total) by mouth daily 2 pills now and 1 in an hour  One daily until resolved  (Patient not taking: Reported on 2/1/2022 ), Disp: 20 tablet, Rfl: 0    TiZANidine (ZANAFLEX) 2 MG capsule, Take 1 capsule (2 mg total) by mouth daily at bedtime as needed for muscle spasms (Patient not taking: Reported on 2/1/2022 ), Disp: 30 capsule, Rfl: 0  Allergies   Allergen Reactions    Gadolinium Anaphylaxis     "Oxygen dropped and coded during"       Iodinated Diagnostic Agents Anaphylaxis     Past Surgical History:   Procedure Laterality Date    APPENDECTOMY      CHOLECYSTECTOMY      GASTRIC BYPASS      HERNIA REPAIR      x2         Review of Systems   Constitutional: Positive for activity change (Decreased due to neck pain and daytime sleepiness) and fatigue  Negative for appetite change, chills, diaphoresis, fever and unexpected weight change  HENT: Negative  Eyes: Negative  Respiratory: Positive for chest tightness (With anxiety)  Cardiovascular: Positive for chest pain (With anxiety)  Gastrointestinal: Negative  Endocrine: Negative  Genitourinary: Negative  Musculoskeletal: Positive for neck pain (Chronic)  Skin: Negative  Allergic/Immunologic: Negative  Neurological:        Cervical radicular symptoms   Hematological: Negative  Psychiatric/Behavioral: Positive for decreased concentration  The patient is nervous/anxious  Objective:      /88 (BP Location: Left arm, Patient Position: Sitting, Cuff Size: Large)   Pulse 63   Temp 98 1 °F (36 7 °C) (Tympanic)   Ht 5' 10 67" (1 795 m)   Wt (!) 151 kg (332 lb 3 2 oz)   SpO2 97%   BMI 46 77 kg/m²          Physical Exam  Vitals reviewed  Constitutional:       General: She is not in acute distress  Appearance: She is obese  She is not ill-appearing, toxic-appearing or diaphoretic  HENT:      Head: Normocephalic and atraumatic        Right Ear: External ear normal       Left Ear: External ear normal    Eyes:      General: No scleral icterus  Conjunctiva/sclera: Conjunctivae normal       Pupils: Pupils are equal, round, and reactive to light  Cardiovascular:      Rate and Rhythm: Normal rate and regular rhythm  Pulmonary:      Effort: Pulmonary effort is normal  No respiratory distress  Abdominal:      General: Abdomen is flat  There is no distension  Musculoskeletal:      Cervical back: Neck supple  No rigidity  Right lower leg: No edema  Left lower leg: No edema  Skin:     Coloration: Skin is not jaundiced  Findings: No bruising, erythema or rash  Neurological:      General: No focal deficit present  Mental Status: She is alert and oriented to person, place, and time  Mental status is at baseline  Psychiatric:         Mood and Affect: Mood normal          Behavior: Behavior normal          Thought Content:  Thought content normal          Judgment: Judgment normal

## 2022-02-01 NOTE — ASSESSMENT & PLAN NOTE
Most likely secondary to chlorthalidone  Will increase Aldactone to 100 mg daily and recheck a BMP in 2 weeks    If potassium not improved will consider reinstituting potassium chloride at that time

## 2022-02-01 NOTE — ASSESSMENT & PLAN NOTE
Blood pressure currently stable 124/88  Will increase Aldactone to 100 mg daily given the recent finding of hypokalemia, most likely secondary to the use of chlorthalidone  Patient has not been able to tolerate the large potassium chloride tablets

## 2022-02-03 ENCOUNTER — EVALUATION (OUTPATIENT)
Dept: PHYSICAL THERAPY | Facility: CLINIC | Age: 42
End: 2022-02-03
Payer: COMMERCIAL

## 2022-02-03 DIAGNOSIS — M54.42 CHRONIC BILATERAL LOW BACK PAIN WITH BILATERAL SCIATICA: ICD-10-CM

## 2022-02-03 DIAGNOSIS — M54.12 CERVICAL RADICULOPATHY: Primary | ICD-10-CM

## 2022-02-03 DIAGNOSIS — M54.41 CHRONIC BILATERAL LOW BACK PAIN WITH BILATERAL SCIATICA: ICD-10-CM

## 2022-02-03 DIAGNOSIS — G89.29 CHRONIC BILATERAL LOW BACK PAIN WITH BILATERAL SCIATICA: ICD-10-CM

## 2022-02-03 PROCEDURE — 97164 PT RE-EVAL EST PLAN CARE: CPT | Performed by: PHYSICAL THERAPIST

## 2022-02-03 NOTE — PROGRESS NOTES
PT Re-Evaluation     Today's date: 2/3/2022  Patient name: Homer Vargas  : 1980  MRN: [id number]  Referring provider: No ref  provider found  Dx: No diagnosis found  Assessment  Assessment details: Patient seen for reevaluation after hospital admission for possible stroke  All clear and patient cleared to resume PT via PCP  Reports neck ROM improved but remains painful  Lumbar spine pain persists and is better with activity  Most pain when stationary or inactive  Had extremely poor response to repeated extension  Impairments: abnormal or restricted ROM, impaired physical strength, lacks appropriate home exercise program, pain with function and poor posture   Barriers to therapy: Chronicity  Understanding of Dx/Px/POC: good   Prognosis: good    Plan  Patient would benefit from: skilled physical therapy  Planned therapy interventions: home exercise program  Frequency: 2x week  Duration in visits: 12  Duration in weeks: 6  Plan of Care beginning date: 2/3/2022  Plan of Care expiration date: 2022  Treatment plan discussed with: patient        Subjective Evaluation    Pain  Current pain ratin  At best pain rating: 3  At worst pain ratin  Location: [facility]  Quality: discomfort  Alleviating factors: Activity  Exacerbated by:  Has more pain at rest     Patient Goals  Patient goals for therapy: decreased pain, increased motion, increased strength, return to sport/leisure activities, independence with ADLs/IADLs and return to work          Objective     Active Range of Motion     Lumbar   Flexion:  Restriction level: minimal  Extension:  with pain Restriction level: maximal

## 2022-02-10 ENCOUNTER — APPOINTMENT (OUTPATIENT)
Dept: PHYSICAL THERAPY | Facility: CLINIC | Age: 42
End: 2022-02-10
Payer: COMMERCIAL

## 2022-02-14 ENCOUNTER — OFFICE VISIT (OUTPATIENT)
Dept: PHYSICAL THERAPY | Facility: CLINIC | Age: 42
End: 2022-02-14
Payer: COMMERCIAL

## 2022-02-14 DIAGNOSIS — M54.41 CHRONIC BILATERAL LOW BACK PAIN WITH BILATERAL SCIATICA: ICD-10-CM

## 2022-02-14 DIAGNOSIS — M54.42 CHRONIC BILATERAL LOW BACK PAIN WITH BILATERAL SCIATICA: ICD-10-CM

## 2022-02-14 DIAGNOSIS — M54.12 CERVICAL RADICULOPATHY: Primary | ICD-10-CM

## 2022-02-14 DIAGNOSIS — G89.29 CHRONIC BILATERAL LOW BACK PAIN WITH BILATERAL SCIATICA: ICD-10-CM

## 2022-02-14 PROCEDURE — 97112 NEUROMUSCULAR REEDUCATION: CPT | Performed by: PHYSICAL THERAPIST

## 2022-02-14 PROCEDURE — 97110 THERAPEUTIC EXERCISES: CPT | Performed by: PHYSICAL THERAPIST

## 2022-02-14 PROCEDURE — 97140 MANUAL THERAPY 1/> REGIONS: CPT | Performed by: PHYSICAL THERAPIST

## 2022-02-14 NOTE — PROGRESS NOTES
Daily Note     Today's date: 2022  Patient name: Sydnie Wahl  : 1980  MRN: [id number]  Referring provider: Becka Chand*  Dx:   Encounter Diagnosis     ICD-10-CM    1  Cervical radiculopathy  M54 12    2  Chronic bilateral low back pain with bilateral sciatica  M54 42     M54 41     G89 29                   Subjective: No new complaints  Objective: See treatment diary below      Assessment: Able to restore L cervical rotation with MWM      Plan: Continue per plan of care         Manuals                        Cerv traction/SOR ANABELLA 10'                      LAT B  60s x5                       MWM - L rotation ANABELLA                                             Neuro Re-Ed                        Supine cervical retract 2x10                      Supine cervical rot 2x10                      Seated cervical rotation with OP x10                                                                                                                     Ther Ex                        RB 5'                      LTR 10s x10                     Seated Lumbar flexion Ball 5s x15                                                                                                                                             Ther Activity                                                                       Gait Training                                                                       Modalities

## 2022-02-16 ENCOUNTER — APPOINTMENT (OUTPATIENT)
Dept: LAB | Facility: CLINIC | Age: 42
End: 2022-02-16
Payer: COMMERCIAL

## 2022-02-16 ENCOUNTER — TELEPHONE (OUTPATIENT)
Dept: INTERNAL MEDICINE CLINIC | Facility: CLINIC | Age: 42
End: 2022-02-16

## 2022-02-16 DIAGNOSIS — R79.89 ABNORMAL TSH: ICD-10-CM

## 2022-02-16 DIAGNOSIS — M10.9 GOUTY ARTHRITIS: ICD-10-CM

## 2022-02-16 DIAGNOSIS — E87.6 HYPOKALEMIA: ICD-10-CM

## 2022-02-16 DIAGNOSIS — I10 ESSENTIAL HYPERTENSION: ICD-10-CM

## 2022-02-16 LAB
ANION GAP SERPL CALCULATED.3IONS-SCNC: 8 MMOL/L (ref 4–13)
BUN SERPL-MCNC: 16 MG/DL (ref 5–25)
CALCIUM SERPL-MCNC: 9.5 MG/DL (ref 8.3–10.1)
CHLORIDE SERPL-SCNC: 101 MMOL/L (ref 100–108)
CO2 SERPL-SCNC: 27 MMOL/L (ref 21–32)
CREAT SERPL-MCNC: 1.13 MG/DL (ref 0.6–1.3)
GLUCOSE P FAST SERPL-MCNC: 131 MG/DL (ref 65–99)
POTASSIUM SERPL-SCNC: 3.8 MMOL/L (ref 3.5–5.3)
SODIUM SERPL-SCNC: 136 MMOL/L (ref 136–145)
URATE SERPL-MCNC: 6.9 MG/DL (ref 4.2–6.8)

## 2022-02-16 PROCEDURE — 80048 BASIC METABOLIC PNL TOTAL CA: CPT

## 2022-02-16 PROCEDURE — 86800 THYROGLOBULIN ANTIBODY: CPT

## 2022-02-16 PROCEDURE — 84550 ASSAY OF BLOOD/URIC ACID: CPT

## 2022-02-16 PROCEDURE — 36415 COLL VENOUS BLD VENIPUNCTURE: CPT

## 2022-02-16 PROCEDURE — 86376 MICROSOMAL ANTIBODY EACH: CPT

## 2022-02-16 PROCEDURE — 84480 ASSAY TRIIODOTHYRONINE (T3): CPT

## 2022-02-16 NOTE — TELEPHONE ENCOUNTER
Patient called stating she is experiencing body cramping in her mid stomach, upper arms and hands  Patient refused to be scheduled for an appt with any other provider  Will like a virtual appt with Dr Shay Cardona

## 2022-02-17 ENCOUNTER — TELEMEDICINE (OUTPATIENT)
Dept: INTERNAL MEDICINE CLINIC | Facility: CLINIC | Age: 42
End: 2022-02-17
Payer: COMMERCIAL

## 2022-02-17 DIAGNOSIS — R25.2 MUSCLE CRAMPING: Primary | ICD-10-CM

## 2022-02-17 LAB — T3 SERPL-MCNC: 1.1 NG/ML (ref 0.6–1.8)

## 2022-02-17 PROCEDURE — 99213 OFFICE O/P EST LOW 20 MIN: CPT | Performed by: INTERNAL MEDICINE

## 2022-02-17 NOTE — PROGRESS NOTES
Virtual Regular Visit    Verification of patient location:    Patient is located in the following state in which I hold an active license [place]      Assessment/Plan:    Problem List Items Addressed This Visit     None               Reason for visit is   Chief Complaint   Patient presents with    Virtual Regular Visit     Patient c/o body aches, abdominal and hand cramping x 3 days  No fever  Encounter provider Jaylan Angel MD    Provider located at [address] [id number]      Recent Visits  Date Type Provider Dept   02/16/22 Telephone Josue Pepe, [address] [place] recent visits within past 7 days and meeting all other requirements  Today's Visits  Date Type Provider Dept   02/17/22 Telemedicine Josue Pepe  [place] today's visits and meeting all other requirements  Future Appointments  No visits were found meeting these conditions  Showing future appointments within next 150 days and meeting all other requirements       The patient was identified by name and date of birth  Cleta Runner  was informed that this is a telemedicine visit and that the visit is being conducted through [address] [place] [address] Now and patient was informed that this is a secure, HIPAA-compliant platform  She agrees to proceed     My office door was closed  No one else was in the room  She acknowledged consent and understanding of privacy and security of the video platform  The patient has agreed to participate and understands they can discontinue the visit at any time  Patient is aware this is a billable service  Subjective  Cleta Runner  is a 39 y o  adult seen virtually cough she is currently at her place of employment with a medical problem        Patient is seen virtually because of issues related to muscle cramping after resuming Aldactone  She had been experiencing this same problem previously but was agreeable to rechallenging with Aldactone  Unfortunately, we have run into the same resolved that she has developed a lot of muscle spasms in various muscle groups especially after physical therapy and was feeling quite uncomfortable  A BMP was done which revealed a normal serum potassium  Nonfasting glucose was elevated at 131  Thyroid antibody panel is still pending  Past Medical History:   Diagnosis Date    Anxiety     Asthma     seasonal    COVID-19 virus infection 12/27/2021    Hernia of abdominal wall     Hypertension     Kidney stones     Seizures ([address] )     pseudoseizures since 2012    Viral URI with cough 12/27/2021       Past Surgical History:   Procedure Laterality Date    APPENDECTOMY      CHOLECYSTECTOMY      GASTRIC BYPASS      HERNIA REPAIR      x2       Current Outpatient Medications   Medication Sig Dispense Refill    acetaminophen (TYLENOL) 500 mg tablet Take 500 mg by mouth 2 (two) times a day      albuterol (PROVENTIL HFA,VENTOLIN HFA) 90 mcg/act inhaler Inhale 2 puffs every 6 (six) hours as needed for wheezing 6 7 g 2    amLODIPine (NORVASC) 10 mg tablet Take 1 tablet (10 mg total) by mouth daily 90 tablet 1    aspirin (ECOTRIN LOW STRENGTH) 81 mg EC tablet Take 81 mg by mouth daily      benzonatate (TESSALON) 200 MG capsule Take 1 capsule (200 mg total) by mouth 3 (three) times a day as needed for cough 30 capsule 0    chlorthalidone 25 mg tablet Take 1 tablet (25 mg total) by mouth daily 90 tablet 1    Cyanocobalamin (B-12) 1000 MCG/ML KIT Inject 1 ml IM in thigh/ buttocks or deltoid mm once weekly x 8 weeks   Afterward once monthly 8 mL 1    estradiol (ESTRACE) 2 MG tablet Take 1 tablet (2 mg total) by mouth 2 (two) times a day 180 tablet 1    hydrocortisone (ANUSOL-HC) 25 mg suppository UNWRAP AND INSERT 1 SUPPOSITORY RECTALLY TWICE DAILY 12 suppository 0    LORazepam (ATIVAN) 1 mg tablet Take 0 5 tablets (0 5 mg total) by mouth every 6 (six) hours as needed for anxiety 60 tablet 1    methocarbamol (ROBAXIN) 500 mg tablet Take 1 tablet (500 mg total) by mouth daily after breakfast 90 tablet 1    montelukast (SINGULAIR) 10 mg tablet Take 1 tablet (10 mg total) by mouth daily at bedtime 90 tablet 1    multivitamin (THERAGRAN) TABS Take 1 tablet by mouth daily      ondansetron (ZOFRAN-ODT) 4 mg disintegrating tablet Take 1 tablet (4 mg total) by mouth every 8 (eight) hours as needed for nausea or vomiting 60 tablet 1    pantoprazole (PROTONIX) 40 mg tablet Take 1 tablet (40 mg total) by mouth daily 90 tablet 1    spironolactone (ALDACTONE) 100 mg tablet Take 1 tablet (100 mg total) by mouth daily 90 tablet 1    Syringe/Needle, Disp, (SYRINGE 3CC/73UE3-0/4") 27G X 1-1/4" 3 ML MISC Use for B12 injections 10 each 1    TiZANidine (ZANAFLEX) 2 MG capsule Take 1 capsule (2 mg total) by mouth daily at bedtime as needed for muscle spasms 30 capsule 0    traMADol (ULTRAM) 50 mg tablet Take 1 tablet (50 mg total) by mouth 2 (two) times a day 60 tablet 1    colchicine (COLCRYS) 0 6 mg tablet Take 1 tablet (0 6 mg total) by mouth daily 2 pills now and 1 in an hour  One daily until resolved  (Patient not taking: Reported on 2/1/2022 ) 20 tablet 0     No current facility-administered medications for this visit  Allergies   Allergen Reactions    Gadolinium Anaphylaxis     "Oxygen dropped and coded during"       Iodinated Diagnostic Agents Anaphylaxis       Review of Systems   Constitutional: Negative  HENT: Negative  Eyes: Negative  Respiratory: Negative  Cardiovascular: Negative  Gastrointestinal: Negative  Genitourinary: Negative  Musculoskeletal: Positive for myalgias and neck stiffness  Hematological: Negative  All other systems reviewed and are negative  Video Exam    There were no vitals filed for this visit      Physical Exam  Constitutional:       General: She is not in acute distress  Appearance: She is obese  She is not ill-appearing, toxic-appearing or diaphoretic  HENT:      Head: Normocephalic and atraumatic  Eyes:      General: No scleral icterus  Conjunctiva/sclera: Conjunctivae normal       Pupils: Pupils are equal, round, and reactive to light  Pulmonary:      Effort: Pulmonary effort is normal  No respiratory distress  Abdominal:      General: There is no distension  Tenderness: There is no abdominal tenderness  Musculoskeletal:         General: No swelling  Cervical back: Neck supple  Right lower leg: No edema  Left lower leg: No edema  Skin:     Coloration: Skin is not jaundiced  Findings: No bruising, erythema or rash  Neurological:      General: No focal deficit present  Mental Status: She is alert and oriented to person, place, and time  Mental status is at baseline  Psychiatric:         Mood and Affect: Mood normal          Behavior: Behavior normal           I spent 10 minutes directly with the patient during this visit    VIRTUAL VISIT Shaji J  Jalil Wilson  verbally agrees to participate in [employer]  Pt is aware that [employer] could be limited without vital signs or the ability to perform a full hands-on physical exam  Rob Wilson  understands she or the provider may request at any time to terminate the video visit and request the patient to seek care or treatment in person

## 2022-02-17 NOTE — ASSESSMENT & PLAN NOTE
Will discontinue Aldactone and re-evaluate in a few days    Patient will continue potassium supplementation with chlorthalidone

## 2022-02-18 LAB
THYROGLOB AB SERPL-ACNC: <1 IU/ML (ref 0–0.9)
THYROPEROXIDASE AB SERPL-ACNC: 11 IU/ML (ref 0–34)

## 2022-02-21 ENCOUNTER — APPOINTMENT (OUTPATIENT)
Dept: PHYSICAL THERAPY | Facility: CLINIC | Age: 42
End: 2022-02-21
Payer: COMMERCIAL

## 2022-02-24 ENCOUNTER — APPOINTMENT (OUTPATIENT)
Dept: PHYSICAL THERAPY | Facility: CLINIC | Age: 42
End: 2022-02-24
Payer: COMMERCIAL

## 2022-02-28 ENCOUNTER — APPOINTMENT (OUTPATIENT)
Dept: PHYSICAL THERAPY | Facility: CLINIC | Age: 42
End: 2022-02-28
Payer: COMMERCIAL

## 2022-03-04 ENCOUNTER — CONSULT (OUTPATIENT)
Dept: CARDIOLOGY CLINIC | Facility: CLINIC | Age: 42
End: 2022-03-04
Payer: COMMERCIAL

## 2022-03-04 VITALS
HEIGHT: 70 IN | HEART RATE: 65 BPM | WEIGHT: 315 LBS | SYSTOLIC BLOOD PRESSURE: 134 MMHG | OXYGEN SATURATION: 97 % | DIASTOLIC BLOOD PRESSURE: 84 MMHG | BODY MASS INDEX: 45.1 KG/M2

## 2022-03-04 DIAGNOSIS — Z78.9 MALE-TO-FEMALE TRANSGENDER PERSON: Primary | ICD-10-CM

## 2022-03-04 DIAGNOSIS — R00.2 PALPITATIONS: ICD-10-CM

## 2022-03-04 DIAGNOSIS — I10 ESSENTIAL HYPERTENSION: ICD-10-CM

## 2022-03-04 DIAGNOSIS — R07.9 CHEST PAIN, UNSPECIFIED TYPE: ICD-10-CM

## 2022-03-04 PROCEDURE — 93000 ELECTROCARDIOGRAM COMPLETE: CPT | Performed by: INTERNAL MEDICINE

## 2022-03-04 PROCEDURE — 99204 OFFICE O/P NEW MOD 45 MIN: CPT | Performed by: INTERNAL MEDICINE

## 2022-03-04 NOTE — LETTER
2022     Katina Menjivar, [address] [address]    Patient: Pat Triana  YOB: 1980   Date of Visit: 3/4/2022       Dear Dr Tobin : Thank you for referring Austen Howell to me for evaluation  Below are my notes for this consultation  If you have questions, please do not hesitate to call me  I look forward to following your patient along with you  Sincerely,        Fabiola Simon MD        CC: MD Fabiola Reece MD  3/4/2022  9:05 AM  Incomplete                                             Cardiology Consultation     Pat Triana  [id number]  1980  [facility] [place] [facility] [address]  [address] [id number]      Diagnoses and all orders for this visit:    Male-to-female transgender person    Palpitations  -     Ambulatory referral to Cardiology  -     POCT ECG  -     AMB extended holter monitor; Future    Chest pain, unspecified type  -     Stress test only, exercise; Future    Essential hypertension  -     Stress test only, exercise; Future        I had the pleasure of seeing Pat Triana  for a consultation regarding palpitations and chest pain requested by Dr Camacho    History of the Presenting Illness, Discussion/Summary and my Plan are as follows:::    Nathan Hammond is a pleasant 70-year-old transgender female, with a history of longstanding hypertension since her teens, seasonal asthma, no diabetes, tobacco use or significant alcohol use, prior history of gastric sleeve around 2017  She works in neurology at [employer]  Family history-strong family history of hypertension in all first-degree relatives, father  of trauma at 76, mother has diabetes and hypertension  No vascular disease in first-degree relatives but grandfather had a fatal MI in his 62s and grandmother had a stroke      She is mainly here for further evaluation of palpitations, now occurring on a daily basis, usually brought on by stress, improved with Ativan, at least 2 to 3 times a day  Has noticed increased frequency ever since she had COVID in December 2021 and especially since her most recent presentation to the ED for left regan sensory deficits  At that time in January 2022, admitted with blood pressure 156/97, with left regan sensory as well as left facial deficits, with negative evaluation including CT head, carotid Dopplers, as well as all blood work  At that time she was also having palpitations and chest pains although ECGs showed sinus tachycardia only  Overall thyroid profile was normal     She has 1 episode of syncope in December 2017, was able to review ER records from [place], had a left eyebrow laceration, woke up to go to the bathroom and appears that she lost consciousness  ECG, CT, blood work were all normal, urine was positive for cannabis but no alcohol  She also admits to shortness of breath that has been worse for the last few weeks, she feels that she has always been heavy but her breathing is much worse now compared to before  COVID symptoms were mild  She did have a chest x-ray in January 2022 that was unremarkable  In terms of cardiac testing, had an echocardiogram in 2020 as well as a more recent 1 in January 2022 during her presentation with neurologic symptoms, echo was unremarkable  ECGs show normal sinus rhythm or sinus tachycardia  Cardiopulmonary exam is unremarkable  Plan:    Chest pain and shortness of breath: Will check an exercise treadmill stress test   Some of this could be from deconditioning and weight gain-has put on about 70 lb in the last 2 years but also had COVID in December and with her more frequent palpitations I wonder if there could be an element of autonomic dysfunction    If her stress test is negative, advised her to do more walking since there might be an element of deconditioning also     Palpitations:  Especially in the setting of her recent neurologic symptoms, although more likely to be from cervical and lumbar spinal cord issues or complex regional pain syndrome, has seen Neurosurgery before, will check a 1 week Zio to rule out any atrial fibrillation also  Thyroid profile was normal, recent electrolytes were normal   Palpitations  almost always occur in the setting of a panic attack    Lipids are under control    Hypertension:  Improved on 2nd reading, she will continue to keep an eye on it at home, she does feel a little nervous during her 1st visit here today  Follow-up in about 3 months    Results for Khadra Wallace (MRN [id number]) as of 3/4/2022 08:24   Ref  Range 3/7/2020 05:16 1/13/2022 08:46   Cholesterol Latest Ref Range: See Comment mg/dL 144 153   Triglycerides Latest Ref Range: See Comment mg/dL 40 44   HDL Latest Units: mg/dL 50 65   LDL Calculated Latest Ref Range: 0 - 100 mg/dL 86 79       Results for Khadra Wallace (MRN [id number]) as of 3/4/2022 08:24   Ref  Range 1/13/2022 03:54 1/13/2022 04:36 2/16/2022 16:54   TSH 3RD GENERATON Latest Ref Range: 0 358 - 3 740 uIU/mL 5 750 (H)     Free T4 Latest Ref Range: 0 76 - 1 46 ng/dL 1 29     T3, Total Latest Ref Range: 0 60 - 1 80 ng/mL   1 10   THYROGLOBULIN AB Latest Ref Range: 0 0 - 0 9 IU/mL   <1 0   THYROID MICROSOMAL ANTIBODY Latest Ref Range: 0 - 34 IU/mL   11         1  Male-to-female transgender person     2  Palpitations  Ambulatory referral to Cardiology    POCT ECG    AMB extended holter monitor   3  Chest pain, unspecified type  Stress test only, exercise   4   Essential hypertension  Stress test only, exercise     Patient Active Problem List   Diagnosis    History of sleeve gastrectomy    Essential hypertension    Cervical disc disease    Male-to-female transgender person    Anxiety    Umbilical hernia    GERD (gastroesophageal reflux disease)    Worsening headaches    Left-sided weakness and sensory deficits    Herniation of intervertebral disc of lumbar spine    Morbid obesity with BMI of 45 0-49 9, adult (HCC)    Chronic neck pain    Low back pain    Acute sinusitis    Chronic pain syndrome    Lumbar disc herniation with radiculopathy    Elevated serum immunoglobulin free light chain level    Hypokalemia    Cervical myofascial pain syndrome    Palpitations    Abnormal TSH    Muscle cramping    Chest pain     Past Medical History:   Diagnosis Date    Anxiety     Asthma     seasonal    COVID-19 virus infection 12/27/2021    Hernia of abdominal wall     Hypertension     Kidney stones     Seizures ([address] )     pseudoseizures since 2012    Viral URI with cough 12/27/2021     Social History     Socioeconomic History    Marital status: Single     Spouse name: Not on file    Number of children: Not on file    Years of education: Not on file    Highest education level: Not on file   Occupational History    Not on file   Tobacco Use    Smoking status: Never Smoker    Smokeless tobacco: Never Used   Vaping Use    Vaping Use: Never used   Substance and Sexual Activity    Alcohol use: Yes     Comment: social    Drug use: Never    Sexual activity: Not on file   Other Topics Concern    Not on file   Social History Narrative    Not on file     Social Determinants of Health     Financial Resource Strain: Not on file   Food Insecurity: No Food Insecurity    Worried About Running Out of Food in the Last Year: Never true    David of Food in the Last Year: Never true   Transportation Needs: No Transportation Needs    Lack of Transportation (Medical): No    Lack of Transportation (Non-Medical):  No   Physical Activity: Not on file   Stress: Not on file   Social Connections: Not on file   Intimate Partner Violence: Not on file   Housing Stability: Low Risk     Unable to Pay for Housing in the Last Year: No    Number of Places Lived in the Last Year: 1    Unstable Housing in the Last Year: No      Family History   Problem Relation Age of Onset    Hypertension Mother     Diabetes Mother     Hypertension Sister     No Known Problems Father         head injury    Stroke Maternal Grandmother         brain aneurysm    Aneurysm Maternal Uncle         brain     Past Surgical History:   Procedure Laterality Date    APPENDECTOMY      CHOLECYSTECTOMY      GASTRIC BYPASS      HERNIA REPAIR      x2       Current Outpatient Medications:     acetaminophen (TYLENOL) 500 mg tablet, Take 500 mg by mouth 2 (two) times a day, Disp: , Rfl:     albuterol (PROVENTIL HFA,VENTOLIN HFA) 90 mcg/act inhaler, Inhale 2 puffs every 6 (six) hours as needed for wheezing, Disp: 6 7 g, Rfl: 2    amLODIPine (NORVASC) 10 mg tablet, Take 1 tablet (10 mg total) by mouth daily, Disp: 90 tablet, Rfl: 1    aspirin (ECOTRIN LOW STRENGTH) 81 mg EC tablet, Take 81 mg by mouth daily, Disp: , Rfl:     benzonatate (TESSALON) 200 MG capsule, Take 1 capsule (200 mg total) by mouth 3 (three) times a day as needed for cough, Disp: 30 capsule, Rfl: 0    chlorthalidone 25 mg tablet, Take 1 tablet (25 mg total) by mouth daily, Disp: 90 tablet, Rfl: 1    colchicine (COLCRYS) 0 6 mg tablet, Take 1 tablet (0 6 mg total) by mouth daily 2 pills now and 1 in an hour  One daily until resolved , Disp: 20 tablet, Rfl: 0    Cyanocobalamin (B-12) 1000 MCG/ML KIT, Inject 1 ml IM in thigh/ buttocks or deltoid mm once weekly x 8 weeks   Afterward once monthly, Disp: 8 mL, Rfl: 1    estradiol (ESTRACE) 2 MG tablet, Take 1 tablet (2 mg total) by mouth 2 (two) times a day, Disp: 180 tablet, Rfl: 1    hydrocortisone (ANUSOL-HC) 25 mg suppository, UNWRAP AND INSERT 1 SUPPOSITORY RECTALLY TWICE DAILY, Disp: 12 suppository, Rfl: 0    LORazepam (ATIVAN) 1 mg tablet, Take 0 5 tablets (0 5 mg total) by mouth every 6 (six) hours as needed for anxiety, Disp: 60 tablet, Rfl: 1    methocarbamol (ROBAXIN) 500 mg tablet, Take 1 tablet (500 mg total) by mouth daily after breakfast (Patient taking differently: Take 500 mg by mouth as needed  ), Disp: 90 tablet, Rfl: 1    montelukast (SINGULAIR) 10 mg tablet, Take 1 tablet (10 mg total) by mouth daily at bedtime, Disp: 90 tablet, Rfl: 1    multivitamin (THERAGRAN) TABS, Take 1 tablet by mouth daily, Disp: , Rfl:     ondansetron (ZOFRAN-ODT) 4 mg disintegrating tablet, Take 1 tablet (4 mg total) by mouth every 8 (eight) hours as needed for nausea or vomiting, Disp: 60 tablet, Rfl: 1    pantoprazole (PROTONIX) 40 mg tablet, Take 1 tablet (40 mg total) by mouth daily, Disp: 90 tablet, Rfl: 1    Syringe/Needle, Disp, (SYRINGE 3CC/45QU5-6/4") 27G X 1-1/4" 3 ML MISC, Use for B12 injections, Disp: 10 each, Rfl: 1    traMADol (ULTRAM) 50 mg tablet, Take 1 tablet (50 mg total) by mouth 2 (two) times a day, Disp: 60 tablet, Rfl: 1    spironolactone (ALDACTONE) 100 mg tablet, Take 1 tablet (100 mg total) by mouth daily (Patient not taking: Reported on 3/4/2022 ), Disp: 90 tablet, Rfl: 1    TiZANidine (ZANAFLEX) 2 MG capsule, Take 1 capsule (2 mg total) by mouth daily at bedtime as needed for muscle spasms (Patient not taking: Reported on 3/4/2022 ), Disp: 30 capsule, Rfl: 0  Allergies   Allergen Reactions    Gadolinium Anaphylaxis     "Oxygen dropped and coded during"       Iodinated Diagnostic Agents Anaphylaxis     Vitals:    03/04/22 0758 03/04/22 0857   BP: 138/90 134/84   Pulse: 65    SpO2: 97%    Weight: (!) 157 kg (347 lb)    Height: 5' 10" (1 778 m)          Imaging: No results found  Review of Systems:  Review of Systems   Constitutional: Negative  HENT: Negative  Eyes: Negative  Respiratory: Negative  Cardiovascular: Negative  Endocrine: Negative  Musculoskeletal: Negative  Physical Exam:    /84   Pulse 65   Ht 5' 10" (1 778 m)   Wt (!) 157 kg (347 lb)   SpO2 97%   BMI 49 79 kg/m²   Physical Exam  Constitutional:       General: She is not in acute distress  Appearance: She is well-developed  She is not ill-appearing  HENT:      Head: Normocephalic  Mouth/Throat:      Mouth: Mucous membranes are moist       Pharynx: No pharyngeal swelling or oropharyngeal exudate  Neck:      Thyroid: No thyromegaly  Vascular: No hepatojugular reflux or JVD  Pulmonary:      Effort: Pulmonary effort is normal  No tachypnea, bradypnea, accessory muscle usage or respiratory distress  Breath sounds: No decreased breath sounds, wheezing or rhonchi  Chest:      Chest wall: No mass, deformity, tenderness or crepitus  Abdominal:      Palpations: Abdomen is soft  There is no hepatomegaly, splenomegaly or mass  Tenderness: There is no abdominal tenderness  Musculoskeletal:      Cervical back: Normal range of motion  Right lower leg: No tenderness  No edema  Left lower leg: No tenderness  No edema  Lymphadenopathy:      Cervical: No cervical adenopathy  Skin:     General: Skin is warm  Coloration: Skin is not cyanotic or pale  Neurological:      General: No focal deficit present  Mental Status: She is alert  Cranial Nerves: No cranial nerve deficit  Motor: No weakness  This note was completed in part utilizing Vator direct voice recognition software  Grammatical errors, random word insertion, spelling mistakes, occasional wrong word or "sound-alike" substitutions and incomplete sentences may be an occasional consequence of the system secondary to software limitations, ambient noise and hardware issues  At the time of dictation, efforts were made to edit, clarify and /or correct errors  Please read the chart carefully and recognize, using context, where substitutions have occurred  If you have any questions or concerns about the context, text or information contained within the body of this dictation, please contact myself, the provider, for further clarification

## 2022-03-04 NOTE — PROGRESS NOTES
Cardiology Consultation     [facility]  [id number]  1980  [address] [facility] [address]  [address] [address]      Diagnoses and all orders for this visit:    Male-to-female transgender person    Palpitations  -     Ambulatory referral to Cardiology  -     POCT ECG  -     AMB extended holter monitor; Future    Chest pain, unspecified type  -     Stress test only, exercise; Future    Essential hypertension  -     Stress test only, exercise; Future        I had the pleasure of seeing [facility]  for a consultation regarding palpitations and chest pain requested by Dr Camacho    History of the Presenting Illness, Discussion/Summary and my Plan are as follows:::    Dani Smith is a pleasant 45-year-old transgender female, with a history of longstanding hypertension since her teens, seasonal asthma, no diabetes, tobacco use or significant alcohol use, prior history of gastric sleeve around 2017  She works in neurology at [employer]  Family history-strong family history of hypertension in all first-degree relatives, father  of trauma at 76, mother has diabetes and hypertension  No vascular disease in first-degree relatives but grandfather had a fatal MI in his 62s and grandmother had a stroke  She is mainly here for further evaluation of palpitations, now occurring on a daily basis, usually brought on by stress, improved with Ativan, at least 2 to 3 times a day  Has noticed increased frequency ever since she had COVID in 2021 and especially since her most recent presentation to the ED for left regan sensory deficits  At that time in 2022, admitted with blood pressure 156/97, with left regan sensory as well as left facial deficits, with negative evaluation including CT head, carotid Dopplers, as well as all blood work    At that time she was also having palpitations and chest pains although ECGs showed sinus tachycardia only  Overall thyroid profile was normal     She has 1 episode of syncope in December 2017, was able to review ER records from [place], had a left eyebrow laceration, woke up to go to the bathroom and appears that she lost consciousness  ECG, CT, blood work were all normal, urine was positive for cannabis but no alcohol  She also admits to shortness of breath that has been worse for the last few weeks, she feels that she has always been heavy but her breathing is much worse now compared to before  COVID symptoms were mild  She did have a chest x-ray in January 2022 that was unremarkable  In terms of cardiac testing, had an echocardiogram in 2020 as well as a more recent 1 in January 2022 during her presentation with neurologic symptoms, echo was unremarkable  ECGs show normal sinus rhythm or sinus tachycardia  Cardiopulmonary exam is unremarkable  Plan:    Chest pain and shortness of breath: Will check an exercise treadmill stress test   Some of this could be from deconditioning and weight gain-has put on about 70 lb in the last 2 years but also had COVID in December and with her more frequent palpitations I wonder if there could be an element of autonomic dysfunction  If her stress test is negative, advised her to do more walking since there might be an element of deconditioning also  Palpitations:  Especially in the setting of her recent neurologic symptoms, although more likely to be from cervical and lumbar spinal cord issues or complex regional pain syndrome, has seen Neurosurgery before, will check a 1 week Zio to rule out any atrial fibrillation also    Thyroid profile was normal, recent electrolytes were normal   Palpitations  almost always occur in the setting of a panic attack    Lipids are under control    Hypertension:  Improved on 2nd reading, she will continue to keep an eye on it at home, she does feel a little nervous during her 1st visit here today  Follow-up in about 3 months    Results for Donnie Laboy (MRN [id number]) as of 3/4/2022 08:24   Ref  Range 3/7/2020 05:16 1/13/2022 08:46   Cholesterol Latest Ref Range: See Comment mg/dL 144 153   Triglycerides Latest Ref Range: See Comment mg/dL 40 44   HDL Latest Units: mg/dL 50 65   LDL Calculated Latest Ref Range: 0 - 100 mg/dL 86 79       Results for Donnie Laboy (MRN [id number]) as of 3/4/2022 08:24   Ref  Range 1/13/2022 03:54 1/13/2022 04:36 2/16/2022 16:54   TSH 3RD GENERATON Latest Ref Range: 0 358 - 3 740 uIU/mL 5 750 (H)     Free T4 Latest Ref Range: 0 76 - 1 46 ng/dL 1 29     T3, Total Latest Ref Range: 0 60 - 1 80 ng/mL   1 10   THYROGLOBULIN AB Latest Ref Range: 0 0 - 0 9 IU/mL   <1 0   THYROID MICROSOMAL ANTIBODY Latest Ref Range: 0 - 34 IU/mL   11         1  Male-to-female transgender person     2  Palpitations  Ambulatory referral to Cardiology    POCT ECG    AMB extended holter monitor   3  Chest pain, unspecified type  Stress test only, exercise   4   Essential hypertension  Stress test only, exercise     Patient Active Problem List   Diagnosis    History of sleeve gastrectomy    Essential hypertension    Cervical disc disease    Male-to-female transgender person    Anxiety    Umbilical hernia    GERD (gastroesophageal reflux disease)    Worsening headaches    Left-sided weakness and sensory deficits    Herniation of intervertebral disc of lumbar spine    Morbid obesity with BMI of 45 0-49 9, adult (HCC)    Chronic neck pain    Low back pain    Acute sinusitis    Chronic pain syndrome    Lumbar disc herniation with radiculopathy    Elevated serum immunoglobulin free light chain level    Hypokalemia    Cervical myofascial pain syndrome    Palpitations    Abnormal TSH    Muscle cramping    Chest pain     Past Medical History:   Diagnosis Date    Anxiety     Asthma     seasonal    COVID-19 virus infection 12/27/2021    Hernia of abdominal wall     Hypertension     Kidney stones     Seizures ([address] )     pseudoseizures since 2012    Viral URI with cough 12/27/2021     Social History     Socioeconomic History    Marital status: Single     Spouse name: Not on file    Number of children: Not on file    Years of education: Not on file    Highest education level: Not on file   Occupational History    Not on file   Tobacco Use    Smoking status: Never Smoker    Smokeless tobacco: Never Used   Vaping Use    Vaping Use: Never used   Substance and Sexual Activity    Alcohol use: Yes     Comment: social    Drug use: Never    Sexual activity: Not on file   Other Topics Concern    Not on file   Social History Narrative    Not on file     Social Determinants of Health     Financial Resource Strain: Not on file   Food Insecurity: No Food Insecurity    Worried About [address] in the Last Year: Never true    [address] in the Last Year: Never true   Transportation Needs: No Transportation Needs    Lack of Transportation (Medical): No    Lack of Transportation (Non-Medical):  No   Physical Activity: Not on file   Stress: Not on file   Social Connections: Not on file   Intimate Partner Violence: Not on file   Housing Stability: Low Risk     Unable to Pay for Housing in the Last Year: No    Number of Places Lived in the Last Year: 1    Unstable Housing in the Last Year: No      Family History   Problem Relation Age of Onset    Hypertension Mother     Diabetes Mother     Hypertension Sister     No Known Problems Father         head injury    Stroke Maternal Grandmother         brain aneurysm    Aneurysm Maternal Uncle         brain     Past Surgical History:   Procedure Laterality Date    APPENDECTOMY      CHOLECYSTECTOMY      GASTRIC BYPASS      HERNIA REPAIR      x2       Current Outpatient Medications:     acetaminophen (TYLENOL) 500 mg tablet, Take 500 mg by mouth 2 (two) times a day, Disp: , Rfl:     albuterol (PROVENTIL HFA,VENTOLIN HFA) 90 mcg/act inhaler, Inhale 2 puffs every 6 (six) hours as needed for wheezing, Disp: 6 7 g, Rfl: 2    amLODIPine (NORVASC) 10 mg tablet, Take 1 tablet (10 mg total) by mouth daily, Disp: 90 tablet, Rfl: 1    aspirin (ECOTRIN LOW STRENGTH) 81 mg EC tablet, Take 81 mg by mouth daily, Disp: , Rfl:     benzonatate (TESSALON) 200 MG capsule, Take 1 capsule (200 mg total) by mouth 3 (three) times a day as needed for cough, Disp: 30 capsule, Rfl: 0    chlorthalidone 25 mg tablet, Take 1 tablet (25 mg total) by mouth daily, Disp: 90 tablet, Rfl: 1    colchicine (COLCRYS) 0 6 mg tablet, Take 1 tablet (0 6 mg total) by mouth daily 2 pills now and 1 in an hour  One daily until resolved , Disp: 20 tablet, Rfl: 0    Cyanocobalamin (B-12) 1000 MCG/ML KIT, Inject 1 ml IM in thigh/ buttocks or deltoid mm once weekly x 8 weeks   Afterward once monthly, Disp: 8 mL, Rfl: 1    estradiol (ESTRACE) 2 MG tablet, Take 1 tablet (2 mg total) by mouth 2 (two) times a day, Disp: 180 tablet, Rfl: 1    hydrocortisone (ANUSOL-HC) 25 mg suppository, UNWRAP AND INSERT 1 SUPPOSITORY RECTALLY TWICE DAILY, Disp: 12 suppository, Rfl: 0    LORazepam (ATIVAN) 1 mg tablet, Take 0 5 tablets (0 5 mg total) by mouth every 6 (six) hours as needed for anxiety, Disp: 60 tablet, Rfl: 1    methocarbamol (ROBAXIN) 500 mg tablet, Take 1 tablet (500 mg total) by mouth daily after breakfast (Patient taking differently: Take 500 mg by mouth as needed  ), Disp: 90 tablet, Rfl: 1    montelukast (SINGULAIR) 10 mg tablet, Take 1 tablet (10 mg total) by mouth daily at bedtime, Disp: 90 tablet, Rfl: 1    multivitamin (THERAGRAN) TABS, Take 1 tablet by mouth daily, Disp: , Rfl:     ondansetron (ZOFRAN-ODT) 4 mg disintegrating tablet, Take 1 tablet (4 mg total) by mouth every 8 (eight) hours as needed for nausea or vomiting, Disp: 60 tablet, Rfl: 1    pantoprazole (PROTONIX) 40 mg tablet, Take 1 tablet (40 mg total) by mouth daily, Disp: 90 tablet, Rfl: 1    Syringe/Needle, Disp, (SYRINGE 3CC/77WX9-0/4") 27G X 1-1/4" 3 ML MISC, Use for B12 injections, Disp: 10 each, Rfl: 1    traMADol (ULTRAM) 50 mg tablet, Take 1 tablet (50 mg total) by mouth 2 (two) times a day, Disp: 60 tablet, Rfl: 1    spironolactone (ALDACTONE) 100 mg tablet, Take 1 tablet (100 mg total) by mouth daily (Patient not taking: Reported on 3/4/2022 ), Disp: 90 tablet, Rfl: 1    TiZANidine (ZANAFLEX) 2 MG capsule, Take 1 capsule (2 mg total) by mouth daily at bedtime as needed for muscle spasms (Patient not taking: Reported on 3/4/2022 ), Disp: 30 capsule, Rfl: 0  Allergies   Allergen Reactions    Gadolinium Anaphylaxis     "Oxygen dropped and coded during"       Iodinated Diagnostic Agents Anaphylaxis     Vitals:    03/04/22 0758 03/04/22 0857   BP: 138/90 134/84   Pulse: 65    SpO2: 97%    Weight: (!) 157 kg (347 lb)    Height: 5' 10" (1 778 m)          Imaging: No results found  Review of Systems:  Review of Systems   Constitutional: Negative  HENT: Negative  Eyes: Negative  Respiratory: Negative  Cardiovascular: Negative  Endocrine: Negative  Musculoskeletal: Negative  Physical Exam:    /84   Pulse 65   Ht 5' 10" (1 778 m)   Wt (!) 157 kg (347 lb)   SpO2 97%   BMI 49 79 kg/m²   Physical Exam  Constitutional:       General: She is not in acute distress  Appearance: She is well-developed  She is not ill-appearing  HENT:      Head: Normocephalic  Mouth/Throat:      Mouth: Mucous membranes are moist       Pharynx: No pharyngeal swelling or oropharyngeal exudate  Neck:      Thyroid: No thyromegaly  Vascular: No hepatojugular reflux or JVD  Pulmonary:      Effort: Pulmonary effort is normal  No tachypnea, bradypnea, accessory muscle usage or respiratory distress  Breath sounds: No decreased breath sounds, wheezing or rhonchi     Chest:      Chest wall: No mass, deformity, tenderness or crepitus  Abdominal:      Palpations: Abdomen is soft  There is no hepatomegaly, splenomegaly or mass  Tenderness: There is no abdominal tenderness  Musculoskeletal:      Cervical back: Normal range of motion  Right lower leg: No tenderness  No edema  Left lower leg: No tenderness  No edema  Lymphadenopathy:      Cervical: No cervical adenopathy  Skin:     General: Skin is warm  Coloration: Skin is not cyanotic or pale  Neurological:      General: No focal deficit present  Mental Status: She is alert  Cranial Nerves: No cranial nerve deficit  Motor: No weakness  This note was completed in part utilizing [employer] direct voice recognition software  Grammatical errors, random word insertion, spelling mistakes, occasional wrong word or "sound-alike" substitutions and incomplete sentences may be an occasional consequence of the system secondary to software limitations, ambient noise and hardware issues  At the time of dictation, efforts were made to edit, clarify and /or correct errors  Please read the chart carefully and recognize, using context, where substitutions have occurred  If you have any questions or concerns about the context, text or information contained within the body of this dictation, please contact myself, the provider, for further clarification

## 2022-04-01 ENCOUNTER — CLINICAL SUPPORT (OUTPATIENT)
Dept: CARDIOLOGY CLINIC | Facility: CLINIC | Age: 42
End: 2022-04-01
Payer: COMMERCIAL

## 2022-04-01 DIAGNOSIS — R00.2 PALPITATIONS: ICD-10-CM

## 2022-04-01 PROCEDURE — 93244 EXT ECG>48HR<7D REV&INTERPJ: CPT | Performed by: INTERNAL MEDICINE

## 2022-04-05 ENCOUNTER — EVENT RECORDER/EXTENDED HOLTER (OUTPATIENT)
Dept: CARDIOLOGY CLINIC | Facility: HOSPITAL | Age: 42
End: 2022-04-05

## 2022-04-05 NOTE — PROGRESS NOTES
REPORT OF CARDIAC EVENT MONITORING-O MONITOR    Duration of monitoring-3 days, analysis time 2 days 14 hours  Dates-3/23/2022-3/26/2022    Background data:  Patient had a min HR of 50 bpm, max HR of 141 bpm, and avg HR of 80 bpm    Predominant underlying rhythm was Sinus Rhythm  Supraventricular ectopy:  3 Supraventricular Tachycardia runs occurred, the run with the fastest interval lasting 4 beats with a max rate of 129 bpm, the longest lasting 17 beats with an avg rate of 117 bpm  Isolated SVEs were rare (<1 0%), SVE Couplets were rare (<1 0%), and SVE Triplets were rare (<1 0%)  Ventricular ectopy:    Isolated VEs were rare (<1 0%), and no VE Couplets or VE Triplets were present  Symptoms:  Symptoms of palpitations correlated with sinus rhythm or sinus rhythm with isolated PACs and PVCs  There was no atrial fibrillation          Impression:    Overall unremarkable 2-3 days of cardiac rhythm monitoring  No significant arrhythmias were noted  Symptoms correlated with isolated PACs and PVCs or sinus rhythm  No atrial fibrillation was noted

## 2022-04-19 ENCOUNTER — TELEPHONE (OUTPATIENT)
Dept: INTERNAL MEDICINE CLINIC | Age: 42
End: 2022-04-19

## 2022-04-19 NOTE — TELEPHONE ENCOUNTER
PA for Estradiol sent to [facility] in [address] [place] [address] My Meds  Patient has been taking Estradiol since the early 2000's  Seen in Dr Terence Rodriguez  office note of 3/19/2019  Medical record showing start date or any other medication tried and failed is not available

## 2022-04-27 ENCOUNTER — HOSPITAL ENCOUNTER (EMERGENCY)
Facility: HOSPITAL | Age: 42
Discharge: HOME | End: 2022-04-27
Attending: EMERGENCY MEDICINE
Payer: COMMERCIAL

## 2022-04-27 VITALS
TEMPERATURE: 98.2 F | HEART RATE: 81 BPM | RESPIRATION RATE: 16 BRPM | DIASTOLIC BLOOD PRESSURE: 80 MMHG | SYSTOLIC BLOOD PRESSURE: 160 MMHG | OXYGEN SATURATION: 97 %

## 2022-04-27 DIAGNOSIS — G43.909 MIGRAINE WITHOUT STATUS MIGRAINOSUS, NOT INTRACTABLE, UNSPECIFIED MIGRAINE TYPE: Primary | ICD-10-CM

## 2022-04-27 PROCEDURE — 99284 EMERGENCY DEPT VISIT MOD MDM: CPT | Performed by: PHYSICIAN ASSISTANT

## 2022-04-27 PROCEDURE — 96365 THER/PROPH/DIAG IV INF INIT: CPT

## 2022-04-27 PROCEDURE — 96375 TX/PRO/DX INJ NEW DRUG ADDON: CPT

## 2022-04-27 PROCEDURE — 99283 EMERGENCY DEPT VISIT LOW MDM: CPT

## 2022-04-27 RX ORDER — METOCLOPRAMIDE HYDROCHLORIDE 5 MG/ML
10 INJECTION INTRAMUSCULAR; INTRAVENOUS ONCE
Status: COMPLETED | OUTPATIENT
Start: 2022-04-27 | End: 2022-04-27

## 2022-04-27 RX ORDER — KETOROLAC TROMETHAMINE 30 MG/ML
30 INJECTION, SOLUTION INTRAMUSCULAR; INTRAVENOUS ONCE
Status: COMPLETED | OUTPATIENT
Start: 2022-04-27 | End: 2022-04-27

## 2022-04-27 RX ORDER — MAGNESIUM SULFATE HEPTAHYDRATE 40 MG/ML
2 INJECTION, SOLUTION INTRAVENOUS ONCE
Status: COMPLETED | OUTPATIENT
Start: 2022-04-27 | End: 2022-04-27

## 2022-04-27 RX ORDER — DIPHENHYDRAMINE HYDROCHLORIDE 50 MG/ML
25 INJECTION INTRAMUSCULAR; INTRAVENOUS ONCE
Status: COMPLETED | OUTPATIENT
Start: 2022-04-27 | End: 2022-04-27

## 2022-04-27 RX ADMIN — METOCLOPRAMIDE HYDROCHLORIDE 10 MG: 5 INJECTION INTRAMUSCULAR; INTRAVENOUS at 17:06

## 2022-04-27 RX ADMIN — DIPHENHYDRAMINE HYDROCHLORIDE 25 MG: 50 INJECTION, SOLUTION INTRAMUSCULAR; INTRAVENOUS at 17:09

## 2022-04-27 RX ADMIN — SODIUM CHLORIDE 1000 ML: 0.9 INJECTION, SOLUTION INTRAVENOUS at 17:09

## 2022-04-27 RX ADMIN — MAGNESIUM SULFATE HEPTAHYDRATE 2 G: 40 INJECTION, SOLUTION INTRAVENOUS at 17:10

## 2022-04-27 RX ADMIN — KETOROLAC TROMETHAMINE 30 MG: 30 INJECTION, SOLUTION INTRAMUSCULAR at 17:10

## 2022-04-27 NOTE — TELEPHONE ENCOUNTER
Spoke to [facility] regarding the status of the PA for Estradiol and was told a decision will be made on 5/4/22  No additional records are needed

## 2022-04-28 NOTE — ED PROVIDER NOTES
History  Chief Complaint   Patient presents with    Migraine     pt reports migraine x 2 days with neck pain  49-year-old female presents to the emergency department with complaints of a migraine headache  States that she has had an ongoing headache over the past 2 days  States she has a history of migraines and usually takes Ultram when she starts to have neck pain  States that she did this last night before she went to sleep woke up with increased symptoms  Additionally reports some visual auras over the course of the day along with nausea and photophobia  Has tried over-the-counter medications without alleviation of symptoms  Stated this is very similar to her usual migraines that started in the back of her neck and travel up the back of her head but that this is more intense  History provided by:  Patient   used: No    Migraine  Location:  Neck and occipital area  Quality:  Throbbing  Severity:  Moderate  Onset quality:  Gradual  Duration:  1 day  Timing:  Constant  Progression:  Waxing and waning  Chronicity:  Recurrent  Associated symptoms: headaches    Associated symptoms: no abdominal pain, no chest pain, no congestion, no cough, no diarrhea, no ear pain, no fever, no nausea, no rash, no rhinorrhea, no shortness of breath, no sore throat, no vomiting and no wheezing        Prior to Admission Medications   Prescriptions Last Dose Informant Patient Reported? Taking? Cyanocobalamin (B-12) 1000 MCG/ML KIT  Self No No   Sig: Inject 1 ml IM in thigh/ buttocks or deltoid mm once weekly x 8 weeks   Afterward once monthly   LORazepam (ATIVAN) 1 mg tablet  Self No No   Sig: Take 0 5 tablets (0 5 mg total) by mouth every 6 (six) hours as needed for anxiety   Syringe/Needle, Disp, (SYRINGE 3CC/90CL0-6/4") 27G X 1-1/4" 3 ML MISC  Self No No   Sig: Use for B12 injections   TiZANidine (ZANAFLEX) 2 MG capsule  Self No No   Sig: Take 1 capsule (2 mg total) by mouth daily at bedtime as needed for muscle spasms   Patient not taking: Reported on 3/4/2022    acetaminophen (TYLENOL) 500 mg tablet  Self Yes No   Sig: Take 500 mg by mouth 2 (two) times a day   albuterol (PROVENTIL HFA,VENTOLIN HFA) 90 mcg/act inhaler  Self No No   Sig: Inhale 2 puffs every 6 (six) hours as needed for wheezing   amLODIPine (NORVASC) 10 mg tablet  Self No No   Sig: Take 1 tablet (10 mg total) by mouth daily   aspirin (ECOTRIN LOW STRENGTH) 81 mg EC tablet  Self Yes No   Sig: Take 81 mg by mouth daily   benzonatate (TESSALON) 200 MG capsule  Self No No   Sig: Take 1 capsule (200 mg total) by mouth 3 (three) times a day as needed for cough   chlorthalidone 25 mg tablet  Self No No   Sig: Take 1 tablet (25 mg total) by mouth daily   colchicine (COLCRYS) 0 6 mg tablet  Self No No   Sig: Take 1 tablet (0 6 mg total) by mouth daily 2 pills now and 1 in an hour  One daily until resolved     estradiol (ESTRACE) 2 MG tablet  Self No No   Sig: Take 1 tablet (2 mg total) by mouth 2 (two) times a day   hydrocortisone (ANUSOL-HC) 25 mg suppository  Self No No   Sig: UNWRAP AND INSERT 1 SUPPOSITORY RECTALLY TWICE DAILY   methocarbamol (ROBAXIN) 500 mg tablet  Self No No   Sig: Take 1 tablet (500 mg total) by mouth daily after breakfast   Patient taking differently: Take 500 mg by mouth as needed     montelukast (SINGULAIR) 10 mg tablet  Self No No   Sig: Take 1 tablet (10 mg total) by mouth daily at bedtime   multivitamin (THERAGRAN) TABS  Self Yes No   Sig: Take 1 tablet by mouth daily   ondansetron (ZOFRAN-ODT) 4 mg disintegrating tablet  Self No No   Sig: Take 1 tablet (4 mg total) by mouth every 8 (eight) hours as needed for nausea or vomiting   pantoprazole (PROTONIX) 40 mg tablet  Self No No   Sig: Take 1 tablet (40 mg total) by mouth daily   spironolactone (ALDACTONE) 100 mg tablet  Self No No   Sig: Take 1 tablet (100 mg total) by mouth daily   Patient not taking: Reported on 3/4/2022    traMADol (ULTRAM) 50 mg tablet  Self No No   Sig: Take 1 tablet (50 mg total) by mouth 2 (two) times a day      Facility-Administered Medications: None       Past Medical History:   Diagnosis Date    Anxiety     Asthma     seasonal    COVID-19 virus infection 12/27/2021    Hernia of abdominal wall     Hypertension     Kidney stones     Seizures ([address] )     pseudoseizures since 2012    Viral URI with cough 12/27/2021       Past Surgical History:   Procedure Laterality Date    APPENDECTOMY      CHOLECYSTECTOMY      GASTRIC BYPASS      HERNIA REPAIR      x2       Family History   Problem Relation Age of Onset    Hypertension Mother     Diabetes Mother     Hypertension Sister     No Known Problems Father         head injury    Stroke Maternal Grandmother         brain aneurysm    Aneurysm Maternal Uncle         brain     I have reviewed and agree with the history as documented  E-Cigarette/Vaping    E-Cigarette Use Never User      E-Cigarette/Vaping Substances    Nicotine No     THC No     CBD No     Flavoring No     Other No     Unknown No      Social History     Tobacco Use    Smoking status: Never Smoker    Smokeless tobacco: Never Used   Vaping Use    Vaping Use: Never used   Substance Use Topics    Alcohol use: Yes     Comment: social    Drug use: Never       Review of Systems   Constitutional: Negative for activity change, appetite change, chills and fever  HENT: Negative for congestion, dental problem, drooling, ear discharge, ear pain, mouth sores, nosebleeds, rhinorrhea, sore throat and trouble swallowing  Eyes: Positive for photophobia and visual disturbance  Negative for pain, discharge and itching  Respiratory: Negative for cough, chest tightness, shortness of breath and wheezing  Cardiovascular: Negative for chest pain and palpitations  Gastrointestinal: Negative for abdominal pain, blood in stool, constipation, diarrhea, nausea and vomiting     Endocrine: Negative for cold intolerance and heat intolerance  Genitourinary: Negative for difficulty urinating, dysuria, flank pain, frequency and urgency  Musculoskeletal: Positive for neck pain  Negative for gait problem and joint swelling  Skin: Negative for rash and wound  Allergic/Immunologic: Negative for food allergies and immunocompromised state  Neurological: Positive for headaches  Negative for dizziness, seizures, syncope, weakness and numbness  Psychiatric/Behavioral: Negative for agitation, behavioral problems and confusion  Physical Exam  Physical Exam  Vitals and nursing note reviewed  Constitutional:       General: She is not in acute distress  Appearance: She is not diaphoretic  HENT:      Head: Normocephalic and atraumatic  Right Ear: External ear normal       Left Ear: External ear normal    Eyes:      Extraocular Movements: Extraocular movements intact  Conjunctiva/sclera: Conjunctivae normal       Pupils: Pupils are equal, round, and reactive to light  Neck:      Vascular: No JVD  Trachea: No tracheal deviation  Cardiovascular:      Rate and Rhythm: Normal rate and regular rhythm  Heart sounds: Normal heart sounds  No murmur heard  No friction rub  No gallop  Pulmonary:      Effort: Pulmonary effort is normal  No respiratory distress  Breath sounds: Normal breath sounds  No wheezing or rales  Chest:      Chest wall: No tenderness  Musculoskeletal:         General: No tenderness or deformity  Normal range of motion  Back:    Lymphadenopathy:      Cervical: No cervical adenopathy  Skin:     General: Skin is warm and dry  Findings: No erythema or rash  Neurological:      Mental Status: She is alert and oriented to person, place, and time     Psychiatric:         Mood and Affect: Mood normal          Behavior: Behavior normal          Vital Signs  ED Triage Vitals [04/27/22 1558]   Temperature Pulse Respirations Blood Pressure SpO2   98 2 °F (36 8 °C) 81 16 160/80 97 % Temp src Heart Rate Source Patient Position - Orthostatic VS BP Location FiO2 (%)   -- Monitor Sitting Left arm --      Pain Score       10 - Worst Possible Pain           Vitals:    04/27/22 1558   BP: 160/80   Pulse: 81   Patient Position - Orthostatic VS: Sitting         Visual Acuity      ED Medications  Medications   diphenhydrAMINE (BENADRYL) injection 25 mg (25 mg Intravenous Given 4/27/22 1709)   metoclopramide (REGLAN) injection 10 mg (10 mg Intravenous Given 4/27/22 1706)   ketorolac (TORADOL) injection 30 mg (30 mg Intravenous Given 4/27/22 1710)   magnesium sulfate 2 g/50 mL IVPB (premix) 2 g (0 g Intravenous Stopped 4/27/22 1823)   sodium chloride 0 9 % bolus 1,000 mL (0 mL Intravenous Stopped 4/27/22 1823)       Diagnostic Studies  Results Reviewed     None                 No orders to display              Procedures  Procedures         ED Course  ED Course as of 04/27/22 2102 Wed Apr 27, 2022   1805 Patient feeling better at this time  Will discharge home with outpatient follow-up  MDM  Number of Diagnoses or Management Options  Migraine without status migrainosus, not intractable, unspecified migraine type  Diagnosis management comments: Differential diagnosis includes but not limited to:  Migraine        Disposition  Final diagnoses:   Migraine without status migrainosus, not intractable, unspecified migraine type     Time reflects when diagnosis was documented in both MDM as applicable and the Disposition within this note     Time User Action Codes Description Comment    4/27/2022  6:05 PM Faviola Acosta Add [G43 909] Migraine without status migrainosus, not intractable, unspecified migraine type       ED Disposition     ED Disposition Condition Date/Time Comment    Discharge Stable Wed Apr 27, 2022  6:05 PM Bradley Ortiz  discharge to home/self care              Follow-up Information    None         Discharge Medication List as of 4/27/2022 6:05 PM      CONTINUE these medications which have NOT CHANGED    Details   acetaminophen (TYLENOL) 500 mg tablet Take 500 mg by mouth 2 (two) times a day, Historical Med      albuterol (PROVENTIL HFA,VENTOLIN HFA) 90 mcg/act inhaler Inhale 2 puffs every 6 (six) hours as needed for wheezing, Starting Fri 9/10/2021, Normal      amLODIPine (NORVASC) 10 mg tablet Take 1 tablet (10 mg total) by mouth daily, Starting Thu 10/21/2021, Normal      aspirin (ECOTRIN LOW STRENGTH) 81 mg EC tablet Take 81 mg by mouth daily, Historical Med      benzonatate (TESSALON) 200 MG capsule Take 1 capsule (200 mg total) by mouth 3 (three) times a day as needed for cough, Starting Mon 12/27/2021, Normal      chlorthalidone 25 mg tablet Take 1 tablet (25 mg total) by mouth daily, Starting Fri 9/10/2021, Normal      colchicine (COLCRYS) 0 6 mg tablet Take 1 tablet (0 6 mg total) by mouth daily 2 pills now and 1 in an hour  One daily until resolved , Starting Sun 11/7/2021, Normal      Cyanocobalamin (B-12) 1000 MCG/ML KIT Inject 1 ml IM in thigh/ buttocks or deltoid mm once weekly x 8 weeks   Afterward once monthly, Normal      estradiol (ESTRACE) 2 MG tablet Take 1 tablet (2 mg total) by mouth 2 (two) times a day, Starting Thu 10/21/2021, Normal      hydrocortisone (ANUSOL-HC) 25 mg suppository UNWRAP AND INSERT 1 SUPPOSITORY RECTALLY TWICE DAILY, Normal      LORazepam (ATIVAN) 1 mg tablet Take 0 5 tablets (0 5 mg total) by mouth every 6 (six) hours as needed for anxiety, Starting Tue 2/1/2022, Normal      methocarbamol (ROBAXIN) 500 mg tablet Take 1 tablet (500 mg total) by mouth daily after breakfast, Starting Thu 3/25/2021, Normal      montelukast (SINGULAIR) 10 mg tablet Take 1 tablet (10 mg total) by mouth daily at bedtime, Starting Wed 2/3/2021, Normal      multivitamin (THERAGRAN) TABS Take 1 tablet by mouth daily, Historical Med      ondansetron (ZOFRAN-ODT) 4 mg disintegrating tablet Take 1 tablet (4 mg total) by mouth every 8 (eight) hours as needed for nausea or vomiting, Starting Thu 10/17/2019, Normal      pantoprazole (PROTONIX) 40 mg tablet Take 1 tablet (40 mg total) by mouth daily, Starting Thu 10/21/2021, Normal      spironolactone (ALDACTONE) 100 mg tablet Take 1 tablet (100 mg total) by mouth daily, Starting Tue 2/1/2022, Normal      Syringe/Needle, Disp, (SYRINGE 3CC/02KM2-5/4") 27G X 1-1/4" 3 ML MISC Use for B12 injections, Normal      TiZANidine (ZANAFLEX) 2 MG capsule Take 1 capsule (2 mg total) by mouth daily at bedtime as needed for muscle spasms, Starting Fri 9/10/2021, Normal      traMADol (ULTRAM) 50 mg tablet Take 1 tablet (50 mg total) by mouth 2 (two) times a day, Starting Tue 2/1/2022, Normal             No discharge procedures on file      PDMP Review       Value Time User    PDMP Reviewed  Yes 2/1/2022 10:19 AM Kamila Martins MD          ED Provider  Electronically Signed by           Cher Dash PA-C  04/27/22 2102       Rito Acosta PA-C  04/27/22 2102

## 2022-04-29 DIAGNOSIS — R51.9 WORSENING HEADACHES: ICD-10-CM

## 2022-04-29 DIAGNOSIS — I10 ESSENTIAL HYPERTENSION: ICD-10-CM

## 2022-04-29 DIAGNOSIS — Z78.9 MALE-TO-FEMALE TRANSGENDER PERSON: ICD-10-CM

## 2022-05-02 RX ORDER — TRAMADOL HYDROCHLORIDE 50 MG/1
50 TABLET ORAL 2 TIMES DAILY
Qty: 60 TABLET | Refills: 1 | Status: SHIPPED | OUTPATIENT
Start: 2022-05-02 | End: 2022-06-27 | Stop reason: SDUPTHER

## 2022-05-02 RX ORDER — AMLODIPINE BESYLATE 10 MG/1
10 TABLET ORAL DAILY
Qty: 90 TABLET | Refills: 1 | Status: SHIPPED | OUTPATIENT
Start: 2022-05-02

## 2022-05-02 RX ORDER — ESTRADIOL 2 MG/1
2 TABLET ORAL 2 TIMES DAILY
Qty: 180 TABLET | Refills: 1 | Status: SHIPPED | OUTPATIENT
Start: 2022-05-02

## 2022-05-04 NOTE — TELEPHONE ENCOUNTER
[employer] denied the Estradiol  Patient would like us to send an Appeal to the insurance  [[id number]],[[id number]] [[id number]],[[id number]],[UNKNOWN]

## 2022-05-06 ENCOUNTER — TELEPHONE (OUTPATIENT)
Dept: INTERNAL MEDICINE CLINIC | Age: 42
End: 2022-05-06

## 2022-05-06 NOTE — TELEPHONE ENCOUNTER
Received a prior authorization form for medication    estradiol (ESTRACE) 2 MG tablet     Will scan into the chart and then will send to the [facility]

## 2022-05-09 PROBLEM — F64.9 GENDER DYSPHORIA: Status: ACTIVE | Noted: 2022-05-09

## 2022-05-13 ENCOUNTER — CLINICAL SUPPORT (OUTPATIENT)
Dept: NEUROLOGY | Facility: CLINIC | Age: 42
End: 2022-05-13
Payer: COMMERCIAL

## 2022-05-13 DIAGNOSIS — G43.709 CHRONIC MIGRAINE WITHOUT AURA: Primary | ICD-10-CM

## 2022-05-13 PROCEDURE — 96372 THER/PROPH/DIAG INJ SC/IM: CPT | Performed by: STUDENT IN AN ORGANIZED HEALTH CARE EDUCATION/TRAINING PROGRAM

## 2022-05-13 RX ORDER — PROCHLORPERAZINE EDISYLATE 5 MG/ML
10 INJECTION INTRAMUSCULAR; INTRAVENOUS ONCE
Status: COMPLETED | OUTPATIENT
Start: 2022-05-13 | End: 2022-05-13

## 2022-05-13 RX ORDER — KETOROLAC TROMETHAMINE 30 MG/ML
60 INJECTION, SOLUTION INTRAMUSCULAR; INTRAVENOUS ONCE
Status: COMPLETED | OUTPATIENT
Start: 2022-05-13 | End: 2022-05-13

## 2022-05-13 RX ADMIN — KETOROLAC TROMETHAMINE 60 MG: 30 INJECTION, SOLUTION INTRAMUSCULAR; INTRAVENOUS at 16:03

## 2022-05-13 RX ADMIN — PROCHLORPERAZINE EDISYLATE 10 MG: 5 INJECTION INTRAMUSCULAR; INTRAVENOUS at 16:05

## 2022-05-20 ENCOUNTER — OFFICE VISIT (OUTPATIENT)
Dept: NEUROLOGY | Facility: CLINIC | Age: 42
End: 2022-05-20
Payer: COMMERCIAL

## 2022-05-20 VITALS — SYSTOLIC BLOOD PRESSURE: 140 MMHG | DIASTOLIC BLOOD PRESSURE: 98 MMHG | TEMPERATURE: 97.9 F | HEART RATE: 83 BPM

## 2022-05-20 DIAGNOSIS — M79.18 CERVICAL MYOFASCIAL PAIN SYNDROME: Primary | ICD-10-CM

## 2022-05-20 PROCEDURE — 20553 NJX 1/MLT TRIGGER POINTS 3/>: CPT | Performed by: PHYSICIAN ASSISTANT

## 2022-05-20 PROCEDURE — S0020 INJECTION, BUPIVICAINE HYDRO: HCPCS | Performed by: PHYSICIAN ASSISTANT

## 2022-05-20 RX ORDER — LIDOCAINE HYDROCHLORIDE 10 MG/ML
2.5 INJECTION, SOLUTION INFILTRATION; PERINEURAL ONCE
Status: COMPLETED | OUTPATIENT
Start: 2022-05-20 | End: 2022-05-20

## 2022-05-20 RX ORDER — BUPIVACAINE HYDROCHLORIDE 2.5 MG/ML
2.5 INJECTION, SOLUTION EPIDURAL; INFILTRATION; INTRACAUDAL ONCE
Status: COMPLETED | OUTPATIENT
Start: 2022-05-20 | End: 2022-05-20

## 2022-05-20 RX ADMIN — LIDOCAINE HYDROCHLORIDE 2.5 ML: 10 INJECTION, SOLUTION INFILTRATION; PERINEURAL at 17:00

## 2022-05-20 RX ADMIN — BUPIVACAINE HYDROCHLORIDE 2.5 ML: 2.5 INJECTION, SOLUTION EPIDURAL; INFILTRATION; INTRACAUDAL at 16:59

## 2022-05-20 NOTE — PROGRESS NOTES
Universal Protocol:  Consent: The procedure was performed in an emergent situation  Verbal consent obtained  Risks and benefits: risks, benefits and alternatives were discussed  Consent given by: patient    Procedure Details  Location(s):  Patient tolerance: patient tolerated the procedure well with no immediate complications  Additional procedure details: Trigger point injections were performed on an emergent basis and are a part of ongoing therapy  Risks, benefits, alternatives, infection, bleeding and allergic reaction were discussed with the patient  Verbal consent was obtained prior to the procedure and is detailed in the patient's record  Trigger point injections were performed in the following locations using a mixture of 2 5 mL 0 25% bupivacaine + 2 5 mL of 1% lidocaine, without steroid, using a 30 gauge, 1/2 inch needle: Right > left upper traps, right and left middle traps and R > L cervical paraspinal muscles, R SCM            Blood pressure 140/98, pulse 83, temperature 97 9 °F (36 6 °C), temperature source Temporal

## 2022-05-26 ENCOUNTER — TELEMEDICINE (OUTPATIENT)
Dept: INTERNAL MEDICINE CLINIC | Facility: CLINIC | Age: 42
End: 2022-05-26
Payer: COMMERCIAL

## 2022-05-26 VITALS — SYSTOLIC BLOOD PRESSURE: 142 MMHG | HEART RATE: 80 BPM | DIASTOLIC BLOOD PRESSURE: 90 MMHG

## 2022-05-26 DIAGNOSIS — M54.50 MIDLINE LOW BACK PAIN WITHOUT SCIATICA, UNSPECIFIED CHRONICITY: ICD-10-CM

## 2022-05-26 DIAGNOSIS — R53.82 CHRONIC FATIGUE: ICD-10-CM

## 2022-05-26 DIAGNOSIS — M25.519 TRIGGER POINT OF SHOULDER REGION, UNSPECIFIED LATERALITY: ICD-10-CM

## 2022-05-26 DIAGNOSIS — I10 ESSENTIAL HYPERTENSION: Primary | ICD-10-CM

## 2022-05-26 DIAGNOSIS — G89.4 CHRONIC PAIN SYNDROME: ICD-10-CM

## 2022-05-26 DIAGNOSIS — M50.90 CERVICAL DISC DISEASE: ICD-10-CM

## 2022-05-26 PROBLEM — G43.009 MIGRAINE WITHOUT AURA AND WITHOUT STATUS MIGRAINOSUS, NOT INTRACTABLE: Status: ACTIVE | Noted: 2020-02-03

## 2022-05-26 PROCEDURE — 99213 OFFICE O/P EST LOW 20 MIN: CPT | Performed by: INTERNAL MEDICINE

## 2022-05-26 RX ORDER — TIZANIDINE HYDROCHLORIDE 2 MG/1
2 CAPSULE, GELATIN COATED ORAL
Qty: 30 CAPSULE | Refills: 0 | Status: SHIPPED | OUTPATIENT
Start: 2022-05-26 | End: 2022-06-27 | Stop reason: ALTCHOICE

## 2022-05-26 RX ORDER — METHOCARBAMOL 500 MG/1
TABLET, FILM COATED ORAL
Qty: 90 TABLET | Refills: 0 | Status: SHIPPED | OUTPATIENT
Start: 2022-05-26

## 2022-05-26 NOTE — ASSESSMENT & PLAN NOTE
Somewhat worse after recent trigger point injection therapy  Will schedule some follow-up labs    CMP, CBC, CK level and thyroid function studies

## 2022-05-26 NOTE — ASSESSMENT & PLAN NOTE
Continue with muscle relaxants as needed, Tylenol as needed, light exercise and stretching, supportive treatments with local heat applications    Follow up with pain management if repeat trigger point injection therapy is necessary

## 2022-05-26 NOTE — PROGRESS NOTES
Virtual Regular Visit    Verification of patient location:    Patient is located in the following state in which I hold an active license [place]      Assessment/Plan:    Problem List Items Addressed This Visit        Cardiovascular and Mediastinum    Essential hypertension - Primary     Continues on amlodipine 10 mg daily along with chlorthalidone 25 mg daily           Relevant Orders    CBC and differential    Comprehensive metabolic panel       Musculoskeletal and Integument    Cervical disc disease     Recently underwent trigger point injection  Relevant Medications    TiZANidine (ZANAFLEX) 2 MG capsule    Other Relevant Orders    CBC and differential    C-reactive protein       Other    Chronic pain syndrome     Continue with muscle relaxants as needed, Tylenol as needed, light exercise and stretching, supportive treatments with local heat applications  Follow up with pain management if repeat trigger point injection therapy is necessary           Relevant Orders    CBC and differential    CK (with reflex to MB)    C-reactive protein    Chronic fatigue     Somewhat worse after recent trigger point injection therapy  Will schedule some follow-up labs  CMP, CBC, CK level and thyroid function studies           Relevant Orders    CBC and differential    Comprehensive metabolic panel    CK (with reflex to MB)    C-reactive protein      Other Visit Diagnoses     Trigger point of shoulder region, unspecified laterality        Relevant Medications    TiZANidine (ZANAFLEX) 2 MG capsule    Other Relevant Orders    CBC and differential    Comprehensive metabolic panel    CK (with reflex to MB)    C-reactive protein               Reason for visit is   Chief Complaint   Patient presents with    Virtual Regular Visit     Patient c/o fatigue, pressure in her head, feels dazed, vision blurred and dizziness  Her symptoms come and go over the last 3 days  She also c/o urine frequency and cloudy    Blood sugar is 142 - non-fasting   Virtual Regular Visit        Encounter provider Ruddy Segura MD    Provider located at [place] [address]  [address] [place] [id number]      Recent Visits  No visits were found meeting these conditions  Showing recent visits within past 7 days and meeting all other requirements  Today's Visits  Date Type Provider Dept   05/26/22 Telemedicine Alberto Isidro, [address] today's visits and meeting all other requirements  Future Appointments  No visits were found meeting these conditions  Showing future appointments within next 150 days and meeting all other requirements       The patient was identified by name and date of birth  Newton Tony  was informed that this is a telemedicine visit and that the visit is being conducted through [address] [place] [address] [facility] [employer] and patient was informed that this is a secure, HIPAA-compliant platform  She agrees to proceed     My office door was closed  No one else was in the room  She acknowledged consent and understanding of privacy and security of the video platform  The patient has agreed to participate and understands they can discontinue the visit at any time  Patient is aware this is a billable service  Chelita Becerra is a 43 y o  adult seen virtually because of difficulty getting to the office during her regular work hours   Patient was recently treated in the emergency room at [facility] for severe migraine headache  She received a migraine cocktail which provided some relief  She also recently underwent trigger point injections in the back and shoulders which have left her feeling somewhat fatigued and she feels excessively so    She 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 and then coming home wanting to make dinner but not feeling she to energetic so she was going to call for delivery and then promptly fell asleep until the early hours of the morning  No history of fevers or chills, no coughing, no chest pain, no shortness of breath, palpitations, diaphoresis  No abdominal pain, trigger point injections have been helpful  Past Medical History:   Diagnosis Date    Anxiety     Asthma     seasonal    COVID-19 virus infection 12/27/2021    Hernia of abdominal wall     Hypertension     Kidney stones     Seizures ([address] )     pseudoseizures since 2012    Viral URI with cough 12/27/2021       Past Surgical History:   Procedure Laterality Date    APPENDECTOMY      CHOLECYSTECTOMY      GASTRIC BYPASS      HERNIA REPAIR      x2       Current Outpatient Medications   Medication Sig Dispense Refill    acetaminophen (TYLENOL) 500 mg tablet Take 500 mg by mouth 2 (two) times a day      albuterol (PROVENTIL HFA,VENTOLIN HFA) 90 mcg/act inhaler Inhale 2 puffs every 6 (six) hours as needed for wheezing 6 7 g 2    amLODIPine (NORVASC) 10 mg tablet Take 1 tablet (10 mg total) by mouth daily 90 tablet 1    aspirin (ECOTRIN LOW STRENGTH) 81 mg EC tablet Take 81 mg by mouth daily      benzonatate (TESSALON) 200 MG capsule Take 1 capsule (200 mg total) by mouth 3 (three) times a day as needed for cough 30 capsule 0    chlorthalidone 25 mg tablet Take 1 tablet (25 mg total) by mouth daily 90 tablet 1    colchicine (COLCRYS) 0 6 mg tablet Take 1 tablet (0 6 mg total) by mouth daily 2 pills now and 1 in an hour  One daily until resolved  20 tablet 0    Cyanocobalamin (B-12) 1000 MCG/ML KIT Inject 1 ml IM in thigh/ buttocks or deltoid mm once weekly x 8 weeks   Afterward once monthly 8 mL 1    estradiol (ESTRACE) 2 MG tablet Take 1 tablet (2 mg total) by mouth 2 (two) times a day 180 tablet 1    hydrocortisone (ANUSOL-HC) 25 mg suppository UNWRAP AND INSERT 1 SUPPOSITORY RECTALLY TWICE DAILY 12 suppository 0    LORazepam (ATIVAN) 1 mg tablet Take 0 5 tablets (0 5 mg total) by mouth every 6 (six) hours as needed for anxiety 60 tablet 1    methocarbamol (ROBAXIN) 500 mg tablet TAKE 1 TABLET BY MOUTH DAILY AFTER BREAKFAST 90 tablet 0    montelukast (SINGULAIR) 10 mg tablet Take 1 tablet (10 mg total) by mouth daily at bedtime 90 tablet 1    multivitamin (THERAGRAN) TABS Take 1 tablet by mouth daily      ondansetron (ZOFRAN-ODT) 4 mg disintegrating tablet Take 1 tablet (4 mg total) by mouth every 8 (eight) hours as needed for nausea or vomiting 60 tablet 1    pantoprazole (PROTONIX) 40 mg tablet Take 1 tablet (40 mg total) by mouth daily 90 tablet 1    Syringe/Needle, Disp, (SYRINGE 3CC/10FG4-7/4") 27G X 1-1/4" 3 ML MISC Use for B12 injections 10 each 1    TiZANidine (ZANAFLEX) 2 MG capsule Take 1 capsule (2 mg total) by mouth daily at bedtime as needed for muscle spasms 30 capsule 0    traMADol (ULTRAM) 50 mg tablet Take 1 tablet (50 mg total) by mouth 2 (two) times a day 60 tablet 1    spironolactone (ALDACTONE) 100 mg tablet Take 1 tablet (100 mg total) by mouth daily (Patient not taking: No sig reported) 90 tablet 1     No current facility-administered medications for this visit  Allergies   Allergen Reactions    Gadolinium Anaphylaxis     "Oxygen dropped and coded during"       Iodinated Diagnostic Agents Anaphylaxis       Review of Systems   Constitutional: Positive for activity change (Somewhat diminished due to fatigue) and fatigue  Negative for appetite change, chills, diaphoresis, fever and unexpected weight change  HENT: Negative  Eyes: Negative  Respiratory: Negative  Cardiovascular: Negative  Gastrointestinal: Negative  Endocrine: Negative  Musculoskeletal: Positive for myalgias  Skin: Negative  Allergic/Immunologic: Negative  Neurological: Negative  Hematological: Negative  Psychiatric/Behavioral: Negative          Video Exam    Vitals:    05/26/22 1428   BP: 142/90   BP Location: Right arm   Patient Position: Sitting   Cuff Size: Standard Pulse: 80       Physical Exam  Vitals reviewed  Constitutional:       General: She is not in acute distress  Appearance: She is obese  She is not ill-appearing, toxic-appearing or diaphoretic  HENT:      Head: Normocephalic and atraumatic  Eyes:      General: No scleral icterus  Pupils: Pupils are equal, round, and reactive to light  Cardiovascular:      Rate and Rhythm: Normal rate  Pulmonary:      Effort: Pulmonary effort is normal  No respiratory distress  Abdominal:      General: There is no distension  Musculoskeletal:      Cervical back: Neck supple  Right lower leg: No edema  Left lower leg: No edema  Skin:     Coloration: Skin is not jaundiced  Findings: No bruising, erythema or rash  Neurological:      General: No focal deficit present  Mental Status: She is alert and oriented to person, place, and time  Mental status is at baseline  Psychiatric:         Mood and Affect: Mood normal          Behavior: Behavior normal           I spent 9 minutes directly with the patient during this visit    [address] [place] Gustafson  verbally agrees to participate in [employer]  Pt is aware that [employer] could be limited without vital signs or the ability to perform a full hands-on physical exam  Rob Gustafson  understands she or the provider may request at any time to terminate the video visit and request the patient to seek care or treatment in person

## 2022-06-03 ENCOUNTER — OFFICE VISIT (OUTPATIENT)
Dept: NEUROLOGY | Facility: CLINIC | Age: 42
End: 2022-06-03
Payer: COMMERCIAL

## 2022-06-03 DIAGNOSIS — G43.109 MIGRAINE WITH AURA AND WITHOUT STATUS MIGRAINOSUS, NOT INTRACTABLE: Primary | ICD-10-CM

## 2022-06-03 PROCEDURE — 99215 OFFICE O/P EST HI 40 MIN: CPT | Performed by: STUDENT IN AN ORGANIZED HEALTH CARE EDUCATION/TRAINING PROGRAM

## 2022-06-03 RX ORDER — RIBOFLAVIN (VITAMIN B2) 400 MG
400 TABLET ORAL DAILY
Qty: 90 TABLET | Refills: 3 | Status: SHIPPED | OUTPATIENT
Start: 2022-06-03

## 2022-06-03 RX ORDER — CALCIUM CARBONATE/VITAMIN D3 500-10/5ML
400 LIQUID (ML) ORAL DAILY
Qty: 90 CAPSULE | Refills: 3 | Status: SHIPPED | OUTPATIENT
Start: 2022-06-03 | End: 2022-06-27 | Stop reason: ALTCHOICE

## 2022-06-03 RX ORDER — TOPIRAMATE 25 MG/1
25 TABLET ORAL DAILY
Qty: 90 TABLET | Refills: 3 | Status: SHIPPED | OUTPATIENT
Start: 2022-06-03 | End: 2023-05-29

## 2022-06-03 RX ORDER — RIZATRIPTAN BENZOATE 10 MG/1
10 TABLET ORAL ONCE AS NEEDED
Qty: 30 TABLET | Refills: 0 | Status: SHIPPED | OUTPATIENT
Start: 2022-06-03 | End: 2022-07-03

## 2022-06-03 NOTE — PROGRESS NOTES
[facility]'s Neurology Consult  PATIENT:  Bradley Ortiz  MRN:  [id number]  :  1980  DATE OF SERVICE:  6/3/2022  REFERRED BY: No ref  provider found  PMD: Dave Fairbanks MD    Assessment/Plan:     Bradley Ortiz  is a very pleasant 43 y o  male to female transgender woman with a past medical history that includes GERD, HTN, lumbar and cervical radiculopathy, cervical myofascial pain syndrome, anxiety, depression, s/p gastric bypass who presents for evaluation of headaches  Migraines with aura   Cervicogenic headaches  Preventative:  - we discussed headache hygiene and lifestyle factors that may improve headaches  - encouraged her to take in at least 2 L of fluid daily  - start topamax 25 mg at night  Increase by 25 mg each week as tolerated up to 50 mg twice daily  - start magnesium and riboflavin daily  - sleep study pending    Abortive:  - discussed not taking over-the-counter or prescription pain medications more than 3 days per week to prevent medication overuse/rebound headache  - start maxalt 10 mg PRN at onset of migraines  - continue tizanidine 2 mg qHS PRN    F/u as clinically indicated    CC:   headaches    History of Present Illness:   43 y o  male to female transgender woman with a past medical history that includes GERD, HTN, lumbar and cervical radiculopathy, cervical myofascial pain syndrome, anxiety, depression, s/p gastric bypass who presents for evaluation of headaches  She was previously seen by Dr Azucena Heimlich for lumbar radiculopathy back in   She has presented to the ED twice times in the past with episodes of paresthesias affecting the L face, arm, and leg accompanied by neck pain  MRI was unremarkable x2, and symptoms were attributed to her multilevel cervical DD  She was also previously seen by Angelia Cardoso for trigger point injections for her neck pain, which was effective  Headaches started at what age?  36years old  How often do the headaches occur?   - as of 6/3/2022: 2-3 times per week  What time of the day do the headaches start? No particular time of day  How long do the headaches last? 1-2 hrs   Are you ever headache free? Yes    Aura? Visual aura    Where is your headache located and pain quality? Bifrontal, bilateral neck pain, throbbing, stabbing, pressure-like   What is the intensity of pain? 5/10  Associated symptoms:   [x] Nausea       [] Vomiting        [] Diarrhea  [] Stiff or sore neck   [x] Problems with concentration  [x] Photophobia     []Phonophobia      [] Osmophobia  [] Blurred vision   [] Prefer quiet, dark room  [x] Light-headed or dizzy     [] Tinnitus   [] Hands or feet tingle or feel numb/paresthesias      [] Ptosis      [] Facial droop  [] Lacrimation  [] Nasal congestion/rhinorrhea        Things that make the headache worse? Head movements    Headache triggers:  Stress and weather changes    Have you seen someone else for headaches or pain? Yes  Have you had trigger point injection performed and how often? Yes  Have you had Botox injection performed and how often? No   Have you had epidural injections or transforaminal injections performed? No  Are you current pregnant or planning on getting pregnant? no  Have you ever had any Brain imaging? yes MRI brain     What medications do you take or have you taken for your headaches?    ABORTIVE:    Putting something behind her will sometimes improve the pain  Tylenol - mild relief  Robaxin 250-500 mg a day with benefit- no sedation from this  Flexeril 10 mg has not tried recently  Tramadol - some relief of neck pain    PREVENTIVE:   none    LIFESTYLE  Sleep   - averages: 8 hrs  Problems falling asleep?:   No  Problems staying asleep?:  No  Sleep study in discussion with pcp    Water: none  Caffeine: 2 carafes of coffee a day     Mood:   Denies history of anxiety or depression or other diagnosed mood disorder    The following portions of the patient's history were reviewed and updated as appropriate: allergies, current medications, past family history, past medical history, past social history, past surgical history and problem list     Pertinent family history:  Family history of headaches:  migraine headaches in sister  Any family history of aneurysms - No    Pertinent social history:  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    Past Medical History:     Past Medical History:   Diagnosis Date    Anxiety     Asthma     seasonal    COVID-19 virus infection 12/27/2021    Hernia of abdominal wall     Hypertension     Kidney stones     Seizures ([address] )     pseudoseizures since 2012    Viral URI with cough 12/27/2021       Patient Active Problem List   Diagnosis    History of sleeve gastrectomy    Essential hypertension    Cervical disc disease    Male-to-female transgender person    Anxiety    Umbilical hernia    GERD (gastroesophageal reflux disease)    Migraine without aura and without status migrainosus, not intractable    Left-sided weakness and sensory deficits    Herniation of intervertebral disc of lumbar spine    Morbid obesity with BMI of 45 0-49 9, adult (HCC)    Chronic neck pain    Low back pain    Acute sinusitis    Chronic pain syndrome    Lumbar disc herniation with radiculopathy    Elevated serum immunoglobulin free light chain level    Hypokalemia    Cervical myofascial pain syndrome    Palpitations    Abnormal TSH    Muscle cramping    Chest pain    Gender dysphoria    Chronic fatigue       Medications:      Current Outpatient Medications   Medication Sig Dispense Refill    acetaminophen (TYLENOL) 500 mg tablet Take 500 mg by mouth 2 (two) times a day      albuterol (PROVENTIL HFA,VENTOLIN HFA) 90 mcg/act inhaler Inhale 2 puffs every 6 (six) hours as needed for wheezing 6 7 g 2    amLODIPine (NORVASC) 10 mg tablet Take 1 tablet (10 mg total) by mouth daily 90 tablet 1    aspirin (ECOTRIN LOW STRENGTH) 81 mg EC tablet Take 81 mg by mouth daily      benzonatate (TESSALON) 200 MG capsule Take 1 capsule (200 mg total) by mouth 3 (three) times a day as needed for cough 30 capsule 0    chlorthalidone 25 mg tablet Take 1 tablet (25 mg total) by mouth daily 90 tablet 1    colchicine (COLCRYS) 0 6 mg tablet Take 1 tablet (0 6 mg total) by mouth daily 2 pills now and 1 in an hour  One daily until resolved  20 tablet 0    Cyanocobalamin (B-12) 1000 MCG/ML KIT Inject 1 ml IM in thigh/ buttocks or deltoid mm once weekly x 8 weeks  Afterward once monthly 8 mL 1    estradiol (ESTRACE) 2 MG tablet Take 1 tablet (2 mg total) by mouth 2 (two) times a day 180 tablet 1    hydrocortisone (ANUSOL-HC) 25 mg suppository UNWRAP AND INSERT 1 SUPPOSITORY RECTALLY TWICE DAILY 12 suppository 0    LORazepam (ATIVAN) 1 mg tablet Take 0 5 tablets (0 5 mg total) by mouth every 6 (six) hours as needed for anxiety 60 tablet 1    methocarbamol (ROBAXIN) 500 mg tablet TAKE 1 TABLET BY MOUTH DAILY AFTER BREAKFAST 90 tablet 0    montelukast (SINGULAIR) 10 mg tablet Take 1 tablet (10 mg total) by mouth daily at bedtime 90 tablet 1    multivitamin (THERAGRAN) TABS Take 1 tablet by mouth daily      ondansetron (ZOFRAN-ODT) 4 mg disintegrating tablet Take 1 tablet (4 mg total) by mouth every 8 (eight) hours as needed for nausea or vomiting 60 tablet 1    pantoprazole (PROTONIX) 40 mg tablet Take 1 tablet (40 mg total) by mouth daily 90 tablet 1    spironolactone (ALDACTONE) 100 mg tablet Take 1 tablet (100 mg total) by mouth daily (Patient not taking: No sig reported) 90 tablet 1    Syringe/Needle, Disp, (SYRINGE 3CC/11EF7-0/4") 27G X 1-1/4" 3 ML MISC Use for B12 injections 10 each 1    TiZANidine (ZANAFLEX) 2 MG capsule Take 1 capsule (2 mg total) by mouth daily at bedtime as needed for muscle spasms 30 capsule 0    traMADol (ULTRAM) 50 mg tablet Take 1 tablet (50 mg total) by mouth 2 (two) times a day 60 tablet 1     No current facility-administered medications for this visit          Allergies: Allergies   Allergen Reactions    Gadolinium Anaphylaxis     "Oxygen dropped and coded during"       Iodinated Diagnostic Agents Anaphylaxis       Family History:     Family History   Problem Relation Age of Onset    Hypertension Mother     Diabetes Mother     Hypertension Sister     No Known Problems Father         head injury    Stroke Maternal Grandmother         brain aneurysm    Aneurysm Maternal Uncle         brain       Social History:       Social History     Socioeconomic History    Marital status: Single     Spouse name: Not on file    Number of children: Not on file    Years of education: Not on file    Highest education level: Not on file   Occupational History    Not on file   Tobacco Use    Smoking status: Never Smoker    Smokeless tobacco: Never Used   Vaping Use    Vaping Use: Never used   Substance and Sexual Activity    Alcohol use: Yes     Comment: 2 drinks per month    Drug use: Never    Sexual activity: Not on file   Other Topics Concern    Not on file   Social History Narrative    Not on file     Social Determinants of Health     Financial Resource Strain: Not on file   Food Insecurity: No Food Insecurity    Worried About Running Out of Food in the Last Year: Never true    David of Food in the Last Year: Never true   Transportation Needs: No Transportation Needs    Lack of Transportation (Medical): No    Lack of Transportation (Non-Medical): No   Physical Activity: Not on file   Stress: Not on file   Social Connections: Not on file   Intimate Partner Violence: Not on file   Housing Stability: Low Risk     Unable to Pay for Housing in the Last Year: No    Number of Places Lived in the Last Year: 1    Unstable Housing in the Last Year: No         Objective:     General: Patient is not in any acute/apparent distress, well nourished, well developed and cooperative     HEENT: normocephalic, atraumatic, moist membranes  Neck: supple  Extremities: no edema noted Skin: no lesions or rash  Musculosketal: no bony abnormalities    Neurologic Examination:   Mental status: alert, awake, oriented X 3 and following commands  Speech/Language: Speech is fluent without any dysarthria, no aphasia noted, can name, comprehension intact    Cranial Nerves:   CN I: smell not tested  CN II: Visual fields full to confrontation, fundus -  Exam limited 2/2 miosis  CN III, IV, VI: Extraocular movements intact bilaterally  Pupils equal round and reactive to light bilaterally  CN V: Facial sensation is normal   CN VII: Full and symmetric facial movement  CN VIII: Hearing is normal   CN IX, X: Palate elevates symmetrically  CN XI: Shoulder shrug strength is normal   CN XII: Tongue midline without atrophy or fasciculations  Motor:   Strength 5/5 in all 4 extremities  Bulk/tone - normal   Fasiculations - none    Sensory:   Sensation intact to soft touch in all 4 extremities  Cerebellar:   Finger-to-nose intact, normal heel to shin  Reflexes: 2+ in all 4 extremities    Gait:   Normal casual gait     Imaging:   MRI brain w/o 1/13/22  IMPRESSION:  No acute intracranial abnormality  No evidence of CVA  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         Review of Systems:     Review of Systems - General ROS: negative  Psychological ROS: negative  Ophthalmic ROS: negative  ENT ROS: negative  Allergy and Immunology ROS: negative  Hematological and Lymphatic ROS: negative  Endocrine ROS: negative  Respiratory ROS: negative  Cardiovascular ROS: negative  Gastrointestinal ROS: negative  Genito-Urinary ROS: negative  Musculoskeletal ROS: negative  Neurological ROS: positive for - headaches and visual changes  Dermatological ROS: negative    I have spent 47 minutes with Patient today in which greater than 50% of this time was spent in counseling/coordination of care regarding Risks and benefits of tx options, Intructions for management, Patient and family education, Risk factor reductions and Impressions  I also spent 30 minutes non face to face for this patient the same day

## 2022-06-03 NOTE — PATIENT INSTRUCTIONS
Patient instructions     Headache/migraine treatment:   Abortive medications (for immediate treatment of a headache): It is ok to take ibuprofen, acetaminophen or naproxen (Advil, Tylenol,  Aleve, Excedrin) if they help your headaches you should limit these to No more than 3 times a week to avoid medication overuse/rebound headaches  For your more moderate to severe migraines take this medication early   Maxalt (rizatriptan) 10mg tabs - take one at the onset of headache  May repeat one time after 1-2 hours if pain has not resolved  (Max 2 a day and 9 a month)     - some people may have some side effects from this medication, most typically do not  Common side effects include making you feel tired, palpitations, tingling or tightness of the face or chest   Most people report the side effects are nothing compared to their migraines and do not mind these  If you have intolerable side effects we will stop  Over the counter preventive supplements for headaches/migraines (if you try, try for 3 months straight)  (to take every day to help prevent headaches - not to take at the time of headache): There are combo pills online of these - none of which regulated by FDA and double check dosing - take appropriate dose only once a day- preventa migraine, migravent, mind ease, migrelief   [] Magnesium 400mg daily (If any diarrhea or upset stomach, decrease dose  as tolerated)  [] Riboflavin (Vitamin B2) 400mg daily - try online   (FYI B2 may make your urine bright/neon yellow)  AND/OR  [] Herbal medication: Petasites/Butterbur 150 mg daily - try online  (When choosing your Butterbur online or in the store, beware that there are some poor preps containing pyrrolizidine alkaloids (PAs) that can be harmful to the liver  Therefore, do not use butterbur products that are not labeled as PA-free )    Sleep and headache prevention:   [] Melatonin - you may take 3 mg nightly for sleep   You should take this 1 hour prior to bedtime consistently every night for it to work  It works by gradually helping to adjust your sleep time over days to weeks, rather than immediately making you feel sleepy  Prescription preventive medications for headaches/migraines   (to take every day to help prevent headaches - not to take at the time of headache):  [x] Topamax 25 mg daily at bedtime  Week 1 - 25 mg PM  Week 2 - 25 mg AM 25 mg PM  Week 3 - 25 mg AM 50 mg PM  Week 4 - 50 mg AM 50 mg PM    *Typically these types of medications take time untill you see the benefit, although some may see improvement in days, often it may take weeks, especially if the medication is being titrated up to a beneficial level  Please contact us if there are any concerns or questions regarding the medication  Lifestyle Recommendations:  [x] SLEEP - Maintain a regular sleep schedule: Adults need at least 7-8 hours of uninterrupted a night  Maintain good sleep hygiene:  Going to bed and waking up at consistent times, avoiding excessive daytime naps, avoiding caffeinated beverages in the evening, avoid excessive stimulation in the evening and generally using bed primarily for sleeping  One hour before bedtime would recommend turning lights down lower, decreasing your activity (may read quietly, listen to music at a low volume)  When you get into bed, should eliminate all technology (no texting, emailing, playing with your phone, iPad or tablet in bed)  [x] HYDRATION - Maintain good hydration  Drink  2L of fluid a day (4 typical small water bottles)  [x] DIET - Maintain good nutrition  In particular don't skip meals and try and eat healthy balanced meals regularly  [x] TRIGGERS - Look for other triggers and avoid them: Limit caffeine to 1-2 cups a day or less  Avoid dietary triggers that you have noticed bring on your headaches (this could include aged cheese, peanuts, MSG, aspartame and nitrates)    [x] EXERCISE - physical exercise as we all know is good for you in many ways, and not only is good for your heart, but also is beneficial for your mental health, cognitive health and  chronic pain/headaches  I would encourage at the least 5 days of physical exercise weekly for at least 30 minutes  Education and Follow-up  [x] Please call with any questions or concerns  Of course if any new concerning symptoms go to the emergency department    [x] Follow up as needed

## 2022-06-08 ENCOUNTER — TELEPHONE (OUTPATIENT)
Dept: SLEEP CENTER | Facility: CLINIC | Age: 42
End: 2022-06-08

## 2022-06-08 NOTE — TELEPHONE ENCOUNTER
----- Message from Maurice Waters MD sent at 6/8/2022  8:59 AM EDT -----  Approved    ----- Message -----  From: Lita Mon  Sent: 5/24/2022  11:33 AM EDT  To: Sleep Medicine [facility] Provider    This sleep study needs approval      If approved please sign and return to clerical pool  If denied please include reasons why  Also provide alternative testing if warranted  Please sign and return to clerical pool

## 2022-06-24 ENCOUNTER — APPOINTMENT (OUTPATIENT)
Dept: LAB | Facility: CLINIC | Age: 42
End: 2022-06-24
Payer: COMMERCIAL

## 2022-06-24 DIAGNOSIS — E87.6 HYPOKALEMIA: ICD-10-CM

## 2022-06-24 DIAGNOSIS — I10 ESSENTIAL HYPERTENSION: ICD-10-CM

## 2022-06-24 DIAGNOSIS — M25.519 TRIGGER POINT OF SHOULDER REGION, UNSPECIFIED LATERALITY: ICD-10-CM

## 2022-06-24 DIAGNOSIS — G89.4 CHRONIC PAIN SYNDROME: ICD-10-CM

## 2022-06-24 DIAGNOSIS — M50.90 CERVICAL DISC DISEASE: ICD-10-CM

## 2022-06-24 DIAGNOSIS — R53.82 CHRONIC FATIGUE: ICD-10-CM

## 2022-06-24 LAB
ALBUMIN SERPL BCP-MCNC: 3.9 G/DL (ref 3.5–5)
ALP SERPL-CCNC: 57 U/L (ref 46–116)
ALT SERPL W P-5'-P-CCNC: 12 U/L (ref 7–52)
ANION GAP SERPL CALCULATED.3IONS-SCNC: 11 MMOL/L (ref 4–13)
AST SERPL W P-5'-P-CCNC: 20 U/L (ref 5–45)
BASOPHILS # BLD AUTO: 0.03 THOUSANDS/ΜL (ref 0–0.1)
BASOPHILS NFR BLD AUTO: 0 % (ref 0–1)
BILIRUB SERPL-MCNC: 0.38 MG/DL (ref 0.2–1)
BUN SERPL-MCNC: 12 MG/DL (ref 5–25)
CALCIUM SERPL-MCNC: 8.9 MG/DL (ref 8.4–10.2)
CHLORIDE SERPL-SCNC: 100 MMOL/L (ref 96–108)
CK MB SERPL-MCNC: 1.1 % (ref 0–2.5)
CK MB SERPL-MCNC: 2.8 NG/ML (ref 0.6–6.3)
CK SERPL-CCNC: 264 U/L (ref 39–192)
CO2 SERPL-SCNC: 27 MMOL/L (ref 21–32)
CREAT SERPL-MCNC: 0.83 MG/DL (ref 0.6–1.3)
CRP SERPL QL: 15.7 MG/L
EOSINOPHIL # BLD AUTO: 0.18 THOUSAND/ΜL (ref 0–0.61)
EOSINOPHIL NFR BLD AUTO: 2 % (ref 0–6)
ERYTHROCYTE [DISTWIDTH] IN BLOOD BY AUTOMATED COUNT: 13.8 % (ref 11.6–15.1)
GLUCOSE SERPL-MCNC: 88 MG/DL (ref 65–140)
HCT VFR BLD AUTO: 39.2 % (ref 36.5–46.1)
HGB BLD-MCNC: 12.8 G/DL (ref 12–15.4)
IMM GRANULOCYTES # BLD AUTO: 0.04 THOUSAND/UL (ref 0–0.2)
IMM GRANULOCYTES NFR BLD AUTO: 0 % (ref 0–2)
LYMPHOCYTES # BLD AUTO: 2.37 THOUSANDS/ΜL (ref 0.6–4.47)
LYMPHOCYTES NFR BLD AUTO: 26 % (ref 14–44)
MCH RBC QN AUTO: 27.6 PG (ref 26.8–34.3)
MCHC RBC AUTO-ENTMCNC: 32.7 G/DL (ref 31.4–37.4)
MCV RBC AUTO: 85 FL (ref 82–98)
MONOCYTES # BLD AUTO: 0.68 THOUSAND/ΜL (ref 0.17–1.22)
MONOCYTES NFR BLD AUTO: 7 % (ref 4–12)
NEUTROPHILS # BLD AUTO: 5.96 THOUSANDS/ΜL (ref 1.85–7.62)
NEUTS SEG NFR BLD AUTO: 65 % (ref 43–75)
NRBC BLD AUTO-RTO: 0 /100 WBCS
PLATELET # BLD AUTO: 304 THOUSANDS/UL (ref 149–390)
PMV BLD AUTO: 9.2 FL (ref 8.9–12.7)
POTASSIUM SERPL-SCNC: 2.8 MMOL/L (ref 3.5–5.3)
PROT SERPL-MCNC: 7.5 G/DL (ref 6.4–8.4)
RBC # BLD AUTO: 4.64 MILLION/UL (ref 3.88–5.12)
SODIUM SERPL-SCNC: 138 MMOL/L (ref 135–147)
WBC # BLD AUTO: 9.26 THOUSAND/UL (ref 4.31–10.16)

## 2022-06-24 PROCEDURE — 82550 ASSAY OF CK (CPK): CPT

## 2022-06-24 PROCEDURE — 80053 COMPREHEN METABOLIC PANEL: CPT

## 2022-06-24 PROCEDURE — 82553 CREATINE MB FRACTION: CPT

## 2022-06-24 PROCEDURE — 86140 C-REACTIVE PROTEIN: CPT

## 2022-06-24 PROCEDURE — 85025 COMPLETE CBC W/AUTO DIFF WBC: CPT

## 2022-06-24 PROCEDURE — 36415 COLL VENOUS BLD VENIPUNCTURE: CPT

## 2022-06-27 ENCOUNTER — OFFICE VISIT (OUTPATIENT)
Dept: INTERNAL MEDICINE CLINIC | Facility: CLINIC | Age: 42
End: 2022-06-27
Payer: COMMERCIAL

## 2022-06-27 VITALS
SYSTOLIC BLOOD PRESSURE: 120 MMHG | HEIGHT: 69 IN | OXYGEN SATURATION: 96 % | BODY MASS INDEX: 46.65 KG/M2 | DIASTOLIC BLOOD PRESSURE: 80 MMHG | TEMPERATURE: 98.4 F | WEIGHT: 315 LBS | HEART RATE: 77 BPM

## 2022-06-27 DIAGNOSIS — R51.9 WORSENING HEADACHES: ICD-10-CM

## 2022-06-27 DIAGNOSIS — M79.18 CERVICAL MYOFASCIAL PAIN SYNDROME: ICD-10-CM

## 2022-06-27 DIAGNOSIS — M10.9 ACUTE GOUT INVOLVING TOE OF RIGHT FOOT, UNSPECIFIED CAUSE: ICD-10-CM

## 2022-06-27 DIAGNOSIS — M50.90 CERVICAL DISC DISEASE: ICD-10-CM

## 2022-06-27 DIAGNOSIS — E87.6 HYPOKALEMIA: Primary | ICD-10-CM

## 2022-06-27 DIAGNOSIS — I10 ESSENTIAL HYPERTENSION: ICD-10-CM

## 2022-06-27 DIAGNOSIS — K64.9 HEMORRHOIDS, UNSPECIFIED HEMORRHOID TYPE: ICD-10-CM

## 2022-06-27 DIAGNOSIS — Z87.39 HX OF GOUT: ICD-10-CM

## 2022-06-27 DIAGNOSIS — Z90.3 HISTORY OF SLEEVE GASTRECTOMY: ICD-10-CM

## 2022-06-27 PROCEDURE — 99214 OFFICE O/P EST MOD 30 MIN: CPT | Performed by: INTERNAL MEDICINE

## 2022-06-27 RX ORDER — TRIAMTERENE AND HYDROCHLOROTHIAZIDE 37.5; 25 MG/1; MG/1
1 CAPSULE ORAL EVERY MORNING
Qty: 30 CAPSULE | Refills: 5 | Status: SHIPPED | OUTPATIENT
Start: 2022-06-27

## 2022-06-27 RX ORDER — COLCHICINE 0.6 MG/1
0.6 TABLET ORAL DAILY
Qty: 20 TABLET | Refills: 0 | Status: SHIPPED | OUTPATIENT
Start: 2022-06-27

## 2022-06-27 RX ORDER — TRAMADOL HYDROCHLORIDE 50 MG/1
50 TABLET ORAL 2 TIMES DAILY
Qty: 60 TABLET | Refills: 1 | Status: SHIPPED | OUTPATIENT
Start: 2022-06-27

## 2022-06-27 RX ORDER — HYDROCORTISONE ACETATE 25 MG/1
25 SUPPOSITORY RECTAL DAILY PRN
Qty: 12 SUPPOSITORY | Refills: 0 | Status: SHIPPED | OUTPATIENT
Start: 2022-06-27

## 2022-06-27 RX ORDER — POTASSIUM CHLORIDE 20 MEQ/1
TABLET, EXTENDED RELEASE ORAL
Qty: 10 TABLET | Refills: 5 | Status: SHIPPED | OUTPATIENT
Start: 2022-06-27

## 2022-06-27 RX ORDER — LORAZEPAM 1 MG/1
0.5 TABLET ORAL EVERY 6 HOURS PRN
Qty: 60 TABLET | Refills: 1 | Status: SHIPPED | OUTPATIENT
Start: 2022-06-27

## 2022-06-27 NOTE — ASSESSMENT & PLAN NOTE
Status post trigger point injections  Continue tramadol    Patient may be considered for Botox therapy

## 2022-06-27 NOTE — ASSESSMENT & PLAN NOTE
Potassium repletion ordered  Follow-up labs and 2 weeks, transition diuretic therapy to Dyazide from chlorthalidone

## 2022-06-27 NOTE — ASSESSMENT & PLAN NOTE
Nicely controlled at this time    Will transition away from chlorthalidone and initiate Dyazide capsules

## 2022-06-27 NOTE — PROGRESS NOTES
Assessment/Plan:    Cervical disc disease  Status post trigger point injections  Continue tramadol  Patient may be considered for Botox therapy    Cervical myofascial pain syndrome  Continues with topiramate, Robaxin, lorazepam as needed    Essential hypertension  Nicely controlled at this time  Will transition away from chlorthalidone and initiate Dyazide capsules    History of sleeve gastrectomy  Encouraged the patient to resume her post sleeve gastrectomy diet    Hypokalemia  Potassium repletion ordered  Follow-up labs and 2 weeks, transition diuretic therapy to Dyazide from chlorthalidone  Diagnoses and all orders for this visit:    Hypokalemia  -     potassium chloride (K-DUR,KLOR-CON) 20 mEq tablet; 2 tabs BID day 1 the 2 tabs daily for 3 days   -     Basic metabolic panel; Future  -     CK (with reflex to MB); Future    Hemorrhoids, unspecified hemorrhoid type  -     hydrocortisone (ANUSOL-HC) 25 mg suppository; Insert 1 suppository (25 mg total) into the rectum daily as needed for hemorrhoids    Cervical disc disease  -     LORazepam (ATIVAN) 1 mg tablet; Take 0 5 tablets (0 5 mg total) by mouth every 6 (six) hours as needed for anxiety  -     CK (with reflex to MB); Future    Worsening headaches  -     traMADol (ULTRAM) 50 mg tablet; Take 1 tablet (50 mg total) by mouth 2 (two) times a day    Essential hypertension  -     triamterene-hydrochlorothiazide (DYAZIDE) 37 5-25 mg per capsule; Take 1 capsule by mouth every morning  -     Basic metabolic panel; Future  -     CK (with reflex to MB); Future    Acute gout involving toe of right foot, unspecified cause  -     colchicine (COLCRYS) 0 6 mg tablet; Take 1 tablet (0 6 mg total) by mouth daily 2 pills now and 1 in an hour  One daily until resolved  Hx of gout  -     Uric acid; Future    History of sleeve gastrectomy    Cervical myofascial pain syndrome          Subjective:      Patient ID: Aleta Rangel  is a 43 y o  adult      Patient presents to the office for follow-up visit  Recent labs disclosed significant hypokalemia with a potassium at 2 8  Patient is complaining of continual cervical myalgias  Her headaches are improved after being initiated on Topamax and after receiving trigger point injections  She did not tolerate Aldactone  Blood pressure is improved  She was scheduled for a sleep study but between her headaches and her work obligations she was unable to make the appointment for the home sleep study  She was encouraged to call and reschedule  Patient states she is beginning to feel some odd sensations in her feet and toes, similar to what she experienced when she had an episode of gouty arthritis  She has also been continue sleep gaining weight BMI is now over 50 at 51 1 and weight is 346 lb  Patient is status post gastric sleeve surgery  Labs reviewed  In addition to the hypokalemia patient was noted to have a mildly elevated CK level at 264 which may be secondary to her trigger point injections?   C-reactive protein was noted to be elevated as well at 15 7      Family History   Problem Relation Age of Onset   [place]gu[place] Hypertension Mother     Diabetes Mother     Hypertension Sister     No Known Problems Father         head injury    Stroke Maternal Grandmother         brain aneurysm    Aneurysm Maternal Uncle         brain     Social History     Socioeconomic History    Marital status: Single     Spouse name: Not on file    Number of children: Not on file    Years of education: Not on file    Highest education level: Not on file   Occupational History    Not on file   Tobacco Use    Smoking status: Never Smoker    Smokeless tobacco: Never Used   Vaping Use    Vaping Use: Never used   Substance and Sexual Activity    Alcohol use: Yes     Comment: 2 drinks per month    Drug use: Never    Sexual activity: Not on file   Other Topics Concern    Not on file   Social History Narrative    Not on file     Social Determinants of Health     Financial Resource Strain: Not on file   Food Insecurity: No Food Insecurity    Worried About Running Out of Food in the Last Year: Never true    Ran Out of Food in the Last Year: Never true   Transportation Needs: No Transportation Needs    Lack of Transportation (Medical): No    Lack of Transportation (Non-Medical): No   Physical Activity: Not on file   Stress: Not on file   Social Connections: Not on file   Intimate Partner Violence: Not on file   Housing Stability: Low Risk     Unable to Pay for Housing in the Last Year: No    Number of Places Lived in the Last Year: 1    Unstable Housing in the Last Year: No     Past Medical History:   Diagnosis Date    Anxiety     Asthma     seasonal    COVID-19 virus infection 12/27/2021    Hernia of abdominal wall     Hypertension     Kidney stones     Seizures ([facility] [address] )     pseudoseizures since 2012    Viral URI with cough 12/27/2021       Current Outpatient Medications:     acetaminophen (TYLENOL) 500 mg tablet, Take 500 mg by mouth 2 (two) times a day, Disp: , Rfl:     albuterol (PROVENTIL HFA,VENTOLIN HFA) 90 mcg/act inhaler, Inhale 2 puffs every 6 (six) hours as needed for wheezing, Disp: 6 7 g, Rfl: 2    amLODIPine (NORVASC) 10 mg tablet, Take 1 tablet (10 mg total) by mouth daily, Disp: 90 tablet, Rfl: 1    aspirin (ECOTRIN LOW STRENGTH) 81 mg EC tablet, Take 81 mg by mouth daily, Disp: , Rfl:     colchicine (COLCRYS) 0 6 mg tablet, Take 1 tablet (0 6 mg total) by mouth daily 2 pills now and 1 in an hour  One daily until resolved , Disp: 20 tablet, Rfl: 0    Cyanocobalamin (B-12) 1000 MCG/ML KIT, Inject 1 ml IM in thigh/ buttocks or deltoid mm once weekly x 8 weeks   Afterward once monthly, Disp: 8 mL, Rfl: 1    estradiol (ESTRACE) 2 MG tablet, Take 1 tablet (2 mg total) by mouth 2 (two) times a day, Disp: 180 tablet, Rfl: 1    hydrocortisone (ANUSOL-HC) 25 mg suppository, Insert 1 suppository (25 mg total) into the rectum daily as needed for hemorrhoids, Disp: 12 suppository, Rfl: 0    LORazepam (ATIVAN) 1 mg tablet, Take 0 5 tablets (0 5 mg total) by mouth every 6 (six) hours as needed for anxiety, Disp: 60 tablet, Rfl: 1    methocarbamol (ROBAXIN) 500 mg tablet, TAKE 1 TABLET BY MOUTH DAILY AFTER BREAKFAST, Disp: 90 tablet, Rfl: 0    montelukast (SINGULAIR) 10 mg tablet, Take 1 tablet (10 mg total) by mouth daily at bedtime, Disp: 90 tablet, Rfl: 1    multivitamin (THERAGRAN) TABS, Take 1 tablet by mouth daily, Disp: , Rfl:     ondansetron (ZOFRAN-ODT) 4 mg disintegrating tablet, Take 1 tablet (4 mg total) by mouth every 8 (eight) hours as needed for nausea or vomiting, Disp: 60 tablet, Rfl: 1    pantoprazole (PROTONIX) 40 mg tablet, Take 1 tablet (40 mg total) by mouth daily, Disp: 90 tablet, Rfl: 1    potassium chloride (K-DUR,KLOR-CON) 20 mEq tablet, 2 tabs BID day 1 the 2 tabs daily for 3 days  , Disp: 10 tablet, Rfl: 5    Riboflavin 400 MG TABS, Take 1 tablet (400 mg total) by mouth in the morning, Disp: 90 tablet, Rfl: 3    rizatriptan (Maxalt) 10 MG tablet, Take 1 tablet (10 mg total) by mouth once as needed for migraine May repeat in 2 hours if needed, Disp: 30 tablet, Rfl: 0    Syringe/Needle, Disp, (SYRINGE 3CC/42SQ1-9/4") 27G X 1-1/4" 3 ML MISC, Use for B12 injections, Disp: 10 each, Rfl: 1    topiramate (Topamax) 25 mg tablet, Take 1 tablet (25 mg total) by mouth daily After 1 week, can increase to 25 mg twice daily  After 2 weeks can increase to 25 mg in AM, 50 mg at night   After 3 weeks can increase to 50 mg twice daily, Disp: 90 tablet, Rfl: 3    traMADol (ULTRAM) 50 mg tablet, Take 1 tablet (50 mg total) by mouth 2 (two) times a day, Disp: 60 tablet, Rfl: 1    triamterene-hydrochlorothiazide (DYAZIDE) 37 5-25 mg per capsule, Take 1 capsule by mouth every morning, Disp: 30 capsule, Rfl: 5  Allergies   Allergen Reactions    Gadolinium Anaphylaxis     "Oxygen dropped and coded during"       Iodinated Diagnostic Agents Anaphylaxis     Past Surgical History:   Procedure Laterality Date    APPENDECTOMY      CHOLECYSTECTOMY      GASTRIC BYPASS      HERNIA REPAIR      x2         Review of Systems   Constitutional: Positive for fatigue  Negative for activity change, appetite change, chills, diaphoresis, fever and unexpected weight change  HENT: Negative  Eyes: Negative  Respiratory: Negative  Cardiovascular: Negative  Gastrointestinal: Negative  Genitourinary: Negative  Musculoskeletal: Positive for neck pain and neck stiffness  Skin: Negative  Neurological: Positive for headaches  Hematological: Negative  Psychiatric/Behavioral: Negative  Objective:      /80 (BP Location: Left arm, Patient Position: Sitting, Cuff Size: Large)   Pulse 77   Temp 98 4 °F (36 9 °C) (Temporal)   Ht 5' 9" (1 753 m)   Wt (!) 157 kg (346 lb)   SpO2 96%   BMI 51 10 kg/m²          Physical Exam  Vitals reviewed  Constitutional:       General: She is not in acute distress  Appearance: She is obese  She is not ill-appearing, toxic-appearing or diaphoretic  HENT:      Head: Normocephalic and atraumatic  Right Ear: External ear normal       Left Ear: External ear normal       Nose: Nose normal    Eyes:      General: No scleral icterus  Conjunctiva/sclera: Conjunctivae normal       Pupils: Pupils are equal, round, and reactive to light  Neck:      Vascular: No JVD  Trachea: No tracheal deviation  Cardiovascular:      Rate and Rhythm: Normal rate and regular rhythm  Heart sounds: Normal heart sounds  No murmur heard  Pulmonary:      Effort: Pulmonary effort is normal  No respiratory distress  Breath sounds: Normal breath sounds  Abdominal:      General: There is no distension  Tenderness: There is no abdominal tenderness  Musculoskeletal:      Cervical back: Neck supple  Right lower leg: No edema  Left lower leg: No edema     Skin: General: Skin is warm  Coloration: Skin is not jaundiced  Findings: No bruising, erythema or rash  Neurological:      General: No focal deficit present  Mental Status: She is alert and oriented to person, place, and time  Mental status is at baseline  Psychiatric:         Mood and Affect: Mood normal          Behavior: Behavior normal          Thought Content:  Thought content normal          Judgment: Judgment normal

## 2022-07-15 ENCOUNTER — APPOINTMENT (OUTPATIENT)
Dept: LAB | Facility: CLINIC | Age: 42
End: 2022-07-15
Payer: COMMERCIAL

## 2022-07-15 DIAGNOSIS — E87.6 HYPOKALEMIA: ICD-10-CM

## 2022-07-15 DIAGNOSIS — I10 ESSENTIAL HYPERTENSION: ICD-10-CM

## 2022-07-15 DIAGNOSIS — M50.90 CERVICAL DISC DISEASE: ICD-10-CM

## 2022-07-15 DIAGNOSIS — Z87.39 HX OF GOUT: ICD-10-CM

## 2022-07-15 LAB
ANION GAP SERPL CALCULATED.3IONS-SCNC: 6 MMOL/L (ref 4–13)
BUN SERPL-MCNC: 15 MG/DL (ref 5–25)
CALCIUM SERPL-MCNC: 8.8 MG/DL (ref 8.4–10.2)
CHLORIDE SERPL-SCNC: 105 MMOL/L (ref 96–108)
CK MB SERPL-MCNC: 1.4 % (ref 0–2.5)
CK MB SERPL-MCNC: 2.4 NG/ML (ref 0.6–6.3)
CK SERPL-CCNC: 170 U/L (ref 39–192)
CO2 SERPL-SCNC: 28 MMOL/L (ref 21–32)
CREAT SERPL-MCNC: 0.85 MG/DL (ref 0.6–1.3)
GLUCOSE P FAST SERPL-MCNC: 110 MG/DL (ref 65–99)
POTASSIUM SERPL-SCNC: 3.5 MMOL/L (ref 3.5–5.3)
SODIUM SERPL-SCNC: 139 MMOL/L (ref 135–147)
URATE SERPL-MCNC: 6.6 MG/DL (ref 4.2–6.8)

## 2022-07-15 PROCEDURE — 36415 COLL VENOUS BLD VENIPUNCTURE: CPT

## 2022-07-15 PROCEDURE — 80048 BASIC METABOLIC PNL TOTAL CA: CPT

## 2022-07-15 PROCEDURE — 84550 ASSAY OF BLOOD/URIC ACID: CPT

## 2022-07-15 PROCEDURE — 82553 CREATINE MB FRACTION: CPT

## 2022-07-15 PROCEDURE — 82550 ASSAY OF CK (CPK): CPT

## 2022-07-21 ENCOUNTER — TELEPHONE (OUTPATIENT)
Dept: INTERNAL MEDICINE CLINIC | Facility: CLINIC | Age: 42
End: 2022-07-21

## 2022-07-21 DIAGNOSIS — K42.9 UMBILICAL HERNIA WITHOUT OBSTRUCTION AND WITHOUT GANGRENE: ICD-10-CM

## 2022-07-21 DIAGNOSIS — K21.9 GASTROESOPHAGEAL REFLUX DISEASE WITHOUT ESOPHAGITIS: ICD-10-CM

## 2022-07-21 RX ORDER — PANTOPRAZOLE SODIUM 40 MG/1
40 TABLET, DELAYED RELEASE ORAL DAILY
Qty: 90 TABLET | Refills: 1 | Status: SHIPPED | OUTPATIENT
Start: 2022-07-21

## 2022-07-21 NOTE — TELEPHONE ENCOUNTER
Rx refill for pantoprazole (PROTONIX) 40 mg tablet      Send to [address] [facility]) [facility], [address] - [address]- 6/27/22  NOV- 8/15/22

## 2022-09-29 DIAGNOSIS — G43.109 MIGRAINE WITH AURA AND WITHOUT STATUS MIGRAINOSUS, NOT INTRACTABLE: ICD-10-CM

## 2022-09-29 RX ORDER — TOPIRAMATE 25 MG/1
75 TABLET ORAL
Qty: 270 TABLET | Refills: 3 | Status: SHIPPED | OUTPATIENT
Start: 2022-09-29 | End: 2022-12-22

## 2022-09-29 RX ORDER — SUMATRIPTAN 50 MG/1
TABLET, FILM COATED ORAL
Qty: 18 TABLET | Refills: 3 | Status: SHIPPED | OUTPATIENT
Start: 2022-09-29 | End: 2022-12-22 | Stop reason: ALTCHOICE

## 2022-11-09 DIAGNOSIS — K52.9 GASTROENTERITIS: ICD-10-CM

## 2022-11-09 RX ORDER — ONDANSETRON 4 MG/1
4 TABLET, ORALLY DISINTEGRATING ORAL EVERY 8 HOURS PRN
Qty: 60 TABLET | Refills: 1 | Status: SHIPPED | OUTPATIENT
Start: 2022-11-09

## 2022-11-15 DIAGNOSIS — R51.9 WORSENING HEADACHES: ICD-10-CM

## 2022-11-15 RX ORDER — TRAMADOL HYDROCHLORIDE 50 MG/1
50 TABLET ORAL 2 TIMES DAILY
Qty: 60 TABLET | Refills: 1 | Status: SHIPPED | OUTPATIENT
Start: 2022-11-15

## 2022-11-15 NOTE — TELEPHONE ENCOUNTER
rx refill for traMADol (ULTRAM) 50 mg tablet     Send to [address] [place]) [facility]- 6/27/22 Nov- 12/22/22

## 2022-12-05 DIAGNOSIS — G43.709 CHRONIC MIGRAINE WITHOUT AURA: Primary | ICD-10-CM

## 2022-12-05 RX ORDER — RIZATRIPTAN BENZOATE 10 MG/1
10 TABLET ORAL AS NEEDED
Qty: 9 TABLET | Refills: 0 | Status: SHIPPED | OUTPATIENT
Start: 2022-12-05

## 2022-12-08 ENCOUNTER — TELEPHONE (OUTPATIENT)
Dept: INTERNAL MEDICINE CLINIC | Facility: CLINIC | Age: 42
End: 2022-12-08

## 2022-12-08 ENCOUNTER — OFFICE VISIT (OUTPATIENT)
Dept: CARDIOLOGY CLINIC | Facility: CLINIC | Age: 42
End: 2022-12-08

## 2022-12-08 VITALS
DIASTOLIC BLOOD PRESSURE: 88 MMHG | BODY MASS INDEX: 51.1 KG/M2 | OXYGEN SATURATION: 98 % | SYSTOLIC BLOOD PRESSURE: 132 MMHG | HEART RATE: 78 BPM | TEMPERATURE: 99.2 F | HEIGHT: 69 IN

## 2022-12-08 DIAGNOSIS — R60.0 PEDAL EDEMA: ICD-10-CM

## 2022-12-08 DIAGNOSIS — I10 ESSENTIAL HYPERTENSION: ICD-10-CM

## 2022-12-08 DIAGNOSIS — R06.02 SHORTNESS OF BREATH: Primary | ICD-10-CM

## 2022-12-08 DIAGNOSIS — M25.562 LEFT KNEE PAIN, UNSPECIFIED CHRONICITY: ICD-10-CM

## 2022-12-08 DIAGNOSIS — R00.2 PALPITATIONS: ICD-10-CM

## 2022-12-08 DIAGNOSIS — E66.01 MORBID OBESITY (HCC): ICD-10-CM

## 2022-12-08 DIAGNOSIS — M25.562 LEFT KNEE PAIN, UNSPECIFIED CHRONICITY: Primary | ICD-10-CM

## 2022-12-08 DIAGNOSIS — Z98.84 S/P LAPAROSCOPIC SLEEVE GASTRECTOMY: ICD-10-CM

## 2022-12-08 DIAGNOSIS — M25.472 ANKLE EDEMA, BILATERAL: ICD-10-CM

## 2022-12-08 DIAGNOSIS — M25.471 ANKLE EDEMA, BILATERAL: ICD-10-CM

## 2022-12-08 NOTE — TELEPHONE ENCOUNTER
Pain in left knee w both ankles swelling since 2 days ago  Please advise if there is anything you can recommend

## 2022-12-08 NOTE — PROGRESS NOTES
[facility] [address] [address]  Phone#  [phone]  Fax#  [phone]                                               Cardiology Office Follow up  Jay Mayfield , 43 y o  adult  YOB: 1980  MRN: [id number] Encounter: [id number]      PCP - Mook Slater MD  Referring Provider - No ref  provider found    Chief Complaint   Patient presents with   • Palpitations   • Leg Swelling     X2 days with pain in left knee       Assessment  Pedal edema  Shortness of breath  Hypertension  Palpitations  Morbid obesity, Body mass index is 51 1 kg/m²  S/p sleeve gastrectomy      Plan  Pedal edema, Shortness of breath  Mild edema over the last few days, better today with leg elevation  Also has shortness of breath of unclear etiology  JVP difficult to assess with body habitus  Weight has been uptrending over the last year: 319 lbs --> 346 lbs  Most of this was gained earlier in the year  She has been on diuretics with Dyazide 37 5-25 mg daily over the last 4 months  ?venous insufficiency vs/ diastolic HF v/s DVT  Check BMP, BNP, D-dimer  Check echocardiogram  If creatinine stable, and signs of volume overload/pulm hypertension on echocardiogram, then can consider adding as needed Lasix  For now low-salt diet, leg elevation and compression stockings are recommended    Hypertension  Blood pressure reasonably well controlled, 138/88  Continue Dyazide 37 5/25 mg daily, amlodipine 10 mg daily    Left knee pain  Has tenderness on medial side of left knee  Possibly musculoskeletal etiology  She felt there was more swelling in the left leg related to same  ? related to arthritis  Tylenol PRN for now  Follow up with PCP  Check D-dimer to risk stratify concerns for DVT    Results for orders placed or performed in visit on 12/08/22   POCT ECG    Impression    Normal sinus rhythm  Normal ECG       Orders Placed This Encounter   Procedures   • Basic metabolic panel   • D-dimer, quantitative   • B-Type Natriuretic Peptide   • POCT ECG   • Echo complete w/ contrast if indicated     Return in about 4 weeks (around 1/5/2023), or if symptoms worsen or fail to improve  History of Present Illness   43 y o  adult, who works with our neurologists office here at [facility], comes in for earlier follow up and consultation  She routinely follows with Dr Judy Funes, but was having symptoms of increased lower extremity edema over the last 2 days associated with some shortness of breath and as a result got concerned  She was considering going to the ED but instead was able to get in for an earlier appointment here with me today  She notes that over the last 2 days she had noticed significant lower extremity edema  She elevated her legs and with this edema has improved to some extent, but this continues to be a problem  Additionally, she has had a longstanding history of intermittent symptoms of shortness of breath and palpitations as well  She does report some increased anxiety as well with all of those and as a result due to continued symptoms wanted further evaluation  She additionally reports complains of left knee pain, which started suddenly  She denies any injuries or fall related to the same        Historical Information   Past Medical History:   Diagnosis Date   • Anxiety    • Asthma     seasonal   • COVID-19 virus infection 12/27/2021   • Hernia of abdominal wall    • Hypertension    • Kidney stones    • Seizures ([facility] [address] )     pseudoseizures since 2012   • Viral URI with cough 12/27/2021     Past Surgical History:   Procedure Laterality Date   • APPENDECTOMY     • CHOLECYSTECTOMY     • GASTRIC BYPASS     • HERNIA REPAIR      x2     Family History   Problem Relation Age of Onset   • Hypertension Mother    • Diabetes Mother    • Hypertension Sister    • No Known Problems Father         head injury   • Stroke Maternal Grandmother         brain aneurysm   • Aneurysm Maternal Uncle brain     Current Outpatient Medications on File Prior to Visit   Medication Sig Dispense Refill   • acetaminophen (TYLENOL) 500 mg tablet Take 500 mg by mouth if needed     • albuterol (PROVENTIL HFA,VENTOLIN HFA) 90 mcg/act inhaler Inhale 2 puffs every 6 (six) hours as needed for wheezing 6 7 g 2   • amLODIPine (NORVASC) 10 mg tablet Take 1 tablet (10 mg total) by mouth daily 90 tablet 1   • colchicine (COLCRYS) 0 6 mg tablet Take 1 tablet (0 6 mg total) by mouth daily 2 pills now and 1 in an hour  One daily until resolved  20 tablet 0   • Cyanocobalamin (B-12) 1000 MCG/ML KIT Inject 1 ml IM in thigh/ buttocks or deltoid mm once weekly x 8 weeks  Afterward once monthly (Patient taking differently: if needed Inject 1 ml IM in thigh/ buttocks or deltoid mm once weekly x 8 weeks  Afterward once monthly) 8 mL 1   • estradiol (ESTRACE) 2 MG tablet Take 1 tablet (2 mg total) by mouth 2 (two) times a day 180 tablet 1   • hydrocortisone (ANUSOL-HC) 25 mg suppository Insert 1 suppository (25 mg total) into the rectum daily as needed for hemorrhoids 12 suppository 0   • LORazepam (ATIVAN) 1 mg tablet Take 0 5 tablets (0 5 mg total) by mouth every 6 (six) hours as needed for anxiety 60 tablet 1   • methocarbamol (ROBAXIN) 500 mg tablet TAKE 1 TABLET BY MOUTH DAILY AFTER BREAKFAST 90 tablet 0   • montelukast (SINGULAIR) 10 mg tablet Take 1 tablet (10 mg total) by mouth daily at bedtime 90 tablet 1   • multivitamin (THERAGRAN) TABS Take 1 tablet by mouth daily     • ondansetron (ZOFRAN-ODT) 4 mg disintegrating tablet Take 1 tablet (4 mg total) by mouth every 8 (eight) hours as needed for nausea or vomiting 60 tablet 1   • pantoprazole (PROTONIX) 40 mg tablet Take 1 tablet (40 mg total) by mouth daily 90 tablet 1   • potassium chloride (K-DUR,KLOR-CON) 20 mEq tablet 2 tabs BID day 1 the 2 tabs daily for 3 days   10 tablet 5   • Riboflavin 400 MG TABS Take 1 tablet (400 mg total) by mouth in the morning 90 tablet 3   • rizatriptan (Maxalt) 10 mg tablet Take 1 tablet (10 mg total) by mouth as needed for migraine Take at the onset of migraine; if symptoms continue or return, may take another dose at least 2 hours after first dose  Take no more than 2 doses in a day  9 tablet 0   • traMADol (ULTRAM) 50 mg tablet Take 1 tablet (50 mg total) by mouth 2 (two) times a day 60 tablet 1   • triamterene-hydrochlorothiazide (DYAZIDE) 37 5-25 mg per capsule Take 1 capsule by mouth every morning 30 capsule 5   • aspirin (ECOTRIN LOW STRENGTH) 81 mg EC tablet Take 81 mg by mouth daily (Patient not taking: Reported on 12/8/2022)     • SUMAtriptan (Imitrex) 50 mg tablet Take 1-2 tablets as needed at the onset of your migraines (Patient not taking: Reported on 12/8/2022) 18 tablet 3   • Syringe/Needle, Disp, (SYRINGE 3CC/[id number]/4") 27G X 1-1/4" 3 ML MISC Use for B12 injections 10 each 1   • topiramate (Topamax) 25 mg tablet Take 3 tablets (75 mg total) by mouth daily at bedtime (Patient not taking: Reported on 12/8/2022) 270 tablet 3     No current facility-administered medications on file prior to visit       Allergies   Allergen Reactions   • Gadolinium Anaphylaxis     "Oxygen dropped and coded during"      • Iodinated Diagnostic Agents Anaphylaxis     Social History     Socioeconomic History   • Marital status: Single     Spouse name: None   • Number of children: None   • Years of education: None   • Highest education level: None   Occupational History   • None   Tobacco Use   • Smoking status: Never   • Smokeless tobacco: Never   Vaping Use   • Vaping Use: Never used   Substance and Sexual Activity   • Alcohol use: Yes     Comment: 2 drinks per month   • Drug use: Never   • Sexual activity: None   Other Topics Concern   • None   Social History Narrative   • None     Social Determinants of Health     Financial Resource Strain: Not on file   Food Insecurity: No Food Insecurity   • Worried About Running Out of Food in the Last Year: Never true   • Ran Out of Food in the Last Year: Never true   Transportation Needs: No Transportation Needs   • Lack of Transportation (Medical): No   • Lack of Transportation (Non-Medical): No   Physical Activity: Not on file   Stress: Not on file   Social Connections: Not on file   Intimate Partner Violence: Not on file   Housing Stability: Low Risk    • Unable to Pay for Housing in the Last Year: No   • Number of Places Lived in the Last Year: 1   • Unstable Housing in the Last Year: No        Review of Systems   All other systems reviewed and are negative  Vitals:  Vitals:    12/08/22 1440   BP: 132/88   BP Location: Right arm   Patient Position: Sitting   Cuff Size: Large   Pulse: 78   Temp: 99 2 °F (37 3 °C)   TempSrc: Tympanic   SpO2: 98%   Height: 5' 9" (1 753 m)     BMI - Body mass index is 51 1 kg/m²  Wt Readings from Last 7 Encounters:   06/27/22 (!) 157 kg (346 lb)   03/04/22 (!) 157 kg (347 lb)   02/01/22 (!) 151 kg (332 lb 3 2 oz)   01/13/22 (!) 150 kg (330 lb)   12/26/21 (!) 150 kg (330 lb)   12/10/21 (!) 149 kg (328 lb 6 4 oz)   11/07/21 (!) 145 kg (319 lb)       Physical Exam  Vitals reviewed  Constitutional:       General: She is not in acute distress  Appearance: She is well-developed  She is obese  She is not ill-appearing  HENT:      Head: Normocephalic and atraumatic  Eyes:      General: No scleral icterus  Cardiovascular:      Rate and Rhythm: Normal rate and regular rhythm  Heart sounds: Murmur heard  Systolic murmur is present with a grade of 2/6  No friction rub  No gallop  Pulmonary:      Effort: Pulmonary effort is normal  No respiratory distress  Breath sounds: Normal breath sounds  No wheezing or rales  Chest:      Chest wall: No tenderness  Abdominal:      General: There is no distension  Palpations: Abdomen is soft  Tenderness: There is no abdominal tenderness  Musculoskeletal:         General: Tenderness present        Right lower leg: Edema present  Left lower leg: Edema present  Comments: Point tenderness on medial side of left knee  B/L 1+ edema  Skin:     General: Skin is warm  Neurological:      General: No focal deficit present  Mental Status: She is alert and oriented to person, place, and time  Psychiatric:         Mood and Affect: Mood normal          Thought Content: Thought content normal            Labs:  CBC:   Lab Results   Component Value Date    WBC 9 26 2022    RBC 4 64 2022    HGB 12 8 2022    HCT 39 2 2022    MCV 85 2022     2022    RDW 13 8 2022       CMP:   Lab Results   Component Value Date    K 3 5 07/15/2022     07/15/2022    CO2 28 07/15/2022    BUN 15 07/15/2022    CREATININE 0 85 07/15/2022    CALCIUM 8 8 07/15/2022    AST 20 2022    ALT 12 2022    ALKPHOS 57 2022       Magnesium:  Lab Results   Component Value Date    MG 1 8 2022       Lipid Profile:   Lab Results   Component Value Date    HDL 65 2022    TRIG 44 2022    LDLCALC 79 2022       Thyroid Studies:   Lab Results   Component Value Date    FIF8HIFDZIPO 5 750 (H) 2022    FREET4 1 29 2022    Q5MXVDS 1 10 2022       A1c:  No components found for: HGA1C    INR:  Lab Results   Component Value Date    INR 0 92 2022    INR 1 04 2020   5    Imaging: No results found      Cardiac testing:   Results for orders placed during the hospital encounter of 20    Echo complete with contrast if indicated    Narrative  [facility] [address] [facility]  [phone]    Transthoracic Echocardiogram  2D, M-mode, Doppler, and Color Doppler    Study date:  08-Mar-2020    Patient: Melissa Kenney  MR number: [id number]  Account number: [de-identified]  : 1980  Age: 44 years  Gender: Male  Status: Inpatient  Location: Bedside  Height: 69 in  Weight: 272 4 lb  BP: 152/ 75 mmHg    Indications: TIA    Diagnoses: G45 9 - Transient cerebral ischemic attack, unspecified    Sonographer:  BERNADINE Balbuena  Referring Physician:  Brit Saavedra PA-C  Group:  [id number]  Interpreting Physician:  Tigre Bonilla MD    SUMMARY    LEFT VENTRICLE:  Systolic function was normal by visual assessment  Ejection fraction was estimated in the range of 55 % to 60 %  There were no regional wall motion abnormalities  There was mild concentric hypertrophy  There was moderate assymetrical hypertrophy  PULMONIC VALVE:  There was trace regurgitation  HISTORY: PRIOR HISTORY: HTN; Male to female transgender person, Left sided weakness    PROCEDURE: The procedure was performed at the bedside  This was a routine study  The transthoracic approach was used  The study included complete 2D imaging, M-mode, complete spectral Doppler, and color Doppler  The heart rate was 79 bpm,  at the start of the study  Images were obtained from the parasternal, apical, subcostal, and suprasternal notch acoustic windows  Image quality was good  LEFT VENTRICLE: Size was normal  Systolic function was normal by visual assessment  Ejection fraction was estimated in the range of 55 % to 60 %  There were no regional wall motion abnormalities  There was mild concentric hypertrophy  There was moderate assymetrical hypertrophy  DOPPLER: Left ventricular diastolic function parameters were normal     RIGHT VENTRICLE: The size was normal  Systolic function was normal  Wall thickness was normal     LEFT ATRIUM: Size was normal     RIGHT ATRIUM: Size was normal     MITRAL VALVE: Valve structure was normal  There was normal leaflet separation  DOPPLER: The transmitral velocity was within the normal range  There was no evidence for stenosis  There was no regurgitation  AORTIC VALVE: The valve was trileaflet  Leaflets exhibited normal thickness and normal cuspal separation   DOPPLER: Transaortic velocity was within the normal range  There was no evidence for stenosis  There was no regurgitation  TRICUSPID VALVE: The valve structure was normal  There was normal leaflet separation  DOPPLER: The transtricuspid velocity was within the normal range  There was no evidence for stenosis  There was no regurgitation  PULMONIC VALVE: Leaflets exhibited normal thickness, no calcification, and normal cuspal separation  DOPPLER: The transpulmonic velocity was within the normal range  There was trace regurgitation  PERICARDIUM: There was no pericardial effusion  The pericardium was normal in appearance  AORTA: The root exhibited normal size  SYSTEM MEASUREMENT TABLES    2D  %FS: 31 03 %  Ao Diam: 3 7 cm  EDV(Teich): 51 47 ml  EF(Teich): 59 78 %  ESV(Teich): 20 7 ml  IVSd: 1 74 cm  LA Area: 13 78 cm2  LA Diam: 3 86 cm  LVEDV MOD A4C: 78 37 ml  LVEF MOD A4C: 70 83 %  LVESV MOD A4C: 22 86 ml  LVIDd: 3 52 cm  LVIDs: 2 43 cm  LVLd A4C: 9 39 cm  LVLs A4C: 7 49 cm  LVPWd: 1 26 cm  RA Area: 10 69 cm2  RVIDd: 3 27 cm  SV MOD A4C: 55 51 ml  SV(Teich): 30 77 ml    CW  AV MaxP 75 mmHg  AV Vmax: 1 2 m/s  TR MaxP 14 mmHg  TR Vmax: 1 02 m/s    MM  TAPSE: 2 27 cm    PW  E': 0 06 m/s  E/E': 10 76  MV A Jerrell: 0 63 m/s  MV Dec [place]: 2 76 m/s2  MV DecT: 248 88 ms  MV E Jerrell: 0 69 m/s  MV E/A Ratio: 1 08  MV PHT: 72 17 ms  MVA By PHT: 3 05 cm2    [employer] Accredited Echocardiography Laboratory    Prepared and electronically signed by    Cortney Allen MD  Signed 08-Mar-2020 12:16:04    No results found for this or any previous visit  No results found for this or any previous visit  No results found for this or any previous visit  VAS carotid complete study     [facility] REPORT  CLINICAL:  Indications:  Patient presents with to evaluate for carotid artery stenosis s/p possible  TIA/CVA  The patient admits symptoms of chest pain, left-sided tingling and  numbness    Risk Factors  The patient has history of Obesity and HTN   Clinical  Right Pressure:  130/80 mm Hg, Left Pressure:  125/80 mm Hg  FINDINGS:     Right        Impression  PSV  EDV (cm/s)  Direction of Flow  Ratio    Dist  ICA                 46          21                      0 51    Mid  ICA                  51          24                      0 57    Prox  ICA    Normal       41          14                      0 45    Dist CCA                  75          16                              Mid CCA                   89          22                      0 87    Prox CCA                 102          25                      0 91    Ext Carotid               72          12                      0 81    Prox Vert                 23          10  Antegrade                   Subclavian               142          19                              Innominate               112          17                                 Left         Impression  PSV  EDV (cm/s)  Direction of Flow  Ratio    Dist  ICA                 65          22                      0 67    Mid  ICA                  56          24                      0 58    Prox  ICA    Normal       50          18                      0 52    Dist CCA                  78          20                              Mid CCA                   97          24                      0 94    Prox CCA                 103          26                              Ext Carotid               85          15                      0 88    Prox Vert                 51          18  Antegrade                   Subclavian               148          18                                       CONCLUSION:  Impression     RIGHT:  There is no evidence of disease throughout the extracranial carotid arteries  Vertebral artery flow is antegrade  There is no significant subclavian artery disease  LEFT:  There is no evidence of disease throughout the extracranial carotid arteries  Vertebral artery flow is antegrade    There is no significant subclavian artery disease  Internal carotid artery stenosis determination by consensus criteria from:  Gerson Coles et al  Carotid Artery Stenosis: Gray-Scale and Doppler US Diagnosis  - Society of Radiologists in [address] [place] [address], Radiology 2003;  [phone]       SIGNATURE:  Electronically Signed by: Rylee Bustos MD, RPVI on 2022-01-14 02:47:32 PM

## 2022-12-09 ENCOUNTER — APPOINTMENT (OUTPATIENT)
Dept: LAB | Facility: CLINIC | Age: 42
End: 2022-12-09

## 2022-12-09 DIAGNOSIS — I10 ESSENTIAL HYPERTENSION: ICD-10-CM

## 2022-12-09 DIAGNOSIS — R60.0 PEDAL EDEMA: ICD-10-CM

## 2022-12-09 DIAGNOSIS — M25.562 LEFT KNEE PAIN, UNSPECIFIED CHRONICITY: ICD-10-CM

## 2022-12-09 DIAGNOSIS — M25.472 ANKLE EDEMA, BILATERAL: ICD-10-CM

## 2022-12-09 DIAGNOSIS — M25.471 ANKLE EDEMA, BILATERAL: ICD-10-CM

## 2022-12-09 LAB
ALBUMIN SERPL BCP-MCNC: 3.7 G/DL (ref 3.5–5)
ALP SERPL-CCNC: 66 U/L (ref 46–116)
ALT SERPL W P-5'-P-CCNC: 8 U/L (ref 7–52)
ANION GAP SERPL CALCULATED.3IONS-SCNC: 7 MMOL/L (ref 4–13)
AST SERPL W P-5'-P-CCNC: 13 U/L (ref 5–45)
BASOPHILS # BLD AUTO: 0.04 THOUSANDS/ÂΜL (ref 0–0.1)
BASOPHILS NFR BLD AUTO: 0 % (ref 0–1)
BILIRUB SERPL-MCNC: 0.35 MG/DL (ref 0.2–1)
BUN SERPL-MCNC: 14 MG/DL (ref 5–25)
CALCIUM SERPL-MCNC: 8.6 MG/DL (ref 8.4–10.2)
CHLORIDE SERPL-SCNC: 103 MMOL/L (ref 96–108)
CO2 SERPL-SCNC: 27 MMOL/L (ref 21–32)
CREAT SERPL-MCNC: 0.92 MG/DL (ref 0.6–1.3)
D DIMER PPP FEU-MCNC: <0.27 UG/ML FEU
EOSINOPHIL # BLD AUTO: 0.13 THOUSAND/ÂΜL (ref 0–0.61)
EOSINOPHIL NFR BLD AUTO: 1 % (ref 0–6)
ERYTHROCYTE [DISTWIDTH] IN BLOOD BY AUTOMATED COUNT: 13.4 % (ref 11.6–15.1)
GLUCOSE SERPL-MCNC: 91 MG/DL (ref 65–140)
HCT VFR BLD AUTO: 37.5 % (ref 36.5–46.1)
HGB BLD-MCNC: 11.8 G/DL (ref 12–15.4)
IMM GRANULOCYTES # BLD AUTO: 0.04 THOUSAND/UL (ref 0–0.2)
IMM GRANULOCYTES NFR BLD AUTO: 0 % (ref 0–2)
LYMPHOCYTES # BLD AUTO: 2.52 THOUSANDS/ÂΜL (ref 0.6–4.47)
LYMPHOCYTES NFR BLD AUTO: 25 % (ref 14–44)
MCH RBC QN AUTO: 27 PG (ref 26.8–34.3)
MCHC RBC AUTO-ENTMCNC: 31.5 G/DL (ref 31.4–37.4)
MCV RBC AUTO: 86 FL (ref 82–98)
MONOCYTES # BLD AUTO: 0.52 THOUSAND/ÂΜL (ref 0.17–1.22)
MONOCYTES NFR BLD AUTO: 5 % (ref 4–12)
NEUTROPHILS # BLD AUTO: 6.7 THOUSANDS/ÂΜL (ref 1.85–7.62)
NEUTS SEG NFR BLD AUTO: 69 % (ref 43–75)
NRBC BLD AUTO-RTO: 0 /100 WBCS
NT-PROBNP SERPL-MCNC: 65 PG/ML
PLATELET # BLD AUTO: 319 THOUSANDS/UL (ref 149–390)
PMV BLD AUTO: 9.1 FL (ref 8.9–12.7)
POTASSIUM SERPL-SCNC: 3.6 MMOL/L (ref 3.5–5.3)
PROT SERPL-MCNC: 7.2 G/DL (ref 6.4–8.4)
RBC # BLD AUTO: 4.37 MILLION/UL (ref 3.88–5.12)
SODIUM SERPL-SCNC: 137 MMOL/L (ref 135–147)
URATE SERPL-MCNC: 6.6 MG/DL (ref 4.2–6.8)
WBC # BLD AUTO: 9.95 THOUSAND/UL (ref 4.31–10.16)

## 2022-12-14 ENCOUNTER — TELEPHONE (OUTPATIENT)
Dept: INTERNAL MEDICINE CLINIC | Facility: CLINIC | Age: 42
End: 2022-12-14

## 2022-12-14 DIAGNOSIS — K21.9 GASTROESOPHAGEAL REFLUX DISEASE WITHOUT ESOPHAGITIS: ICD-10-CM

## 2022-12-14 DIAGNOSIS — K42.9 UMBILICAL HERNIA WITHOUT OBSTRUCTION AND WITHOUT GANGRENE: ICD-10-CM

## 2022-12-14 RX ORDER — PANTOPRAZOLE SODIUM 40 MG/1
40 TABLET, DELAYED RELEASE ORAL DAILY
Qty: 90 TABLET | Refills: 1 | Status: SHIPPED | OUTPATIENT
Start: 2022-12-14 | End: 2022-12-22 | Stop reason: SDUPTHER

## 2022-12-14 NOTE — TELEPHONE ENCOUNTER
Patient called this afternoon wanting a call back from provider to discuss medications (has an appointment on 12/22/2022)  States that they feel their amlodipine and triamterene are causing some shortness of breath  Wanted to discuss with PCP about stopping either or medication

## 2022-12-14 NOTE — TELEPHONE ENCOUNTER
Relayed message from Dr Leno Milian to stop Amlodipine and will re-evaluate at next visit on 12/22/22

## 2022-12-21 ENCOUNTER — CLINICAL SUPPORT (OUTPATIENT)
Dept: NEUROLOGY | Facility: CLINIC | Age: 42
End: 2022-12-21

## 2022-12-21 DIAGNOSIS — G43.009 MIGRAINE WITHOUT AURA AND WITHOUT STATUS MIGRAINOSUS, NOT INTRACTABLE: Primary | ICD-10-CM

## 2022-12-21 DIAGNOSIS — G43.709 CHRONIC MIGRAINE WITHOUT AURA: ICD-10-CM

## 2022-12-21 RX ORDER — KETOROLAC TROMETHAMINE 30 MG/ML
60 INJECTION, SOLUTION INTRAMUSCULAR; INTRAVENOUS ONCE
Status: COMPLETED | OUTPATIENT
Start: 2022-12-21 | End: 2022-12-21

## 2022-12-21 RX ORDER — KETOROLAC TROMETHAMINE 30 MG/ML
60 INJECTION, SOLUTION INTRAMUSCULAR; INTRAVENOUS ONCE
Status: DISCONTINUED | OUTPATIENT
Start: 2022-12-21 | End: 2022-12-21

## 2022-12-21 RX ADMIN — KETOROLAC TROMETHAMINE 60 MG: 30 INJECTION, SOLUTION INTRAMUSCULAR; INTRAVENOUS at 11:03

## 2022-12-22 ENCOUNTER — TELEPHONE (OUTPATIENT)
Dept: INTERNAL MEDICINE CLINIC | Facility: CLINIC | Age: 42
End: 2022-12-22

## 2022-12-22 ENCOUNTER — OFFICE VISIT (OUTPATIENT)
Dept: INTERNAL MEDICINE CLINIC | Facility: CLINIC | Age: 42
End: 2022-12-22

## 2022-12-22 VITALS
DIASTOLIC BLOOD PRESSURE: 86 MMHG | OXYGEN SATURATION: 95 % | SYSTOLIC BLOOD PRESSURE: 126 MMHG | HEIGHT: 69 IN | TEMPERATURE: 98.4 F | HEART RATE: 68 BPM | WEIGHT: 315 LBS | BODY MASS INDEX: 46.65 KG/M2

## 2022-12-22 DIAGNOSIS — Z88.9 MULTIPLE ALLERGIES: ICD-10-CM

## 2022-12-22 DIAGNOSIS — R25.2 MUSCLE CRAMPING: ICD-10-CM

## 2022-12-22 DIAGNOSIS — M50.90 CERVICAL DISC DISEASE: ICD-10-CM

## 2022-12-22 DIAGNOSIS — I10 ESSENTIAL HYPERTENSION: ICD-10-CM

## 2022-12-22 DIAGNOSIS — E66.01 MORBID OBESITY WITH BMI OF 50.0-59.9, ADULT (HCC): Primary | ICD-10-CM

## 2022-12-22 DIAGNOSIS — K42.9 UMBILICAL HERNIA WITHOUT OBSTRUCTION AND WITHOUT GANGRENE: ICD-10-CM

## 2022-12-22 DIAGNOSIS — K21.9 GASTROESOPHAGEAL REFLUX DISEASE WITHOUT ESOPHAGITIS: ICD-10-CM

## 2022-12-22 DIAGNOSIS — M62.838 MUSCLE SPASMS OF BOTH LOWER EXTREMITIES: ICD-10-CM

## 2022-12-22 DIAGNOSIS — Z78.9 MALE-TO-FEMALE TRANSGENDER PERSON: ICD-10-CM

## 2022-12-22 PROBLEM — E03.8 SUBCLINICAL HYPOTHYROIDISM: Status: ACTIVE | Noted: 2022-01-13

## 2022-12-22 RX ORDER — ESTRADIOL 2 MG/1
2 TABLET ORAL 2 TIMES DAILY
Qty: 180 TABLET | Refills: 1 | Status: SHIPPED | OUTPATIENT
Start: 2022-12-22

## 2022-12-22 RX ORDER — CARISOPRODOL 250 MG/1
250 TABLET ORAL 3 TIMES DAILY
Qty: 90 TABLET | Refills: 0 | Status: SHIPPED | OUTPATIENT
Start: 2022-12-22

## 2022-12-22 RX ORDER — PANTOPRAZOLE SODIUM 40 MG/1
40 TABLET, DELAYED RELEASE ORAL DAILY
Qty: 90 TABLET | Refills: 1 | Status: SHIPPED | OUTPATIENT
Start: 2022-12-22

## 2022-12-22 RX ORDER — LORAZEPAM 1 MG/1
0.5 TABLET ORAL EVERY 6 HOURS PRN
Qty: 60 TABLET | Refills: 1 | Status: SHIPPED | OUTPATIENT
Start: 2022-12-22

## 2022-12-22 RX ORDER — MONTELUKAST SODIUM 10 MG/1
10 TABLET ORAL
Qty: 90 TABLET | Refills: 1 | Status: SHIPPED | OUTPATIENT
Start: 2022-12-22

## 2022-12-22 NOTE — ASSESSMENT & PLAN NOTE
Will initiate Carisoprodol, 250 mg t i d  and monitor  She is also following with Neurology for trigger-point manipulations  This should hopefully help with that    She has been poorly responsive to other types of muscle relaxant medications

## 2022-12-22 NOTE — TELEPHONE ENCOUNTER
[address] [facility] spoke to [employer] not on formulary Baclofen or flexeril is covered by insurance  If Jerrald Lot is needed a prior authorization will need to be initiated

## 2022-12-22 NOTE — PROGRESS NOTES
Name: Benjamin Waldrop  : 1980      MRN: [id number]  Encounter Provider: Theron Álvarez MD  Encounter Date: 2022   Encounter department: [address] [place] [address]     1  Morbid obesity with BMI of 50 0-59 9, adult [facility])  Assessment & Plan:  Needs to begin a comprehensive weight management diet and exercise regimen  2  Gastroesophageal reflux disease without esophagitis  Assessment & Plan:  Currently not having any major symptoms of reflux    Orders:  -     pantoprazole (PROTONIX) 40 mg tablet; Take 1 tablet (40 mg total) by mouth daily    3  Umbilical hernia without obstruction and without gangrene  Assessment & Plan:  Currently no treatment is being planned or anticipated  Orders:  -     pantoprazole (PROTONIX) 40 mg tablet; Take 1 tablet (40 mg total) by mouth daily    4  Cervical disc disease  Assessment & Plan:  No complaints of any radicular symptoms at this time    Orders:  -     LORazepam (ATIVAN) 1 mg tablet; Take 0 5 tablets (0 5 mg total) by mouth every 6 (six) hours as needed for anxiety    5  Multiple allergies  -     montelukast (SINGULAIR) 10 mg tablet; Take 1 tablet (10 mg total) by mouth daily at bedtime    6  Male-to-female transgender person  Assessment & Plan:  Estradiol was renewed    Orders:  -     estradiol (ESTRACE) 2 MG tablet; Take 1 tablet (2 mg total) by mouth 2 (two) times a day    7  Muscle spasms of both lower extremities  -     carisoprodol (SOMA) 250 MG; Take 1 tablet (250 mg total) by mouth 3 (three) times a day    8  Essential hypertension  Assessment & Plan:  Blood pressure appears to be reasonably well controlled with Dyazide alone      9  Muscle cramping  Assessment & Plan: Will initiate Carisoprodol, 250 mg t i d  and monitor  She is also following with Neurology for trigger-point manipulations  This should hopefully help with that    She has been poorly responsive to other types of muscle relaxant medications           Subjective      Patient presents to the office with complaints of lower extremity discomfort/muscle spasms and swelling  She complains of diffuse body swelling  She had been experiencing some peripheral edema with amlodipine which has since been discontinued  The edema attributed to amlodipine has resolved  She has experienced a progressive weight gain  Over the past 20 months, the patient has gained 32 lb  She was recently evaluated by Cardiology  She is scheduled to undergo another echocardiogram   Her previous echocardiogram was essentially normal   Lab work was unremarkable with exception very mild drop in hemoglobin to 11 8  NT BNP PRO was normal   She does complain of some shortness of breath but I suspect that this may be secondary to some physical deconditioning and morbid obesity  She has been having difficulty establishing with bariatric medicine for weight loss because they insist on having her old records from [place] and she is having difficulty obtaining the records  Review of Systems   Constitutional: Positive for activity change (Not very physically active other than while at work)  HENT: Negative  Eyes: Negative  Respiratory: Positive for shortness of breath (With exertion)  Cardiovascular: Negative for chest pain and leg swelling (Previously with amlodipine this has resolved)  Gastrointestinal: Negative  Endocrine: Negative  Genitourinary: Negative  Musculoskeletal: Positive for myalgias  Skin: Negative  Allergic/Immunologic: Negative  Neurological: Positive for headaches (History of migraines)  Hematological: Negative  Psychiatric/Behavioral: The patient is nervous/anxious          Current Outpatient Medications on File Prior to Visit   Medication Sig   • acetaminophen (TYLENOL) 500 mg tablet Take 500 mg by mouth if needed   • albuterol (PROVENTIL HFA,VENTOLIN HFA) 90 mcg/act inhaler Inhale 2 puffs every 6 (six) hours as needed for wheezing   • colchicine (COLCRYS) 0 6 mg tablet Take 1 tablet (0 6 mg total) by mouth daily 2 pills now and 1 in an hour  One daily until resolved  • Cyanocobalamin (B-12) 1000 MCG/ML KIT Inject 1 ml IM in thigh/ buttocks or deltoid mm once weekly x 8 weeks  Afterward once monthly (Patient taking differently: if needed Inject 1 ml IM in thigh/ buttocks or deltoid mm once weekly x 8 weeks  Afterward once monthly)   • hydrocortisone (ANUSOL-HC) 25 mg suppository Insert 1 suppository (25 mg total) into the rectum daily as needed for hemorrhoids   • multivitamin (THERAGRAN) TABS Take 1 tablet by mouth daily   • ondansetron (ZOFRAN-ODT) 4 mg disintegrating tablet Take 1 tablet (4 mg total) by mouth every 8 (eight) hours as needed for nausea or vomiting   • Riboflavin 400 MG TABS Take 1 tablet (400 mg total) by mouth in the morning   • rizatriptan (Maxalt) 10 mg tablet Take 1 tablet (10 mg total) by mouth as needed for migraine Take at the onset of migraine; if symptoms continue or return, may take another dose at least 2 hours after first dose  Take no more than 2 doses in a day     • Syringe/Needle, Disp, (SYRINGE 3CC/16DV8-2/4") 27G X 1-1/4" 3 ML MISC Use for B12 injections   • traMADol (ULTRAM) 50 mg tablet Take 1 tablet (50 mg total) by mouth 2 (two) times a day   • triamterene-hydrochlorothiazide (DYAZIDE) 37 5-25 mg per capsule Take 1 capsule by mouth every morning   • [DISCONTINUED] estradiol (ESTRACE) 2 MG tablet Take 1 tablet (2 mg total) by mouth 2 (two) times a day   • [DISCONTINUED] LORazepam (ATIVAN) 1 mg tablet Take 0 5 tablets (0 5 mg total) by mouth every 6 (six) hours as needed for anxiety   • [DISCONTINUED] methocarbamol (ROBAXIN) 500 mg tablet TAKE 1 TABLET BY MOUTH DAILY AFTER BREAKFAST (Patient taking differently: Take by mouth daily as needed)   • [DISCONTINUED] montelukast (SINGULAIR) 10 mg tablet Take 1 tablet (10 mg total) by mouth daily at bedtime   • [DISCONTINUED] pantoprazole (PROTONIX) 40 mg tablet Take 1 tablet (40 mg total) by mouth daily   • potassium chloride (K-DUR,KLOR-CON) 20 mEq tablet 2 tabs BID day 1 the 2 tabs daily for 3 days  (Patient not taking: Reported on 12/22/2022)   • [DISCONTINUED] amLODIPine (NORVASC) 10 mg tablet Take 1 tablet (10 mg total) by mouth daily (Patient not taking: Reported on 12/22/2022)   • [DISCONTINUED] SUMAtriptan (Imitrex) 50 mg tablet Take 1-2 tablets as needed at the onset of your migraines (Patient not taking: Reported on 12/8/2022)   • [DISCONTINUED] topiramate (Topamax) 25 mg tablet Take 3 tablets (75 mg total) by mouth daily at bedtime       Objective     /86 (BP Location: Left arm, Patient Position: Sitting, Cuff Size: Large)   Pulse 68   Temp 98 4 °F (36 9 °C) (Temporal)   Ht 5' 9" (1 753 m)   Wt (!) 156 kg (343 lb)   SpO2 95%   BMI 50 65 kg/m²     Physical Exam  Vitals reviewed  Constitutional:       General: She is not in acute distress  Appearance: She is obese  She is not ill-appearing, toxic-appearing or diaphoretic  HENT:      Head: Normocephalic and atraumatic  Right Ear: External ear normal       Left Ear: External ear normal       Nose: Nose normal       Mouth/Throat:      Mouth: Mucous membranes are moist    Eyes:      General: No scleral icterus  Conjunctiva/sclera: Conjunctivae normal       Pupils: Pupils are equal, round, and reactive to light  Cardiovascular:      Rate and Rhythm: Normal rate and regular rhythm  Heart sounds: Normal heart sounds  No murmur heard  Pulmonary:      Effort: Pulmonary effort is normal  No respiratory distress  Breath sounds: Normal breath sounds  Abdominal:      General: Abdomen is protuberant  There is no distension  Tenderness: There is no abdominal tenderness  Comments: Obese   Musculoskeletal:         General: No swelling  Cervical back: Neck supple  No rigidity  Right lower leg: No edema  Left lower leg: No edema     Skin: General: Skin is warm  Coloration: Skin is not jaundiced  Findings: No bruising, erythema or rash  Neurological:      General: No focal deficit present  Mental Status: She is alert and oriented to person, place, and time  Mental status is at baseline     Psychiatric:         Mood and Affect: Mood normal          Behavior: Behavior normal        Denise Wood MD

## 2022-12-22 NOTE — TELEPHONE ENCOUNTER
Patient stated her pharmacy has told her a Prior Auth was needed for rx  carisoprodol (SOMA) 250 MG

## 2022-12-23 NOTE — TELEPHONE ENCOUNTER
Prior authorization has been started for soma  Determination to be completed by Jan 7th  Spoke with Waqas Carbone at [facility]  Prior auth number [id number]

## 2022-12-27 NOTE — TELEPHONE ENCOUNTER
Carisoprodol has been denied by insurance  Pt has called the office today for status  Of medication  Pt  informed it was denied  Dr Romy Caldwell out office 12/27 and 12/28 will address with provider on 12/29

## 2023-01-05 ENCOUNTER — APPOINTMENT (EMERGENCY)
Dept: CT IMAGING | Facility: HOSPITAL | Age: 43
End: 2023-01-05

## 2023-01-05 ENCOUNTER — HOSPITAL ENCOUNTER (EMERGENCY)
Facility: HOSPITAL | Age: 43
Discharge: HOME | End: 2023-01-06
Attending: EMERGENCY MEDICINE

## 2023-01-05 VITALS
BODY MASS INDEX: 46.65 KG/M2 | WEIGHT: 315 LBS | RESPIRATION RATE: 18 BRPM | HEART RATE: 69 BPM | SYSTOLIC BLOOD PRESSURE: 154 MMHG | DIASTOLIC BLOOD PRESSURE: 100 MMHG | HEIGHT: 69 IN | TEMPERATURE: 97.8 F | OXYGEN SATURATION: 95 %

## 2023-01-05 DIAGNOSIS — M54.9 MUSCULOSKELETAL BACK PAIN: Primary | ICD-10-CM

## 2023-01-05 RX ORDER — LIDOCAINE 50 MG/G
1 PATCH TOPICAL ONCE
Status: DISCONTINUED | OUTPATIENT
Start: 2023-01-05 | End: 2023-01-06 | Stop reason: HOSPADM

## 2023-01-05 RX ORDER — CARISOPRODOL 350 MG/1
350 TABLET ORAL ONCE
Status: COMPLETED | OUTPATIENT
Start: 2023-01-05 | End: 2023-01-05

## 2023-01-05 RX ADMIN — IOHEXOL 30 ML: 300 INJECTION, SOLUTION INTRAVENOUS at 23:02

## 2023-01-05 RX ADMIN — CARISOPRODOL 350 MG: 350 TABLET ORAL at 21:24

## 2023-01-05 RX ADMIN — LIDOCAINE 1 PATCH: 50 PATCH TOPICAL at 21:24

## 2023-01-06 NOTE — DISCHARGE INSTRUCTIONS
Please continue to take your Soma as prescribed  You may also use Tylenol and lidocaine patches for your pain management  Follow-up with PCP for reassessment and management of pain  Thank you for allowing us to take part in your care

## 2023-01-06 NOTE — ED PROVIDER NOTES
History  Chief Complaint   Patient presents with   • Back Pain     Patient c/o left lower back pain and feels like it's a muscle spasm  Patient is a 42-year-old female presenting with left-sided flank pain  Patient was a male at birth and has since transitioned to female  Patient states she has a history of muscle spasms which typically includes her legs, stomach, neck for which she typically takes [place] but today she did not take it and she started to have what feels like muscle spasms and pain in her left flank  The pain worsened throughout the day to the point where she could not get in the car to leave work today  Patient has a history of sleeve gastrectomy which was done in [place] in 2017  Patient states she was taking aspirin for a long time until she realized that she should not be taking it due to her previous surgery and stopped around 4 weeks ago  Patient endorses shortness of breath, nausea, feeling hot, diarrhea but denies any headache, lightheadedness, fever, hematuria, dysuria  Prior to Admission Medications   Prescriptions Last Dose Informant Patient Reported? Taking? Cyanocobalamin (B-12) 1000 MCG/ML KIT   No No   Sig: Inject 1 ml IM in thigh/ buttocks or deltoid mm once weekly x 8 weeks  Afterward once monthly   Patient taking differently: if needed Inject 1 ml IM in thigh/ buttocks or deltoid mm once weekly x 8 weeks   Afterward once monthly   LORazepam (ATIVAN) 1 mg tablet   No No   Sig: Take 0 5 tablets (0 5 mg total) by mouth every 6 (six) hours as needed for anxiety   Riboflavin 400 MG TABS   No No   Sig: Take 1 tablet (400 mg total) by mouth in the morning   Syringe/Needle, Disp, (SYRINGE 3CC/82JV4-6/4") 27G X 1-1/4" 3 ML MISC   No No   Sig: Use for B12 injections   acetaminophen (TYLENOL) 500 mg tablet   Yes No   Sig: Take 500 mg by mouth if needed   albuterol (PROVENTIL HFA,VENTOLIN HFA) 90 mcg/act inhaler   No No   Sig: Inhale 2 puffs every 6 (six) hours as needed for wheezing   carisoprodol (SOMA) 250 MG   No No   Sig: Take 1 tablet (250 mg total) by mouth 3 (three) times a day   colchicine (COLCRYS) 0 6 mg tablet   No No   Sig: Take 1 tablet (0 6 mg total) by mouth daily 2 pills now and 1 in an hour  One daily until resolved  estradiol (ESTRACE) 2 MG tablet   No No   Sig: Take 1 tablet (2 mg total) by mouth 2 (two) times a day   hydrocortisone (ANUSOL-HC) 25 mg suppository   No No   Sig: Insert 1 suppository (25 mg total) into the rectum daily as needed for hemorrhoids   montelukast (SINGULAIR) 10 mg tablet   No No   Sig: Take 1 tablet (10 mg total) by mouth daily at bedtime   multivitamin (THERAGRAN) TABS   Yes No   Sig: Take 1 tablet by mouth daily   ondansetron (ZOFRAN-ODT) 4 mg disintegrating tablet   No No   Sig: Take 1 tablet (4 mg total) by mouth every 8 (eight) hours as needed for nausea or vomiting   pantoprazole (PROTONIX) 40 mg tablet   No No   Sig: Take 1 tablet (40 mg total) by mouth daily   potassium chloride (K-DUR,KLOR-CON) 20 mEq tablet   No No   Si tabs BID day 1 the 2 tabs daily for 3 days  Patient not taking: Reported on 2022   rizatriptan (Maxalt) 10 mg tablet   No No   Sig: Take 1 tablet (10 mg total) by mouth as needed for migraine Take at the onset of migraine; if symptoms continue or return, may take another dose at least 2 hours after first dose  Take no more than 2 doses in a day     traMADol (ULTRAM) 50 mg tablet   No No   Sig: Take 1 tablet (50 mg total) by mouth 2 (two) times a day   triamterene-hydrochlorothiazide (DYAZIDE) 37 5-25 mg per capsule   No No   Sig: Take 1 capsule by mouth every morning      Facility-Administered Medications: None       Past Medical History:   Diagnosis Date   • Anxiety    • Asthma     seasonal   • COVID-19 virus infection 2021   • Hernia of abdominal wall    • Hypertension    • Kidney stones    • Seizures ([address] )     pseudoseizures since    • Viral URI with cough 2021       Past Surgical History:   Procedure Laterality Date   • APPENDECTOMY     • CHOLECYSTECTOMY     • GASTRIC BYPASS     • HERNIA REPAIR      x2       Family History   Problem Relation Age of Onset   • Hypertension Mother    • Diabetes Mother    • Hypertension Sister    • No Known Problems Father         head injury   • Stroke Maternal Grandmother         brain aneurysm   • Aneurysm Maternal Uncle         brain     I have reviewed and agree with the history as documented  E-Cigarette/Vaping   • E-Cigarette Use Never User      E-Cigarette/Vaping Substances   • Nicotine No    • THC No    • CBD No    • Flavoring No    • Other No    • Unknown No      Social History     Tobacco Use   • Smoking status: Never   • Smokeless tobacco: Never   Vaping Use   • Vaping Use: Never used   Substance Use Topics   • Alcohol use: Yes     Comment: 2 drinks per month   • Drug use: Never        Review of Systems   Constitutional: Negative for chills and fever  HENT: Negative for congestion, ear pain, rhinorrhea and sore throat  Eyes: Negative for pain and visual disturbance  Respiratory: Positive for shortness of breath (From pain)  Negative for cough and wheezing  Cardiovascular: Negative for chest pain and palpitations  Gastrointestinal: Positive for diarrhea and nausea  Negative for abdominal pain, constipation and vomiting  Genitourinary: Positive for flank pain (Left-sided)  Negative for dysuria and hematuria  Musculoskeletal: Positive for back pain (Paraspinal muscles) and neck pain (Chronic)  Negative for arthralgias and neck stiffness  Skin: Negative for color change, rash and wound  Neurological: Negative for dizziness, seizures, syncope, facial asymmetry, weakness, light-headedness, numbness and headaches  All other systems reviewed and are negative        Physical Exam  ED Triage Vitals [01/05/23 2006]   Temperature Pulse Respirations Blood Pressure SpO2   97 8 °F (36 6 °C) 75 18 160/92 99 %      Temp Source Heart Rate Source Patient Position - Orthostatic VS BP Location FiO2 (%)   Oral Monitor Lying Right arm --      Pain Score       10 - Worst Possible Pain             Orthostatic Vital Signs  Vitals:    01/05/23 2006 01/05/23 2235   BP: 160/92 154/100   Pulse: 75 69   Patient Position - Orthostatic VS: Lying        Physical Exam  Vitals and nursing note reviewed  Constitutional:       General: She is not in acute distress  Appearance: She is well-developed  Comments: Standing due to pain, seems to come and go   HENT:      Head: Normocephalic and atraumatic  Nose: Nose normal       Mouth/Throat:      Mouth: Mucous membranes are moist       Pharynx: Oropharynx is clear  Eyes:      Conjunctiva/sclera: Conjunctivae normal       Pupils: Pupils are equal, round, and reactive to light  Cardiovascular:      Rate and Rhythm: Normal rate and regular rhythm  Pulses: Normal pulses  Heart sounds: Normal heart sounds  No murmur heard  Pulmonary:      Effort: Pulmonary effort is normal  No respiratory distress  Breath sounds: Normal breath sounds  Chest:      Chest wall: No tenderness  Abdominal:      General: Bowel sounds are normal       Palpations: Abdomen is soft  Tenderness: There is no abdominal tenderness  There is left CVA tenderness (Tenderness to CVA area, possibly more muscular but patient endorses feeling pain deeper inside as well)  There is no right CVA tenderness  Musculoskeletal:         General: Tenderness (Left-sided paraspinal muscular tenderness) present  No swelling  Cervical back: Neck supple  No rigidity or tenderness  Skin:     General: Skin is warm and dry  Capillary Refill: Capillary refill takes less than 2 seconds  Findings: No bruising, lesion or rash  Neurological:      General: No focal deficit present  Mental Status: She is alert and oriented to person, place, and time     Psychiatric:         Mood and Affect: Mood normal          ED Medications  Medications   carisoprodol (SOMA) tablet 350 mg (350 mg Oral Given 1/5/23 2124)   iohexol (OMNIPAQUE) 300 mg/mL injection 30 mL (30 mL Oral Given 1/5/23 2302)       Diagnostic Studies  Results Reviewed     None                 CT abdomen pelvis wo contrast   Final Result by Dilip Gil MD (01/06 0019)      1  Status post sleeve gastrectomy  No bowel obstruction  2   Small hiatal hernia  3   Moderate ventral abdominal wall hernia containing nonobstructed small bowel  4   Other findings as above  Workstation performed: [id number]               Procedures  Procedures      ED Course  ED Course as of 01/06/23 1609   Fri Jan 06, 2023   0023 CT abdomen pelvis wo contrast  1  Status post sleeve gastrectomy  No bowel obstruction  2   Small hiatal hernia  3   Moderate ventral abdominal wall hernia containing nonobstructed small bowel  4   Other findings as above  Medical Decision Making  41-year-old female presenting with left lower back/abdominal pain  On physical exam, pain seems to be more paraspinal muscular cramping and tenderness but there is concern due to patient's history of gastric sleeve and patient also endorsing pain that is deeper in her abdomen  Patient given Lidoderm patch as well as Soma for muscular cramping pain  CT abdomen/pelvis with p o  contrast to be done assess gastric sleeve  On reassessment, patient is doing much better after Lidoderm patch and Soma  She was able to stand up and sit down with minimal discomfort and feels like she is able to be discharged and be already going home  CT AP with p o  contrast-  1  Status post sleeve gastrectomy  No bowel obstruction  2   Small hiatal hernia  3   Moderate ventral abdominal wall hernia containing nonobstructed small bowel  4   Other findings as above  Discussed CT findings with pt as well as treatment of symptoms with Soma and Lidoderm patches at home   Recommend follow up with PCP for management of symptoms  Return precautions discussed  Pt is agreeable and appropriate for discharge  Musculoskeletal back pain: acute illness or injury  Amount and/or Complexity of Data Reviewed  Radiology: ordered  Decision-making details documented in ED Course  Risk  Prescription drug management  Disposition  Final diagnoses:   Musculoskeletal back pain     Time reflects when diagnosis was documented in both MDM as applicable and the Disposition within this note     Time User Action Codes Description Comment    1/5/2023 11:19 PM Ebbie Dubin Add [M54 9] Musculoskeletal back pain       ED Disposition     ED Disposition   Discharge    Condition   Stable    Date/Time   Fri Jan 6, 2023 12:25 AM    Comment   Prudence Petersen  discharge to home/self care  Follow-up Information     Follow up With Specialties Details Why Contact Barbara Fournier MD Internal Medicine   [address]  [phone]            Discharge Medication List as of 1/6/2023 12:25 AM      CONTINUE these medications which have NOT CHANGED    Details   acetaminophen (TYLENOL) 500 mg tablet Take 500 mg by mouth if needed, Historical Med      albuterol (PROVENTIL HFA,VENTOLIN HFA) 90 mcg/act inhaler Inhale 2 puffs every 6 (six) hours as needed for wheezing, Starting Fri 9/10/2021, Normal      carisoprodol (SOMA) 250 MG Take 1 tablet (250 mg total) by mouth 3 (three) times a day, Starting Thu 12/22/2022, Normal      colchicine (COLCRYS) 0 6 mg tablet Take 1 tablet (0 6 mg total) by mouth daily 2 pills now and 1 in an hour  One daily until resolved , Starting Mon 6/27/2022, Normal      Cyanocobalamin (B-12) 1000 MCG/ML KIT Inject 1 ml IM in thigh/ buttocks or deltoid mm once weekly x 8 weeks   Afterward once monthly, Normal      estradiol (ESTRACE) 2 MG tablet Take 1 tablet (2 mg total) by mouth 2 (two) times a day, Starting Thu 12/22/2022, Normal      hydrocortisone (ANUSOL-HC) 25 mg suppository Insert 1 suppository (25 mg total) into the rectum daily as needed for hemorrhoids, Starting Mon 6/27/2022, Normal      LORazepam (ATIVAN) 1 mg tablet Take 0 5 tablets (0 5 mg total) by mouth every 6 (six) hours as needed for anxiety, Starting Thu 12/22/2022, Normal      montelukast (SINGULAIR) 10 mg tablet Take 1 tablet (10 mg total) by mouth daily at bedtime, Starting Thu 12/22/2022, Normal      multivitamin (THERAGRAN) TABS Take 1 tablet by mouth daily, Historical Med      ondansetron (ZOFRAN-ODT) 4 mg disintegrating tablet Take 1 tablet (4 mg total) by mouth every 8 (eight) hours as needed for nausea or vomiting, Starting Wed 11/9/2022, Normal      pantoprazole (PROTONIX) 40 mg tablet Take 1 tablet (40 mg total) by mouth daily, Starting Thu 12/22/2022, Normal      potassium chloride (K-DUR,KLOR-CON) 20 mEq tablet 2 tabs BID day 1 the 2 tabs daily for 3 days  , Normal      Riboflavin 400 MG TABS Take 1 tablet (400 mg total) by mouth in the morning, Starting Fri 6/3/2022, Normal      rizatriptan (Maxalt) 10 mg tablet Take 1 tablet (10 mg total) by mouth as needed for migraine Take at the onset of migraine; if symptoms continue or return, may take another dose at least 2 hours after first dose  Take no more than 2 doses in a day , Starting Mon 12/5/2022, Normal      Syringe/Needle, Disp, (SYRINGE 3CC/35HJ1-2/4") 27G X 1-1/4" 3 ML MISC Use for B12 injections, Normal      traMADol (ULTRAM) 50 mg tablet Take 1 tablet (50 mg total) by mouth 2 (two) times a day, Starting Tue 11/15/2022, Normal      triamterene-hydrochlorothiazide (DYAZIDE) 37 5-25 mg per capsule Take 1 capsule by mouth every morning, Starting Mon 6/27/2022, Normal           No discharge procedures on file      PDMP Review       Value Time User    PDMP Reviewed  Yes 12/22/2022  8:44 AM Nelly Manley MD           ED Provider  Attending physically available and evaluated Santiago Smith Deepika Logan I managed the patient along with the ED Attending      Electronically Signed by         Yulissa Lara MD  01/06/23 2635

## 2023-01-06 NOTE — ED ATTENDING ATTESTATION
1/5/2023  IRadha MD, saw and evaluated the patient  I have discussed the patient with the resident/non-physician practitioner and agree with the resident's/non-physician practitioner's findings, Plan of Care, and MDM as documented in the resident's/non-physician practitioner's note, except where noted  All available labs and Radiology studies were reviewed  I was present for key portions of any procedure(s) performed by the resident/non-physician practitioner and I was immediately available to provide assistance  At this point I agree with the current assessment done in the Emergency Department    I have conducted an independent evaluation of this patient a history and physical is as follows:    ED Course         Critical Care Time  Procedures

## 2023-01-13 NOTE — PROGRESS NOTES
Given a referral for colonoscopy  Inj Trigger Point Single/Multiple     Date/Time 6/18/2021 4:00 PM     Performed by  Naz Jolley PA-C     Authorized by Naz Jolley PA-C      Universal Protocol   Consent: The procedure was performed in an emergent situation  Verbal consent obtained  Written consent obtained  Risks and benefits: risks, benefits and alternatives were discussed  Consent given by: patient       Procedure Details   Procedure Notes:  Trigger point injections were performed on an emergent basis today and they are part of ongoing therapy  Trigger point injections were performed in the sites of maximal tenderness as reported by the patient, using a 30 gauge 1/2 inch needle with a total of 5 mL 0 25% bupivacaine, w/o epi, w/o steroid  The following locations were injected:  Right and left suboccipital muscles, right and left cervical paraspinal muscles, right and left upper trapezius muscles  The patient tolerated the injections with no complications  She reports improved range of motion and relief of pain following the injections  Patient tolerance: patient tolerated the procedure well with no immediate complications                   ROS:  Constitutional: Negative  Negative for appetite change and fever  HENT: Negative  Negative for hearing loss, tinnitus, trouble swallowing and voice change  Eyes: Negative  Negative for photophobia and pain  Respiratory: Negative  Negative for shortness of breath  Cardiovascular: Negative  Negative for palpitations  Gastrointestinal: Negative  Negative for nausea and vomiting  Endocrine: Negative  Negative for cold intolerance  Genitourinary: Negative  Negative for dysuria, frequency and urgency  Musculoskeletal: Positive for neck pain and neck stiffness  Negative for myalgias  Skin: Negative  Negative for rash  Neurological: Positive for headaches (scale 4/10 pain level)   Negative for dizziness, tremors, seizures, syncope, facial asymmetry, speech difficulty, weakness, light-headedness and numbness  Hematological: Negative  Does not bruise/bleed easily  Psychiatric/Behavioral: Negative  Negative for confusion, hallucinations and sleep disturbance  The following portions of the patient's history were reviewed and updated as appropriate: allergies, current medications/ medication history, past family history, past medical history, past social history, past surgical history and problem list     Review of systems was reviewed and otherwise unremarkable from a neurological perspective

## 2023-01-16 DIAGNOSIS — M54.16 RADICULOPATHY, LUMBAR REGION: ICD-10-CM

## 2023-01-16 DIAGNOSIS — R29.898 RIGHT LEG WEAKNESS: ICD-10-CM

## 2023-01-16 DIAGNOSIS — J45.909 ASTHMA: ICD-10-CM

## 2023-01-16 RX ORDER — PREDNISONE 10 MG/1
TABLET ORAL
Qty: 30 TABLET | Refills: 0 | Status: SHIPPED | OUTPATIENT
Start: 2023-01-16 | End: 2023-06-26 | Stop reason: SDUPTHER

## 2023-01-16 RX ORDER — PREDNISONE 20 MG/1
TABLET ORAL
Qty: 27 TABLET | Refills: 0 | OUTPATIENT
Start: 2023-01-16

## 2023-01-16 NOTE — TELEPHONE ENCOUNTER
Patient requesting a refill of Prednisone for low back pain.  She is taking Soma for muscle spasms.   She cannot take anti-inflammatory meds due to history of gastric bypass.    Last ov 12/22/22  Next ov 2/17/23

## 2023-01-18 ENCOUNTER — TELEPHONE (OUTPATIENT)
Dept: INTERNAL MEDICINE CLINIC | Facility: OTHER | Age: 43
End: 2023-01-18

## 2023-01-18 DIAGNOSIS — I10 ESSENTIAL HYPERTENSION: ICD-10-CM

## 2023-01-18 RX ORDER — TRIAMTERENE AND HYDROCHLOROTHIAZIDE 37.5; 25 MG/1; MG/1
1 CAPSULE ORAL EVERY MORNING
Qty: 90 CAPSULE | Refills: 1 | Status: SHIPPED | OUTPATIENT
Start: 2023-01-18

## 2023-01-20 ENCOUNTER — OFFICE VISIT (OUTPATIENT)
Dept: NEUROLOGY | Facility: CLINIC | Age: 43
End: 2023-01-20

## 2023-01-20 VITALS — SYSTOLIC BLOOD PRESSURE: 122 MMHG | DIASTOLIC BLOOD PRESSURE: 78 MMHG | TEMPERATURE: 97.4 F | HEART RATE: 69 BPM

## 2023-01-20 DIAGNOSIS — M79.18 CERVICAL MYOFASCIAL PAIN SYNDROME: Primary | ICD-10-CM

## 2023-01-20 DIAGNOSIS — G43.009 MIGRAINE WITHOUT AURA AND WITHOUT STATUS MIGRAINOSUS, NOT INTRACTABLE: ICD-10-CM

## 2023-01-20 DIAGNOSIS — D18.00 CAVERNOUS ANGIOMA: ICD-10-CM

## 2023-01-20 RX ORDER — LIDOCAINE HYDROCHLORIDE 10 MG/ML
2.5 INJECTION, SOLUTION INFILTRATION; PERINEURAL ONCE
Status: COMPLETED | OUTPATIENT
Start: 2023-01-20 | End: 2023-01-20

## 2023-01-20 RX ADMIN — LIDOCAINE HYDROCHLORIDE 2.5 ML: 10 INJECTION, SOLUTION INFILTRATION; PERINEURAL at 17:22

## 2023-01-20 NOTE — PROGRESS NOTES
Patient ID: Becky Díaz is a 43 y o  adult  Assessment/Plan:       Diagnoses and all orders for this visit:    Cervical myofascial pain syndrome  -     lidocaine (XYLOCAINE) 1 % injection 2 5 mL  -     Trigger Injection 1 or 2 muscles    Migraine without aura and without status migrainosus, not intractable    Cavernous angioma         The patient presents today on an emergent basis for trigger point injections which have alleviated myofascial pain in the past for several weeks to months  See procedure noted below  She can continue Soma as needed judiciously  We also discussed the importance of continuing head and neck exercises as learned in prior PT, practicing good posture, TENS unit  She can also get a posture corrector to wear during the day  Follow-up as needed  The patient should not hesitate to call me with any questions or concerns  Subjective:    HPI    Ms Adriana Triana is here for neurological follow-up  She is requesting trigger point injections on an emergent basis  In the past they worked well to alleviate myofascial pain in the upper back and neck, and this alleviated her headaches as a result  She currently takes Tony Counter which is a new medication per her PCP, and she takes as needed myofascial pain but not daily  She denies any significant side effects to this and finds it helpful when needed  Pain is bilateral upper traps and middle traps  She has decreased range of motion when looking right to left due to stiffness and some discomfort  She denies pain into the arms or numbness, tingling or any weakness  Balance is adequate  Brain MRI 1/13/2022 unremarkable except for probable cavernoma in the left dorsal arcelia which is incidental     Cervical spine MRI 3/9/2020: "There is nonspecific straightening of the cervical lordosis without subluxation  Mild spondylosis  No cord compression or cord signal abnormality    Nonspecific mildly hypointense marrow signal   See concurrent lumbar spine  Red marrow conversion may be present  Other etiologies not excluded  No focally suspicious marrow lesion or compression abnormality "    Last CBC with differential essentially unremarkable  The following portions of the patient's history were reviewed and updated as appropriate:   She  has a past medical history of Anxiety, Asthma, COVID-19 virus infection (12/27/2021), Hernia of abdominal wall, Hypertension, Kidney stones, Seizures ([facility] [address] ), and Viral URI with cough (12/27/2021)  She   Patient Active Problem List    Diagnosis Date Noted   • Cavernous angioma 01/20/2023   • Chronic fatigue 05/26/2022   • Gender dysphoria 05/09/2022   • Chest pain 03/04/2022   • Muscle cramping 02/17/2022   • Palpitations 01/13/2022   • Subclinical hypothyroidism 01/13/2022   • Cervical myofascial pain syndrome 06/20/2021   • Hypokalemia 06/07/2021   • Elevated serum immunoglobulin free light chain level 04/05/2021   • Chronic pain syndrome 03/30/2021   • Lumbar disc herniation with radiculopathy 03/30/2021   • Acute sinusitis 11/17/2020   • Chronic neck pain 05/18/2020   • Low back pain 05/18/2020   • Herniation of intervertebral disc of lumbar spine 04/03/2020   • Morbid obesity with BMI of 50 0-59 9, adult ([facility] [address] ) 04/03/2020   • Left-sided weakness and sensory deficits 03/06/2020   • Migraine without aura and without status migrainosus, not intractable 02/21/9573   • Umbilical hernia 76/71/1437   • GERD (gastroesophageal reflux disease) 05/31/2019   • History of sleeve gastrectomy 03/19/2019   • Essential hypertension 03/19/2019   • Cervical disc disease 03/19/2019   • Male-to-female transgender person 03/19/2019   • Anxiety 03/19/2019     She  has a past surgical history that includes Gastric bypass; Appendectomy; Cholecystectomy; and Hernia repair  Her family history includes Aneurysm in her maternal uncle; Diabetes in her mother; Hypertension in her mother and sister;  No Known Problems in her father; Stroke in her maternal grandmother  She  reports that she has never smoked  She has never used smokeless tobacco  She reports current alcohol use  She reports that she does not use drugs  Current Outpatient Medications   Medication Sig Dispense Refill   • acetaminophen (TYLENOL) 500 mg tablet Take 500 mg by mouth if needed     • albuterol (PROVENTIL HFA,VENTOLIN HFA) 90 mcg/act inhaler Inhale 2 puffs every 6 (six) hours as needed for wheezing 6 7 g 2   • carisoprodol (SOMA) 250 MG Take 1 tablet (250 mg total) by mouth 3 (three) times a day 90 tablet 0   • colchicine (COLCRYS) 0 6 mg tablet Take 1 tablet (0 6 mg total) by mouth daily 2 pills now and 1 in an hour  One daily until resolved  20 tablet 0   • Cyanocobalamin (B-12) 1000 MCG/ML KIT Inject 1 ml IM in thigh/ buttocks or deltoid mm once weekly x 8 weeks  Afterward once monthly (Patient taking differently: if needed Inject 1 ml IM in thigh/ buttocks or deltoid mm once weekly x 8 weeks  Afterward once monthly) 8 mL 1   • estradiol (ESTRACE) 2 MG tablet Take 1 tablet (2 mg total) by mouth 2 (two) times a day 180 tablet 1   • hydrocortisone (ANUSOL-HC) 25 mg suppository Insert 1 suppository (25 mg total) into the rectum daily as needed for hemorrhoids 12 suppository 0   • LORazepam (ATIVAN) 1 mg tablet Take 0 5 tablets (0 5 mg total) by mouth every 6 (six) hours as needed for anxiety 60 tablet 1   • montelukast (SINGULAIR) 10 mg tablet Take 1 tablet (10 mg total) by mouth daily at bedtime 90 tablet 1   • multivitamin (THERAGRAN) TABS Take 1 tablet by mouth daily     • ondansetron (ZOFRAN-ODT) 4 mg disintegrating tablet Take 1 tablet (4 mg total) by mouth every 8 (eight) hours as needed for nausea or vomiting 60 tablet 1   • pantoprazole (PROTONIX) 40 mg tablet Take 1 tablet (40 mg total) by mouth daily 90 tablet 1   • potassium chloride (K-DUR,KLOR-CON) 20 mEq tablet 2 tabs BID day 1 the 2 tabs daily for 3 days   (Patient not taking: Reported on 12/22/2022) 10 tablet 5   • predniSONE 10 mg tablet 4 tablets daily x 3 days;  3 tablets daily x 3 days; 2 tablets daily x 3 days; 1 tablet daily x 3 days 30 tablet 0   • Riboflavin 400 MG TABS Take 1 tablet (400 mg total) by mouth in the morning 90 tablet 3   • rizatriptan (Maxalt) 10 mg tablet Take 1 tablet (10 mg total) by mouth as needed for migraine Take at the onset of migraine; if symptoms continue or return, may take another dose at least 2 hours after first dose  Take no more than 2 doses in a day  9 tablet 0   • Syringe/Needle, Disp, (SYRINGE 3CC/25AC3-8/4") 27G X 1-1/4" 3 ML MISC Use for B12 injections 10 each 1   • traMADol (ULTRAM) 50 mg tablet Take 1 tablet (50 mg total) by mouth 2 (two) times a day 60 tablet 1   • triamterene-hydrochlorothiazide (DYAZIDE) 37 5-25 mg per capsule Take 1 capsule by mouth every morning 90 capsule 1     No current facility-administered medications for this visit  She is allergic to gadolinium and iodinated contrast media            Objective:    Blood pressure 122/78, pulse 69, temperature (!) 97 4 °F (36 3 °C), temperature source Temporal       Physical Exam    Neurological Exam  On neurologic exam, the patient is alert and oriented to time and place  Speech is fluent and articulate, and the patient follows commands appropriately  Judgment and affect appear normal  Pupils are equally round and reactive to light and extraocular muscles are intact without nystagmus  Face is symmetric  Hearing is intact  Motor examination reveals intact strength throughout  Normal gait is steady  + Mild tenderness to palpation of the posterior neck muscles bilaterally with decreased range of motion when looking right and left  ROS:    Review of Systems   Constitutional: Negative for chills and fever  HENT: Negative for ear pain and sore throat  Eyes: Negative for pain and visual disturbance  Respiratory: Negative for cough and shortness of breath      Cardiovascular: Negative for chest pain and palpitations  Gastrointestinal: Negative for abdominal pain and vomiting  Genitourinary: Negative for dysuria and hematuria  Musculoskeletal: Positive for myalgias, neck pain and neck stiffness  Negative for arthralgias and back pain  Skin: Negative for color change and rash  Allergic/Immunologic: Negative for environmental allergies, food allergies and immunocompromised state  Neurological: Positive for headaches  Negative for seizures and syncope  Psychiatric/Behavioral: Negative for behavioral problems and suicidal ideas  The patient is not nervous/anxious and is not hyperactive  All other systems reviewed and are negative  The following portions of the patient's history were reviewed and updated as appropriate: allergies, current medications/ medication history, past family history, past medical history, past social history, past surgical history and problem list     Review of systems was reviewed and otherwise unremarkable from a neurological perspective  Universal Protocol:  Consent: The procedure was performed in an emergent situation  Verbal consent obtained  Risks and benefits: risks, benefits and alternatives were discussed  Consent given by: patient    Procedure Details  Location(s):  Patient tolerance: patient tolerated the procedure well with no immediate complications  Additional procedure details: Trigger point injections were performed on an emergent basis and are a part of ongoing therapy  Risks, benefits, alternatives, infection, bleeding and allergic reaction were discussed with the patient  Verbal consent was obtained prior to the procedure and is detailed in the patient's record      Trigger point injections were performed in the following locations using a total 2 5 mL of 1% lidocaine, without steroid, using a 30 gauge, 1/2 inch needle: Several injections in the upper and middle trapezius muscles, bilateral splenius capitis, bilateral levators (superior portion)

## 2023-01-22 NOTE — PATIENT INSTRUCTIONS
Headache management instructions  - When patient has a moderate to severe headache, they should seek rest, initiate relaxation and apply cold compresses to the head  - Maintain regular sleep schedule  Adults need at least 7-8 hours of uninterrupted a night  - Limit over the counter medications such as Tylenol, Ibuprofen, Aleve, Excedrin  (No more than 2- 3 times a week or max 10 a month)  - Maintain headache diary  Free DELPHINE for a smart phone, which can be used is "Migraine reynaldo"  - Limit caffeine to 1-2 cups 8 to 16 oz a day or less  - Avoid dietary trigger  (aged cheese, peanuts, MSG, aspartame and nitrates)  - Patient is to have regular frequent meals to prevent headache onset  - Please drink at least 64 ounces of water a day to help remain hydrated

## 2023-02-13 DIAGNOSIS — G43.709 CHRONIC MIGRAINE WITHOUT AURA: ICD-10-CM

## 2023-02-13 RX ORDER — RIZATRIPTAN BENZOATE 10 MG/1
10 TABLET ORAL AS NEEDED
Qty: 9 TABLET | Refills: 5 | Status: SHIPPED | OUTPATIENT
Start: 2023-02-13 | End: 2023-03-31

## 2023-02-17 ENCOUNTER — OFFICE VISIT (OUTPATIENT)
Dept: NEUROLOGY | Facility: CLINIC | Age: 43
End: 2023-02-17

## 2023-02-17 DIAGNOSIS — G43.709 CHRONIC MIGRAINE WITHOUT AURA WITHOUT STATUS MIGRAINOSUS, NOT INTRACTABLE: Primary | ICD-10-CM

## 2023-02-17 RX ORDER — KETOROLAC TROMETHAMINE 30 MG/ML
30 INJECTION, SOLUTION INTRAMUSCULAR; INTRAVENOUS ONCE
Status: CANCELLED | OUTPATIENT
Start: 2023-02-17 | End: 2023-02-17

## 2023-02-17 RX ORDER — KETOROLAC TROMETHAMINE 30 MG/ML
30 INJECTION, SOLUTION INTRAMUSCULAR; INTRAVENOUS ONCE
Status: COMPLETED | OUTPATIENT
Start: 2023-02-17 | End: 2023-02-17

## 2023-02-17 RX ADMIN — KETOROLAC TROMETHAMINE 30 MG: 30 INJECTION, SOLUTION INTRAMUSCULAR; INTRAVENOUS at 11:25

## 2023-02-21 NOTE — PROGRESS NOTES
Toradol injection ordered 2/17/23 per documentation in chart  See 2/17/23 "orders" encounter  Pt received injection in left deltoid, no complications

## 2023-03-10 DIAGNOSIS — M62.838 MUSCLE SPASMS OF BOTH LOWER EXTREMITIES: ICD-10-CM

## 2023-03-10 DIAGNOSIS — R51.9 WORSENING HEADACHES: ICD-10-CM

## 2023-03-10 RX ORDER — TRAMADOL HYDROCHLORIDE 50 MG/1
50 TABLET ORAL 2 TIMES DAILY
Qty: 60 TABLET | Refills: 1 | Status: SHIPPED | OUTPATIENT
Start: 2023-03-10

## 2023-03-10 RX ORDER — CARISOPRODOL 250 MG/1
250 TABLET ORAL 3 TIMES DAILY
Qty: 90 TABLET | Refills: 0 | Status: SHIPPED | OUTPATIENT
Start: 2023-03-10

## 2023-03-10 NOTE — TELEPHONE ENCOUNTER
rx refill for traMADol (ULTRAM) 50 mg tablet and carisoprodol (SOMA) 250 MG      Send to [address] [facility]) [facility] - [address]     LOV-12/22/22 NOV-5/4/23

## 2023-03-10 NOTE — TELEPHONE ENCOUNTER
Patient also needs a refill on her SOMA  Patient knows that this medication is not covered by insurance and will pay out of pocket for it

## 2023-03-13 NOTE — TELEPHONE ENCOUNTER
rec'd a prior auth for the carisoprodol 250mg    Scanning into media and sending to [address] office

## 2023-03-14 ENCOUNTER — TELEPHONE (OUTPATIENT)
Dept: INTERNAL MEDICINE CLINIC | Facility: CLINIC | Age: 43
End: 2023-03-14

## 2023-03-31 DIAGNOSIS — G43.709 CHRONIC MIGRAINE WITHOUT AURA: ICD-10-CM

## 2023-03-31 RX ORDER — RIZATRIPTAN BENZOATE 10 MG/1
10 TABLET ORAL AS NEEDED
Qty: 9 TABLET | Refills: 6 | Status: SHIPPED | OUTPATIENT
Start: 2023-03-31 | End: 2023-05-19

## 2023-04-24 DIAGNOSIS — K42.9 UMBILICAL HERNIA WITHOUT OBSTRUCTION AND WITHOUT GANGRENE: ICD-10-CM

## 2023-04-24 DIAGNOSIS — K21.9 GASTROESOPHAGEAL REFLUX DISEASE WITHOUT ESOPHAGITIS: ICD-10-CM

## 2023-04-24 RX ORDER — PANTOPRAZOLE SODIUM 40 MG/1
40 TABLET, DELAYED RELEASE ORAL DAILY
Qty: 90 TABLET | Refills: 1 | Status: SHIPPED | OUTPATIENT
Start: 2023-04-24

## 2023-05-04 ENCOUNTER — OFFICE VISIT (OUTPATIENT)
Dept: INTERNAL MEDICINE CLINIC | Facility: CLINIC | Age: 43
End: 2023-05-04

## 2023-05-04 VITALS
WEIGHT: 315 LBS | TEMPERATURE: 97.8 F | BODY MASS INDEX: 46.65 KG/M2 | SYSTOLIC BLOOD PRESSURE: 130 MMHG | HEIGHT: 69 IN | OXYGEN SATURATION: 99 % | HEART RATE: 74 BPM | DIASTOLIC BLOOD PRESSURE: 82 MMHG

## 2023-05-04 DIAGNOSIS — M54.2 CHRONIC NECK PAIN: ICD-10-CM

## 2023-05-04 DIAGNOSIS — E87.6 HYPOKALEMIA: ICD-10-CM

## 2023-05-04 DIAGNOSIS — G89.29 CHRONIC NECK PAIN: ICD-10-CM

## 2023-05-04 DIAGNOSIS — I10 ESSENTIAL HYPERTENSION: ICD-10-CM

## 2023-05-04 DIAGNOSIS — M50.90 CERVICAL DISC DISEASE: Primary | ICD-10-CM

## 2023-05-04 DIAGNOSIS — E66.01 MORBID OBESITY WITH BMI OF 50.0-59.9, ADULT (HCC): ICD-10-CM

## 2023-05-04 DIAGNOSIS — K21.9 GASTROESOPHAGEAL REFLUX DISEASE WITHOUT ESOPHAGITIS: ICD-10-CM

## 2023-05-04 PROBLEM — K01.1 IMPACTED THIRD MOLAR TOOTH: Status: ACTIVE | Noted: 2023-05-04

## 2023-05-04 NOTE — ASSESSMENT & PLAN NOTE
Patient recently seen in the emergency room was found to have a potassium level of 3 4    Recommended that the patient take 1 potassium supplement per week regular basis

## 2023-05-04 NOTE — PROGRESS NOTES
Name: Charlee Franco      : 1980      MRN: [id number]  Encounter Provider: Zack Adkins MD  Encounter Date: 2023   Encounter department: [address] [place] [address]     1  Cervical disc disease  Assessment & Plan:  Continues with home exercises, use of Soma 3 times daily as needed      2  Chronic neck pain    3  Essential hypertension  Assessment & Plan:  Pressure is stable and controlled on current medications    Orders:  -     CBC and differential; Future; Expected date: 10/30/2023  -     Comprehensive metabolic panel; Future; Expected date: 10/30/2023  -     Lipid panel; Future; Expected date: 10/30/2023    4  Gastroesophageal reflux disease without esophagitis  Assessment & Plan:  Pantoprazole to continue    Orders:  -     CBC and differential; Future; Expected date: 10/30/2023  -     Comprehensive metabolic panel; Future; Expected date: 10/30/2023  -     Magnesium; Future; Expected date: 10/30/2023    5  Hypokalemia  Assessment & Plan:  Patient recently seen in the emergency room was found to have a potassium level of 3 4  Recommended that the patient take 1 potassium supplement per week regular basis    Orders:  -     Comprehensive metabolic panel; Future; Expected date: 10/30/2023    6  Morbid obesity with BMI of 50 0-59 9, adult [facility])  Assessment & Plan:  Recommended the patient contact bariatric medicine to hook up with a weight loss program given the fact that she has already undergone gastric sleeve surgery and remains a dangerously high BMI    Orders:  -     Lipid panel; Future; Expected date: 10/30/2023         Subjective      Patient presents for 4-month checkup  No particular complaints at this time  Had labs done on  and also CT scan when the patient was seen in the emergency department for an acute UTI  CT scan did make note of a possible inflamed bladder wall    Urine was positive for E  coli urinary tract infection greater than 100,000  The patient was treated with antibiotics for a week and is fully recovered and is now asymptomatic  Hypertension      Review of Systems   Constitutional: Negative  HENT: Negative  Eyes: Negative  Respiratory: Negative  Cardiovascular: Negative  Gastrointestinal: Negative  Endocrine: Negative  Genitourinary: Negative  Musculoskeletal: Negative  Skin: Negative  Allergic/Immunologic: Negative  Neurological: Negative  Hematological: Negative  Psychiatric/Behavioral: Negative  Current Outpatient Medications on File Prior to Visit   Medication Sig    acetaminophen (TYLENOL) 500 mg tablet Take 500 mg by mouth if needed    albuterol (PROVENTIL HFA,VENTOLIN HFA) 90 mcg/act inhaler Inhale 2 puffs every 6 (six) hours as needed for wheezing    carisoprodol (SOMA) 250 MG Take 1 tablet (250 mg total) by mouth 3 (three) times a day    colchicine (COLCRYS) 0 6 mg tablet Take 1 tablet (0 6 mg total) by mouth daily 2 pills now and 1 in an hour  One daily until resolved   Cyanocobalamin (B-12) 1000 MCG/ML KIT Inject 1 ml IM in thigh/ buttocks or deltoid mm once weekly x 8 weeks  Afterward once monthly (Patient taking differently: if needed Inject 1 ml IM in thigh/ buttocks or deltoid mm once weekly x 8 weeks   Afterward once monthly)    estradiol (ESTRACE) 2 MG tablet Take 1 tablet (2 mg total) by mouth 2 (two) times a day    hydrocortisone (ANUSOL-HC) 25 mg suppository Insert 1 suppository (25 mg total) into the rectum daily as needed for hemorrhoids    LORazepam (ATIVAN) 1 mg tablet Take 0 5 tablets (0 5 mg total) by mouth every 6 (six) hours as needed for anxiety    montelukast (SINGULAIR) 10 mg tablet Take 1 tablet (10 mg total) by mouth daily at bedtime    multivitamin (THERAGRAN) TABS Take 1 tablet by mouth daily    ondansetron (ZOFRAN-ODT) 4 mg disintegrating tablet Take 1 tablet (4 mg total) by mouth every 8 (eight) hours as needed for nausea or vomiting "   pantoprazole (PROTONIX) 40 mg tablet Take 1 tablet (40 mg total) by mouth daily    potassium chloride (K-DUR,KLOR-CON) 20 mEq tablet 2 tabs BID day 1 the 2 tabs daily for 3 days  (Patient taking differently: as needed 2 tabs BID day 1 the 2 tabs daily for 3 days )    predniSONE 10 mg tablet 4 tablets daily x 3 days;  3 tablets daily x 3 days; 2 tablets daily x 3 days; 1 tablet daily x 3 days    Riboflavin 400 MG TABS Take 1 tablet (400 mg total) by mouth in the morning    rizatriptan (Maxalt) 10 mg tablet Take 1 tablet (10 mg total) by mouth as needed for migraine Take at the onset of migraine; if symptoms continue or return, may take another dose at least 2 hours after first dose  Take no more than 2 doses in a day   Syringe/Needle, Disp, (SYRINGE 3CC/49YQ2-6/4\") 27G X 1-1/4\" 3 ML MISC Use for B12 injections    traMADol (ULTRAM) 50 mg tablet Take 1 tablet (50 mg total) by mouth 2 (two) times a day    triamterene-hydrochlorothiazide (DYAZIDE) 37 5-25 mg per capsule Take 1 capsule by mouth every morning       Objective     /82 (BP Location: Left arm, Patient Position: Sitting, Cuff Size: Large)   Pulse 74   Temp 97 8 °F (36 6 °C) (Temporal)   Ht 5' 9\" (1 753 m)   Wt (!) 159 kg (350 lb)   SpO2 99%   BMI 51 69 kg/m²     Physical Exam  Vitals reviewed  Constitutional:       General: She is not in acute distress  Appearance: She is obese  She is not ill-appearing, toxic-appearing or diaphoretic  HENT:      Head: Normocephalic and atraumatic  Right Ear: External ear normal       Left Ear: External ear normal       Mouth/Throat:      Mouth: Mucous membranes are moist    Eyes:      Pupils: Pupils are equal, round, and reactive to light  Cardiovascular:      Rate and Rhythm: Normal rate and regular rhythm  Heart sounds: Normal heart sounds  No murmur heard  Pulmonary:      Effort: Pulmonary effort is normal  No respiratory distress  Breath sounds: Normal breath sounds   " Abdominal:      General: Abdomen is flat  Comments: Ventral hernia identified on CT scan   Musculoskeletal:         General: No swelling, deformity or signs of injury  Cervical back: Neck supple  No rigidity  Right lower leg: No edema  Left lower leg: No edema  Skin:     Capillary Refill: Capillary refill takes less than 2 seconds  Coloration: Skin is not jaundiced  Findings: No bruising, erythema or rash  Neurological:      General: No focal deficit present  Mental Status: She is alert and oriented to person, place, and time  Mental status is at baseline     Psychiatric:         Mood and Affect: Mood normal          Behavior: Behavior normal        Sreekanth Lunsford MD

## 2023-05-04 NOTE — ASSESSMENT & PLAN NOTE
Recommended the patient contact bariatric medicine to hook up with a weight loss program given the fact that she has already undergone gastric sleeve surgery and remains a dangerously high BMI

## 2023-05-05 ENCOUNTER — CLINICAL SUPPORT (OUTPATIENT)
Dept: NEUROLOGY | Facility: CLINIC | Age: 43
End: 2023-05-05

## 2023-05-05 DIAGNOSIS — G43.109 MIGRAINE WITH AURA AND WITHOUT STATUS MIGRAINOSUS, NOT INTRACTABLE: Primary | ICD-10-CM

## 2023-05-05 DIAGNOSIS — M79.18 CERVICAL MYOFASCIAL PAIN SYNDROME: ICD-10-CM

## 2023-05-05 DIAGNOSIS — G43.119 INTRACTABLE MIGRAINE WITH AURA WITHOUT STATUS MIGRAINOSUS: ICD-10-CM

## 2023-05-05 RX ORDER — KETOROLAC TROMETHAMINE 30 MG/ML
30 INJECTION, SOLUTION INTRAMUSCULAR; INTRAVENOUS ONCE
Status: COMPLETED | OUTPATIENT
Start: 2023-05-05 | End: 2023-05-05

## 2023-05-05 RX ADMIN — KETOROLAC TROMETHAMINE 30 MG: 30 INJECTION, SOLUTION INTRAMUSCULAR; INTRAVENOUS at 10:47

## 2023-05-19 DIAGNOSIS — G43.109 MIGRAINE WITH AURA AND WITHOUT STATUS MIGRAINOSUS, NOT INTRACTABLE: Primary | ICD-10-CM

## 2023-05-24 ENCOUNTER — CLINICAL SUPPORT (OUTPATIENT)
Dept: NEUROLOGY | Facility: CLINIC | Age: 43
End: 2023-05-24

## 2023-05-24 DIAGNOSIS — G43.009 MIGRAINE WITHOUT AURA AND WITHOUT STATUS MIGRAINOSUS, NOT INTRACTABLE: ICD-10-CM

## 2023-05-24 DIAGNOSIS — E53.8 B12 DEFICIENCY: Primary | ICD-10-CM

## 2023-05-24 DIAGNOSIS — G43.109 MIGRAINE WITH AURA AND WITHOUT STATUS MIGRAINOSUS, NOT INTRACTABLE: Primary | ICD-10-CM

## 2023-05-24 DIAGNOSIS — R51.9 WORSENING HEADACHES: ICD-10-CM

## 2023-05-24 RX ORDER — KETOROLAC TROMETHAMINE 30 MG/ML
30 INJECTION, SOLUTION INTRAMUSCULAR; INTRAVENOUS ONCE
Status: COMPLETED | OUTPATIENT
Start: 2023-05-24 | End: 2023-05-24

## 2023-05-24 RX ORDER — TRAMADOL HYDROCHLORIDE 50 MG/1
TABLET ORAL
Qty: 60 TABLET | Refills: 0 | Status: SHIPPED | OUTPATIENT
Start: 2023-05-24

## 2023-05-24 RX ADMIN — KETOROLAC TROMETHAMINE 30 MG: 30 INJECTION, SOLUTION INTRAMUSCULAR; INTRAVENOUS at 15:18

## 2023-05-30 DIAGNOSIS — G43.109 MIGRAINE WITH AURA AND WITHOUT STATUS MIGRAINOSUS, NOT INTRACTABLE: ICD-10-CM

## 2023-05-30 NOTE — PROGRESS NOTES
Pt recently trialed on nurtec as abortive therapy for migraines, and has had good relief with migraine prevention while using it  She has previously tried/failed topamax, which led to side effects of sedation, and propranolol+gabapentin, which were ineffective  Recommend continuation of nurtec as preventative therapy given good therapeutic effect, with plan for use of maxalt as abortive therapy in the future  New script sent

## 2023-06-01 ENCOUNTER — TELEPHONE (OUTPATIENT)
Dept: NEUROLOGY | Facility: CLINIC | Age: 43
End: 2023-06-01

## 2023-06-01 NOTE — TELEPHONE ENCOUNTER
Received fax from [facility]    Key [id number]    PA initiated on CMM    Awaiting determination

## 2023-06-02 ENCOUNTER — APPOINTMENT (OUTPATIENT)
Dept: LAB | Facility: CLINIC | Age: 43
End: 2023-06-02
Payer: COMMERCIAL

## 2023-06-02 DIAGNOSIS — E53.8 B12 DEFICIENCY: ICD-10-CM

## 2023-06-02 LAB — VIT B12 SERPL-MCNC: 164 PG/ML (ref 180–914)

## 2023-06-02 PROCEDURE — 36415 COLL VENOUS BLD VENIPUNCTURE: CPT

## 2023-06-02 PROCEDURE — 82607 VITAMIN B-12: CPT

## 2023-06-05 ENCOUNTER — CLINICAL SUPPORT (OUTPATIENT)
Dept: NEUROLOGY | Facility: CLINIC | Age: 43
End: 2023-06-05
Payer: COMMERCIAL

## 2023-06-05 DIAGNOSIS — E53.8 B12 DEFICIENCY: Primary | ICD-10-CM

## 2023-06-05 PROCEDURE — 96372 THER/PROPH/DIAG INJ SC/IM: CPT | Performed by: STUDENT IN AN ORGANIZED HEALTH CARE EDUCATION/TRAINING PROGRAM

## 2023-06-05 RX ORDER — CYANOCOBALAMIN 1000 UG/ML
1000 INJECTION, SOLUTION INTRAMUSCULAR; SUBCUTANEOUS
Status: CANCELLED | OUTPATIENT
Start: 2023-06-05

## 2023-06-05 RX ORDER — CYANOCOBALAMIN 1000 UG/ML
1000 INJECTION, SOLUTION INTRAMUSCULAR; SUBCUTANEOUS
Status: SHIPPED | OUTPATIENT
Start: 2023-06-05

## 2023-06-05 RX ADMIN — CYANOCOBALAMIN 1000 MCG: 1000 INJECTION, SOLUTION INTRAMUSCULAR; SUBCUTANEOUS at 10:13

## 2023-06-07 DIAGNOSIS — M79.18 CERVICAL MYOFASCIAL PAIN SYNDROME: Primary | ICD-10-CM

## 2023-06-07 RX ORDER — ORPHENADRINE CITRATE 100 MG/1
100 TABLET, EXTENDED RELEASE ORAL 2 TIMES DAILY PRN
Qty: 30 TABLET | Refills: 3 | Status: SHIPPED | OUTPATIENT
Start: 2023-06-07

## 2023-06-19 ENCOUNTER — CLINICAL SUPPORT (OUTPATIENT)
Dept: NEUROLOGY | Facility: CLINIC | Age: 43
End: 2023-06-19
Payer: COMMERCIAL

## 2023-06-19 DIAGNOSIS — E53.8 B12 DEFICIENCY: Primary | ICD-10-CM

## 2023-06-19 PROCEDURE — 96372 THER/PROPH/DIAG INJ SC/IM: CPT | Performed by: STUDENT IN AN ORGANIZED HEALTH CARE EDUCATION/TRAINING PROGRAM

## 2023-06-19 RX ORDER — CYANOCOBALAMIN 1000 UG/ML
1000 INJECTION, SOLUTION INTRAMUSCULAR; SUBCUTANEOUS
Status: CANCELLED | OUTPATIENT
Start: 2023-06-19

## 2023-06-19 RX ADMIN — CYANOCOBALAMIN 1000 MCG: 1000 INJECTION, SOLUTION INTRAMUSCULAR; SUBCUTANEOUS at 13:10

## 2023-06-26 ENCOUNTER — TELEPHONE (OUTPATIENT)
Dept: INTERNAL MEDICINE CLINIC | Facility: CLINIC | Age: 43
End: 2023-06-26

## 2023-06-26 DIAGNOSIS — M54.16 RADICULOPATHY, LUMBAR REGION: ICD-10-CM

## 2023-06-26 RX ORDER — PREDNISONE 10 MG/1
TABLET ORAL
Qty: 30 TABLET | Refills: 0 | Status: SHIPPED | OUTPATIENT
Start: 2023-06-26

## 2023-06-26 NOTE — TELEPHONE ENCOUNTER
RX refill for predniSONE 10 mg tablet      Send to [address]) [facility], [address]     Nov- 11/14/23

## 2023-06-30 ENCOUNTER — TELEMEDICINE (OUTPATIENT)
Dept: INTERNAL MEDICINE CLINIC | Facility: OTHER | Age: 43
End: 2023-06-30
Payer: COMMERCIAL

## 2023-06-30 VITALS
DIASTOLIC BLOOD PRESSURE: 80 MMHG | SYSTOLIC BLOOD PRESSURE: 150 MMHG | BODY MASS INDEX: 46.65 KG/M2 | HEIGHT: 69 IN | WEIGHT: 315 LBS

## 2023-06-30 DIAGNOSIS — J45.998 SEASONAL ASTHMA: Primary | ICD-10-CM

## 2023-06-30 PROBLEM — J01.90 ACUTE SINUSITIS: Status: RESOLVED | Noted: 2020-11-17 | Resolved: 2023-06-30

## 2023-06-30 RX ORDER — FLUTICASONE PROPIONATE 50 MCG
1 SPRAY, SUSPENSION (ML) NASAL DAILY
Qty: 16 G | Refills: 3 | Status: SHIPPED | OUTPATIENT
Start: 2023-06-30

## 2023-06-30 RX ORDER — IPRATROPIUM BROMIDE AND ALBUTEROL SULFATE 2.5; .5 MG/3ML; MG/3ML
3 SOLUTION RESPIRATORY (INHALATION) EVERY 6 HOURS PRN
Qty: 60 ML | Refills: 1 | Status: SHIPPED | OUTPATIENT
Start: 2023-06-30

## 2023-06-30 NOTE — PROGRESS NOTES
Virtual Regular Visit    Verification of patient location:    Patient is located at Home in the following state in which I hold an active license [place]      Assessment/Plan:    Problem List Items Addressed This Visit        Respiratory    Seasonal asthma - Primary     - refilled given of duo neb solution for her nebulizer  - continue neb up to 4x a day as needed  - continue Singulair and claritin  - recommend starting flonase 2 sprays each nostril daily  - follow up in office for any worsening symptoms          Relevant Medications    ipratropium-albuterol (DUO-NEB) 0 5-2 5 mg/3 mL nebulizer solution    fluticasone (FLONASE) 50 mcg/act nasal spray            Reason for visit is   Chief Complaint   Patient presents with   • Virtual Brief Visit     Text message    • URI     Pt wants refill of nebulizer medication  • Virtual Regular Visit        Encounter provider NATHAN Wilkinson    Provider located at [address] [place] [id number]      Recent Visits  Date Type Provider Dept   06/26/23 Telephone MD Aleksey Hoyos [id number]   Showing recent visits within past 7 days and meeting all other requirements  Today's Visits  Date Type Provider Dept   06/30/23 NATHAN Bhat [facility]   Showing today's visits and meeting all other requirements  Future Appointments  No visits were found meeting these conditions  Showing future appointments within next 150 days and meeting all other requirements       The patient was identified by name and date of birth  Grazyna Ruano was informed that this is a telemedicine visit and that the visit is being conducted through the [facility]  She agrees to proceed     My office door was closed  No one else was in the room    She acknowledged consent and understanding of privacy and security of the video platform  The patient has agreed to participate and understands they can discontinue the visit at any time  Patient is aware this is a billable service  Subjective  Stef Owen is a 37 y o  adult presents today to request a refill of her duo neb solution  She states she has been using her nebulizer about once a day for the last week  She has head congestion and post nasal drip and tells me this often turns into bronchitis for her so she is trying to keep her chest open  She denies any resp distress  No chest tightness or SOB  She does have a dry cough  She reports having seasonal asthma and states it has been worse with the poor air quality lately  She is also on Singulair and Claritin  HPI     Past Medical History:   Diagnosis Date   • Anxiety    • Asthma     seasonal   • COVID-19 virus infection 12/27/2021   • Hernia of abdominal wall    • Hypertension    • Kidney stones    • Seizures ([facility] [address] )     pseudoseizures since 2012   • Viral URI with cough 12/27/2021       Past Surgical History:   Procedure Laterality Date   • APPENDECTOMY     • CHOLECYSTECTOMY     • GASTRIC BYPASS     • HERNIA REPAIR      x2       Current Outpatient Medications   Medication Sig Dispense Refill   • acetaminophen (TYLENOL) 500 mg tablet Take 500 mg by mouth if needed     • albuterol (PROVENTIL HFA,VENTOLIN HFA) 90 mcg/act inhaler Inhale 2 puffs every 6 (six) hours as needed for wheezing 6 7 g 2   • carisoprodol (SOMA) 250 MG Take 1 tablet (250 mg total) by mouth 3 (three) times a day 90 tablet 0   • colchicine (COLCRYS) 0 6 mg tablet Take 1 tablet (0 6 mg total) by mouth daily 2 pills now and 1 in an hour  One daily until resolved  (Patient taking differently: Take 0 6 mg by mouth daily as needed 2 pills now and 1 in an hour  One daily until resolved ) 20 tablet 0   • Cyanocobalamin (B-12) 1000 MCG/ML KIT Inject 1 ml IM in thigh/ buttocks or deltoid mm once weekly x 8 weeks   Afterward once "monthly (Patient taking differently: if needed Inject 1 ml IM in thigh/ buttocks or deltoid mm once weekly x 8 weeks  Afterward once monthly) 8 mL 1   • estradiol (ESTRACE) 2 MG tablet Take 1 tablet (2 mg total) by mouth 2 (two) times a day 180 tablet 1   • fluticasone (FLONASE) 50 mcg/act nasal spray 1 spray into each nostril daily 16 g 3   • hydrocortisone (ANUSOL-HC) 25 mg suppository Insert 1 suppository (25 mg total) into the rectum daily as needed for hemorrhoids 12 suppository 0   • ipratropium-albuterol (DUO-NEB) 0 5-2 5 mg/3 mL nebulizer solution Take 3 mL by nebulization every 6 (six) hours as needed for wheezing or shortness of breath 60 mL 1   • LORazepam (ATIVAN) 1 mg tablet Take 0 5 tablets (0 5 mg total) by mouth every 6 (six) hours as needed for anxiety 60 tablet 1   • montelukast (SINGULAIR) 10 mg tablet Take 1 tablet (10 mg total) by mouth daily at bedtime 90 tablet 1   • multivitamin (THERAGRAN) TABS Take 1 tablet by mouth daily     • ondansetron (ZOFRAN-ODT) 4 mg disintegrating tablet Take 1 tablet (4 mg total) by mouth every 8 (eight) hours as needed for nausea or vomiting 60 tablet 1   • orphenadrine (NORFLEX) 100 mg tablet Take 1 tablet (100 mg total) by mouth 2 (two) times a day as needed for muscle spasms 30 tablet 3   • pantoprazole (PROTONIX) 40 mg tablet Take 1 tablet (40 mg total) by mouth daily 90 tablet 1   • potassium chloride (K-DUR,KLOR-CON) 20 mEq tablet 2 tabs BID day 1 the 2 tabs daily for 3 days  (Patient taking differently: as needed 2 tabs BID day 1 the 2 tabs daily for 3 days  ) 10 tablet 5   • predniSONE 10 mg tablet 4 tablets daily x 3 days;  3 tablets daily x 3 days; 2 tablets daily x 3 days; 1 tablet daily x 3 days 30 tablet 0   • Riboflavin 400 MG TABS Take 1 tablet (400 mg total) by mouth in the morning 90 tablet 3   • rimegepant sulfate (NURTEC) 75 mg TBDP Take 1 tablet (75 mg total) by mouth every other day 15 tablet 3   • Syringe/Needle, Disp, (SYRINGE 3CC/19UX2-2/4\") " "27G X 1-1/4\" 3 ML MISC Use for B12 injections 10 each 1   • traMADol (ULTRAM) 50 mg tablet TAKE ONE TABLET BY MOUTH 2 TIMES A DAY  60 tablet 0   • triamterene-hydrochlorothiazide (DYAZIDE) 37 5-25 mg per capsule Take 1 capsule by mouth every morning 90 capsule 1     Current Facility-Administered Medications   Medication Dose Route Frequency Provider Last Rate Last Admin   • cyanocobalamin injection 1,000 mcg  1,000 mcg Intramuscular Q30 Days Jere Weiss MD   1,000 mcg at 06/19/23 1310        Allergies   Allergen Reactions   • Gadolinium Anaphylaxis     \"Oxygen dropped and coded during\"      • Iodinated Contrast Media Anaphylaxis       Review of Systems   Constitutional: Negative for fever  HENT: Positive for congestion, postnasal drip and rhinorrhea  Negative for sore throat  Respiratory: Positive for cough  Negative for chest tightness, shortness of breath and wheezing  Video Exam    Vitals:    06/30/23 0759   BP: 150/80   BP Location: Left arm   Weight: (!) 159 kg (350 lb)   Height: 5' 9\" (1 753 m)       Physical Exam  Vitals reviewed  Constitutional:       General: She is not in acute distress  Appearance: Normal appearance  She is obese  She is not diaphoretic  HENT:      Head: Normocephalic and atraumatic  Pulmonary:      Effort: Pulmonary effort is normal  No respiratory distress  Comments: + dry cough during exam  No conversational dyspnea  Neurological:      Mental Status: She is alert  Mental status is at baseline     Psychiatric:         Mood and Affect: Mood normal          Behavior: Behavior normal           Visit Time  Total Visit Duration: 6 minutes       "

## 2023-06-30 NOTE — ASSESSMENT & PLAN NOTE
- refilled given of duo neb solution for her nebulizer  - continue neb up to 4x a day as needed  - continue Singulair and claritin  - recommend starting flonase 2 sprays each nostril daily  - follow up in office for any worsening symptoms

## 2023-07-09 DIAGNOSIS — K64.9 HEMORRHOIDS, UNSPECIFIED HEMORRHOID TYPE: ICD-10-CM

## 2023-07-09 DIAGNOSIS — R51.9 WORSENING HEADACHES: ICD-10-CM

## 2023-07-09 DIAGNOSIS — Z78.9 MALE-TO-FEMALE TRANSGENDER PERSON: ICD-10-CM

## 2023-07-09 DIAGNOSIS — K42.9 UMBILICAL HERNIA WITHOUT OBSTRUCTION AND WITHOUT GANGRENE: ICD-10-CM

## 2023-07-09 DIAGNOSIS — M50.90 CERVICAL DISC DISEASE: ICD-10-CM

## 2023-07-09 DIAGNOSIS — K21.9 GASTROESOPHAGEAL REFLUX DISEASE WITHOUT ESOPHAGITIS: ICD-10-CM

## 2023-07-10 RX ORDER — LORAZEPAM 1 MG/1
0.5 TABLET ORAL EVERY 6 HOURS PRN
Qty: 60 TABLET | Refills: 0 | Status: SHIPPED | OUTPATIENT
Start: 2023-07-10

## 2023-07-10 RX ORDER — ESTRADIOL 2 MG/1
2 TABLET ORAL 2 TIMES DAILY
Qty: 180 TABLET | Refills: 0 | Status: SHIPPED | OUTPATIENT
Start: 2023-07-10

## 2023-07-10 RX ORDER — HYDROCORTISONE ACETATE 25 MG/1
25 SUPPOSITORY RECTAL DAILY PRN
Qty: 12 SUPPOSITORY | Refills: 0 | Status: SHIPPED | OUTPATIENT
Start: 2023-07-10

## 2023-07-10 RX ORDER — TRAMADOL HYDROCHLORIDE 50 MG/1
50 TABLET ORAL 2 TIMES DAILY
Qty: 60 TABLET | Refills: 0 | Status: SHIPPED | OUTPATIENT
Start: 2023-07-10

## 2023-07-10 RX ORDER — PANTOPRAZOLE SODIUM 40 MG/1
40 TABLET, DELAYED RELEASE ORAL DAILY
Qty: 90 TABLET | Refills: 1 | Status: SHIPPED | OUTPATIENT
Start: 2023-07-10

## 2023-07-12 ENCOUNTER — CLINICAL SUPPORT (OUTPATIENT)
Dept: NEUROLOGY | Facility: CLINIC | Age: 43
End: 2023-07-12
Payer: COMMERCIAL

## 2023-07-12 ENCOUNTER — HOSPITAL ENCOUNTER (EMERGENCY)
Facility: HOSPITAL | Age: 43
Discharge: HOME | End: 2023-07-12
Attending: EMERGENCY MEDICINE
Payer: COMMERCIAL

## 2023-07-12 ENCOUNTER — APPOINTMENT (EMERGENCY)
Dept: RADIOLOGY | Facility: HOSPITAL | Age: 43
End: 2023-07-12
Payer: COMMERCIAL

## 2023-07-12 VITALS
RESPIRATION RATE: 18 BRPM | DIASTOLIC BLOOD PRESSURE: 66 MMHG | SYSTOLIC BLOOD PRESSURE: 124 MMHG | HEART RATE: 104 BPM | TEMPERATURE: 99.4 F | OXYGEN SATURATION: 97 %

## 2023-07-12 DIAGNOSIS — G43.709 CHRONIC MIGRAINE WITHOUT AURA WITHOUT STATUS MIGRAINOSUS, NOT INTRACTABLE: Primary | ICD-10-CM

## 2023-07-12 DIAGNOSIS — J06.9 URI (UPPER RESPIRATORY INFECTION): Primary | ICD-10-CM

## 2023-07-12 DIAGNOSIS — J32.9 SINUSITIS: ICD-10-CM

## 2023-07-12 LAB
ALBUMIN SERPL BCP-MCNC: 3.9 G/DL (ref 3.5–5)
ALP SERPL-CCNC: 62 U/L (ref 34–104)
ALT SERPL W P-5'-P-CCNC: 9 U/L (ref 7–52)
ANION GAP SERPL CALCULATED.3IONS-SCNC: 9 MMOL/L
AST SERPL W P-5'-P-CCNC: 15 U/L (ref 5–45)
BASOPHILS # BLD AUTO: 0.03 THOUSANDS/ÂΜL (ref 0–0.1)
BASOPHILS NFR BLD AUTO: 1 % (ref 0–1)
BILIRUB SERPL-MCNC: 0.49 MG/DL (ref 0.2–1)
BUN SERPL-MCNC: 15 MG/DL (ref 5–25)
CALCIUM SERPL-MCNC: 9 MG/DL (ref 8.4–10.2)
CHLORIDE SERPL-SCNC: 101 MMOL/L (ref 96–108)
CO2 SERPL-SCNC: 27 MMOL/L (ref 21–32)
CREAT SERPL-MCNC: 1.17 MG/DL (ref 0.6–1.3)
EOSINOPHIL # BLD AUTO: 0.01 THOUSAND/ÂΜL (ref 0–0.61)
EOSINOPHIL NFR BLD AUTO: 0 % (ref 0–6)
ERYTHROCYTE [DISTWIDTH] IN BLOOD BY AUTOMATED COUNT: 14.3 % (ref 11.6–15.1)
FLUAV RNA RESP QL NAA+PROBE: NEGATIVE
FLUBV RNA RESP QL NAA+PROBE: NEGATIVE
GLUCOSE SERPL-MCNC: 104 MG/DL (ref 65–140)
HCT VFR BLD AUTO: 40.2 % (ref 36.5–46.1)
HGB BLD-MCNC: 12.6 G/DL (ref 12–15.4)
IMM GRANULOCYTES # BLD AUTO: 0.03 THOUSAND/UL (ref 0–0.2)
IMM GRANULOCYTES NFR BLD AUTO: 1 % (ref 0–2)
LYMPHOCYTES # BLD AUTO: 0.51 THOUSANDS/ÂΜL (ref 0.6–4.47)
LYMPHOCYTES NFR BLD AUTO: 10 % (ref 14–44)
MCH RBC QN AUTO: 27 PG (ref 26.8–34.3)
MCHC RBC AUTO-ENTMCNC: 31.3 G/DL (ref 31.4–37.4)
MCV RBC AUTO: 86 FL (ref 82–98)
MONOCYTES # BLD AUTO: 0.44 THOUSAND/ÂΜL (ref 0.17–1.22)
MONOCYTES NFR BLD AUTO: 8 % (ref 4–12)
NEUTROPHILS # BLD AUTO: 4.35 THOUSANDS/ÂΜL (ref 1.85–7.62)
NEUTS SEG NFR BLD AUTO: 80 % (ref 43–75)
NRBC BLD AUTO-RTO: 0 /100 WBCS
PLATELET # BLD AUTO: 258 THOUSANDS/UL (ref 149–390)
PMV BLD AUTO: 8.9 FL (ref 8.9–12.7)
POTASSIUM SERPL-SCNC: 3.8 MMOL/L (ref 3.5–5.3)
PROT SERPL-MCNC: 7.6 G/DL (ref 6.4–8.4)
RBC # BLD AUTO: 4.66 MILLION/UL (ref 3.88–5.12)
RSV RNA RESP QL NAA+PROBE: NEGATIVE
SARS-COV-2 RNA RESP QL NAA+PROBE: NEGATIVE
SODIUM SERPL-SCNC: 137 MMOL/L (ref 135–147)
WBC # BLD AUTO: 5.37 THOUSAND/UL (ref 4.31–10.16)

## 2023-07-12 PROCEDURE — 0241U HB NFCT DS VIR RESP RNA 4 TRGT: CPT

## 2023-07-12 PROCEDURE — 93005 ELECTROCARDIOGRAM TRACING: CPT

## 2023-07-12 PROCEDURE — 71046 X-RAY EXAM CHEST 2 VIEWS: CPT

## 2023-07-12 PROCEDURE — 85025 COMPLETE CBC W/AUTO DIFF WBC: CPT

## 2023-07-12 PROCEDURE — 36415 COLL VENOUS BLD VENIPUNCTURE: CPT

## 2023-07-12 PROCEDURE — 80053 COMPREHEN METABOLIC PANEL: CPT

## 2023-07-12 PROCEDURE — 96372 THER/PROPH/DIAG INJ SC/IM: CPT | Performed by: STUDENT IN AN ORGANIZED HEALTH CARE EDUCATION/TRAINING PROGRAM

## 2023-07-12 RX ORDER — KETOROLAC TROMETHAMINE 30 MG/ML
15 INJECTION, SOLUTION INTRAMUSCULAR; INTRAVENOUS ONCE
Status: COMPLETED | OUTPATIENT
Start: 2023-07-12 | End: 2023-07-12

## 2023-07-12 RX ORDER — AMOXICILLIN AND CLAVULANATE POTASSIUM 875; 125 MG/1; MG/1
1 TABLET, FILM COATED ORAL EVERY 12 HOURS
Qty: 14 TABLET | Refills: 0 | Status: SHIPPED | OUTPATIENT
Start: 2023-07-12 | End: 2023-07-19

## 2023-07-12 RX ORDER — AMOXICILLIN AND CLAVULANATE POTASSIUM 875; 125 MG/1; MG/1
1 TABLET, FILM COATED ORAL ONCE
Status: COMPLETED | OUTPATIENT
Start: 2023-07-12 | End: 2023-07-12

## 2023-07-12 RX ORDER — ONDANSETRON 2 MG/ML
4 INJECTION INTRAMUSCULAR; INTRAVENOUS ONCE
Status: COMPLETED | OUTPATIENT
Start: 2023-07-12 | End: 2023-07-12

## 2023-07-12 RX ORDER — KETOROLAC TROMETHAMINE 30 MG/ML
30 INJECTION, SOLUTION INTRAMUSCULAR; INTRAVENOUS ONCE
Status: COMPLETED | OUTPATIENT
Start: 2023-07-12 | End: 2023-07-12

## 2023-07-12 RX ADMIN — KETOROLAC TROMETHAMINE 15 MG: 30 INJECTION, SOLUTION INTRAMUSCULAR; INTRAVENOUS at 18:12

## 2023-07-12 RX ADMIN — KETOROLAC TROMETHAMINE 30 MG: 30 INJECTION, SOLUTION INTRAMUSCULAR; INTRAVENOUS at 09:48

## 2023-07-12 RX ADMIN — ONDANSETRON 4 MG: 2 INJECTION INTRAMUSCULAR; INTRAVENOUS at 18:11

## 2023-07-12 RX ADMIN — AMOXICILLIN AND CLAVULANATE POTASSIUM 1 TABLET: 875; 125 TABLET, FILM COATED ORAL at 18:48

## 2023-07-12 RX ADMIN — SODIUM CHLORIDE 1000 ML: 0.9 INJECTION, SOLUTION INTRAVENOUS at 18:11

## 2023-07-12 NOTE — ED ATTENDING ATTESTATION
7/12/2023  I, Jessica Arias MD, saw and evaluated the patient. I have discussed the patient with the resident/non-physician practitioner and agree with the resident's/non-physician practitioner's findings, Plan of Care, and MDM as documented in the resident's/non-physician practitioner's note, except where noted. All available labs and Radiology studies were reviewed. I was present for key portions of any procedure(s) performed by the resident/non-physician practitioner and I was immediately available to provide assistance. At this point I agree with the current assessment done in the Emergency Department. I have conducted an independent evaluation of this patient a history and physical is as follows: Shortness of breath, cough, chills. Reports symptoms present for about 14 days, tried outpatient steroid, Flonase etc.  Also reports increased sinus pain and drainage. Low-grade temperature, mild tachycardia. No chest pain. Chest x-ray with no clear cardiopulmonary abnormality identified on my independent evaluation. Patient states she has taken 3 days of amoxicillin at home that was leftover from previous dental infection. Patient with 16 days of symptoms, at least 7 days of sinus symptoms. We will treat as acute sinusitis, cover community-acquired pneumonia. First dose of Augmentin in ED. Basic laboratory studies unremarkable. Results Reviewed     Procedure Component Value Units Date/Time    FLU/RSV/COVID - if FLU/RSV clinically relevant [[id number]]  (Normal) Collected: 07/12/23 1743    Lab Status: Final result Specimen: Nares from Nose Updated: 07/12/23 1840     SARS-CoV-2 Negative     INFLUENZA A PCR Negative     INFLUENZA B PCR Negative     RSV PCR Negative    Narrative:      FOR PEDIATRIC PATIENTS - copy/paste COVID Guidelines URL to browser: [email or web address]    SARS-CoV-2 assay is a Nucleic Acid Amplification assay intended for the  qualitative detection of nucleic acid from SARS-CoV-2 in nasopharyngeal  swabs. Results are for the presumptive identification of SARS-CoV-2 RNA. Positive results are indicative of infection with SARS-CoV-2, the virus  causing COVID-19, but do not rule out bacterial infection or co-infection  with other viruses. Laboratories within the [facility] and its  territories are required to report all positive results to the appropriate  public health authorities. Negative results do not preclude SARS-CoV-2  infection and should not be used as the sole basis for treatment or other  patient management decisions. Negative results must be combined with  clinical observations, patient history, and epidemiological information. This test has not been FDA cleared or approved. This test has been authorized by FDA under an Emergency Use Authorization  (EUA). This test is only authorized for the duration of time the  declaration that circumstances exist justifying the authorization of the  emergency use of an in vitro diagnostic tests for detection of SARS-CoV-2  virus and/or diagnosis of COVID-19 infection under section 564(b)(1) of  the Act, 21 U. S.C. 847VKY-9(K)(9), unless the authorization is terminated  or revoked sooner. The test has been validated but independent review by FDA  and CLIA is pending. Test performed using [employer] GeneXpert: This RT-PCR assay targets N2,  a region unique to SARS-CoV-2. A conserved region in the E-gene was chosen  for pan-Sarbecovirus detection which includes SARS-CoV-2. According to CMS-2020-01-R, this platform meets the definition of high-throughput technology.     Comprehensive metabolic panel [[id number]] Collected: 07/12/23 1743    Lab Status: Final result Specimen: Blood from Arm, Right Updated: 07/12/23 1813     Sodium 137 mmol/L      Potassium 3.8 mmol/L      Chloride 101 mmol/L      CO2 27 mmol/L      ANION GAP 9 mmol/L      BUN 15 mg/dL      Creatinine 1.17 mg/dL Glucose 104 mg/dL      Calcium 9.0 mg/dL      AST 15 U/L      ALT 9 U/L      Alkaline Phosphatase 62 U/L      Total Protein 7.6 g/dL      Albumin 3.9 g/dL      Total Bilirubin 0.49 mg/dL      eGFR --    Narrative:      Notes:     1. eGFR calculation is only valid for adults 18 years and older. 2. EGFR calculation cannot be performed for patients who are transgender, non-binary, or whose legal sex, sex at birth, and gender identity differ.     CBC and differential [[id number]]  (Abnormal) Collected: 07/12/23 1743    Lab Status: Final result Specimen: Blood from Arm, Right Updated: 07/12/23 1755     WBC 5.37 Thousand/uL      RBC 4.66 Million/uL      Hemoglobin 12.6 g/dL      Hematocrit 40.2 %      MCV 86 fL      MCH 27.0 pg      MCHC 31.3 g/dL      RDW 14.3 %      MPV 8.9 fL      Platelets 734 Thousands/uL      nRBC 0 /100 WBCs      Neutrophils Relative 80 %      Immat GRANS % 1 %      Lymphocytes Relative 10 %      Monocytes Relative 8 %      Eosinophils Relative 0 %      Basophils Relative 1 %      Neutrophils Absolute 4.35 Thousands/µL      Immature Grans Absolute 0.03 Thousand/uL      Lymphocytes Absolute 0.51 Thousands/µL      Monocytes Absolute 0.44 Thousand/µL      Eosinophils Absolute 0.01 Thousand/µL      Basophils Absolute 0.03 Thousands/µL         XR chest 2 views   ED Interpretation by Cecy Rivers MD (07/12 1835)   No acute cardiopulmonary abnormality identified on my independent evaluation            ED Course         Critical Care Time  Procedures

## 2023-07-12 NOTE — ED PROVIDER NOTES
History  Chief Complaint   Patient presents with   • Shortness of Breath     Pt reports SOB, chills and HA. Also repots low back pain. 37year old Female with PMH of HTN presents to the ED following an episode at work today where she had trouble breathing along with chills. She states that a few months ago she visited her PCP for a possible tooth infection and was given antibiotics. The pain had originally subsided but returned recently. She thinks that the infection may be related as she is on a wait list for oral surgery. Patient also mentions that she has been feeling feverish, nauseous, and has had night sweats. She also endorses her history of HA. Denies chest pain, abdominal pain, dysuria, vomiting/diarrhea. Prior to Admission Medications   Prescriptions Last Dose Informant Patient Reported? Taking? Cyanocobalamin (B-12) 1000 MCG/ML KIT  Self No No   Sig: Inject 1 ml IM in thigh/ buttocks or deltoid mm once weekly x 8 weeks. Afterward once monthly   Patient taking differently: if needed Inject 1 ml IM in thigh/ buttocks or deltoid mm once weekly x 8 weeks. Afterward once monthly   LORazepam (ATIVAN) 1 mg tablet   No No   Sig: Take 0.5 tablets (0.5 mg total) by mouth every 6 (six) hours as needed for anxiety   Riboflavin 400 MG TABS  Self No No   Sig: Take 1 tablet (400 mg total) by mouth in the morning   Syringe/Needle, Disp, (SYRINGE 3CC/33YO6-9/4") 27G X 1-1/4" 3 ML MISC  Self No No   Sig: Use for B12 injections   acetaminophen (TYLENOL) 500 mg tablet  Self Yes No   Sig: Take 500 mg by mouth if needed   albuterol (PROVENTIL HFA,VENTOLIN HFA) 90 mcg/act inhaler  Self No No   Sig: Inhale 2 puffs every 6 (six) hours as needed for wheezing   carisoprodol (SOMA) 250 MG  Self No No   Sig: Take 1 tablet (250 mg total) by mouth 3 (three) times a day   colchicine (COLCRYS) 0.6 mg tablet  Self No No   Sig: Take 1 tablet (0.6 mg total) by mouth daily 2 pills now and 1 in an hour.  One daily until resolved. Patient taking differently: Take 0.6 mg by mouth daily as needed 2 pills now and 1 in an hour. One daily until resolved. estradiol (ESTRACE) 2 MG tablet   No No   Sig: Take 1 tablet (2 mg total) by mouth 2 (two) times a day   fluticasone (FLONASE) 50 mcg/act nasal spray   No No   Si spray into each nostril daily   hydrocortisone (ANUSOL-HC) 25 mg suppository   No No   Sig: Insert 1 suppository (25 mg total) into the rectum daily as needed for hemorrhoids   ipratropium-albuterol (DUO-NEB) 0.5-2.5 mg/3 mL nebulizer solution   No No   Sig: Take 3 mL by nebulization every 6 (six) hours as needed for wheezing or shortness of breath   montelukast (SINGULAIR) 10 mg tablet  Self No No   Sig: Take 1 tablet (10 mg total) by mouth daily at bedtime   multivitamin (THERAGRAN) TABS  Self Yes No   Sig: Take 1 tablet by mouth daily   ondansetron (ZOFRAN-ODT) 4 mg disintegrating tablet  Self No No   Sig: Take 1 tablet (4 mg total) by mouth every 8 (eight) hours as needed for nausea or vomiting   orphenadrine (NORFLEX) 100 mg tablet   No No   Sig: Take 1 tablet (100 mg total) by mouth 2 (two) times a day as needed for muscle spasms   pantoprazole (PROTONIX) 40 mg tablet   No No   Sig: Take 1 tablet (40 mg total) by mouth daily   potassium chloride (K-DUR,KLOR-CON) 20 mEq tablet  Self No No   Si tabs BID day 1 the 2 tabs daily for 3 days. Patient taking differently: as needed 2 tabs BID day 1 the 2 tabs daily for 3 days.    predniSONE 10 mg tablet   No No   Si tablets daily x 3 days;  3 tablets daily x 3 days; 2 tablets daily x 3 days; 1 tablet daily x 3 days   rimegepant sulfate (NURTEC) 75 mg TBDP   No No   Sig: Take 1 tablet (75 mg total) by mouth every other day   traMADol (ULTRAM) 50 mg tablet   No No   Sig: Take 1 tablet (50 mg total) by mouth 2 (two) times a day   triamterene-hydrochlorothiazide (DYAZIDE) 37.5-25 mg per capsule  Self No No   Sig: Take 1 capsule by mouth every morning Facility-Administered Medications Last Administration Doses Remaining   cyanocobalamin injection 1,000 mcg 6/19/2023  1:10 PM    ketorolac (TORADOL) injection 30 mg 7/12/2023  9:48 AM 0          Past Medical History:   Diagnosis Date   • Anxiety    • Asthma     seasonal   • COVID-19 virus infection 12/27/2021   • Hernia of abdominal wall    • Hypertension    • Kidney stones    • Seizures ([address])     pseudoseizures since 2012   • Viral URI with cough 12/27/2021       Past Surgical History:   Procedure Laterality Date   • APPENDECTOMY     • CHOLECYSTECTOMY     • GASTRIC BYPASS     • HERNIA REPAIR      x2       Family History   Problem Relation Age of Onset   • Hypertension Mother    • Diabetes Mother    • Hypertension Sister    • No Known Problems Father         head injury   • Stroke Maternal Grandmother         brain aneurysm   • Aneurysm Maternal Uncle         brain     I have reviewed and agree with the history as documented. E-Cigarette/Vaping   • E-Cigarette Use Never User      E-Cigarette/Vaping Substances   • Nicotine No    • THC No    • CBD No    • Flavoring No    • Other No    • Unknown No      Social History     Tobacco Use   • Smoking status: Never   • Smokeless tobacco: Never   Vaping Use   • Vaping Use: Never used   Substance Use Topics   • Alcohol use: Yes     Comment: 2 drinks per month   • Drug use: Never        Review of Systems   Constitutional: Positive for fever. HENT: Positive for congestion. Eyes: Negative. Respiratory: Positive for shortness of breath. Cardiovascular: Negative. Gastrointestinal: Positive for nausea. Negative for abdominal pain, constipation, diarrhea and vomiting. Genitourinary: Negative. Negative for dysuria. Musculoskeletal: Negative. Skin: Negative. Neurological: Positive for headaches. All other systems reviewed and are negative.       Physical Exam  ED Triage Vitals   Temperature Pulse Respirations Blood Pressure SpO2   07/12/23 1555 07/12/23 0664 577 07 11 07/12/23 1555 07/12/23 1555 07/12/23 1555   99.4 °F (37.4 °C) (!) 115 22 129/81 95 %      Temp Source Heart Rate Source Patient Position - Orthostatic VS BP Location FiO2 (%)   07/12/23 1555 07/12/23 1555 07/12/23 1555 07/12/23 1555 --   Oral Monitor Sitting Right arm       Pain Score       07/12/23 1812       10 - Worst Possible Pain             Orthostatic Vital Signs  Vitals:    07/12/23 1555 07/12/23 1745 07/12/23 1800 07/12/23 1830   BP: 129/81 148/86 147/70 124/66   Pulse: (!) 115 (!) 109 105 104   Patient Position - Orthostatic VS: Sitting          Physical Exam  Vitals and nursing note reviewed. Constitutional:       Appearance: She is well-developed. She is obese. HENT:      Head: Normocephalic and atraumatic. Right Ear: External ear normal.      Left Ear: External ear normal.      Mouth/Throat:      Pharynx: Oropharynx is clear. Eyes:      Conjunctiva/sclera: Conjunctivae normal.   Cardiovascular:      Rate and Rhythm: Normal rate and regular rhythm. Pulses: Normal pulses. Heart sounds: Normal heart sounds. Pulmonary:      Effort: Pulmonary effort is normal.      Breath sounds: Normal breath sounds. No wheezing or rales. Comments: CTABL, no abnormal breath sounds were appreciated during exam.  Abdominal:      General: Abdomen is flat. Bowel sounds are normal.      Palpations: Abdomen is soft. Musculoskeletal:         General: Normal range of motion. Skin:     General: Skin is warm and dry. Neurological:      General: No focal deficit present. Mental Status: She is alert and oriented to person, place, and time. Mental status is at baseline.          ED Medications  Medications   sodium chloride 0.9 % bolus 1,000 mL (0 mL Intravenous Stopped 7/12/23 1948)   ketorolac (TORADOL) injection 15 mg (15 mg Intravenous Given 7/12/23 1812)   ondansetron (ZOFRAN) injection 4 mg (4 mg Intravenous Given 7/12/23 1811)   amoxicillin-clavulanate (AUGMENTIN) 875-125 mg per tablet 1 tablet (1 tablet Oral Given 7/12/23 1848)       Diagnostic Studies  Results Reviewed     Procedure Component Value Units Date/Time    FLU/RSV/COVID - if FLU/RSV clinically relevant [[id number]]  (Normal) Collected: 07/12/23 1743    Lab Status: Final result Specimen: Nares from Nose Updated: 07/12/23 1840     SARS-CoV-2 Negative     INFLUENZA A PCR Negative     INFLUENZA B PCR Negative     RSV PCR Negative    Narrative:      FOR PEDIATRIC PATIENTS - copy/paste COVID Guidelines URL to browser: [email or web address]    SARS-CoV-2 assay is a Nucleic Acid Amplification assay intended for the  qualitative detection of nucleic acid from SARS-CoV-2 in nasopharyngeal  swabs. Results are for the presumptive identification of SARS-CoV-2 RNA. Positive results are indicative of infection with SARS-CoV-2, the virus  causing COVID-19, but do not rule out bacterial infection or co-infection  with other viruses. Laboratories within the [facility] and its  territories are required to report all positive results to the appropriate  public health authorities. Negative results do not preclude SARS-CoV-2  infection and should not be used as the sole basis for treatment or other  patient management decisions. Negative results must be combined with  clinical observations, patient history, and epidemiological information. This test has not been FDA cleared or approved. This test has been authorized by FDA under an Emergency Use Authorization  (EUA). This test is only authorized for the duration of time the  declaration that circumstances exist justifying the authorization of the  emergency use of an in vitro diagnostic tests for detection of SARS-CoV-2  virus and/or diagnosis of COVID-19 infection under section 564(b)(1) of  the Act, 21 U. S.C. 226JJM-1(R)(4), unless the authorization is terminated  or revoked sooner.  The test has been validated but independent review by FDA  and CLIA is pending. Test performed using [employer] GeneXpert: This RT-PCR assay targets N2,  a region unique to SARS-CoV-2. A conserved region in the E-gene was chosen  for pan-Sarbecovirus detection which includes SARS-CoV-2. According to CMS-2020-01-R, this platform meets the definition of high-throughput technology. Comprehensive metabolic panel [[id number]] Collected: 07/12/23 1743    Lab Status: Final result Specimen: Blood from Arm, Right Updated: 07/12/23 1813     Sodium 137 mmol/L      Potassium 3.8 mmol/L      Chloride 101 mmol/L      CO2 27 mmol/L      ANION GAP 9 mmol/L      BUN 15 mg/dL      Creatinine 1.17 mg/dL      Glucose 104 mg/dL      Calcium 9.0 mg/dL      AST 15 U/L      ALT 9 U/L      Alkaline Phosphatase 62 U/L      Total Protein 7.6 g/dL      Albumin 3.9 g/dL      Total Bilirubin 0.49 mg/dL      eGFR --    Narrative:      Notes:     1. eGFR calculation is only valid for adults 18 years and older. 2. EGFR calculation cannot be performed for patients who are transgender, non-binary, or whose legal sex, sex at birth, and gender identity differ.     CBC and differential [[id number]]  (Abnormal) Collected: 07/12/23 1743    Lab Status: Final result Specimen: Blood from Arm, Right Updated: 07/12/23 1755     WBC 5.37 Thousand/uL      RBC 4.66 Million/uL      Hemoglobin 12.6 g/dL      Hematocrit 40.2 %      MCV 86 fL      MCH 27.0 pg      MCHC 31.3 g/dL      RDW 14.3 %      MPV 8.9 fL      Platelets 332 Thousands/uL      nRBC 0 /100 WBCs      Neutrophils Relative 80 %      Immat GRANS % 1 %      Lymphocytes Relative 10 %      Monocytes Relative 8 %      Eosinophils Relative 0 %      Basophils Relative 1 %      Neutrophils Absolute 4.35 Thousands/µL      Immature Grans Absolute 0.03 Thousand/uL      Lymphocytes Absolute 0.51 Thousands/µL      Monocytes Absolute 0.44 Thousand/µL      Eosinophils Absolute 0.01 Thousand/µL      Basophils Absolute 0.03 Thousands/µL                  XR chest 2 views   ED Interpretation by Helio Jones MD (07/12 1835)   No acute cardiopulmonary abnormality identified on my independent evaluation      Final Result by Alona Marino MD (07/13 1255)      No acute cardiopulmonary disease. Findings are stable            Workstation performed: [id number]               Procedures  ECG 12 Lead Documentation Only    Date/Time: 7/12/2023 5:59 PM    Performed by: Ritesh Umanzor MD  Authorized by: Ritesh Umanzor MD    ECG reviewed by me, the ED Provider: yes    Patient location:  ED  Previous ECG:     Previous ECG:  Compared to current    Similarity:  No change  Interpretation:     Interpretation: normal    Rate:     ECG rate assessment: tachycardic    Rhythm:     Rhythm: sinus rhythm            ED Course  ED Course as of 07/14/23 1440   Wed Jul 12, 2023   1844 Patient labs and images were reviewed by the provider. SBIRT 20yo+    Flowsheet Row Most Recent Value   Initial Alcohol Screen: US AUDIT-C     1. How often do you have a drink containing alcohol? 0 Filed at: 07/12/2023 1803   2. How many drinks containing alcohol do you have on a typical day you are drinking? 0 Filed at: 07/12/2023 1803   3a. Male UNDER 65: How often do you have five or more drinks on one occasion? 0 Filed at: 07/12/2023 1803   3b. FEMALE Any Age, or MALE 65+: How often do you have 4 or more drinks on one occassion? 0 Filed at: 07/12/2023 1803   Audit-C Score 0 Filed at: 07/12/2023 1803   MILES: How many times in the past year have you. .. Used an illegal drug or used a prescription medication for non-medical reasons? Never Filed at: 07/12/2023 1803                Medical Decision Making  80-year-old female with PMH of hypertension presented to the ED after an episode of trouble breathing, chills, nausea, and headache while at work today. Patient states that she feels her symptoms may be related to an ongoing tooth infection.   Patient labs and images were ordered and reviewed by the provider. Patient labs showed no acute concerns at this time and viral panel was negative. Chest x-ray showed no acute abnormalities or concerns. Patient was given 15 mg Toradol for headache pain relief, 4 mg Zofran, 1 tablet dose of Augmentin, and 1 L NS 0.9% for hydration. Patient's symptoms improved while in the ED. Patient was discharged home and advised to follow-up with her outpatient PCP. Patient was prescribed 7 days of Augmentin for possible infection. She was advised to return to the ED if her symptoms worsened including but not limited to fever, worsening shortness of breath, abdominal pain, and diaphoresis. Amount and/or Complexity of Data Reviewed  Labs: ordered. Radiology: ordered and independent interpretation performed. Risk  Prescription drug management. Disposition  Final diagnoses:   URI (upper respiratory infection)   Sinusitis     Time reflects when diagnosis was documented in both MDM as applicable and the Disposition within this note     Time User Action Codes Description Comment    7/12/2023  6:30 PM Rockford Crigler Add [J06.9] URI (upper respiratory infection)     7/12/2023  6:30 PM [address] [facility] [address] [J32.9] Sinusitis       ED Disposition     ED Disposition   Discharge    Condition   Stable    Date/Time   Wed Jul 12, 2023  6:29 PM    Comment   Rubens Counts discharge to home/self care.                Follow-up Information     Follow up With Specialties Details Why [address], MD Internal Medicine  As needed [facility] [address]  [phone]            Discharge Medication List as of 7/12/2023  7:06 PM      START taking these medications    Details   amoxicillin-clavulanate (AUGMENTIN) 875-125 mg per tablet Take 1 tablet by mouth every 12 (twelve) hours for 7 days, Starting Wed 7/12/2023, Until Wed 7/19/2023, Normal         CONTINUE these medications which have NOT CHANGED    Details   traMADol (ULTRAM) 50 mg tablet Take 1 tablet (50 mg total) by mouth 2 (two) times a day, Starting Mon 7/10/2023, Normal      acetaminophen (TYLENOL) 500 mg tablet Take 500 mg by mouth if needed, Historical Med      albuterol (PROVENTIL HFA,VENTOLIN HFA) 90 mcg/act inhaler Inhale 2 puffs every 6 (six) hours as needed for wheezing, Starting Fri 9/10/2021, Normal      carisoprodol (SOMA) 250 MG Take 1 tablet (250 mg total) by mouth 3 (three) times a day, Starting Fri 3/10/2023, Normal      colchicine (COLCRYS) 0.6 mg tablet Take 1 tablet (0.6 mg total) by mouth daily 2 pills now and 1 in an hour. One daily until resolved., Starting Mon 6/27/2022, Normal      Cyanocobalamin (B-12) 1000 MCG/ML KIT Inject 1 ml IM in thigh/ buttocks or deltoid mm once weekly x 8 weeks.  Afterward once monthly, Normal      estradiol (ESTRACE) 2 MG tablet Take 1 tablet (2 mg total) by mouth 2 (two) times a day, Starting Mon 7/10/2023, Normal      fluticasone (FLONASE) 50 mcg/act nasal spray 1 spray into each nostril daily, Starting Fri 6/30/2023, Normal      hydrocortisone (ANUSOL-HC) 25 mg suppository Insert 1 suppository (25 mg total) into the rectum daily as needed for hemorrhoids, Starting Mon 7/10/2023, Normal      ipratropium-albuterol (DUO-NEB) 0.5-2.5 mg/3 mL nebulizer solution Take 3 mL by nebulization every 6 (six) hours as needed for wheezing or shortness of breath, Starting Fri 6/30/2023, Normal      LORazepam (ATIVAN) 1 mg tablet Take 0.5 tablets (0.5 mg total) by mouth every 6 (six) hours as needed for anxiety, Starting Mon 7/10/2023, Normal      montelukast (SINGULAIR) 10 mg tablet Take 1 tablet (10 mg total) by mouth daily at bedtime, Starting u 12/22/2022, Normal      multivitamin (THERAGRAN) TABS Take 1 tablet by mouth daily, Historical Med      ondansetron (ZOFRAN-ODT) 4 mg disintegrating tablet Take 1 tablet (4 mg total) by mouth every 8 (eight) hours as needed for nausea or vomiting, Starting Wed 11/9/2022, Normal      orphenadrine (NORFLEX) 100 mg tablet Take 1 tablet (100 mg total) by mouth 2 (two) times a day as needed for muscle spasms, Starting Wed 6/7/2023, Normal      pantoprazole (PROTONIX) 40 mg tablet Take 1 tablet (40 mg total) by mouth daily, Starting Mon 7/10/2023, Normal      potassium chloride (K-DUR,KLOR-CON) 20 mEq tablet 2 tabs BID day 1 the 2 tabs daily for 3 days. , Normal      predniSONE 10 mg tablet 4 tablets daily x 3 days;  3 tablets daily x 3 days; 2 tablets daily x 3 days; 1 tablet daily x 3 days, Normal      Riboflavin 400 MG TABS Take 1 tablet (400 mg total) by mouth in the morning, Starting Fri 6/3/2022, Normal      rimegepant sulfate (NURTEC) 75 mg TBDP Take 1 tablet (75 mg total) by mouth every other day, Starting Tue 5/30/2023, Normal      Syringe/Needle, Disp, (SYRINGE 3CC/80DX1-3/4") 27G X 1-1/4" 3 ML MISC Use for B12 injections, Normal      triamterene-hydrochlorothiazide (DYAZIDE) 37.5-25 mg per capsule Take 1 capsule by mouth every morning, Starting Wed 1/18/2023, Normal           No discharge procedures on file. PDMP Review       Value Time User    PDMP Reviewed  Yes 12/22/2022  8:44 AM Eloina George MD           ED Provider  Attending physically available and evaluated Mile Ventura. I managed the patient along with the ED Attending.     Electronically Signed by         Reyna La MD  07/14/23 4302

## 2023-07-14 LAB
ATRIAL RATE: 116 BPM
P AXIS: 83 DEGREES
PR INTERVAL: 158 MS
QRS AXIS: -28 DEGREES
QRSD INTERVAL: 78 MS
QT INTERVAL: 322 MS
QTC INTERVAL: 447 MS
T WAVE AXIS: 26 DEGREES
VENTRICULAR RATE: 116 BPM

## 2023-07-14 PROCEDURE — 93010 ELECTROCARDIOGRAM REPORT: CPT | Performed by: INTERNAL MEDICINE

## 2023-08-10 ENCOUNTER — OFFICE VISIT (OUTPATIENT)
Dept: NEUROLOGY | Facility: CLINIC | Age: 43
End: 2023-08-10
Payer: COMMERCIAL

## 2023-08-10 DIAGNOSIS — G43.009 MIGRAINE WITHOUT AURA AND WITHOUT STATUS MIGRAINOSUS, NOT INTRACTABLE: ICD-10-CM

## 2023-08-10 DIAGNOSIS — G43.709 CHRONIC MIGRAINE WITHOUT AURA WITHOUT STATUS MIGRAINOSUS, NOT INTRACTABLE: Primary | ICD-10-CM

## 2023-08-10 PROCEDURE — 96372 THER/PROPH/DIAG INJ SC/IM: CPT | Performed by: STUDENT IN AN ORGANIZED HEALTH CARE EDUCATION/TRAINING PROGRAM

## 2023-08-10 RX ORDER — KETOROLAC TROMETHAMINE 30 MG/ML
30 INJECTION, SOLUTION INTRAMUSCULAR; INTRAVENOUS ONCE
Status: COMPLETED | OUTPATIENT
Start: 2023-08-10 | End: 2023-08-10

## 2023-08-10 RX ADMIN — KETOROLAC TROMETHAMINE 30 MG: 30 INJECTION, SOLUTION INTRAMUSCULAR; INTRAVENOUS at 16:54

## 2023-08-17 DIAGNOSIS — R51.9 WORSENING HEADACHES: ICD-10-CM

## 2023-08-17 DIAGNOSIS — K42.9 UMBILICAL HERNIA WITHOUT OBSTRUCTION AND WITHOUT GANGRENE: ICD-10-CM

## 2023-08-17 DIAGNOSIS — K21.9 GASTROESOPHAGEAL REFLUX DISEASE WITHOUT ESOPHAGITIS: ICD-10-CM

## 2023-08-17 DIAGNOSIS — I10 ESSENTIAL HYPERTENSION: ICD-10-CM

## 2023-08-17 RX ORDER — TRIAMTERENE AND HYDROCHLOROTHIAZIDE 37.5; 25 MG/1; MG/1
1 CAPSULE ORAL EVERY MORNING
Qty: 90 CAPSULE | Refills: 1 | Status: SHIPPED | OUTPATIENT
Start: 2023-08-17

## 2023-08-17 RX ORDER — PANTOPRAZOLE SODIUM 40 MG/1
40 TABLET, DELAYED RELEASE ORAL DAILY
Qty: 90 TABLET | Refills: 1 | Status: SHIPPED | OUTPATIENT
Start: 2023-08-17

## 2023-08-17 RX ORDER — TRAMADOL HYDROCHLORIDE 50 MG/1
50 TABLET ORAL 2 TIMES DAILY
Qty: 60 TABLET | Refills: 0 | Status: SHIPPED | OUTPATIENT
Start: 2023-08-17

## 2023-09-18 DIAGNOSIS — G43.109 MIGRAINE WITH AURA AND WITHOUT STATUS MIGRAINOSUS, NOT INTRACTABLE: ICD-10-CM

## 2023-09-19 NOTE — ASSESSMENT & PLAN NOTE
Possibly due to combination of cervical disc disease and elevated blood pressure  Will initiate chlorthalidone 25 mg daily  We will also provide the patient with a prescription for Ultram which she has used in the past with great success controlling her headaches  Never

## 2023-09-27 ENCOUNTER — OFFICE VISIT (OUTPATIENT)
Dept: NEUROLOGY | Facility: CLINIC | Age: 43
End: 2023-09-27
Payer: COMMERCIAL

## 2023-09-27 DIAGNOSIS — G43.109 MIGRAINE WITH AURA AND WITHOUT STATUS MIGRAINOSUS, NOT INTRACTABLE: Primary | ICD-10-CM

## 2023-09-27 PROCEDURE — 96372 THER/PROPH/DIAG INJ SC/IM: CPT | Performed by: STUDENT IN AN ORGANIZED HEALTH CARE EDUCATION/TRAINING PROGRAM

## 2023-09-27 RX ORDER — KETOROLAC TROMETHAMINE 30 MG/ML
30 INJECTION, SOLUTION INTRAMUSCULAR; INTRAVENOUS ONCE
Status: COMPLETED | OUTPATIENT
Start: 2023-09-27 | End: 2023-09-28

## 2023-09-28 RX ADMIN — KETOROLAC TROMETHAMINE 30 MG: 30 INJECTION, SOLUTION INTRAMUSCULAR; INTRAVENOUS at 11:27

## 2023-10-01 DIAGNOSIS — M54.16 RADICULOPATHY, LUMBAR REGION: ICD-10-CM

## 2023-10-01 DIAGNOSIS — R51.9 WORSENING HEADACHES: ICD-10-CM

## 2023-10-03 RX ORDER — PREDNISONE 10 MG/1
TABLET ORAL
Qty: 30 TABLET | Refills: 0 | Status: SHIPPED | OUTPATIENT
Start: 2023-10-03

## 2023-10-03 RX ORDER — TRAMADOL HYDROCHLORIDE 50 MG/1
50 TABLET ORAL 2 TIMES DAILY
Qty: 60 TABLET | Refills: 0 | Status: SHIPPED | OUTPATIENT
Start: 2023-10-03

## 2023-10-13 DIAGNOSIS — Z78.9 MALE-TO-FEMALE TRANSGENDER PERSON: ICD-10-CM

## 2023-10-13 RX ORDER — ESTRADIOL 2 MG/1
2 TABLET ORAL 2 TIMES DAILY
Qty: 180 TABLET | Refills: 0 | Status: SHIPPED | OUTPATIENT
Start: 2023-10-13

## 2023-10-16 ENCOUNTER — CLINICAL SUPPORT (OUTPATIENT)
Dept: NEUROLOGY | Facility: CLINIC | Age: 43
End: 2023-10-16
Payer: COMMERCIAL

## 2023-10-16 DIAGNOSIS — G43.109 MIGRAINE WITH AURA AND WITHOUT STATUS MIGRAINOSUS, NOT INTRACTABLE: Primary | ICD-10-CM

## 2023-10-16 DIAGNOSIS — G43.009 MIGRAINE WITHOUT AURA AND WITHOUT STATUS MIGRAINOSUS, NOT INTRACTABLE: Primary | ICD-10-CM

## 2023-10-16 PROCEDURE — 96372 THER/PROPH/DIAG INJ SC/IM: CPT | Performed by: PSYCHIATRY & NEUROLOGY

## 2023-10-16 RX ORDER — KETOROLAC TROMETHAMINE 30 MG/ML
30 INJECTION, SOLUTION INTRAMUSCULAR; INTRAVENOUS ONCE
Status: COMPLETED | OUTPATIENT
Start: 2023-10-16 | End: 2023-10-16

## 2023-10-16 RX ADMIN — KETOROLAC TROMETHAMINE 30 MG: 30 INJECTION, SOLUTION INTRAMUSCULAR; INTRAVENOUS at 14:22

## 2023-10-17 DIAGNOSIS — G43.009 MIGRAINE WITHOUT AURA AND WITHOUT STATUS MIGRAINOSUS, NOT INTRACTABLE: Primary | ICD-10-CM

## 2023-10-17 RX ORDER — RIZATRIPTAN BENZOATE 10 MG/1
10 TABLET ORAL AS NEEDED
Qty: 9 TABLET | Refills: 3 | Status: SHIPPED | OUTPATIENT
Start: 2023-10-17

## 2023-10-20 ENCOUNTER — CLINICAL SUPPORT (OUTPATIENT)
Dept: NEUROLOGY | Facility: CLINIC | Age: 43
End: 2023-10-20
Payer: COMMERCIAL

## 2023-10-20 VITALS — SYSTOLIC BLOOD PRESSURE: 144 MMHG | DIASTOLIC BLOOD PRESSURE: 102 MMHG

## 2023-10-20 DIAGNOSIS — M54.12 CERVICAL RADICULAR PAIN: ICD-10-CM

## 2023-10-20 DIAGNOSIS — M79.18 CERVICAL MYOFASCIAL PAIN SYNDROME: ICD-10-CM

## 2023-10-20 DIAGNOSIS — I10 HTN (HYPERTENSION): ICD-10-CM

## 2023-10-20 DIAGNOSIS — E53.8 B12 DEFICIENCY: ICD-10-CM

## 2023-10-20 DIAGNOSIS — G43.109 MIGRAINE WITH AURA AND WITHOUT STATUS MIGRAINOSUS, NOT INTRACTABLE: ICD-10-CM

## 2023-10-20 DIAGNOSIS — G43.709 CHRONIC MIGRAINE WITHOUT AURA WITHOUT STATUS MIGRAINOSUS, NOT INTRACTABLE: Primary | ICD-10-CM

## 2023-10-20 PROCEDURE — 20553 NJX 1/MLT TRIGGER POINTS 3/>: CPT | Performed by: PHYSICIAN ASSISTANT

## 2023-10-20 PROCEDURE — 99214 OFFICE O/P EST MOD 30 MIN: CPT | Performed by: PHYSICIAN ASSISTANT

## 2023-10-20 RX ORDER — AMITRIPTYLINE HYDROCHLORIDE 10 MG/1
TABLET, FILM COATED ORAL
Qty: 60 TABLET | Refills: 2 | Status: SHIPPED | OUTPATIENT
Start: 2023-10-20

## 2023-10-20 RX ORDER — GALCANEZUMAB 120 MG/ML
INJECTION, SOLUTION SUBCUTANEOUS
Qty: 2 ML | Refills: 0 | Status: SHIPPED | OUTPATIENT
Start: 2023-10-20

## 2023-10-20 RX ORDER — BUPIVACAINE HYDROCHLORIDE 5 MG/ML
2.5 INJECTION, SOLUTION EPIDURAL; INTRACAUDAL ONCE
Status: COMPLETED | OUTPATIENT
Start: 2023-10-20 | End: 2023-10-20

## 2023-10-20 RX ORDER — BUPIVACAINE HYDROCHLORIDE 2.5 MG/ML
2.5 INJECTION, SOLUTION EPIDURAL; INFILTRATION; INTRACAUDAL ONCE
Status: DISCONTINUED | OUTPATIENT
Start: 2023-10-20 | End: 2023-10-20

## 2023-10-20 RX ORDER — LIDOCAINE HYDROCHLORIDE 10 MG/ML
2.5 INJECTION, SOLUTION INFILTRATION; PERINEURAL ONCE
Status: COMPLETED | OUTPATIENT
Start: 2023-10-20 | End: 2023-10-20

## 2023-10-20 RX ORDER — GALCANEZUMAB 120 MG/ML
INJECTION, SOLUTION SUBCUTANEOUS
Qty: 1 ML | Refills: 11 | Status: SHIPPED | OUTPATIENT
Start: 2023-10-20

## 2023-10-20 RX ADMIN — BUPIVACAINE HYDROCHLORIDE 2.5 ML: 5 INJECTION, SOLUTION EPIDURAL; INTRACAUDAL at 10:31

## 2023-10-20 RX ADMIN — LIDOCAINE HYDROCHLORIDE 2.5 ML: 10 INJECTION, SOLUTION INFILTRATION; PERINEURAL at 10:31

## 2023-10-20 NOTE — PROGRESS NOTES
Patient ID: Linn Mar is a 37 y.o. adult. Assessment/Plan:       Diagnoses and all orders for this visit:    Chronic migraine without aura without status migrainosus, not intractable    Migraine with aura and without status migrainosus, not intractable  -     Galcanezumab-gnlm (Emgality) 120 MG/ML SOAJ; 30 days after loading dose, inject 1 pen subq every 30 days.  -     Galcanezumab-gnlm (Emgality) 120 MG/ML SOAJ; One ml subcutaneous on the right thigh and 1 ml subcutaneous on the left thigh at the same time for 1 dose  -     amitriptyline (ELAVIL) 10 mg tablet; 1 tab qhs x 1 week, then Increase to 20 mg qhs if tolerated. Cervical radicular pain  -     MRI cervical spine wo contrast; Future  -     amitriptyline (ELAVIL) 10 mg tablet; 1 tab qhs x 1 week, then Increase to 20 mg qhs if tolerated. Cervical myofascial pain syndrome  -     Discontinue: bupivacaine (PF) (MARCAINE) 0.25 % injection 2.5 mL  -     lidocaine (XYLOCAINE) 1 % injection 2.5 mL  -     Trigger Injection 1 or 2 muscles  -     bupivacaine (PF) (MARCAINE) 0.5 % injection 2.5 mL    B12 deficiency  -     Vitamin B12; Future    HTN (hypertension)         Amitriptyline was recommended for prevention of myofascial pain and migraine headaches. Side effects reviewed. The patient takes tramadol every morning is 50 mg, so I instructed her to take the amitriptyline at nighttime and hold the tramadol within 8 hours or more of taking the amitriptyline. The goal is to potentially wean her from using tramadol every day. Emgality was prescribed for migraine headaches. Continue Nurtec every other day for prevention of migraines. If the above is ineffective she is a good candidate for Botox injections as noted below. Trigger point injections were performed today on an emergent basis, as noted below. Pt was instructed to take BP at home and contact us with reading.     The patient should not hesitate to call me prior to her follow up with any questions or concerns. Universal Protocol:  Consent: The procedure was performed in an emergent situation. Verbal consent obtained. Risks and benefits: risks, benefits and alternatives were discussed  Consent given by: patient  Procedure Details  Location(s):  Patient tolerance: patient tolerated the procedure well with no immediate complications  Additional procedure details: Trigger point injections were performed on an emergent basis and are a part of ongoing therapy. Risks, benefits, alternatives, infection, bleeding and allergic reaction were discussed with the patient. Verbal consent was obtained prior to the procedure and is detailed in the patient's record. Trigger point injections were performed in the following locations using a mixture of 2.5 mL 0.5% bupivacaine + 2.5 mL of 1% lidocaine, without steroid, using a 30 gauge, 1/2 inch needle: R>L middle traps, R and L upper traps, b/l suboccipitalis muscles, b/l splenius capitis. For middle traps- 27G 1/2in needle used, and switched to 30G for the rest of the injections. Subjective:    HPI    Ms. Hiwot Deleon is a very pleasant 66-year-old female who is here for neurological follow-up for headaches and neck pain. She is requesting trigger point injections on an emergent basis today. In the past they worked well to alleviate neck pain and headache. They provide temporary relief for a few days or weeks. The patient feels that her headaches are related to the neck pain. She has significant myofascial pain in the right upper back and traps which radiates up into the neck and head. She also has left trapezius pain but it is worse on the right. She also has a headache radiating into the left occipital region from the neck and into the left temple. The majority of her pain is left hemispheric in the head and right trapezius.   She has tried and failed several oral preventative medications including Nurtec every other day which works temporarily, Topamax, gabapentin. She is agreeable to a different preventative at this time, and if something else orally does not work she may be agreeable to try Botox. She has greater than 15 migraine days per month, each lasting greater than 4 hours long. With the migraine headache she has associated nausea, light and sound sensitivity. Sometimes she has to turn the light off at work. Current headache pain is 6 out of 10. She feels better after taking the Nurtec in the beginning of the day but then when she returns home to relax or do her home activities, she develops another headache and then some vision changes like blurry vision, fatigue and even left-sided facial numbness at times. She denies facial droop. The patient has tried several muscle relaxants including chlorthalidone, Soma, baclofen, methocarbamol    The following portions of the patient's history were reviewed and updated as appropriate: She  has a past medical history of Anxiety, Asthma, COVID-19 virus infection (12/27/2021), Hernia of abdominal wall, Hypertension, Kidney stones, Seizures ([address]), and Viral URI with cough (12/27/2021).   She   Patient Active Problem List    Diagnosis Date Noted    B12 deficiency 10/22/2023    Cervical radicular pain 10/22/2023    Migraine with aura and without status migrainosus, not intractable 10/22/2023    Seasonal asthma 06/30/2023    Impacted third molar tooth 05/04/2023    Cavernous angioma 01/20/2023    Chronic fatigue 05/26/2022    Gender dysphoria 05/09/2022    Chest pain 03/04/2022    Muscle cramping 02/17/2022    Palpitations 01/13/2022    Subclinical hypothyroidism 01/13/2022    Cervical myofascial pain syndrome 06/20/2021    Hypokalemia 06/07/2021    Elevated serum immunoglobulin free light chain level 04/05/2021    Chronic pain syndrome 03/30/2021    Lumbar disc herniation with radiculopathy 03/30/2021    Chronic neck pain 05/18/2020    Low back pain 05/18/2020 Herniation of intervertebral disc of lumbar spine 04/03/2020    Morbid obesity with BMI of 50.0-59.9, adult ([address]) 04/03/2020    Left-sided weakness and sensory deficits 03/06/2020    Chronic migraine without aura without status migrainosus, not intractable 02/81/3449    Umbilical hernia 76/63/5424    GERD (gastroesophageal reflux disease) 05/31/2019    History of sleeve gastrectomy 03/19/2019    HTN (hypertension) 03/19/2019    Cervical disc disease 03/19/2019    Male-to-female transgender person 03/19/2019    Anxiety 03/19/2019     She  has a past surgical history that includes Gastric bypass; Appendectomy; Cholecystectomy; and Hernia repair. Her family history includes Aneurysm in her maternal uncle; Diabetes in her mother; Hypertension in her mother and sister; No Known Problems in her father; Stroke in her maternal grandmother. She  reports that she has never smoked. She has never used smokeless tobacco. She reports current alcohol use. She reports that she does not use drugs. Current Outpatient Medications   Medication Sig Dispense Refill    amitriptyline (ELAVIL) 10 mg tablet 1 tab qhs x 1 week, then Increase to 20 mg qhs if tolerated. 60 tablet 2    Galcanezumab-gnlm (Emgality) 120 MG/ML SOAJ 30 days after loading dose, inject 1 pen subq every 30 days.  1 mL 11    Galcanezumab-gnlm (Emgality) 120 MG/ML SOAJ One ml subcutaneous on the right thigh and 1 ml subcutaneous on the left thigh at the same time for 1 dose 2 mL 0    predniSONE 10 mg tablet 4 tablets daily x 3 days;  3 tablets daily x 3 days; 2 tablets daily x 3 days; 1 tablet daily x 3 days 30 tablet 0    traMADol (ULTRAM) 50 mg tablet Take 1 tablet (50 mg total) by mouth 2 (two) times a day 60 tablet 0    acetaminophen (TYLENOL) 500 mg tablet Take 500 mg by mouth if needed      albuterol (PROVENTIL HFA,VENTOLIN HFA) 90 mcg/act inhaler Inhale 2 puffs every 6 (six) hours as needed for wheezing 6.7 g 2    carisoprodol (SOMA) 250 MG Take 1 tablet (250 mg total) by mouth 3 (three) times a day 90 tablet 0    colchicine (COLCRYS) 0.6 mg tablet Take 1 tablet (0.6 mg total) by mouth daily 2 pills now and 1 in an hour. One daily until resolved. (Patient taking differently: Take 0.6 mg by mouth daily as needed 2 pills now and 1 in an hour. One daily until resolved.) 20 tablet 0    Cyanocobalamin (B-12) 1000 MCG/ML KIT Inject 1 ml IM in thigh/ buttocks or deltoid mm once weekly x 8 weeks. Afterward once monthly (Patient taking differently: if needed Inject 1 ml IM in thigh/ buttocks or deltoid mm once weekly x 8 weeks. Afterward once monthly) 8 mL 1    estradiol (ESTRACE) 2 MG tablet Take 1 tablet (2 mg total) by mouth 2 (two) times a day 180 tablet 0    fluticasone (FLONASE) 50 mcg/act nasal spray 1 spray into each nostril daily 16 g 2    hydrocortisone (ANUSOL-HC) 25 mg suppository Insert 1 suppository (25 mg total) into the rectum daily as needed for hemorrhoids 12 suppository 0    ipratropium-albuterol (DUO-NEB) 0.5-2.5 mg/3 mL nebulizer solution Take 3 mL by nebulization every 6 (six) hours as needed for wheezing or shortness of breath 60 mL 1    LORazepam (ATIVAN) 1 mg tablet Take 0.5 tablets (0.5 mg total) by mouth every 6 (six) hours as needed for anxiety 60 tablet 0    montelukast (SINGULAIR) 10 mg tablet Take 1 tablet (10 mg total) by mouth daily at bedtime 90 tablet 1    multivitamin (THERAGRAN) TABS Take 1 tablet by mouth daily      ondansetron (ZOFRAN-ODT) 4 mg disintegrating tablet Take 1 tablet (4 mg total) by mouth every 8 (eight) hours as needed for nausea or vomiting 60 tablet 1    orphenadrine (NORFLEX) 100 mg tablet Take 1 tablet (100 mg total) by mouth 2 (two) times a day as needed for muscle spasms 30 tablet 3    pantoprazole (PROTONIX) 40 mg tablet Take 1 tablet (40 mg total) by mouth daily 90 tablet 1    potassium chloride (K-DUR,KLOR-CON) 20 mEq tablet 2 tabs BID day 1 the 2 tabs daily for 3 days.  (Patient taking differently: as needed 2 tabs BID day 1 the 2 tabs daily for 3 days. ) 10 tablet 5    Riboflavin 400 MG TABS Take 1 tablet (400 mg total) by mouth in the morning 90 tablet 3    rimegepant sulfate (NURTEC) 75 mg TBDP Take 1 tablet (75 mg total) by mouth every other day 15 tablet 3    rizatriptan (Maxalt) 10 mg tablet Take 1 tablet (10 mg total) by mouth as needed for migraine Take at the onset of migraine; if symptoms continue or return, may take another dose at least 2 hours after first dose. Take no more than 2 doses in a day. 9 tablet 3    Syringe/Needle, Disp, (SYRINGE 3CC/68WN8-7/4") 27G X 1-1/4" 3 ML MISC Use for B12 injections 10 each 1    triamterene-hydrochlorothiazide (DYAZIDE) 37.5-25 mg per capsule Take 1 capsule by mouth every morning 90 capsule 1     Current Facility-Administered Medications   Medication Dose Route Frequency Provider Last Rate Last Admin    cyanocobalamin injection 1,000 mcg  1,000 mcg Intramuscular Q30 Days Katie Hayward MD   1,000 mcg at 06/19/23 1310     She is allergic to gadolinium and iodinated contrast media. .         Objective:    Blood pressure (!) 144/102. Physical Exam    Neurological Exam  On neurologic exam, the patient is alert and oriented to time and place. Speech is fluent and articulate, and the patient follows commands appropriately. Judgment and affect appear normal. Pupils are equally round and reactive to light and extraocular muscles are intact without nystagmus. Face is symmetric. Hearing is intact. Motor examination reveals intact strength throughout. Normal gait is steady. ROS:    Review of Systems   Constitutional:  Negative for chills and fever. HENT:  Negative for ear pain and sore throat. Eyes:  Negative for pain and visual disturbance. Respiratory:  Negative for cough and shortness of breath. Cardiovascular:  Negative for chest pain and palpitations. Gastrointestinal:  Negative for abdominal pain and vomiting. Genitourinary:  Negative for dysuria and hematuria. Musculoskeletal:  Positive for back pain, myalgias, neck pain and neck stiffness. Negative for arthralgias. Skin:  Negative for color change and rash. Neurological:  Positive for headaches. Negative for seizures and syncope. All other systems reviewed and are negative. ROS reviewed.

## 2023-10-22 PROBLEM — M54.12 CERVICAL RADICULAR PAIN: Status: ACTIVE | Noted: 2023-10-22

## 2023-10-22 PROBLEM — G43.109 MIGRAINE WITH AURA AND WITHOUT STATUS MIGRAINOSUS, NOT INTRACTABLE: Status: ACTIVE | Noted: 2023-10-22

## 2023-10-22 PROBLEM — E53.8 B12 DEFICIENCY: Status: ACTIVE | Noted: 2023-10-22

## 2023-10-22 PROBLEM — G43.709 CHRONIC MIGRAINE WITHOUT AURA WITHOUT STATUS MIGRAINOSUS, NOT INTRACTABLE: Status: ACTIVE | Noted: 2020-02-03

## 2023-11-02 ENCOUNTER — TELEPHONE (OUTPATIENT)
Dept: NEUROLOGY | Facility: CLINIC | Age: 43
End: 2023-11-02

## 2023-11-02 DIAGNOSIS — K21.9 GASTROESOPHAGEAL REFLUX DISEASE WITHOUT ESOPHAGITIS: ICD-10-CM

## 2023-11-02 DIAGNOSIS — K42.9 UMBILICAL HERNIA WITHOUT OBSTRUCTION AND WITHOUT GANGRENE: ICD-10-CM

## 2023-11-02 RX ORDER — PANTOPRAZOLE SODIUM 40 MG/1
40 TABLET, DELAYED RELEASE ORAL DAILY
Qty: 90 TABLET | Refills: 1 | Status: SHIPPED | OUTPATIENT
Start: 2023-11-02

## 2023-11-07 ENCOUNTER — TELEPHONE (OUTPATIENT)
Dept: NEUROLOGY | Facility: CLINIC | Age: 43
End: 2023-11-07

## 2023-11-07 NOTE — TELEPHONE ENCOUNTER
Emgality denied as they needconfirmation that the reqeusted agent will not be used in combo with another prophylatic CGRP antagonists    Per note form 10/20-Emgality was prescribed for migraine headaches. Continue Nurtec every other day for prevention of migraines.      Also received fax from [employer] that they received an appeal request from pt on 11/6 and will render a decision within 15 days

## 2023-11-07 NOTE — TELEPHONE ENCOUNTER
----- Message from Otis Alpers, PA-C sent at 11/6/2023  2:09 PM EST -----  Hello! Can botox please be auth'ed for her for migraine, 200 units? Thanks.

## 2023-11-08 ENCOUNTER — TELEMEDICINE (OUTPATIENT)
Dept: NEUROLOGY | Facility: CLINIC | Age: 43
End: 2023-11-08
Payer: COMMERCIAL

## 2023-11-08 ENCOUNTER — CLINICAL SUPPORT (OUTPATIENT)
Dept: NEUROLOGY | Facility: CLINIC | Age: 43
End: 2023-11-08
Payer: COMMERCIAL

## 2023-11-08 VITALS — HEART RATE: 67 BPM | SYSTOLIC BLOOD PRESSURE: 138 MMHG | DIASTOLIC BLOOD PRESSURE: 92 MMHG

## 2023-11-08 DIAGNOSIS — G43.709 CHRONIC MIGRAINE WITHOUT AURA WITHOUT STATUS MIGRAINOSUS, NOT INTRACTABLE: Primary | ICD-10-CM

## 2023-11-08 DIAGNOSIS — M79.18 CERVICAL MYOFASCIAL PAIN SYNDROME: ICD-10-CM

## 2023-11-08 DIAGNOSIS — G43.709 CHRONIC MIGRAINE WITHOUT AURA: Primary | ICD-10-CM

## 2023-11-08 PROCEDURE — 99213 OFFICE O/P EST LOW 20 MIN: CPT | Performed by: PHYSICIAN ASSISTANT

## 2023-11-08 PROCEDURE — 96372 THER/PROPH/DIAG INJ SC/IM: CPT | Performed by: STUDENT IN AN ORGANIZED HEALTH CARE EDUCATION/TRAINING PROGRAM

## 2023-11-08 RX ORDER — KETOROLAC TROMETHAMINE 30 MG/ML
30 INJECTION, SOLUTION INTRAMUSCULAR; INTRAVENOUS ONCE
Status: COMPLETED | OUTPATIENT
Start: 2023-11-08 | End: 2023-11-08

## 2023-11-08 RX ORDER — BUTALBITAL, ACETAMINOPHEN AND CAFFEINE 50; 325; 40 MG/1; MG/1; MG/1
TABLET ORAL
Qty: 10 TABLET | Refills: 0 | Status: SHIPPED | OUTPATIENT
Start: 2023-11-08

## 2023-11-08 RX ADMIN — KETOROLAC TROMETHAMINE 30 MG: 30 INJECTION, SOLUTION INTRAMUSCULAR; INTRAVENOUS at 13:26

## 2023-11-08 NOTE — TELEPHONE ENCOUNTER
Patient is a new start to Botox. You would like to start this patient on:    Botox 200 UNITS  Qty. 1  [id number].G43.709  Sig: Inject up to 200 UNITS I.M into the various sites in the head and neck once every three months for one year with  Refills: 3    If you agree to the order transcribed above, please respond directly if you agree or not so that I may proceed further with authorization and for use of Verbal Order. Thank you.

## 2023-11-08 NOTE — PROGRESS NOTES
Virtual Regular Visit    Verification of patient location:    Patient is located at Other in the following state in which I hold an active license [place]      Assessment/Plan:    Problem List Items Addressed This Visit          Cardiovascular and Mediastinum    Chronic migraine without aura without status migrainosus, not intractable - Primary    Relevant Medications    Ubrogepant (UBRELVY) 100 MG tablet    butalbital-acetaminophen-caffeine (FIORICET,ESGIC) -40 mg per tablet    Other Relevant Orders    Chemodenervation       Musculoskeletal and Integument    Cervical myofascial pain syndrome     Ms. Lefty Garcia reports side effects with the recent amitriptyline and she stopped it, and Emgality is unfortunately denied by her insurance. Due to the fact that she has daily migraine headaches, for the past several months migraines have been greater than 15 days out of the month, lasting 4 hours or greater, she is a great candidate for Botox injections. Side effects were reviewed with her in detail and she was agreeable to proceed. I will send a message to the authorization team.    In the meantime we are going to switch Nurtec to Cumeira, since recently Nurtec is ineffective. She can take Maxalt after that if it fails however Maxalt tends to cause sedation so she would like to avoid it while at work. I sent Fioricet as a temporary abortive medication, reminded not to take more than 3-4 doses per week to prevent medication overuse headache. We will continue to perform trigger point injections on an emergent basis, as needed for neck pain. The patient should not hesitate to call me prior to her follow up with any questions or concerns. Reason for visit is No chief complaint on file.        Encounter provider Bethel Stauffer PA-C    Provider located at [address] [address] [address] [facility] [address]  [address] [place] [address]  [phone]      Recent Visits  Date Type Provider Dept   11/02/23 Telephone Thierry Russo PA-C [facility]  [place]   Showing recent visits within past 7 days and meeting all other requirements  Today's Visits  Date Type Provider Dept   11/08/23 2776 John Purdy PA-C [facility] [address]  today's visits and meeting all other requirements  Future Appointments  No visits were found meeting these conditions. Showing future appointments within next 150 days and meeting all other requirements       The patient was identified by name and date of birth. Bryant Arias was informed that this is a telemedicine visit and that the visit is being conducted through the [employer]. She agrees to proceed. .  My office door was closed. No one else was in the room. She acknowledged consent and understanding of privacy and security of the video platform. The patient has agreed to participate and understands they can discontinue the visit at any time. Patient is aware this is a billable service. Subjective  Bryant Arias is a 37 y.o. adult who is contacted via telemedicine for neurological follow-up. HPI       Migraines:  Current pain: 8/10, patient needs sunglasses  Reaches pain level at worst: 8-10/10  Frequency: >15 days per month for the past 6 months  Duration: >4 hours; Typically waxes and wanes but lasts the entire day  Location: Left neck radiating to left occipital, temporal and frontal, left facial  Quality: Throbbing  Headache is not worse with cough, sneeze or bending over.   Associated with: nausea, light and sound sensitivity, left facial twitching/spasm, left-sided numbness feeling (subjective)    Triggers: Overheated, sunlight or bright lights, neck pain or when neck hurts    Aura/ warning: None    Medications tried:  Prevention-  Amitriptyline-side effects  Emgality-denied since on Nurtec  Nurtec every other day  Riboflavin  Chlorthalidone, cyclobenzaprine, soma, methocarbamol, tizanidine  Gabapentin    Abortive-  Rizatriptan  Sumatriptan-side effects  Tylenol  Tramadol  Zofran  Prednisone  Toradol    Other non-medication therapies or treatments-ice, heat, massage, trigger point injections    Neck pain is worse on the left and radiates from the left middle and upper traps into the left side of the neck and then the head. She always has pain on the left side and it is typically less on the right. Past Medical History:   Diagnosis Date    Anxiety     Asthma     seasonal    COVID-19 virus infection 12/27/2021    Hernia of abdominal wall     Hypertension     Kidney stones     Seizures ([address])     pseudoseizures since 2012    Viral URI with cough 12/27/2021       Past Surgical History:   Procedure Laterality Date    APPENDECTOMY      CHOLECYSTECTOMY      GASTRIC BYPASS      HERNIA REPAIR      x2       Current Outpatient Medications   Medication Sig Dispense Refill    acetaminophen (TYLENOL) 500 mg tablet Take 500 mg by mouth if needed      albuterol (PROVENTIL HFA,VENTOLIN HFA) 90 mcg/act inhaler Inhale 2 puffs every 6 (six) hours as needed for wheezing 6.7 g 2    butalbital-acetaminophen-caffeine (FIORICET,ESGIC) -40 mg per tablet 1 tab q6 hours prn migraine. No more than 2-3 per week. 10 tablet 0    carisoprodol (SOMA) 250 MG Take 1 tablet (250 mg total) by mouth 3 (three) times a day 90 tablet 0    colchicine (COLCRYS) 0.6 mg tablet Take 1 tablet (0.6 mg total) by mouth daily 2 pills now and 1 in an hour. One daily until resolved. (Patient taking differently: Take 0.6 mg by mouth daily as needed 2 pills now and 1 in an hour. One daily until resolved.) 20 tablet 0    Cyanocobalamin (B-12) 1000 MCG/ML KIT Inject 1 ml IM in thigh/ buttocks or deltoid mm once weekly x 8 weeks. Afterward once monthly (Patient taking differently: if needed Inject 1 ml IM in thigh/ buttocks or deltoid mm once weekly x 8 weeks.  Afterward once monthly) 8 mL 1    estradiol (ESTRACE) 2 MG tablet Take 1 tablet (2 mg total) by mouth 2 (two) times a day 180 tablet 0    fluticasone (FLONASE) 50 mcg/act nasal spray 1 spray into each nostril daily 16 g 2    hydrocortisone (ANUSOL-HC) 25 mg suppository Insert 1 suppository (25 mg total) into the rectum daily as needed for hemorrhoids 12 suppository 0    ipratropium-albuterol (DUO-NEB) 0.5-2.5 mg/3 mL nebulizer solution Take 3 mL by nebulization every 6 (six) hours as needed for wheezing or shortness of breath 60 mL 1    LORazepam (ATIVAN) 1 mg tablet Take 0.5 tablets (0.5 mg total) by mouth every 6 (six) hours as needed for anxiety 60 tablet 0    montelukast (SINGULAIR) 10 mg tablet Take 1 tablet (10 mg total) by mouth daily at bedtime 90 tablet 1    multivitamin (THERAGRAN) TABS Take 1 tablet by mouth daily      ondansetron (ZOFRAN-ODT) 4 mg disintegrating tablet Take 1 tablet (4 mg total) by mouth every 8 (eight) hours as needed for nausea or vomiting 60 tablet 1    orphenadrine (NORFLEX) 100 mg tablet Take 1 tablet (100 mg total) by mouth 2 (two) times a day as needed for muscle spasms 30 tablet 3    pantoprazole (PROTONIX) 40 mg tablet Take 1 tablet (40 mg total) by mouth daily 90 tablet 1    potassium chloride (K-DUR,KLOR-CON) 20 mEq tablet 2 tabs BID day 1 the 2 tabs daily for 3 days. (Patient taking differently: as needed 2 tabs BID day 1 the 2 tabs daily for 3 days. ) 10 tablet 5    predniSONE 10 mg tablet 4 tablets daily x 3 days;  3 tablets daily x 3 days; 2 tablets daily x 3 days; 1 tablet daily x 3 days (Patient taking differently: if needed 4 tablets daily x 3 days;  3 tablets daily x 3 days; 2 tablets daily x 3 days; 1 tablet daily x 3 days) 30 tablet 0    Riboflavin 400 MG TABS Take 1 tablet (400 mg total) by mouth in the morning 90 tablet 3    rizatriptan (Maxalt) 10 mg tablet Take 1 tablet (10 mg total) by mouth as needed for migraine Take at the onset of migraine; if symptoms continue or return, may take another dose at least 2 hours after first dose.  Take no more than 2 doses in a day. 9 tablet 3    Syringe/Needle, Disp, (SYRINGE 3CC/20AV3-9/4") 27G X 1-1/4" 3 ML MISC Use for B12 injections 10 each 1    traMADol (ULTRAM) 50 mg tablet Take 1 tablet (50 mg total) by mouth 2 (two) times a day 60 tablet 0    triamterene-hydrochlorothiazide (DYAZIDE) 37.5-25 mg per capsule Take 1 capsule by mouth every morning 90 capsule 1    Ubrogepant (UBRELVY) 100 MG tablet 1 tab at migraine onset, repeat in 2 hours if needed. Max 2 per day. 10 tablet 5    Galcanezumab-gnlm (Emgality) 120 MG/ML SOAJ One ml subcutaneous on the right thigh and 1 ml subcutaneous on the left thigh at the same time for 1 dose 2 mL 0     Current Facility-Administered Medications   Medication Dose Route Frequency Provider Last Rate Last Admin    cyanocobalamin injection 1,000 mcg  1,000 mcg Intramuscular Q30 Days Carolyne Hunter MD   1,000 mcg at 06/19/23 1310        Allergies   Allergen Reactions    Gadolinium Anaphylaxis     "Oxygen dropped and coded during"       Iodinated Contrast Media Anaphylaxis       Review of Systems   Constitutional:  Negative for chills and fever. HENT:  Negative for ear pain and sore throat. Eyes:  Negative for pain and visual disturbance. Respiratory:  Negative for cough and shortness of breath. Cardiovascular:  Negative for chest pain and palpitations. Gastrointestinal:  Negative for abdominal pain and vomiting. Genitourinary:  Negative for dysuria and hematuria. Musculoskeletal:  Negative for arthralgias and back pain. Skin:  Negative for color change and rash. Neurological:  Positive for headaches (migraines). Negative for seizures and syncope. Light sensitivity   All other systems reviewed and are negative. ROS reviewed. Video Exam    Vitals:    11/08/23 1117   BP: 138/92   Pulse: 67       Physical Exam   Neurological exam:  On neurologic exam, the patient is alert and oriented to time and place.  Speech is fluent and articulate, and the patient follows commands appropriately. Judgment and affect appear normal.  Extraocular muscles are intact without nystagmus. Face is symmetric. Hearing is intact bilaterally. Motor examination reveals adequate range of motion. Patient has light sensitivity and wearing sunglasses.     Visit Time  Total Visit Duration: 20 min

## 2023-11-08 NOTE — TELEPHONE ENCOUNTER
11/6/23 at [address] [place] [address], this is Zakia. I'm a pharmacist calling from [employer] [facility] prior authorization department. We received an appeal request for one of Dr Luanne Hutchison patient, Arash Martin, YOB: 1980 for the emgality. We need some additional information on it. If you could give us a call back at [phone]. And I will also send a fax request to the office. Thank you.      See below

## 2023-11-13 ENCOUNTER — PROCEDURE VISIT (OUTPATIENT)
Dept: NEUROLOGY | Facility: CLINIC | Age: 43
End: 2023-11-13
Payer: COMMERCIAL

## 2023-11-13 VITALS — DIASTOLIC BLOOD PRESSURE: 85 MMHG | SYSTOLIC BLOOD PRESSURE: 160 MMHG | TEMPERATURE: 98.3 F | HEART RATE: 75 BPM

## 2023-11-13 DIAGNOSIS — G43.709 CHRONIC MIGRAINE WITHOUT AURA WITHOUT STATUS MIGRAINOSUS, NOT INTRACTABLE: Primary | ICD-10-CM

## 2023-11-13 DIAGNOSIS — M79.18 CERVICAL MYOFASCIAL PAIN SYNDROME: ICD-10-CM

## 2023-11-13 PROCEDURE — 64615 CHEMODENERV MUSC MIGRAINE: CPT | Performed by: PHYSICIAN ASSISTANT

## 2023-11-13 NOTE — PATIENT INSTRUCTIONS
Botox Therapy  Important Information    Our goal is to make sure you fully understand how Botox Therapy treatment may benefit you and to help you understand how you can play an active role in your treatments and ongoing care. Please review the following information below. Call our office IMMEDIATELY @ [phone] and speak to one of our Botox Coordinators if you have a change in insurance. (a prior authorization is required and accurate information is vital)  Please call at least 24 hours in advance if you can't make your appointment. Appointments are scheduled every 91 days (this will be scheduled in advance before leaving the office)  You must allow for at least 2-3 treatments to determine if Botox is right for you. It may take a few weeks to see a response from treatment. No hair dye or scalp massage or treatment within 24 hours of treatment  We encourage you to use a headache diary or journal to document your headache frequency and severity. You can also utilize the Migraine Jason delphine (downloadable on [employer])  Sign up for the Botox Savings Program. (commercial insurance patients may qualify) Sign up at [employer] or call [phone] Option: 4  To get more information on Botox therapy for Chronic Migraines and see frequently asked questions, please visit [employer]  If you have any questions or concerns, please speak to one of our Botox Coordinators at [phone]. We look forward to servicing you! Headache management instructions  - When patient has a moderate to severe headache, they should seek rest, initiate relaxation and apply cold compresses to the head. - Maintain regular sleep schedule. Adults need at least 7-8 hours of uninterrupted a night. - Limit over the counter medications such as Tylenol, Ibuprofen, Aleve, Excedrin. (No more than 2- 3 times a week or max 10 a month). - Maintain headache diary.   Free DELPHINE for a smart phone, which can be used is "Migraine reynaldo"  - Limit caffeine to 1-2 cups 8 to 16 oz a day or less. - Avoid dietary trigger. (aged cheese, peanuts, MSG, aspartame and nitrates). - Patient is to have regular frequent meals to prevent headache onset. - Please drink at least 64 ounces of water a day to help remain hydrated.

## 2023-11-13 NOTE — TELEPHONE ENCOUNTER
Received faxed approval letter. Placed in scanning bin at  . Approved   Botox 200 UNITS  Qty.  1  Auth# [id number]  Valid: 11/9/23-5/9/24  Visits: 2     Please use stock

## 2023-11-14 ENCOUNTER — TELEPHONE (OUTPATIENT)
Dept: INTERNAL MEDICINE CLINIC | Facility: OTHER | Age: 43
End: 2023-11-14

## 2023-11-14 ENCOUNTER — OFFICE VISIT (OUTPATIENT)
Age: 43
End: 2023-11-14
Payer: COMMERCIAL

## 2023-11-14 VITALS
SYSTOLIC BLOOD PRESSURE: 140 MMHG | HEIGHT: 69 IN | TEMPERATURE: 97.5 F | HEART RATE: 82 BPM | OXYGEN SATURATION: 98 % | DIASTOLIC BLOOD PRESSURE: 88 MMHG | BODY MASS INDEX: 46.65 KG/M2 | WEIGHT: 315 LBS

## 2023-11-14 DIAGNOSIS — G89.4 CHRONIC PAIN SYNDROME: Primary | ICD-10-CM

## 2023-11-14 DIAGNOSIS — E53.8 B12 DEFICIENCY: ICD-10-CM

## 2023-11-14 DIAGNOSIS — K64.9 HEMORRHOIDS, UNSPECIFIED HEMORRHOID TYPE: ICD-10-CM

## 2023-11-14 DIAGNOSIS — E66.01 MORBID OBESITY WITH BMI OF 50.0-59.9, ADULT (HCC): ICD-10-CM

## 2023-11-14 DIAGNOSIS — I10 ESSENTIAL HYPERTENSION: ICD-10-CM

## 2023-11-14 DIAGNOSIS — K21.9 GASTROESOPHAGEAL REFLUX DISEASE WITHOUT ESOPHAGITIS: ICD-10-CM

## 2023-11-14 DIAGNOSIS — R51.9 WORSENING HEADACHES: ICD-10-CM

## 2023-11-14 DIAGNOSIS — I1A.0 RESISTANT HYPERTENSION: ICD-10-CM

## 2023-11-14 PROCEDURE — 99214 OFFICE O/P EST MOD 30 MIN: CPT | Performed by: INTERNAL MEDICINE

## 2023-11-14 RX ORDER — TRAMADOL HYDROCHLORIDE 50 MG/1
50 TABLET ORAL 2 TIMES DAILY
Qty: 60 TABLET | Refills: 0 | Status: SHIPPED | OUTPATIENT
Start: 2023-11-14

## 2023-11-14 RX ORDER — HYDROCORTISONE ACETATE 25 MG/1
25 SUPPOSITORY RECTAL DAILY PRN
Qty: 12 SUPPOSITORY | Refills: 0 | Status: SHIPPED | OUTPATIENT
Start: 2023-11-14

## 2023-11-14 RX ORDER — SYRINGE W-NEEDLE,DISPOSAB,3 ML 25GX5/8"
SYRINGE, EMPTY DISPOSABLE MISCELLANEOUS
Qty: 10 EACH | Refills: 1 | Status: SHIPPED | OUTPATIENT
Start: 2023-11-14

## 2023-11-14 RX ORDER — TRIAMTERENE AND HYDROCHLOROTHIAZIDE 37.5; 25 MG/1; MG/1
1 CAPSULE ORAL EVERY MORNING
Qty: 90 CAPSULE | Refills: 1 | Status: SHIPPED | OUTPATIENT
Start: 2023-11-14

## 2023-11-14 RX ORDER — LISINOPRIL 5 MG/1
5 TABLET ORAL DAILY
Qty: 30 TABLET | Refills: 5 | Status: SHIPPED | OUTPATIENT
Start: 2023-11-14

## 2023-11-14 RX ORDER — DULOXETIN HYDROCHLORIDE 20 MG/1
20 CAPSULE, DELAYED RELEASE ORAL DAILY
Qty: 30 CAPSULE | Refills: 5 | Status: SHIPPED | OUTPATIENT
Start: 2023-11-14

## 2023-11-14 NOTE — ASSESSMENT & PLAN NOTE
Patient has been attempting some weight loss.   She did lose 4 pounds since her last office visit but she is limited in her exercise ability due to her arthritic issues and migraines

## 2023-11-14 NOTE — ASSESSMENT & PLAN NOTE
Patient has not been taking her B12 as directed. Recommend that she initiate weekly injections of 100 mcg supplemented by the oral sublingual tablets.   We will recheck a B12 level in 3 months

## 2023-11-14 NOTE — PROGRESS NOTES
Universal Protocol   Consent: Verbal consent obtained. Written consent obtained.   Risks and benefits: risks, benefits and alternatives were discussed  Consent given by: patient  Patient understanding: patient states understanding of the procedure being performed  Patient consent: the patient's understanding of the procedure matches consent given  Procedure consent: procedure consent matches procedure scheduled      Chemodenervation     Date/Time  11/13/2023 4:00 PM     Performed by  Bryanna Pan PA-C   Authorized by  Bryanna Pan PA-C     Pre-procedure details      Prepped With: Alcohol     Procedure details      Position:  Upright   Botox      Botox Type:  Type A    Brand:  Botox    mL's of Botulinum Toxin:  200    Final Concentration per CC:  100 units    Needle Gauge:  30 G 2.5 inch   Procedures      Botox Procedures: chronic headache      Indications: migraines     Injection Location      Head / Face:  L superior trapezius, R superior trapezius, L superior cervical paraspinal, R superior cervical paraspinal, L , R , procerus, L temporalis, R temporalis, R frontalis, L frontalis, R medial occipitalis and L medial occipitalis    L  injection amount:  5 unit(s)    R  injection amount:  5 unit(s)    L lateral frontalis:  5 unit(s)    R lateral frontalis:  5 unit(s)    L medial frontalis:  5 unit(s)    R medial frontalis:  5 unit(s)    L temporalis injection amount:  20 unit(s)    R temporalis injection amount:  20 unit(s)    Procerus injection amount:  5 unit(s)    L medial occipitalis injection amount:  15 unit(s)    R medial occipitalis injection amount:  15 unit(s)    L superior cervical paraspinal injection amount:  10 unit(s)    R superior cervical paraspinal injection amount:  10 unit(s)    L superior trapezius injection amount:  15 unit(s)    R superior trapezius injection amount:  15 unit(s)   Total Units      Total units used:  200    Total units discarded:  0 Post-procedure details      Chemodenervation:  Chronic migraine    Facial Nerve Location[de-identified]  Bilateral facial nerve    Patient tolerance of procedure: Tolerated well, no immediate complications   Comments       Extra units medically necessary- 45 units between R upper and middle traps, and R occipitalis. No units in the corrugators. Blood pressure 160/85, pulse 75, temperature 98.3 °F (36.8 °C), temperature source Temporal.    First botox procedure, tolerated well. No immediate complications. A/e reviewed in detail before injecting.

## 2023-11-14 NOTE — ASSESSMENT & PLAN NOTE
Chronic lumbar pain chronic cervical spine pain. Patient did not tolerate amitriptyline.   We will place patient on a trial of duloxetine 20 mg daily and also recommended over-the-counter liposomal curcumin as an anti-inflammatory agent

## 2023-11-14 NOTE — TELEPHONE ENCOUNTER
Patient is asking for Dr. Isaac Mccoy to send B12 to pharmacy so she can administer at home. Only the syringes were sent.

## 2023-11-14 NOTE — PROGRESS NOTES
Name: Corinne Fly      : 1980      MRN: [id number]  Encounter Provider: Efraín Hernandez MD  Encounter Date: 2023   Encounter department: [facility]     1. Chronic pain syndrome  Assessment & Plan:  Chronic lumbar pain chronic cervical spine pain. Patient did not tolerate amitriptyline. We will place patient on a trial of duloxetine 20 mg daily and also recommended over-the-counter liposomal curcumin as an anti-inflammatory agent    Orders:  -     DULoxetine (CYMBALTA) 20 mg capsule; Take 1 capsule (20 mg total) by mouth daily    2. Essential hypertension  -     triamterene-hydrochlorothiazide (DYAZIDE) 37.5-25 mg per capsule; Take 1 capsule by mouth every morning  -     lisinopril (ZESTRIL) 5 mg tablet; Take 1 tablet (5 mg total) by mouth daily    3. Worsening headaches  -     traMADol (ULTRAM) 50 mg tablet; Take 1 tablet (50 mg total) by mouth 2 (two) times a day    4. Hemorrhoids, unspecified hemorrhoid type  -     hydrocortisone (ANUSOL-HC) 25 mg suppository; Insert 1 suppository (25 mg total) into the rectum daily as needed for hemorrhoids    5. B12 deficiency  Assessment & Plan:  Patient has not been taking her B12 as directed. Recommend that she initiate weekly injections of 100 mcg supplemented by the oral sublingual tablets. We will recheck a B12 level in 3 months    Orders:  -     Syringe/Needle, Disp, (SYRINGE 3CC/24TF2-0/4") 27G X 1-1/4" 3 ML MISC; Use for B12 injections    6. Gastroesophageal reflux disease without esophagitis  Assessment & Plan:  Continues with as needed ondansetron and pantoprazole 40 mg daily      7. Resistant hypertension  Assessment & Plan:    Elevated blood pressure 140/88. We will initiate lisinopril 5 mg daily recheck in 3 months      8. Morbid obesity with BMI of 50.0-59.9, adult [facility])  Assessment & Plan:  Patient has been attempting some weight loss.   She did lose 4 pounds since her last office visit but she is limited in her exercise ability due to her arthritic issues and migraines          Depression Screening and Follow-up Plan: Patient was screened for depression during today's encounter. They screened negative with a PHQ-2 score of 0. Subjective      Presents for follow-up visit. Continues to have issues with low back pain, neck pain but without radicular symptoms. Migraines remain problematic. Labs were not drawn. Recently initiated on Botox therapy for migraines which seems to be helping a little bit. She was started on amitriptyline for her chronic pain issues but did not tolerate the medication due to palpitations. Other than the headaches and the back issues she feels well. B12 levels are noted to be low at 164. She only took the B12 injections for about 3 doses and did not continue. Review of Systems   Constitutional: Negative. HENT: Negative. Eyes: Negative. Respiratory: Negative. Cardiovascular: Negative. Gastrointestinal: Negative. Endocrine: Negative. Genitourinary: Negative. Musculoskeletal:  Positive for back pain and neck pain. Skin: Negative. Allergic/Immunologic: Negative. Neurological:  Positive for headaches (Chronic migraine). Hematological: Negative. Psychiatric/Behavioral: Negative. Current Outpatient Medications on File Prior to Visit   Medication Sig    acetaminophen (TYLENOL) 500 mg tablet Take 500 mg by mouth if needed    albuterol (PROVENTIL HFA,VENTOLIN HFA) 90 mcg/act inhaler Inhale 2 puffs every 6 (six) hours as needed for wheezing    butalbital-acetaminophen-caffeine (FIORICET,ESGIC) -40 mg per tablet 1 tab q6 hours prn migraine. No more than 2-3 per week. carisoprodol (SOMA) 250 MG Take 1 tablet (250 mg total) by mouth 3 (three) times a day    colchicine (COLCRYS) 0.6 mg tablet Take 1 tablet (0.6 mg total) by mouth daily 2 pills now and 1 in an hour. One daily until resolved.  (Patient taking differently: Take 0.6 mg by mouth daily as needed 2 pills now and 1 in an hour. One daily until resolved.)    Cyanocobalamin (B-12) 1000 MCG/ML KIT Inject 1 ml IM in thigh/ buttocks or deltoid mm once weekly x 8 weeks. Afterward once monthly (Patient taking differently: if needed Inject 1 ml IM in thigh/ buttocks or deltoid mm once weekly x 8 weeks. Afterward once monthly)    estradiol (ESTRACE) 2 MG tablet Take 1 tablet (2 mg total) by mouth 2 (two) times a day    fluticasone (FLONASE) 50 mcg/act nasal spray 1 spray into each nostril daily    ipratropium-albuterol (DUO-NEB) 0.5-2.5 mg/3 mL nebulizer solution Take 3 mL by nebulization every 6 (six) hours as needed for wheezing or shortness of breath    LORazepam (ATIVAN) 1 mg tablet Take 0.5 tablets (0.5 mg total) by mouth every 6 (six) hours as needed for anxiety    montelukast (SINGULAIR) 10 mg tablet Take 1 tablet (10 mg total) by mouth daily at bedtime    multivitamin (THERAGRAN) TABS Take 1 tablet by mouth daily    ondansetron (ZOFRAN-ODT) 4 mg disintegrating tablet Take 1 tablet (4 mg total) by mouth every 8 (eight) hours as needed for nausea or vomiting    orphenadrine (NORFLEX) 100 mg tablet Take 1 tablet (100 mg total) by mouth 2 (two) times a day as needed for muscle spasms    pantoprazole (PROTONIX) 40 mg tablet Take 1 tablet (40 mg total) by mouth daily    potassium chloride (K-DUR,KLOR-CON) 20 mEq tablet 2 tabs BID day 1 the 2 tabs daily for 3 days.  (Patient taking differently: as needed 2 tabs BID day 1 the 2 tabs daily for 3 days.)    predniSONE 10 mg tablet 4 tablets daily x 3 days;  3 tablets daily x 3 days; 2 tablets daily x 3 days; 1 tablet daily x 3 days (Patient taking differently: if needed 4 tablets daily x 3 days;  3 tablets daily x 3 days; 2 tablets daily x 3 days; 1 tablet daily x 3 days)    Riboflavin 400 MG TABS Take 1 tablet (400 mg total) by mouth in the morning    rizatriptan (Maxalt) 10 mg tablet Take 1 tablet (10 mg total) by mouth as needed for migraine Take at the onset of migraine; if symptoms continue or return, may take another dose at least 2 hours after first dose. Take no more than 2 doses in a day. Ubrogepant (UBRELVY) 100 MG tablet 1 tab at migraine onset, repeat in 2 hours if needed. Max 2 per day. [DISCONTINUED] hydrocortisone (ANUSOL-HC) 25 mg suppository Insert 1 suppository (25 mg total) into the rectum daily as needed for hemorrhoids    [DISCONTINUED] Syringe/Needle, Disp, (SYRINGE 3CC/91FT9-6/4") 27G X 1-1/4" 3 ML MISC Use for B12 injections    [DISCONTINUED] traMADol (ULTRAM) 50 mg tablet Take 1 tablet (50 mg total) by mouth 2 (two) times a day    [DISCONTINUED] triamterene-hydrochlorothiazide (DYAZIDE) 37.5-25 mg per capsule Take 1 capsule by mouth every morning    [DISCONTINUED] Galcanezumab-gnlm (Emgality) 120 MG/ML SOAJ One ml subcutaneous on the right thigh and 1 ml subcutaneous on the left thigh at the same time for 1 dose       Objective     /88 (BP Location: Left arm, Patient Position: Sitting, Cuff Size: Large)   Pulse 82   Temp 97.5 °F (36.4 °C) (Temporal)   Ht 5' 9" (1.753 m)   Wt (!) 157 kg (346 lb 12.8 oz)   SpO2 98%   BMI 51.21 kg/m²     Physical Exam  Vitals reviewed. Constitutional:       General: She is not in acute distress. Appearance: Normal appearance. She is obese. She is not ill-appearing, toxic-appearing or diaphoretic. HENT:      Head: Normocephalic and atraumatic. Right Ear: External ear normal.      Left Ear: External ear normal.      Nose: Nose normal.   Eyes:      Conjunctiva/sclera: Conjunctivae normal.      Pupils: Pupils are equal, round, and reactive to light. Cardiovascular:      Rate and Rhythm: Normal rate and regular rhythm. Pulses: Normal pulses. Heart sounds: Normal heart sounds. No murmur heard. Pulmonary:      Effort: Pulmonary effort is normal. No respiratory distress. Breath sounds: Normal breath sounds. No wheezing or rales. Abdominal:      General: Abdomen is flat. There is no distension. Musculoskeletal:         General: No swelling. Cervical back: Neck supple. No rigidity. Right lower leg: No edema. Left lower leg: No edema. Skin:     General: Skin is warm. Capillary Refill: Capillary refill takes less than 2 seconds. Coloration: Skin is not jaundiced. Findings: No bruising, erythema or rash. Neurological:      General: No focal deficit present. Mental Status: She is alert and oriented to person, place, and time. Mental status is at baseline.    Psychiatric:         Mood and Affect: Mood normal.         Behavior: Behavior normal.       Deidra Padron MD

## 2023-11-15 ENCOUNTER — APPOINTMENT (OUTPATIENT)
Dept: LAB | Facility: CLINIC | Age: 43
End: 2023-11-15
Payer: COMMERCIAL

## 2023-11-15 DIAGNOSIS — E53.8 B12 DEFICIENCY: ICD-10-CM

## 2023-11-15 DIAGNOSIS — I10 ESSENTIAL HYPERTENSION: ICD-10-CM

## 2023-11-15 DIAGNOSIS — E87.6 HYPOKALEMIA: ICD-10-CM

## 2023-11-15 DIAGNOSIS — E66.01 MORBID OBESITY WITH BMI OF 50.0-59.9, ADULT (HCC): ICD-10-CM

## 2023-11-15 DIAGNOSIS — K21.9 GASTROESOPHAGEAL REFLUX DISEASE WITHOUT ESOPHAGITIS: ICD-10-CM

## 2023-11-15 LAB
ALBUMIN SERPL BCP-MCNC: 4.2 G/DL (ref 3.5–5)
ALP SERPL-CCNC: 67 U/L (ref 34–104)
ALT SERPL W P-5'-P-CCNC: 10 U/L (ref 7–52)
ANION GAP SERPL CALCULATED.3IONS-SCNC: 7 MMOL/L
AST SERPL W P-5'-P-CCNC: 14 U/L (ref 5–45)
BASOPHILS # BLD AUTO: 0.05 THOUSANDS/ÂΜL (ref 0–0.1)
BASOPHILS NFR BLD AUTO: 0 % (ref 0–1)
BILIRUB SERPL-MCNC: 0.45 MG/DL (ref 0.2–1)
BUN SERPL-MCNC: 13 MG/DL (ref 5–25)
CALCIUM SERPL-MCNC: 9.2 MG/DL (ref 8.4–10.2)
CHLORIDE SERPL-SCNC: 100 MMOL/L (ref 96–108)
CHOLEST SERPL-MCNC: 176 MG/DL
CO2 SERPL-SCNC: 29 MMOL/L (ref 21–32)
CREAT SERPL-MCNC: 0.85 MG/DL (ref 0.6–1.3)
EOSINOPHIL # BLD AUTO: 0.04 THOUSAND/ÂΜL (ref 0–0.61)
EOSINOPHIL NFR BLD AUTO: 0 % (ref 0–6)
ERYTHROCYTE [DISTWIDTH] IN BLOOD BY AUTOMATED COUNT: 13.7 % (ref 11.6–15.1)
GLUCOSE P FAST SERPL-MCNC: 95 MG/DL (ref 65–99)
HCT VFR BLD AUTO: 41.4 % (ref 36.5–46.1)
HDLC SERPL-MCNC: 66 MG/DL
HGB BLD-MCNC: 13.2 G/DL (ref 12–15.4)
IMM GRANULOCYTES # BLD AUTO: 0.06 THOUSAND/UL (ref 0–0.2)
IMM GRANULOCYTES NFR BLD AUTO: 1 % (ref 0–2)
LDLC SERPL CALC-MCNC: 91 MG/DL (ref 0–100)
LYMPHOCYTES # BLD AUTO: 1.28 THOUSANDS/ÂΜL (ref 0.6–4.47)
LYMPHOCYTES NFR BLD AUTO: 11 % (ref 14–44)
MAGNESIUM SERPL-MCNC: 2.1 MG/DL (ref 1.9–2.7)
MCH RBC QN AUTO: 27.3 PG (ref 26.8–34.3)
MCHC RBC AUTO-ENTMCNC: 31.9 G/DL (ref 31.4–37.4)
MCV RBC AUTO: 86 FL (ref 82–98)
MONOCYTES # BLD AUTO: 0.29 THOUSAND/ÂΜL (ref 0.17–1.22)
MONOCYTES NFR BLD AUTO: 3 % (ref 4–12)
NEUTROPHILS # BLD AUTO: 9.88 THOUSANDS/ÂΜL (ref 1.85–7.62)
NEUTS SEG NFR BLD AUTO: 85 % (ref 43–75)
NONHDLC SERPL-MCNC: 110 MG/DL
NRBC BLD AUTO-RTO: 0 /100 WBCS
PLATELET # BLD AUTO: 418 THOUSANDS/UL (ref 149–390)
PMV BLD AUTO: 9.4 FL (ref 8.9–12.7)
POTASSIUM SERPL-SCNC: 4 MMOL/L (ref 3.5–5.3)
PROT SERPL-MCNC: 8 G/DL (ref 6.4–8.4)
RBC # BLD AUTO: 4.83 MILLION/UL (ref 3.88–5.12)
SODIUM SERPL-SCNC: 136 MMOL/L (ref 135–147)
TRIGL SERPL-MCNC: 95 MG/DL
VIT B12 SERPL-MCNC: 290 PG/ML (ref 180–914)
WBC # BLD AUTO: 11.6 THOUSAND/UL (ref 4.31–10.16)

## 2023-11-15 PROCEDURE — 36415 COLL VENOUS BLD VENIPUNCTURE: CPT

## 2023-11-15 PROCEDURE — 80053 COMPREHEN METABOLIC PANEL: CPT

## 2023-11-15 PROCEDURE — 82607 VITAMIN B-12: CPT

## 2023-11-15 PROCEDURE — 85025 COMPLETE CBC W/AUTO DIFF WBC: CPT

## 2023-11-15 PROCEDURE — 83735 ASSAY OF MAGNESIUM: CPT

## 2023-11-15 PROCEDURE — 80061 LIPID PANEL: CPT

## 2023-11-16 ENCOUNTER — TELEPHONE (OUTPATIENT)
Dept: INTERNAL MEDICINE CLINIC | Age: 43
End: 2023-11-16

## 2023-11-16 NOTE — TELEPHONE ENCOUNTER
Received prior authorization for medication:    traMADol (ULTRAM) 50 mg tablet     Scanning into media and sending to [facility]

## 2023-11-17 NOTE — TELEPHONE ENCOUNTER
Spoke to the pharmacist at [address] [place] [address]. Patient has been using a coupon card to purchase the Tramadol, the insurance was not used. The pharmacy will send the next refill for a 7 day supply to the insurance.

## 2023-11-22 NOTE — PLAN OF CARE
Problem: Potential for Falls  Goal: Patient will remain free of falls  Description  INTERVENTIONS:  - Assess patient frequently for physical needs  -  Identify cognitive and physical deficits and behaviors that affect risk of falls  -  [place] fall precautions as indicated by assessment   - Educate patient/family on patient safety including physical limitations  - Instruct patient to call for assistance with activity based on assessment  - Modify environment to reduce risk of injury  - Consider OT/PT consult to assist with strengthening/mobility  Outcome: Progressing     Problem: Neurological Deficit  Goal: Neurological status is stable or improving  Description  Interventions:  - Monitor and assess patient's level of consciousness, motor function, sensory function, and level of assistance needed for ADLs  - Monitor and report changes from baseline  Collaborate with interdisciplinary team to initiate plan and implement interventions as ordered  - Provide and maintain a safe environment  - Consider seizure precautions  - Consider fall precautions  - Consider aspiration precautions  - Consider bleeding precautions  Outcome: Progressing     Problem: Activity Intolerance/Impaired Mobility  Goal: Mobility/activity is maintained at optimum level for patient  Description  Interventions:  - Assess and monitor patient  barriers to mobility and need for assistive/adaptive devices  - Assess patient's emotional response to limitations  - Collaborate with interdisciplinary team and initiate plans and interventions as ordered  - Encourage independent activity per ability   - Maintain proper body alignment  - Perform active/passive rom as tolerated/ordered    - Plan activities to conserve energy   - Turn patient as appropriate  Outcome: Progressing     Problem: PAIN - ADULT  Goal: Verbalizes/displays adequate comfort level or baseline comfort level  Description  Interventions:  - Encourage patient to monitor pain and request assistance  - Assess pain using appropriate pain scale  - Administer analgesics based on type and severity of pain and evaluate response  - Implement non-pharmacological measures as appropriate and evaluate response  - Consider cultural and social influences on pain and pain management  - Notify physician/advanced practitioner if interventions unsuccessful or patient reports new pain  Outcome: Progressing     Problem: SAFETY ADULT  Goal: Maintain or return to baseline ADL function  Description  INTERVENTIONS:  -  Assess patient's ability to carry out ADLs; assess patient's baseline for ADL function and identify physical deficits which impact ability to perform ADLs (bathing, care of mouth/teeth, toileting, grooming, dressing, etc )  - Assess/evaluate cause of self-care deficits   - Assess range of motion  - Assess patient's mobility; develop plan if impaired  - Assess patient's need for assistive devices and provide as appropriate  - Encourage maximum independence but intervene and supervise when necessary  - Involve family in performance of ADLs  - Assess for home care needs following discharge   - Consider OT consult to assist with ADL evaluation and planning for discharge  - Provide patient education as appropriate  Outcome: Progressing  Goal: Maintain or return mobility status to optimal level  Description  INTERVENTIONS:  - Assess patient's baseline mobility status (ambulation, transfers, stairs, etc )    - Identify cognitive and physical deficits and behaviors that affect mobility  - Identify mobility aids required to assist with transfers and/or ambulation (gait belt, sit-to-stand, lift, walker, cane, etc )  - [place] fall precautions as indicated by assessment  - Record patient progress and toleration of activity level on Mobility SBAR; progress patient to next Phase/Stage  - Instruct patient to call for assistance with activity based on assessment  - Consider rehabilitation consult to assist with strengthening/weightbearing, etc   Outcome: Progressing     Problem: DISCHARGE PLANNING  Goal: Discharge to home or other facility with appropriate resources  Description  INTERVENTIONS:  - Identify barriers to discharge w/patient and caregiver  - Arrange for needed discharge resources and transportation as appropriate  - Identify discharge learning needs (meds, wound care, etc )  - Arrange for interpretive services to assist at discharge as needed  - Refer to Case Management Department for coordinating discharge planning if the patient needs post-hospital services based on physician/advanced practitioner order or complex needs related to functional status, cognitive ability, or social support system  Outcome: Progressing     Problem: NEUROSENSORY - ADULT  Goal: Achieves stable or improved neurological status  Description  INTERVENTIONS  - Monitor and report changes in neurological status  - Monitor vital signs such as temperature, blood pressure, glucose, and any other labs ordered   - Initiate measures to prevent increased intracranial pressure  - Monitor for seizure activity and implement precautions if appropriate      Outcome: Progressing  Goal: Remains free of injury related to seizures activity  Description  INTERVENTIONS  - Maintain airway, patient safety  and administer oxygen as ordered  - Monitor patient for seizure activity, document and report duration and description of seizure to physician/advanced practitioner  - If seizure occurs,  ensure patient safety during seizure  - Reorient patient post seizure  - Seizure pads on all 4 side rails  - Instruct patient/family to notify RN of any seizure activity including if an aura is experienced  - Instruct patient/family to call for assistance with activity based on nursing assessment  - Administer anti-seizure medications if ordered    Outcome: Progressing  Goal: Achieves maximal functionality and self care  Description  INTERVENTIONS  - Monitor swallowing and airway patency with patient fatigue and changes in neurological status  - Encourage and assist patient to increase activity and self care     - Encourage visually impaired, hearing impaired and aphasic patients to use assistive/communication devices  Outcome: Progressing     Problem: MUSCULOSKELETAL - ADULT  Goal: Maintain or return mobility to safest level of function  Description  INTERVENTIONS:  - Assess patient's ability to carry out ADLs; assess patient's baseline for ADL function and identify physical deficits which impact ability to perform ADLs (bathing, care of mouth/teeth, toileting, grooming, dressing, etc )  - Assess/evaluate cause of self-care deficits   - Assess range of motion  - Assess patient's mobility  - Assess patient's need for assistive devices and provide as appropriate  - Encourage maximum independence but intervene and supervise when necessary  - Involve family in performance of ADLs  - Assess for home care needs following discharge   - Consider OT consult to assist with ADL evaluation and planning for discharge  - Provide patient education as appropriate  Outcome: Progressing  Goal: Maintain proper alignment of affected body part  Description  INTERVENTIONS:  - Support, maintain and protect limb and body alignment  - Provide patient/ family with appropriate education  Outcome: Progressing Statement Selected

## 2023-12-05 ENCOUNTER — EVALUATION (OUTPATIENT)
Dept: PHYSICAL THERAPY | Facility: CLINIC | Age: 43
End: 2023-12-05
Payer: COMMERCIAL

## 2023-12-05 DIAGNOSIS — M79.18 CERVICAL MYOFASCIAL PAIN SYNDROME: ICD-10-CM

## 2023-12-05 DIAGNOSIS — M54.2 CERVICALGIA: Primary | ICD-10-CM

## 2023-12-05 DIAGNOSIS — G43.709 CHRONIC MIGRAINE WITHOUT AURA WITHOUT STATUS MIGRAINOSUS, NOT INTRACTABLE: ICD-10-CM

## 2023-12-05 PROCEDURE — 97161 PT EVAL LOW COMPLEX 20 MIN: CPT

## 2023-12-05 PROCEDURE — 97140 MANUAL THERAPY 1/> REGIONS: CPT

## 2023-12-05 PROCEDURE — 97110 THERAPEUTIC EXERCISES: CPT

## 2023-12-05 NOTE — PROGRESS NOTES
PT Evaluation    Today's date: 2023   Patient name: Kannan Etienne  : 1980  MRN: [id number]  Referring provider: Taiwo Marino  Dx:   Encounter Diagnosis     ICD-10-CM    1. Cervicalgia  M54.2       2. Chronic migraine without aura without status migrainosus, not intractable  G43.709 Ambulatory Referral to Physical Therapy      3. Cervical myofascial pain syndrome  M79.18 Ambulatory Referral to Physical Therapy              Subjective Evaluation     History of Present Illness    Patient presents with c/o neck pain with B hand sx and headaches. Pt reported that her sx started about 2 years with a fall. Pt also noted decreased sensation on her hands. Pt reported having an MRI/CT scan of the C/S in the past. Pt reported that her sx increase with desk working, sleeping and turning her head. Pt also reported that her sx decrease with Botox, trigger point injections, and medication. Pt has a PMH of skilled PT for neck pain/LBP. Pt has been performing her HEP. Pt also noted that her head feels heavy which has been going on for 6 months. Pt had an EMG in the past showing that she has Carpal tunnel. 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 Patient Goals  Patient goals for therapy: decrease pain    STGs  1. Decrease pain by 20% in 2-4 weeks. 2. Improve C/S ROM by 10 degrees in 2-4 weeks. 3. (I) with HEP in order to improve PT outcomes. LTGs  1. Decrease pain by 60% in 6-8 weeks. 2. Increase C/S AROM WNL within 6-810  3. Increase BUE MMT 5/5 within 6-8 weeks. 4. Improve FOTO to greater than or equal to 69.        Objective Measurements:    C/S AROM R L Strength Shoulder R L   Flex  52 deg  Flex 4+ 4+   Ext 55 deg  Abd 4+ 4+   SB 28 deg 34 deg ER 4+ 4+   Rot 30 deg 50 deg IR 4+ 4+   Ret 75%       Shoulder A/PROM   DNF Endurance -    Flex  WFL WFL      [facility]      ER [facility] WFL  Strength -    IR WFL WFL                                                  Assessment:    Arleen Granados is a pleasant 37 y.o. adult who is attending skilled PT for neck pain and headaches. Pt presented with cervicogenic headaches and possible C/S instability. Due to pt's report of feeling better after pushing anterior on C2, pt may benefit from further imaging to rule out upper C/S ligament instability/tear. Pt demonstrated deficits in C/S AROM and BUE ROM/MMT. Pt scored a 61 on FOTO. Manual therapy focused on decreasing pain with STM/SOR/gentle traction, increasing C/S retraction PROM and increasing B UT/levator flexibility. Pt reported decreased pain after manual therapy. Pt was educated and demonstrated understanding of HEP, POC, posture, and benefit of skilled PT. Pt deferred modalities. Pt would benefit from further skilled PT in order to decrease pain, address deficits (ROM/MMT), help pt achieve goals, and progress program as tolerated. Thank you for the referral!    Plan  Patient would benefit from:Skilled physical therapy  Planned therapy interventions: manual therapy, neuromuscular re-education, stretching, strengthening, therapeutic activities, therapeutic exercise, patient education, home exercise program, modalities to decrease pain, and activity modification.     Frequency: 2x week  Duration in weeks: 8  Treatment plan discussed with: patient     Goals: Patient's goal is to decrease pain    Precautions: possible instability   Dx:Cervicogenic headaches      Daily Treatment Diary     Manuals 12/5/2023        STM/SOR MS        C/S Ret PROM MS        B UT/Levator str MS                 Ther Ex         Supine C/S ret with TR 5"x10        Scap Squeeze 5"x10        UT str 15"x4        Corner str                                             UBE         Pt Edu HEP/POC        Neuro Re-ed         [facility] TB rows         B shoulder scap ret         Scal 3 ways         C/S isos                                    Ther Activity                                    Gait Training                           Modalities         Ice/MHP

## 2023-12-06 DIAGNOSIS — G43.709 CHRONIC MIGRAINE WITHOUT AURA WITHOUT STATUS MIGRAINOSUS, NOT INTRACTABLE: Primary | ICD-10-CM

## 2023-12-07 ENCOUNTER — TELEPHONE (OUTPATIENT)
Dept: INTERNAL MEDICINE CLINIC | Age: 43
End: 2023-12-07

## 2023-12-07 NOTE — TELEPHONE ENCOUNTER
Patient called and stated she is having mouth tightness and swelling since starting lisinopril 5 mg everyday, she has been taking it for a week a half, swelling started last night (12/06/2023)    Patient wants to know if she should continue taking it or not.

## 2023-12-11 ENCOUNTER — APPOINTMENT (OUTPATIENT)
Dept: PHYSICAL THERAPY | Facility: CLINIC | Age: 43
End: 2023-12-11
Payer: COMMERCIAL

## 2023-12-14 ENCOUNTER — OFFICE VISIT (OUTPATIENT)
Dept: PHYSICAL THERAPY | Facility: CLINIC | Age: 43
End: 2023-12-14
Payer: COMMERCIAL

## 2023-12-14 DIAGNOSIS — M79.18 CERVICAL MYOFASCIAL PAIN SYNDROME: ICD-10-CM

## 2023-12-14 DIAGNOSIS — G43.709 CHRONIC MIGRAINE WITHOUT AURA WITHOUT STATUS MIGRAINOSUS, NOT INTRACTABLE: Primary | ICD-10-CM

## 2023-12-14 DIAGNOSIS — M54.2 CERVICALGIA: ICD-10-CM

## 2023-12-14 PROCEDURE — 97110 THERAPEUTIC EXERCISES: CPT

## 2023-12-14 PROCEDURE — 97140 MANUAL THERAPY 1/> REGIONS: CPT

## 2023-12-14 NOTE — PROGRESS NOTES
Daily Note     Today's date: 2023  Patient name: Roxane Arroyo  : 1980  MRN: [id number]  Referring provider: Jacqui Rincon  Dx:   Encounter Diagnosis     ICD-10-CM    1. Chronic migraine without aura without status migrainosus, not intractable  G43.709       2. Cervicalgia  M54.2       3. Cervical myofascial pain syndrome  M79.18                      Subjective: Pt reports that she does feel better following the stretches. Still has some difficulty with sleeping through the night. Objective: See treatment diary below      Assessment: Tolerated treatment well. Patient would benefit from continued PT      Plan: Continue per plan of care.       Goals: Patient's goal is to decrease pain    Precautions: possible instability   Dx:Cervicogenic headaches      Daily Treatment Diary     Manuals 2023       STM/SOR MS LNK       C/S Ret PROM MS LNK       B UT/Levator str MS LNK                Ther Ex         Supine C/S ret with TR 5"x10 5"x10        Scap Squeeze 5"x10 5"x10        UT str 15"x4 15"x4        Corner str  15"x4                                           UBE         Pt Edu HEP/POC        Neuro Re-ed         [place]  10x       TB rows         B shoulder scap ret         Scal 3 ways         C/S isos                                    Ther Activity                                    Gait Training                           Modalities         Ice/MHP

## 2023-12-15 DIAGNOSIS — R29.898 LEFT LEG WEAKNESS: ICD-10-CM

## 2023-12-15 DIAGNOSIS — M54.12 CERVICAL RADICULAR PAIN: Primary | ICD-10-CM

## 2023-12-16 ENCOUNTER — HOSPITAL ENCOUNTER (EMERGENCY)
Facility: HOSPITAL | Age: 43
Discharge: HOME | End: 2023-12-16
Attending: EMERGENCY MEDICINE
Payer: COMMERCIAL

## 2023-12-16 ENCOUNTER — APPOINTMENT (EMERGENCY)
Dept: RADIOLOGY | Facility: HOSPITAL | Age: 43
End: 2023-12-16
Payer: COMMERCIAL

## 2023-12-16 VITALS
SYSTOLIC BLOOD PRESSURE: 168 MMHG | DIASTOLIC BLOOD PRESSURE: 96 MMHG | HEART RATE: 85 BPM | RESPIRATION RATE: 18 BRPM | OXYGEN SATURATION: 98 % | TEMPERATURE: 98.4 F

## 2023-12-16 DIAGNOSIS — M79.18 CERVICAL MYOFASCIAL PAIN SYNDROME: ICD-10-CM

## 2023-12-16 DIAGNOSIS — M54.12 CERVICAL RADICULOPATHY: Primary | ICD-10-CM

## 2023-12-16 PROCEDURE — 99284 EMERGENCY DEPT VISIT MOD MDM: CPT | Performed by: EMERGENCY MEDICINE

## 2023-12-16 PROCEDURE — 71045 X-RAY EXAM CHEST 1 VIEW: CPT

## 2023-12-16 PROCEDURE — 99283 EMERGENCY DEPT VISIT LOW MDM: CPT

## 2023-12-16 PROCEDURE — 96372 THER/PROPH/DIAG INJ SC/IM: CPT

## 2023-12-16 PROCEDURE — 73030 X-RAY EXAM OF SHOULDER: CPT

## 2023-12-16 PROCEDURE — 93005 ELECTROCARDIOGRAM TRACING: CPT

## 2023-12-16 RX ORDER — LIDOCAINE 50 MG/G
1 PATCH TOPICAL ONCE
Status: DISCONTINUED | OUTPATIENT
Start: 2023-12-16 | End: 2023-12-16 | Stop reason: HOSPADM

## 2023-12-16 RX ORDER — OXYCODONE HYDROCHLORIDE 5 MG/1
5 TABLET ORAL EVERY 6 HOURS PRN
Qty: 8 TABLET | Refills: 0 | Status: SHIPPED | OUTPATIENT
Start: 2023-12-16

## 2023-12-16 RX ORDER — OXYCODONE HYDROCHLORIDE 5 MG/1
5 TABLET ORAL ONCE
Status: COMPLETED | OUTPATIENT
Start: 2023-12-16 | End: 2023-12-16

## 2023-12-16 RX ORDER — PREDNISONE 10 MG/1
TABLET ORAL DAILY
Qty: 27 TABLET | Refills: 0 | Status: SHIPPED | OUTPATIENT
Start: 2023-12-16 | End: 2023-12-26

## 2023-12-16 RX ORDER — ACETAMINOPHEN 325 MG/1
975 TABLET ORAL ONCE
Status: COMPLETED | OUTPATIENT
Start: 2023-12-16 | End: 2023-12-16

## 2023-12-16 RX ORDER — KETOROLAC TROMETHAMINE 30 MG/ML
30 INJECTION, SOLUTION INTRAMUSCULAR; INTRAVENOUS ONCE
Status: COMPLETED | OUTPATIENT
Start: 2023-12-16 | End: 2023-12-16

## 2023-12-16 RX ORDER — ORPHENADRINE CITRATE 100 MG/1
100 TABLET, EXTENDED RELEASE ORAL 2 TIMES DAILY PRN
Qty: 30 TABLET | Refills: 0 | Status: SHIPPED | OUTPATIENT
Start: 2023-12-16

## 2023-12-16 RX ORDER — PREDNISONE 20 MG/1
40 TABLET ORAL ONCE
Status: COMPLETED | OUTPATIENT
Start: 2023-12-16 | End: 2023-12-16

## 2023-12-16 RX ADMIN — ACETAMINOPHEN 975 MG: 325 TABLET, FILM COATED ORAL at 13:52

## 2023-12-16 RX ADMIN — OXYCODONE HYDROCHLORIDE 5 MG: 5 TABLET ORAL at 13:52

## 2023-12-16 RX ADMIN — PREDNISONE 40 MG: 20 TABLET ORAL at 13:52

## 2023-12-16 RX ADMIN — KETOROLAC TROMETHAMINE 30 MG: 30 INJECTION, SOLUTION INTRAMUSCULAR; INTRAVENOUS at 13:52

## 2023-12-16 NOTE — ED PROVIDER NOTES
"History  Chief Complaint   Patient presents with    Shortness of Breath     SOB and chest pain since yesterday after physical therapy, pt on hormone therapy and also having genital swelling     43-year-old transgender female, on chronic estradiol therapy, coming into the ED for evaluation of left-sided neck, upper back discomfort, radiating into her left arm and left shoulder.  She states she feels like the pain is coming from her neck.  She is history of chronic neck pain, taking tramadol, Norflex already for pain.  She took 20 mg of prednisone for her asthma today.      History provided by:  Patient   used: No    Shortness of Breath      Prior to Admission Medications   Prescriptions Last Dose Informant Patient Reported? Taking?   Cyanocobalamin 1000 MCG/ML KIT   No No   Sig: Inject 1 mL (1,000 mcg total) into a muscle once a week Inject 1 ml IM in thigh/ buttocks or deltoid mm once weekly x 8 weeks. Afterward once monthly   DULoxetine (CYMBALTA) 20 mg capsule   No No   Sig: Take 1 capsule (20 mg total) by mouth daily   LORazepam (ATIVAN) 1 mg tablet  Self No No   Sig: Take 0.5 tablets (0.5 mg total) by mouth every 6 (six) hours as needed for anxiety   Riboflavin 400 MG TABS  Self No No   Sig: Take 1 tablet (400 mg total) by mouth in the morning   Syringe/Needle, Disp, (SYRINGE 3CC/98VS0-4/4\") 27G X 1-/4\" 3 ML MISC   No No   Sig: Use for B12 injections   acetaminophen (TYLENOL) 500 mg tablet  Self Yes No   Sig: Take 500 mg by mouth if needed   albuterol (PROVENTIL HFA,VENTOLIN HFA) 90 mcg/act inhaler  Self No No   Sig: Inhale 2 puffs every 6 (six) hours as needed for wheezing   butalbital-acetaminophen-caffeine (FIORICET,ESGIC) -40 mg per tablet  Self No No   Si tab q6 hours prn migraine. No more than 2-3 per week.   carisoprodol (SOMA) 250 MG  Self No No   Sig: Take 1 tablet (250 mg total) by mouth 3 (three) times a day   colchicine (COLCRYS) 0.6 mg tablet  Self No No   Sig: Take 1 " tablet (0.6 mg total) by mouth daily 2 pills now and 1 in an hour. One daily until resolved.   Patient taking differently: Take 0.6 mg by mouth daily as needed 2 pills now and 1 in an hour. One daily until resolved.   estradiol (ESTRACE) 2 MG tablet  Self No No   Sig: Take 1 tablet (2 mg total) by mouth 2 (two) times a day   fluticasone (FLONASE) 50 mcg/act nasal spray  Self No No   Si spray into each nostril daily   hydrocortisone (ANUSOL-HC) 25 mg suppository   No No   Sig: Insert 1 suppository (25 mg total) into the rectum daily as needed for hemorrhoids   ipratropium-albuterol (DUO-NEB) 0.5-2.5 mg/3 mL nebulizer solution  Self No No   Sig: Take 3 mL by nebulization every 6 (six) hours as needed for wheezing or shortness of breath   lisinopril (ZESTRIL) 5 mg tablet   No No   Sig: Take 1 tablet (5 mg total) by mouth daily   montelukast (SINGULAIR) 10 mg tablet  Self No No   Sig: Take 1 tablet (10 mg total) by mouth daily at bedtime   multivitamin (THERAGRAN) TABS  Self Yes No   Sig: Take 1 tablet by mouth daily   ondansetron (ZOFRAN-ODT) 4 mg disintegrating tablet  Self No No   Sig: Take 1 tablet (4 mg total) by mouth every 8 (eight) hours as needed for nausea or vomiting   orphenadrine (NORFLEX) 100 mg tablet  Self No No   Sig: Take 1 tablet (100 mg total) by mouth 2 (two) times a day as needed for muscle spasms   orphenadrine (NORFLEX) 100 mg tablet   No Yes   Sig: Take 1 tablet (100 mg total) by mouth 2 (two) times a day as needed for muscle spasms   pantoprazole (PROTONIX) 40 mg tablet  Self No No   Sig: Take 1 tablet (40 mg total) by mouth daily   potassium chloride (K-DUR,KLOR-CON) 20 mEq tablet  Self No No   Si tabs BID day 1 the 2 tabs daily for 3 days.   Patient taking differently: as needed 2 tabs BID day 1 the 2 tabs daily for 3 days.   predniSONE 10 mg tablet  Self No No   Si tablets daily x 3 days;  3 tablets daily x 3 days; 2 tablets daily x 3 days; 1 tablet daily x 3 days   Patient  taking differently: if needed 4 tablets daily x 3 days;  3 tablets daily x 3 days; 2 tablets daily x 3 days; 1 tablet daily x 3 days   rimegepant sulfate (NURTEC) 75 mg TBDP   No No   Si tab at migraine onset. No more than one dose per 24 hours.   rizatriptan (Maxalt) 10 mg tablet  Self No No   Sig: Take 1 tablet (10 mg total) by mouth as needed for migraine Take at the onset of migraine; if symptoms continue or return, may take another dose at least 2 hours after first dose. Take no more than 2 doses in a day.   traMADol (ULTRAM) 50 mg tablet   No No   Sig: Take 1 tablet (50 mg total) by mouth 2 (two) times a day   triamterene-hydrochlorothiazide (DYAZIDE) 37.5-25 mg per capsule   No No   Sig: Take 1 capsule by mouth every morning      Facility-Administered Medications Last Administration Doses Remaining   cyanocobalamin injection 1,000 mcg 2023  1:10 PM           Past Medical History:   Diagnosis Date    Anxiety     Asthma     seasonal    COVID-19 virus infection 2021    Hernia of abdominal wall     Hypertension     Kidney stones     Seizures (HCC)     pseudoseizures since     Viral URI with cough 2021       Past Surgical History:   Procedure Laterality Date    APPENDECTOMY      CHOLECYSTECTOMY      GASTRIC BYPASS      HERNIA REPAIR      x2       Family History   Problem Relation Age of Onset    Hypertension Mother     Diabetes Mother     Hypertension Sister     No Known Problems Father         head injury    Stroke Maternal Grandmother         brain aneurysm    Aneurysm Maternal Uncle         brain     I have reviewed and agree with the history as documented.    E-Cigarette/Vaping    E-Cigarette Use Never User      E-Cigarette/Vaping Substances    Nicotine No     THC No     CBD No     Flavoring No     Other No     Unknown No      Social History     Tobacco Use    Smoking status: Never    Smokeless tobacco: Never   Vaping Use    Vaping status: Never Used   Substance Use Topics    Alcohol  use: Yes     Comment: 2 drinks per month    Drug use: Never       Review of Systems   Respiratory:  Positive for shortness of breath.    All other systems reviewed and are negative.      Physical Exam  Physical Exam  Vitals and nursing note reviewed.   Constitutional:       General: She is not in acute distress.     Appearance: Normal appearance. She is well-developed and normal weight. She is not ill-appearing, toxic-appearing or diaphoretic.   HENT:      Head: Normocephalic and atraumatic.      Right Ear: External ear normal.      Left Ear: External ear normal.      Nose: Nose normal.      Mouth/Throat:      Mouth: Mucous membranes are moist.      Pharynx: Oropharynx is clear.   Eyes:      Conjunctiva/sclera: Conjunctivae normal.   Cardiovascular:      Rate and Rhythm: Normal rate and regular rhythm.      Pulses: Normal pulses.      Heart sounds: Normal heart sounds.   Pulmonary:      Effort: Pulmonary effort is normal.      Breath sounds: Normal breath sounds.   Abdominal:      General: Abdomen is flat. Bowel sounds are normal. There is no distension or abdominal bruit. There are no signs of injury.      Palpations: Abdomen is soft. There is no shifting dullness.      Tenderness: There is no abdominal tenderness.   Genitourinary:     Adnexa: Right adnexa normal and left adnexa normal.   Musculoskeletal:         General: Normal range of motion.      Cervical back: Normal range of motion and neck supple.      Comments: Left paraspinal neck and trapezius muscle tenderness, increased tone w/ spasm present. Normal strength and sensation to the bilateral upper and lower extremities.   Skin:     General: Skin is warm and dry.      Capillary Refill: Capillary refill takes less than 2 seconds.   Neurological:      General: No focal deficit present.      Mental Status: She is alert and oriented to person, place, and time. Mental status is at baseline.   Psychiatric:         Mood and Affect: Mood normal.         Behavior:  Behavior normal.         Vital Signs  ED Triage Vitals   Temperature Pulse Respirations Blood Pressure SpO2   12/16/23 1235 12/16/23 1235 12/16/23 1235 12/16/23 1235 12/16/23 1235   98.4 °F (36.9 °C) 85 18 168/96 98 %      Temp src Heart Rate Source Patient Position - Orthostatic VS BP Location FiO2 (%)   -- -- -- 12/16/23 1235 --      Right arm       Pain Score       12/16/23 1352       10 - Worst Possible Pain           Vitals:    12/16/23 1235   BP: 168/96   Pulse: 85         Visual Acuity      ED Medications  Medications   predniSONE tablet 40 mg (40 mg Oral Given 12/16/23 1352)   acetaminophen (TYLENOL) tablet 975 mg (975 mg Oral Given 12/16/23 1352)   ketorolac (TORADOL) injection 30 mg (30 mg Intramuscular Given 12/16/23 1352)   oxyCODONE (ROXICODONE) IR tablet 5 mg (5 mg Oral Given 12/16/23 1352)       Diagnostic Studies  Results Reviewed       None                   XR chest 1 view portable   ED Interpretation by Sid Dangelo DO (12/16 1414)   No acute abnormalities.      XR shoulder 2+ views LEFT   ED Interpretation by Sid Dangelo DO (12/16 1414)   Likely calcific tendinitis L shoulder.                 Procedures  Procedures         ED Course  ED Course as of 12/16/23 2010   Sat Dec 16, 2023   1500 Patient states now feeling much better, would like to go home. Will d/c with pain meds, steroids, can f/u outpatient.                                             Medical Decision Making  44 yo F w/ left neck/upper back pain rad to left chest. Exam c/w MSK etiology of pain. Also left shoulder tenderness. Xray of L shoulder and chest non-acute.  Pt's pain treated w/ toradol, tylenol, oxycodone with marked improvement in the ED. Symptoms likely due to muscle spasms and cervical radiculopathy. Doubt chest/lung pathology given history and exam, such as pneumonia, PTX, PE, MI.  Pt stable for d/c home to f/u with PT and neuro outpt.    Amount and/or Complexity of Data Reviewed  External Data Reviewed: radiology  and notes.  Radiology: ordered and independent interpretation performed. Decision-making details documented in ED Course.    Risk  OTC drugs.  Prescription drug management.             Disposition  Final diagnoses:   Cervical radiculopathy     Time reflects when diagnosis was documented in both MDM as applicable and the Disposition within this note       Time User Action Codes Description Comment    12/16/2023  3:01 PM Sid Dangelo [M54.12] Cervical radiculopathy     12/16/2023  3:03 PM Sid Dangelo Add [M79.18] Cervical myofascial pain syndrome           ED Disposition       ED Disposition   Discharge    Condition   Stable    Date/Time   Sat Dec 16, 2023  3:01 PM    Comment   Rob Arriola discharge to home/self care.                   Follow-up Information       Follow up With Specialties Details Why Contact Info    Curly Camacho MD Internal Medicine   [address] [place] [address]  [phone]              Discharge Medication List as of 12/16/2023  3:05 PM        START taking these medications    Details   oxyCODONE (Roxicodone) 5 immediate release tablet Take 1 tablet (5 mg total) by mouth every 6 (six) hours as needed for moderate pain for up to 8 doses Max Daily Amount: 20 mg, Starting Sat 12/16/2023, Normal      !! predniSONE 10 mg tablet Multiple Dosages:Starting Sat 12/16/2023, Until Mon 12/18/2023, THEN Starting Tue 12/19/2023, Until Thu 12/21/2023, THEN Starting Fri 12/22/2023, Until Sat 12/23/2023, THEN Starting Sun 12/24/2023, Until Mon 12/25/2023Take 4 tablets (40 mg total) by  mouth daily for 3 days, THEN 3 tablets (30 mg total) daily for 3 days, THEN 2 tablets (20 mg total) daily for 2 days, THEN 1 tablet (10 mg total) daily for 2 days., Normal       !! - Potential duplicate medications found. Please discuss with provider.        CONTINUE these medications which have CHANGED    Details   orphenadrine (NORFLEX) 100 mg tablet Take 1 tablet (100 mg total) by mouth 2  (two) times a day as needed for muscle spasms, Starting Sat 12/16/2023, Normal           CONTINUE these medications which have NOT CHANGED    Details   acetaminophen (TYLENOL) 500 mg tablet Take 500 mg by mouth if needed, Historical Med      albuterol (PROVENTIL HFA,VENTOLIN HFA) 90 mcg/act inhaler Inhale 2 puffs every 6 (six) hours as needed for wheezing, Starting Fri 9/10/2021, Normal      butalbital-acetaminophen-caffeine (FIORICET,ESGIC) -40 mg per tablet 1 tab q6 hours prn migraine. No more than 2-3 per week., Normal      carisoprodol (SOMA) 250 MG Take 1 tablet (250 mg total) by mouth 3 (three) times a day, Starting Fri 3/10/2023, Normal      colchicine (COLCRYS) 0.6 mg tablet Take 1 tablet (0.6 mg total) by mouth daily 2 pills now and 1 in an hour. One daily until resolved., Starting Mon 6/27/2022, Normal      Cyanocobalamin 1000 MCG/ML KIT Inject 1 mL (1,000 mcg total) into a muscle once a week Inject 1 ml IM in thigh/ buttocks or deltoid mm once weekly x 8 weeks. Afterward once monthly, Starting Tue 11/14/2023, Normal      DULoxetine (CYMBALTA) 20 mg capsule Take 1 capsule (20 mg total) by mouth daily, Starting Tue 11/14/2023, Normal      estradiol (ESTRACE) 2 MG tablet Take 1 tablet (2 mg total) by mouth 2 (two) times a day, Starting Fri 10/13/2023, Normal      fluticasone (FLONASE) 50 mcg/act nasal spray 1 spray into each nostril daily, Starting Mon 10/2/2023, Normal      hydrocortisone (ANUSOL-HC) 25 mg suppository Insert 1 suppository (25 mg total) into the rectum daily as needed for hemorrhoids, Starting Tue 11/14/2023, Normal      ipratropium-albuterol (DUO-NEB) 0.5-2.5 mg/3 mL nebulizer solution Take 3 mL by nebulization every 6 (six) hours as needed for wheezing or shortness of breath, Starting Fri 6/30/2023, Normal      lisinopril (ZESTRIL) 5 mg tablet Take 1 tablet (5 mg total) by mouth daily, Starting Tue 11/14/2023, Normal      LORazepam (ATIVAN) 1 mg tablet Take 0.5 tablets (0.5 mg total)  "by mouth every 6 (six) hours as needed for anxiety, Starting Mon 7/10/2023, Normal      montelukast (SINGULAIR) 10 mg tablet Take 1 tablet (10 mg total) by mouth daily at bedtime, Starting Thu 12/22/2022, Normal      multivitamin (THERAGRAN) TABS Take 1 tablet by mouth daily, Historical Med      ondansetron (ZOFRAN-ODT) 4 mg disintegrating tablet Take 1 tablet (4 mg total) by mouth every 8 (eight) hours as needed for nausea or vomiting, Starting Wed 11/9/2022, Normal      pantoprazole (PROTONIX) 40 mg tablet Take 1 tablet (40 mg total) by mouth daily, Starting Thu 11/2/2023, Normal      potassium chloride (K-DUR,KLOR-CON) 20 mEq tablet 2 tabs BID day 1 the 2 tabs daily for 3 days., Normal      !! predniSONE 10 mg tablet 4 tablets daily x 3 days;  3 tablets daily x 3 days; 2 tablets daily x 3 days; 1 tablet daily x 3 days, Normal      Riboflavin 400 MG TABS Take 1 tablet (400 mg total) by mouth in the morning, Starting Fri 6/3/2022, Normal      rimegepant sulfate (NURTEC) 75 mg TBDP 1 tab at migraine onset. No more than one dose per 24 hours., Normal      rizatriptan (Maxalt) 10 mg tablet Take 1 tablet (10 mg total) by mouth as needed for migraine Take at the onset of migraine; if symptoms continue or return, may take another dose at least 2 hours after first dose. Take no more than 2 doses in a day., Starting Tue 10/17/2023, Normal      Syringe/Needle, Disp, (SYRINGE 3CC/64JV6-4/4\") 27G X 1-1/4\" 3 ML MISC Use for B12 injections, Normal      traMADol (ULTRAM) 50 mg tablet Take 1 tablet (50 mg total) by mouth 2 (two) times a day, Starting Tue 11/14/2023, Normal      triamterene-hydrochlorothiazide (DYAZIDE) 37.5-25 mg per capsule Take 1 capsule by mouth every morning, Starting Tue 11/14/2023, Normal       !! - Potential duplicate medications found. Please discuss with provider.          No discharge procedures on file.    PDMP Review         Value Time User    PDMP Reviewed  Yes 11/14/2023  8:09 AM Curly Sahu " MD Janice            ED Provider  Electronically Signed by             Sid Dangelo DO  12/16/23 2013

## 2023-12-17 LAB
ATRIAL RATE: 65 BPM
P AXIS: 48 DEGREES
PR INTERVAL: 182 MS
QRS AXIS: 6 DEGREES
QRSD INTERVAL: 90 MS
QT INTERVAL: 454 MS
QTC INTERVAL: 472 MS
T WAVE AXIS: 44 DEGREES
VENTRICULAR RATE: 65 BPM

## 2023-12-18 ENCOUNTER — APPOINTMENT (OUTPATIENT)
Dept: PHYSICAL THERAPY | Facility: CLINIC | Age: 43
End: 2023-12-18
Payer: COMMERCIAL

## 2023-12-20 ENCOUNTER — APPOINTMENT (OUTPATIENT)
Dept: PHYSICAL THERAPY | Facility: CLINIC | Age: 43
End: 2023-12-20
Payer: COMMERCIAL

## 2023-12-27 ENCOUNTER — CLINICAL SUPPORT (OUTPATIENT)
Dept: NEUROLOGY | Facility: CLINIC | Age: 43
End: 2023-12-27
Payer: COMMERCIAL

## 2023-12-27 DIAGNOSIS — M54.12 CERVICAL RADICULAR PAIN: Primary | ICD-10-CM

## 2023-12-27 PROCEDURE — 96372 THER/PROPH/DIAG INJ SC/IM: CPT | Performed by: PSYCHIATRY & NEUROLOGY

## 2023-12-27 RX ORDER — KETOROLAC TROMETHAMINE 30 MG/ML
30 INJECTION, SOLUTION INTRAMUSCULAR; INTRAVENOUS ONCE
Status: COMPLETED | OUTPATIENT
Start: 2023-12-27 | End: 2023-12-27

## 2023-12-27 RX ADMIN — KETOROLAC TROMETHAMINE 30 MG: 30 INJECTION, SOLUTION INTRAMUSCULAR; INTRAVENOUS at 12:27

## 2023-12-28 ENCOUNTER — DOCUMENTATION (OUTPATIENT)
Dept: NEUROLOGY | Facility: CLINIC | Age: 43
End: 2023-12-28

## 2023-12-28 NOTE — PROGRESS NOTES
This is an addendum to the previous neurology visit from 11/8/2023.  The patient has been experiencing a reduction of migraine headaches with Botox injections, thankfully.  Unfortunately she is experiencing an excessive amount of left upper extremity pain in the shoulder, radiating from the neck, and further sometimes down into the elbow, forearm and hand/wrists and fingers.  The pain is not manageable with oral medications, therefore physical therapy was recommended.  The patient had 2 sessions of physical therapy, 1 on 12/5/2023 and the other 12/14/2023.  After both sessions she had more pain in the neck and left arm so we asked her to hold PT at this time and we ordered a cervical spine MRI.  In addition the patient has tried and failed home sessions, which were administered by physical therapy and me as her neurology provider: She has been doing neck exercises and stretching at home 3-4 times per week, for 30 to 60 minutes per session, for a total of 6 to 8 weeks at least.  The patient needs to have a cervical spine MRI to evaluate the cord and structures around the cord to identify the need for interventional therapy.

## 2024-01-01 ENCOUNTER — HOSPITAL ENCOUNTER (OUTPATIENT)
Dept: MRI IMAGING | Facility: HOSPITAL | Age: 44
Discharge: HOME | End: 2024-01-01
Payer: COMMERCIAL

## 2024-01-01 DIAGNOSIS — M54.12 CERVICAL RADICULAR PAIN: ICD-10-CM

## 2024-01-01 PROCEDURE — G1004 CDSM NDSC: HCPCS

## 2024-01-01 PROCEDURE — 72141 MRI NECK SPINE W/O DYE: CPT

## 2024-01-08 ENCOUNTER — CLINICAL SUPPORT (OUTPATIENT)
Dept: NEUROLOGY | Facility: CLINIC | Age: 44
End: 2024-01-08
Payer: COMMERCIAL

## 2024-01-08 DIAGNOSIS — G43.709 CHRONIC MIGRAINE WITHOUT AURA WITHOUT STATUS MIGRAINOSUS, NOT INTRACTABLE: Primary | ICD-10-CM

## 2024-01-08 DIAGNOSIS — G43.709 CHRONIC MIGRAINE WITHOUT AURA: Primary | ICD-10-CM

## 2024-01-08 DIAGNOSIS — G43.701 CHRONIC MIGRAINE WITHOUT AURA WITH STATUS MIGRAINOSUS, NOT INTRACTABLE: ICD-10-CM

## 2024-01-08 PROCEDURE — 96372 THER/PROPH/DIAG INJ SC/IM: CPT | Performed by: STUDENT IN AN ORGANIZED HEALTH CARE EDUCATION/TRAINING PROGRAM

## 2024-01-08 RX ORDER — KETOROLAC TROMETHAMINE 30 MG/ML
60 INJECTION, SOLUTION INTRAMUSCULAR; INTRAVENOUS ONCE
Status: COMPLETED | OUTPATIENT
Start: 2024-01-08 | End: 2024-01-08

## 2024-01-08 RX ADMIN — KETOROLAC TROMETHAMINE 60 MG: 30 INJECTION, SOLUTION INTRAMUSCULAR; INTRAVENOUS at 15:15

## 2024-01-16 DIAGNOSIS — M50.90 CERVICAL DISC DISEASE: ICD-10-CM

## 2024-01-16 DIAGNOSIS — K21.9 GASTROESOPHAGEAL REFLUX DISEASE WITHOUT ESOPHAGITIS: ICD-10-CM

## 2024-01-16 DIAGNOSIS — R51.9 WORSENING HEADACHES: ICD-10-CM

## 2024-01-16 DIAGNOSIS — K42.9 UMBILICAL HERNIA WITHOUT OBSTRUCTION AND WITHOUT GANGRENE: ICD-10-CM

## 2024-01-16 DIAGNOSIS — J45.998 SEASONAL ASTHMA: ICD-10-CM

## 2024-01-16 DIAGNOSIS — M62.838 MUSCLE SPASMS OF BOTH LOWER EXTREMITIES: ICD-10-CM

## 2024-01-16 RX ORDER — PANTOPRAZOLE SODIUM 40 MG/1
40 TABLET, DELAYED RELEASE ORAL DAILY
Qty: 90 TABLET | Refills: 1 | Status: SHIPPED | OUTPATIENT
Start: 2024-01-16

## 2024-01-16 RX ORDER — FLUTICASONE PROPIONATE 50 MCG
1 SPRAY, SUSPENSION (ML) NASAL DAILY
Qty: 16 G | Refills: 5 | Status: SHIPPED | OUTPATIENT
Start: 2024-01-16

## 2024-01-16 RX ORDER — LORAZEPAM 1 MG/1
0.5 TABLET ORAL EVERY 6 HOURS PRN
Qty: 60 TABLET | Refills: 0 | Status: SHIPPED | OUTPATIENT
Start: 2024-01-16

## 2024-01-16 RX ORDER — CARISOPRODOL 250 MG/1
250 TABLET ORAL 3 TIMES DAILY
Qty: 90 TABLET | Refills: 0 | Status: SHIPPED | OUTPATIENT
Start: 2024-01-16

## 2024-01-16 RX ORDER — TRAMADOL HYDROCHLORIDE 50 MG/1
50 TABLET ORAL 2 TIMES DAILY
Qty: 60 TABLET | Refills: 0 | Status: SHIPPED | OUTPATIENT
Start: 2024-01-16

## 2024-01-17 ENCOUNTER — TELEPHONE (OUTPATIENT)
Dept: INTERNAL MEDICINE CLINIC | Facility: OTHER | Age: 44
End: 2024-01-17

## 2024-01-17 DIAGNOSIS — G43.709 CHRONIC MIGRAINE WITHOUT AURA WITHOUT STATUS MIGRAINOSUS, NOT INTRACTABLE: ICD-10-CM

## 2024-01-17 DIAGNOSIS — M79.18 CERVICAL MYOFASCIAL PAIN SYNDROME: ICD-10-CM

## 2024-01-17 RX ORDER — BUTALBITAL, ACETAMINOPHEN AND CAFFEINE 50; 325; 40 MG/1; MG/1; MG/1
TABLET ORAL
Qty: 10 TABLET | Refills: 3 | Status: SHIPPED | OUTPATIENT
Start: 2024-01-17

## 2024-01-17 RX ORDER — ORPHENADRINE CITRATE 100 MG/1
100 TABLET, EXTENDED RELEASE ORAL 2 TIMES DAILY PRN
Qty: 60 TABLET | Refills: 3 | Status: SHIPPED | OUTPATIENT
Start: 2024-01-17

## 2024-01-17 NOTE — TELEPHONE ENCOUNTER
Started prior authorization in cover my medications unable to finish came up with clinical denial with duplicate request from 12/24/22 case id [id number]. Notified pharmacist at [facility]

## 2024-01-17 NOTE — TELEPHONE ENCOUNTER
Received prior authorization for medication:      carisoprodol (SOMA) 250 MG       Scanning into media and sending to Elle

## 2024-01-19 DIAGNOSIS — M54.12 CERVICAL RADICULAR PAIN: Primary | ICD-10-CM

## 2024-01-19 NOTE — PROGRESS NOTES
Pain management order placed for neck pain L>R.  Will re-schedule lumbar MRI. Pt fell last week and hurt her back. Sometimes numbness on the R 3 last toes when laying flat in bed. No weakness. No new bowel/ bladder issues.

## 2024-01-31 ENCOUNTER — CLINICAL SUPPORT (OUTPATIENT)
Dept: NEUROLOGY | Facility: CLINIC | Age: 44
End: 2024-01-31
Payer: COMMERCIAL

## 2024-01-31 DIAGNOSIS — G43.709 CHRONIC MIGRAINE WITHOUT AURA: Primary | ICD-10-CM

## 2024-01-31 DIAGNOSIS — G43.109 MIGRAINE WITH AURA AND WITHOUT STATUS MIGRAINOSUS, NOT INTRACTABLE: Primary | ICD-10-CM

## 2024-01-31 PROCEDURE — 96372 THER/PROPH/DIAG INJ SC/IM: CPT | Performed by: STUDENT IN AN ORGANIZED HEALTH CARE EDUCATION/TRAINING PROGRAM

## 2024-01-31 RX ORDER — KETOROLAC TROMETHAMINE 30 MG/ML
30 INJECTION, SOLUTION INTRAMUSCULAR; INTRAVENOUS ONCE
Status: COMPLETED | OUTPATIENT
Start: 2024-01-31 | End: 2024-01-31

## 2024-01-31 RX ADMIN — KETOROLAC TROMETHAMINE 30 MG: 30 INJECTION, SOLUTION INTRAMUSCULAR; INTRAVENOUS at 14:39

## 2024-02-15 ENCOUNTER — APPOINTMENT (EMERGENCY)
Dept: RADIOLOGY | Facility: HOSPITAL | Age: 44
End: 2024-02-15
Payer: COMMERCIAL

## 2024-02-15 ENCOUNTER — HOSPITAL ENCOUNTER (EMERGENCY)
Facility: HOSPITAL | Age: 44
Discharge: HOME | End: 2024-02-15
Attending: EMERGENCY MEDICINE
Payer: COMMERCIAL

## 2024-02-15 VITALS
HEIGHT: 69 IN | SYSTOLIC BLOOD PRESSURE: 132 MMHG | HEART RATE: 66 BPM | WEIGHT: 315 LBS | DIASTOLIC BLOOD PRESSURE: 64 MMHG | OXYGEN SATURATION: 95 % | TEMPERATURE: 98.3 F | BODY MASS INDEX: 46.65 KG/M2 | RESPIRATION RATE: 18 BRPM

## 2024-02-15 DIAGNOSIS — R42 LIGHTHEADEDNESS: Primary | ICD-10-CM

## 2024-02-15 DIAGNOSIS — R55 NEAR SYNCOPE: ICD-10-CM

## 2024-02-15 LAB
2HR DELTA HS TROPONIN: 0 NG/L
ALBUMIN SERPL BCP-MCNC: 4 G/DL (ref 3.5–5)
ALP SERPL-CCNC: 60 U/L (ref 34–104)
ALT SERPL W P-5'-P-CCNC: 11 U/L (ref 7–52)
ANION GAP SERPL CALCULATED.3IONS-SCNC: 9 MMOL/L
AST SERPL W P-5'-P-CCNC: 17 U/L (ref 5–45)
BASOPHILS # BLD AUTO: 0.03 THOUSANDS/ÂΜL (ref 0–0.1)
BASOPHILS NFR BLD AUTO: 0 % (ref 0–1)
BILIRUB SERPL-MCNC: 0.32 MG/DL (ref 0.2–1)
BUN SERPL-MCNC: 11 MG/DL (ref 5–25)
CALCIUM SERPL-MCNC: 9 MG/DL (ref 8.4–10.2)
CARDIAC TROPONIN I PNL SERPL HS: 4 NG/L
CARDIAC TROPONIN I PNL SERPL HS: 4 NG/L
CHLORIDE SERPL-SCNC: 103 MMOL/L (ref 96–108)
CO2 SERPL-SCNC: 27 MMOL/L (ref 21–32)
CREAT SERPL-MCNC: 0.89 MG/DL (ref 0.6–1.3)
EOSINOPHIL # BLD AUTO: 0.08 THOUSAND/ÂΜL (ref 0–0.61)
EOSINOPHIL NFR BLD AUTO: 1 % (ref 0–6)
ERYTHROCYTE [DISTWIDTH] IN BLOOD BY AUTOMATED COUNT: 14 % (ref 11.6–15.1)
GLUCOSE SERPL-MCNC: 90 MG/DL (ref 65–140)
GLUCOSE SERPL-MCNC: 91 MG/DL (ref 65–140)
HCT VFR BLD AUTO: 39.1 % (ref 36.5–46.1)
HGB BLD-MCNC: 12.2 G/DL (ref 12–15.4)
IMM GRANULOCYTES # BLD AUTO: 0.03 THOUSAND/UL (ref 0–0.2)
IMM GRANULOCYTES NFR BLD AUTO: 0 % (ref 0–2)
LYMPHOCYTES # BLD AUTO: 1.82 THOUSANDS/ÂΜL (ref 0.6–4.47)
LYMPHOCYTES NFR BLD AUTO: 22 % (ref 14–44)
MAGNESIUM SERPL-MCNC: 1.9 MG/DL (ref 1.9–2.7)
MCH RBC QN AUTO: 26.8 PG (ref 26.8–34.3)
MCHC RBC AUTO-ENTMCNC: 31.2 G/DL (ref 31.4–37.4)
MCV RBC AUTO: 86 FL (ref 82–98)
MONOCYTES # BLD AUTO: 0.52 THOUSAND/ÂΜL (ref 0.17–1.22)
MONOCYTES NFR BLD AUTO: 6 % (ref 4–12)
NEUTROPHILS # BLD AUTO: 5.9 THOUSANDS/ÂΜL (ref 1.85–7.62)
NEUTS SEG NFR BLD AUTO: 71 % (ref 43–75)
NRBC BLD AUTO-RTO: 0 /100 WBCS
PLATELET # BLD AUTO: 323 THOUSANDS/UL (ref 149–390)
PMV BLD AUTO: 9.3 FL (ref 8.9–12.7)
POTASSIUM SERPL-SCNC: 3 MMOL/L (ref 3.5–5.3)
PROT SERPL-MCNC: 7.5 G/DL (ref 6.4–8.4)
RBC # BLD AUTO: 4.55 MILLION/UL (ref 3.88–5.12)
SODIUM SERPL-SCNC: 139 MMOL/L (ref 135–147)
TSH SERPL DL<=0.05 MIU/L-ACNC: 2.01 UIU/ML (ref 0.45–4.5)
WBC # BLD AUTO: 8.38 THOUSAND/UL (ref 4.31–10.16)

## 2024-02-15 PROCEDURE — 82948 REAGENT STRIP/BLOOD GLUCOSE: CPT

## 2024-02-15 PROCEDURE — 99285 EMERGENCY DEPT VISIT HI MDM: CPT | Performed by: EMERGENCY MEDICINE

## 2024-02-15 PROCEDURE — 84484 ASSAY OF TROPONIN QUANT: CPT | Performed by: EMERGENCY MEDICINE

## 2024-02-15 PROCEDURE — 93005 ELECTROCARDIOGRAM TRACING: CPT

## 2024-02-15 PROCEDURE — 71045 X-RAY EXAM CHEST 1 VIEW: CPT

## 2024-02-15 PROCEDURE — 85025 COMPLETE CBC W/AUTO DIFF WBC: CPT | Performed by: EMERGENCY MEDICINE

## 2024-02-15 PROCEDURE — 80053 COMPREHEN METABOLIC PANEL: CPT | Performed by: EMERGENCY MEDICINE

## 2024-02-15 PROCEDURE — 84443 ASSAY THYROID STIM HORMONE: CPT | Performed by: EMERGENCY MEDICINE

## 2024-02-15 PROCEDURE — 99284 EMERGENCY DEPT VISIT MOD MDM: CPT

## 2024-02-15 PROCEDURE — 83735 ASSAY OF MAGNESIUM: CPT | Performed by: EMERGENCY MEDICINE

## 2024-02-15 PROCEDURE — 36415 COLL VENOUS BLD VENIPUNCTURE: CPT

## 2024-02-15 RX ORDER — POTASSIUM CHLORIDE 20 MEQ/1
40 TABLET, EXTENDED RELEASE ORAL ONCE
Status: COMPLETED | OUTPATIENT
Start: 2024-02-15 | End: 2024-02-15

## 2024-02-15 RX ADMIN — POTASSIUM CHLORIDE 40 MEQ: 1500 TABLET, EXTENDED RELEASE ORAL at 12:11

## 2024-02-15 NOTE — ED PROVIDER NOTES
History  Chief Complaint   Patient presents with    Dizziness     Patient here by EMS from her employment. Patient at work when she was standing and felt very dizzy and felt numb from head to toe and weak. No CP  Patient currently on a liquid diet (self decision) to reduce pain inflammation in body      43-year-old female comes in for evaluation of a near syncopal episode x 2 this morning.  Patient states she was working at her regular job when she began to feel weak and lightheaded like she was going to pass out.  Patient sat down and felt a little bit better but when then she started her activities again she felt the same sensation.  She also felt numbness in her bilateral hands and bilateral feet.  No actual loss of consciousness.  No injury.  Patient states she recently started on liquid diet.  This is done by herself without medical supervision.  She brought in juice or and has been eating nothing but fruit juice.  Denies any fever chest pain or shortness of breath      History provided by:  Patient and medical records   used: No    Dizziness  Severity:  Moderate  Onset quality:  Sudden  Timing:  Intermittent  Progression:  Resolved  Chronicity:  New  Context: physical activity    Relieved by:  Fluids and lying down  Associated symptoms: no blood in stool, no chest pain, no diarrhea, no headaches, no palpitations, no shortness of breath and no vision changes    Risk factors: no anemia, no Meniere's disease and no new medications        Prior to Admission Medications   Prescriptions Last Dose Informant Patient Reported? Taking?   Cyanocobalamin 1000 MCG/ML KIT   No No   Sig: Inject 1 mL (1,000 mcg total) into a muscle once a week Inject 1 ml IM in thigh/ buttocks or deltoid mm once weekly x 8 weeks. Afterward once monthly   DULoxetine (CYMBALTA) 20 mg capsule   No No   Sig: Take 1 capsule (20 mg total) by mouth daily   LORazepam (ATIVAN) 1 mg tablet   No No   Sig: Take 0.5 tablets (0.5 mg  "total) by mouth every 6 (six) hours as needed for anxiety   Riboflavin 400 MG TABS  Self No No   Sig: Take 1 tablet (400 mg total) by mouth in the morning   Syringe/Needle, Disp, (SYRINGE 3CC/34XM6-0/4\") 27G X 1-4\" 3 ML MISC   No No   Sig: Use for B12 injections   acetaminophen (TYLENOL) 500 mg tablet  Self Yes No   Sig: Take 500 mg by mouth if needed   albuterol (PROVENTIL HFA,VENTOLIN HFA) 90 mcg/act inhaler  Self No No   Sig: Inhale 2 puffs every 6 (six) hours as needed for wheezing   butalbital-acetaminophen-caffeine (FIORICET,ESGIC) -40 mg per tablet   No No   Si tab q6 hours prn migraine. No more than 2-3 per week.   carisoprodol (SOMA) 250 MG   No No   Sig: Take 1 tablet (250 mg total) by mouth 3 (three) times a day   colchicine (COLCRYS) 0.6 mg tablet  Self No No   Sig: Take 1 tablet (0.6 mg total) by mouth daily 2 pills now and 1 in an hour. One daily until resolved.   Patient taking differently: Take 0.6 mg by mouth daily as needed 2 pills now and 1 in an hour. One daily until resolved.   estradiol (ESTRACE) 2 MG tablet  Self No No   Sig: Take 1 tablet (2 mg total) by mouth 2 (two) times a day   fluticasone (FLONASE) 50 mcg/act nasal spray   No No   Si spray into each nostril daily   hydrocortisone (ANUSOL-HC) 25 mg suppository   No No   Sig: Insert 1 suppository (25 mg total) into the rectum daily as needed for hemorrhoids   ipratropium-albuterol (DUO-NEB) 0.5-2.5 mg/3 mL nebulizer solution  Self No No   Sig: Take 3 mL by nebulization every 6 (six) hours as needed for wheezing or shortness of breath   lisinopril (ZESTRIL) 5 mg tablet   No No   Sig: Take 1 tablet (5 mg total) by mouth daily   montelukast (SINGULAIR) 10 mg tablet  Self No No   Sig: Take 1 tablet (10 mg total) by mouth daily at bedtime   multivitamin (THERAGRAN) TABS  Self Yes No   Sig: Take 1 tablet by mouth daily   ondansetron (ZOFRAN-ODT) 4 mg disintegrating tablet  Self No No   Sig: Take 1 tablet (4 mg total) by mouth " every 8 (eight) hours as needed for nausea or vomiting   orphenadrine (NORFLEX) 100 mg tablet   No No   Sig: Take 1 tablet (100 mg total) by mouth 2 (two) times a day as needed for muscle spasms   oxyCODONE (Roxicodone) 5 immediate release tablet   No No   Sig: Take 1 tablet (5 mg total) by mouth every 6 (six) hours as needed for moderate pain for up to 8 doses Max Daily Amount: 20 mg   pantoprazole (PROTONIX) 40 mg tablet   No No   Sig: Take 1 tablet (40 mg total) by mouth daily   potassium chloride (K-DUR,KLOR-CON) 20 mEq tablet  Self No No   Si tabs BID day 1 the 2 tabs daily for 3 days.   Patient taking differently: as needed 2 tabs BID day 1 the 2 tabs daily for 3 days.   predniSONE 10 mg tablet  Self No No   Si tablets daily x 3 days;  3 tablets daily x 3 days; 2 tablets daily x 3 days; 1 tablet daily x 3 days   Patient taking differently: if needed 4 tablets daily x 3 days;  3 tablets daily x 3 days; 2 tablets daily x 3 days; 1 tablet daily x 3 days   rimegepant sulfate (NURTEC) 75 mg TBDP   No No   Si tab at migraine onset. No more than one dose per 24 hours.   rizatriptan (Maxalt) 10 mg tablet  Self No No   Sig: Take 1 tablet (10 mg total) by mouth as needed for migraine Take at the onset of migraine; if symptoms continue or return, may take another dose at least 2 hours after first dose. Take no more than 2 doses in a day.   traMADol (ULTRAM) 50 mg tablet   No No   Sig: Take 1 tablet (50 mg total) by mouth 2 (two) times a day   triamterene-hydrochlorothiazide (DYAZIDE) 37.5-25 mg per capsule   No No   Sig: Take 1 capsule by mouth every morning      Facility-Administered Medications Last Administration Doses Remaining   cyanocobalamin injection 1,000 mcg 2023  1:10 PM           Past Medical History:   Diagnosis Date    Anxiety     Asthma     seasonal    COVID-19 virus infection 2021    Hernia of abdominal wall     Hypertension     Kidney stones     Seizures (HCC)     pseudoseizures  since 2012    Viral URI with cough 12/27/2021       Past Surgical History:   Procedure Laterality Date    APPENDECTOMY      CHOLECYSTECTOMY      GASTRIC BYPASS      HERNIA REPAIR      x2       Family History   Problem Relation Age of Onset    Hypertension Mother     Diabetes Mother     Hypertension Sister     No Known Problems Father         head injury    Stroke Maternal Grandmother         brain aneurysm    Aneurysm Maternal Uncle         brain     I have reviewed and agree with the history as documented.    E-Cigarette/Vaping    E-Cigarette Use Never User      E-Cigarette/Vaping Substances    Nicotine No     THC No     CBD No     Flavoring No     Other No     Unknown No      Social History     Tobacco Use    Smoking status: Never    Smokeless tobacco: Never   Vaping Use    Vaping status: Never Used   Substance Use Topics    Alcohol use: Yes     Comment: 2 drinks per month    Drug use: Never       Review of Systems   Constitutional:  Negative for fatigue and fever.   HENT:  Negative for congestion and ear pain.    Eyes:  Negative for discharge and redness.   Respiratory:  Negative for apnea, cough, shortness of breath and wheezing.    Cardiovascular:  Negative for chest pain and palpitations.   Gastrointestinal:  Negative for abdominal pain, blood in stool and diarrhea.   Endocrine: Negative for cold intolerance and polydipsia.   Genitourinary:  Negative for difficulty urinating and hematuria.   Musculoskeletal:  Negative for arthralgias and back pain.   Skin:  Negative for color change and rash.   Allergic/Immunologic: Negative for environmental allergies and immunocompromised state.   Neurological:  Positive for dizziness. Negative for numbness and headaches.   Hematological:  Negative for adenopathy. Does not bruise/bleed easily.   Psychiatric/Behavioral:  Negative for agitation and behavioral problems.        Physical Exam  Physical Exam  Vitals and nursing note reviewed.   Constitutional:       Appearance:  Normal appearance. She is well-developed. She is not toxic-appearing.   HENT:      Head: Normocephalic and atraumatic.      Right Ear: Tympanic membrane and external ear normal.      Left Ear: Tympanic membrane and external ear normal.      Nose: Nose normal. No nasal deformity or rhinorrhea.      Mouth/Throat:      Dentition: Normal dentition.      Pharynx: Uvula midline.   Eyes:      General: Lids are normal.         Right eye: No discharge.         Left eye: No discharge.      Conjunctiva/sclera: Conjunctivae normal.      Pupils: Pupils are equal, round, and reactive to light.   Neck:      Vascular: No carotid bruit or JVD.      Trachea: Trachea normal.   Cardiovascular:      Rate and Rhythm: Normal rate and regular rhythm. No extrasystoles are present.     Chest Wall: PMI is not displaced.      Pulses: Normal pulses.   Pulmonary:      Effort: Pulmonary effort is normal. No accessory muscle usage or respiratory distress.      Breath sounds: Normal breath sounds. No wheezing, rhonchi or rales.   Abdominal:      General: Bowel sounds are normal.      Palpations: Abdomen is soft. Abdomen is not rigid. There is no mass.      Tenderness: There is no abdominal tenderness. There is no guarding or rebound.   Musculoskeletal:      Right shoulder: No swelling, deformity or bony tenderness. Normal range of motion.      Cervical back: Normal range of motion and neck supple. No deformity, tenderness or bony tenderness.   Lymphadenopathy:      Cervical: No cervical adenopathy.   Skin:     General: Skin is warm and dry.      Findings: No rash.   Neurological:      Mental Status: She is alert and oriented to person, place, and time.      GCS: GCS eye subscore is 4. GCS verbal subscore is 5. GCS motor subscore is 6.      Cranial Nerves: No cranial nerve deficit.      Sensory: No sensory deficit.      Deep Tendon Reflexes: Reflexes are normal and symmetric.   Psychiatric:         Speech: Speech normal.         Behavior: Behavior  normal.         Vital Signs  ED Triage Vitals   Temperature Pulse Respirations Blood Pressure SpO2   02/15/24 1010 02/15/24 1009 02/15/24 1009 02/15/24 1010 02/15/24 1010   98.3 °F (36.8 °C) 84 18 (!) 175/95 97 %      Temp src Heart Rate Source Patient Position - Orthostatic VS BP Location FiO2 (%)   -- 02/15/24 1009 02/15/24 1119 02/15/24 1009 --    Monitor Lying - Orthostatic VS Right arm       Pain Score       --                  Vitals:    02/15/24 1122 02/15/24 1123 02/15/24 1130 02/15/24 1200   BP: 136/84 143/90 136/75 132/64   Pulse: 82 85 66 66   Patient Position - Orthostatic VS: Standing - Orthostatic VS Standing for 3 minutes - Orthostatic VS Sitting          Visual Acuity  Visual Acuity      Flowsheet Row Most Recent Value   L Pupil Size (mm) 3   R Pupil Size (mm) 3            ED Medications  Medications   potassium chloride (Klor-Con M20) CR tablet 40 mEq (40 mEq Oral Given 2/15/24 1211)       Diagnostic Studies  Results Reviewed       Procedure Component Value Units Date/Time    TSH, 3rd generation with Free T4 reflex [[id number]]  (Normal) Collected: 02/15/24 1015    Lab Status: Final result Specimen: Blood from Arm, Left Updated: 02/15/24 1332     TSH 3RD GENERATON 2.013 uIU/mL     HS Troponin I 2hr [[id number]]  (Normal) Collected: 02/15/24 1211    Lab Status: Final result Specimen: Blood from Arm, Left Updated: 02/15/24 1250     hs TnI 2hr 4 ng/L      Delta 2hr hsTnI 0 ng/L     HS Troponin I 4hr [[id number]]     Lab Status: No result Specimen: Blood     Magnesium [[id number]]  (Normal) Collected: 02/15/24 1015    Lab Status: Final result Specimen: Blood from Arm, Left Updated: 02/15/24 1138     Magnesium 1.9 mg/dL     HS Troponin 0hr (reflex protocol) [[id number]]  (Normal) Collected: 02/15/24 1015    Lab Status: Final result Specimen: Blood from Arm, Left Updated: 02/15/24 1046     hs TnI 0hr 4 ng/L     Comprehensive metabolic panel [[id number]]  (Abnormal) Collected: 02/15/24 1015    Lab Status: Final  result Specimen: Blood from Arm, Left Updated: 02/15/24 1045     Sodium 139 mmol/L      Potassium 3.0 mmol/L      Chloride 103 mmol/L      CO2 27 mmol/L      ANION GAP 9 mmol/L      BUN 11 mg/dL      Creatinine 0.89 mg/dL      Glucose 90 mg/dL      Calcium 9.0 mg/dL      AST 17 U/L      ALT 11 U/L      Alkaline Phosphatase 60 U/L      Total Protein 7.5 g/dL      Albumin 4.0 g/dL      Total Bilirubin 0.32 mg/dL      eGFR --    Narrative:      Notes:     1. eGFR calculation is only valid for adults 18 years and older.  2. EGFR calculation cannot be performed for patients who are transgender, non-binary, or whose legal sex, sex at birth, and gender identity differ.    Fingerstick Glucose (POCT) [[id number]]  (Normal) Collected: 02/15/24 1043    Lab Status: Final result Updated: 02/15/24 1044     POC Glucose 91 mg/dl     CBC and differential [[id number]]  (Abnormal) Collected: 02/15/24 1015    Lab Status: Final result Specimen: Blood from Arm, Left Updated: 02/15/24 1024     WBC 8.38 Thousand/uL      RBC 4.55 Million/uL      Hemoglobin 12.2 g/dL      Hematocrit 39.1 %      MCV 86 fL      MCH 26.8 pg      MCHC 31.2 g/dL      RDW 14.0 %      MPV 9.3 fL      Platelets 323 Thousands/uL      nRBC 0 /100 WBCs      Neutrophils Relative 71 %      Immat GRANS % 0 %      Lymphocytes Relative 22 %      Monocytes Relative 6 %      Eosinophils Relative 1 %      Basophils Relative 0 %      Neutrophils Absolute 5.90 Thousands/µL      Immature Grans Absolute 0.03 Thousand/uL      Lymphocytes Absolute 1.82 Thousands/µL      Monocytes Absolute 0.52 Thousand/µL      Eosinophils Absolute 0.08 Thousand/µL      Basophils Absolute 0.03 Thousands/µL                    XR chest 1 view portable    (Results Pending)              Procedures  ECG 12 Lead Documentation Only    Date/Time: 2/15/2024 10:10 AM    Performed by: Aurora Stephen DO  Authorized by: Aurora Stephen DO    Previous ECG:     Previous ECG:  Compared to current     Similarity:  No change  Rate:     ECG rate:  80    ECG rate assessment: normal    Ectopy:     Ectopy: none    QRS:     QRS axis:  Normal  Conduction:     Conduction: normal    ST segments:     ST segments:  Normal           ED Course                               SBIRT 20yo+      Flowsheet Row Most Recent Value   Initial Alcohol Screen: US AUDIT-C     1. How often do you have a drink containing alcohol? 0 Filed at: 02/15/2024 [facility]   2. How many drinks containing alcohol do you have on a typical day you are drinking?  0 Filed at: 02/15/2024 [facility]   3a. Male UNDER 65: How often do you have five or more drinks on one occasion? 0 Filed at: 02/15/2024 [facility]   3b. FEMALE Any Age, or MALE 65+: How often do you have 4 or more drinks on one occassion? 0 Filed at: 02/15/2024 [facility]   Audit-C Score 0 Filed at: 02/15/2024 [facility]   MILES: How many times in the past year have you...    Used an illegal drug or used a prescription medication for non-medical reasons? Never Filed at: 02/15/2024 [facility]                      Medical Decision Making  Differential diagnosis includes but is not limited to dehydration, orthostatic hypotension, hypoglycemia, electrolyte abnormality, arrhythmia, anemia, ACS, thyroid dysfunction,    Problems Addressed:  Lightheadedness: acute illness or injury  Near syncope: acute illness or injury    Amount and/or Complexity of Data Reviewed  External Data Reviewed: notes.     Details: Reviewed patient's past medical history  Labs: ordered. Decision-making details documented in ED Course.  Radiology: ordered and independent interpretation performed.     Details: Chest x-ray within normal limits no cardiomegaly no pneumonia  ECG/medicine tests: ordered and independent interpretation performed. Decision-making details documented in ED Course.    Risk  Prescription drug management.  Risk Details: Discussed with patient continuing to take her regular medications.  Also discussed with patient she should not just be  drinking juice alone.  While I applaud her and it is helping with her chronic migraine symptoms.  I do think patient needs to add protein into her diet and if she wants to do liquid she needs to be on a medically supervised diet.  Patient is in agreement with this.             Disposition  Final diagnoses:   Lightheadedness   Near syncope     Time reflects when diagnosis was documented in both MDM as applicable and the Disposition within this note       Time User Action Codes Description Comment    2/15/2024  1:00 PM Aurora Stephen Add [R42] Lightheadedness     2/15/2024  1:00 PM Aurora Stephen Add [R55] Near syncope           ED Disposition       ED Disposition   Discharge    Condition   Stable    Date/Time   Thu Feb 15, 2024 1300    Comment   Rob Arriola discharge to home/self care.                   Follow-up Information       Follow up With Specialties Details Why Contact Info    Curly Camacho MD Internal Medicine Schedule an appointment as soon as possible for a visit   [address] [place] [address]  [phone]              Discharge Medication List as of 2/15/2024  1:01 PM        CONTINUE these medications which have NOT CHANGED    Details   acetaminophen (TYLENOL) 500 mg tablet Take 500 mg by mouth if needed, Historical Med      albuterol (PROVENTIL HFA,VENTOLIN HFA) 90 mcg/act inhaler Inhale 2 puffs every 6 (six) hours as needed for wheezing, Starting Fri 9/10/2021, Normal      butalbital-acetaminophen-caffeine (FIORICET,ESGIC) -40 mg per tablet 1 tab q6 hours prn migraine. No more than 2-3 per week., Normal      carisoprodol (SOMA) 250 MG Take 1 tablet (250 mg total) by mouth 3 (three) times a day, Starting Tue 1/16/2024, Normal      colchicine (COLCRYS) 0.6 mg tablet Take 1 tablet (0.6 mg total) by mouth daily 2 pills now and 1 in an hour. One daily until resolved., Starting Mon 6/27/2022, Normal      Cyanocobalamin 1000 MCG/ML KIT Inject 1 mL (1,000 mcg total)  into a muscle once a week Inject 1 ml IM in thigh/ buttocks or deltoid mm once weekly x 8 weeks. Afterward once monthly, Starting Tue 11/14/2023, Normal      DULoxetine (CYMBALTA) 20 mg capsule Take 1 capsule (20 mg total) by mouth daily, Starting Tue 11/14/2023, Normal      estradiol (ESTRACE) 2 MG tablet Take 1 tablet (2 mg total) by mouth 2 (two) times a day, Starting Fri 10/13/2023, Normal      fluticasone (FLONASE) 50 mcg/act nasal spray 1 spray into each nostril daily, Starting Tue 1/16/2024, Normal      hydrocortisone (ANUSOL-HC) 25 mg suppository Insert 1 suppository (25 mg total) into the rectum daily as needed for hemorrhoids, Starting Tue 11/14/2023, Normal      ipratropium-albuterol (DUO-NEB) 0.5-2.5 mg/3 mL nebulizer solution Take 3 mL by nebulization every 6 (six) hours as needed for wheezing or shortness of breath, Starting Fri 6/30/2023, Normal      lisinopril (ZESTRIL) 5 mg tablet Take 1 tablet (5 mg total) by mouth daily, Starting Tue 11/14/2023, Normal      LORazepam (ATIVAN) 1 mg tablet Take 0.5 tablets (0.5 mg total) by mouth every 6 (six) hours as needed for anxiety, Starting Tue 1/16/2024, Normal      montelukast (SINGULAIR) 10 mg tablet Take 1 tablet (10 mg total) by mouth daily at bedtime, Starting u 12/22/2022, Normal      multivitamin (THERAGRAN) TABS Take 1 tablet by mouth daily, Historical Med      ondansetron (ZOFRAN-ODT) 4 mg disintegrating tablet Take 1 tablet (4 mg total) by mouth every 8 (eight) hours as needed for nausea or vomiting, Starting Wed 11/9/2022, Normal      orphenadrine (NORFLEX) 100 mg tablet Take 1 tablet (100 mg total) by mouth 2 (two) times a day as needed for muscle spasms, Starting Wed 1/17/2024, Normal      oxyCODONE (Roxicodone) 5 immediate release tablet Take 1 tablet (5 mg total) by mouth every 6 (six) hours as needed for moderate pain for up to 8 doses Max Daily Amount: 20 mg, Starting Sat 12/16/2023, Normal      pantoprazole (PROTONIX) 40 mg tablet Take 1  "tablet (40 mg total) by mouth daily, Starting Tue 1/16/2024, Normal      potassium chloride (K-DUR,KLOR-CON) 20 mEq tablet 2 tabs BID day 1 the 2 tabs daily for 3 days., Normal      predniSONE 10 mg tablet 4 tablets daily x 3 days;  3 tablets daily x 3 days; 2 tablets daily x 3 days; 1 tablet daily x 3 days, Normal      Riboflavin 400 MG TABS Take 1 tablet (400 mg total) by mouth in the morning, Starting Fri 6/3/2022, Normal      rimegepant sulfate (NURTEC) 75 mg TBDP 1 tab at migraine onset. No more than one dose per 24 hours., Normal      rizatriptan (Maxalt) 10 mg tablet Take 1 tablet (10 mg total) by mouth as needed for migraine Take at the onset of migraine; if symptoms continue or return, may take another dose at least 2 hours after first dose. Take no more than 2 doses in a day., Starting Tue 10/17/2023, Normal      Syringe/Needle, Disp, (SYRINGE 3CC/17XG5-1/4\") 27G X 1-1/4\" 3 ML MISC Use for B12 injections, Normal      traMADol (ULTRAM) 50 mg tablet Take 1 tablet (50 mg total) by mouth 2 (two) times a day, Starting Tue 1/16/2024, Normal      triamterene-hydrochlorothiazide (DYAZIDE) 37.5-25 mg per capsule Take 1 capsule by mouth every morning, Starting Tue 11/14/2023, Normal             No discharge procedures on file.    PDMP Review         Value Time User    PDMP Reviewed  Yes 1/17/2024  1:11 PM Yamilka Boone PA-C            ED Provider  Electronically Signed by             Aurora Stephen,   02/15/24 1421    "

## 2024-02-16 ENCOUNTER — PROCEDURE VISIT (OUTPATIENT)
Dept: NEUROLOGY | Facility: CLINIC | Age: 44
End: 2024-02-16
Payer: COMMERCIAL

## 2024-02-16 VITALS
SYSTOLIC BLOOD PRESSURE: 132 MMHG | BODY MASS INDEX: 51.76 KG/M2 | HEART RATE: 83 BPM | DIASTOLIC BLOOD PRESSURE: 86 MMHG | HEIGHT: 69 IN | TEMPERATURE: 96.7 F | OXYGEN SATURATION: 96 %

## 2024-02-16 DIAGNOSIS — E87.6 HYPOKALEMIA: ICD-10-CM

## 2024-02-16 DIAGNOSIS — G43.709 CHRONIC MIGRAINE WITHOUT AURA WITHOUT STATUS MIGRAINOSUS, NOT INTRACTABLE: Primary | ICD-10-CM

## 2024-02-16 LAB
ATRIAL RATE: 80 BPM
P AXIS: 50 DEGREES
PR INTERVAL: 192 MS
QRS AXIS: 0 DEGREES
QRSD INTERVAL: 100 MS
QT INTERVAL: 404 MS
QTC INTERVAL: 465 MS
T WAVE AXIS: 44 DEGREES
VENTRICULAR RATE: 80 BPM

## 2024-02-16 PROCEDURE — 93010 ELECTROCARDIOGRAM REPORT: CPT | Performed by: INTERNAL MEDICINE

## 2024-02-16 PROCEDURE — 64615 CHEMODENERV MUSC MIGRAINE: CPT | Performed by: PHYSICIAN ASSISTANT

## 2024-02-16 RX ORDER — POTASSIUM CHLORIDE 20 MEQ/1
TABLET, EXTENDED RELEASE ORAL
Qty: 10 TABLET | Refills: 0 | Status: SHIPPED | OUTPATIENT
Start: 2024-02-16

## 2024-02-16 RX ORDER — KETOROLAC TROMETHAMINE 30 MG/ML
INJECTION, SOLUTION INTRAMUSCULAR; INTRAVENOUS
Qty: 4 ML | Refills: 2 | Status: SHIPPED | OUTPATIENT
Start: 2024-02-16

## 2024-02-16 NOTE — PROGRESS NOTES
Universal Protocol   Consent: Verbal consent obtained. Written consent obtained.  Risks and benefits: risks, benefits and alternatives were discussed  Consent given by: patient  Patient understanding: patient states understanding of the procedure being performed  Patient consent: the patient's understanding of the procedure matches consent given  Procedure consent: procedure consent matches procedure scheduled      Chemodenervation     Date/Time  2/16/2024 9:45 AM     Performed by  Yamilka Boone PA-C   Authorized by  Yamilka Boone PA-C     Pre-procedure details      Prepped With: Alcohol     Procedure details      Position:  Upright   Botox      Botox Type:  Type A    Brand:  Botox    mL's of Botulinum Toxin:  200    Final Concentration per CC:  100 units    Needle Gauge:  30 G 2.5 inch   Procedures      Botox Procedures: chronic headache      Indications: migraines     Injection Location      Head / Face:  L superior trapezius, R superior trapezius, L superior cervical paraspinal, R superior cervical paraspinal, L , R , procerus, L temporalis, R temporalis, R frontalis, L frontalis, R medial occipitalis and L medial occipitalis    L  injection amount:  5 unit(s)    R  injection amount:  5 unit(s)    L lateral frontalis:  5 unit(s)    R lateral frontalis:  5 unit(s)    L medial frontalis:  5 unit(s)    R medial frontalis:  5 unit(s)    L temporalis injection amount:  20 unit(s)    R temporalis injection amount:  20 unit(s)    Procerus injection amount:  5 unit(s)    L medial occipitalis injection amount:  15 unit(s)    R medial occipitalis injection amount:  15 unit(s)    L superior cervical paraspinal injection amount:  10 unit(s)    R superior cervical paraspinal injection amount:  10 unit(s)    L superior trapezius injection amount:  15 unit(s)    R superior trapezius injection amount:  15 unit(s)   Total Units      Total units used:  200    Total units discarded:  0  "  Post-procedure details      Chemodenervation:  Chronic migraine    Facial Nerve Location::  Bilateral facial nerve    Patient tolerance of procedure:  Tolerated well, no immediate complications   Comments       Extra units medically necessary- 45 units between L upper and middle traps, and b/l occipitalis.       Blood pressure 132/86, pulse 83, temperature (!) 96.7 °F (35.9 °C), temperature source Tympanic, height 5' 9\" (1.753 m), SpO2 96%.    "

## 2024-03-11 DIAGNOSIS — R51.9 WORSENING HEADACHES: ICD-10-CM

## 2024-03-11 DIAGNOSIS — E87.6 HYPOKALEMIA: ICD-10-CM

## 2024-03-11 DIAGNOSIS — Z78.9 MALE-TO-FEMALE TRANSGENDER PERSON: ICD-10-CM

## 2024-03-11 DIAGNOSIS — K42.9 UMBILICAL HERNIA WITHOUT OBSTRUCTION AND WITHOUT GANGRENE: ICD-10-CM

## 2024-03-11 DIAGNOSIS — K21.9 GASTROESOPHAGEAL REFLUX DISEASE WITHOUT ESOPHAGITIS: ICD-10-CM

## 2024-03-11 DIAGNOSIS — K52.9 GASTROENTERITIS: ICD-10-CM

## 2024-03-11 RX ORDER — TRAMADOL HYDROCHLORIDE 50 MG/1
50 TABLET ORAL 2 TIMES DAILY
Qty: 60 TABLET | Refills: 0 | Status: SHIPPED | OUTPATIENT
Start: 2024-03-11

## 2024-03-11 RX ORDER — POTASSIUM CHLORIDE 20 MEQ/1
TABLET, EXTENDED RELEASE ORAL
Qty: 10 TABLET | Refills: 0 | Status: CANCELLED | OUTPATIENT
Start: 2024-03-11

## 2024-03-11 RX ORDER — ESTRADIOL 2 MG/1
2 TABLET ORAL 2 TIMES DAILY
Qty: 180 TABLET | Refills: 1 | Status: SHIPPED | OUTPATIENT
Start: 2024-03-11

## 2024-03-11 RX ORDER — POTASSIUM CHLORIDE 20 MEQ/1
TABLET, EXTENDED RELEASE ORAL
Qty: 10 TABLET | Refills: 0 | Status: SHIPPED | OUTPATIENT
Start: 2024-03-11

## 2024-03-11 RX ORDER — TRAMADOL HYDROCHLORIDE 50 MG/1
50 TABLET ORAL 2 TIMES DAILY
Qty: 60 TABLET | Refills: 0 | Status: CANCELLED | OUTPATIENT
Start: 2024-03-11

## 2024-03-11 RX ORDER — PANTOPRAZOLE SODIUM 40 MG/1
40 TABLET, DELAYED RELEASE ORAL DAILY
Qty: 90 TABLET | Refills: 1 | Status: SHIPPED | OUTPATIENT
Start: 2024-03-11

## 2024-03-12 RX ORDER — ONDANSETRON 4 MG/1
4 TABLET, ORALLY DISINTEGRATING ORAL EVERY 8 HOURS PRN
Qty: 60 TABLET | Refills: 0 | Status: SHIPPED | OUTPATIENT
Start: 2024-03-12

## 2024-03-19 ENCOUNTER — APPOINTMENT (OUTPATIENT)
Dept: RADIOLOGY | Facility: HOSPITAL | Age: 44
End: 2024-03-19
Payer: COMMERCIAL

## 2024-03-19 ENCOUNTER — HOSPITAL ENCOUNTER (EMERGENCY)
Facility: HOSPITAL | Age: 44
Discharge: HOME | End: 2024-03-19
Attending: EMERGENCY MEDICINE
Payer: COMMERCIAL

## 2024-03-19 VITALS
DIASTOLIC BLOOD PRESSURE: 86 MMHG | HEART RATE: 70 BPM | WEIGHT: 315 LBS | OXYGEN SATURATION: 97 % | BODY MASS INDEX: 53.16 KG/M2 | SYSTOLIC BLOOD PRESSURE: 133 MMHG | TEMPERATURE: 98.6 F | RESPIRATION RATE: 18 BRPM

## 2024-03-19 DIAGNOSIS — R51.9 HEADACHE: ICD-10-CM

## 2024-03-19 DIAGNOSIS — F41.9 ANXIETY: Primary | ICD-10-CM

## 2024-03-19 PROBLEM — R29.90 STROKE-LIKE SYMPTOMS: Status: ACTIVE | Noted: 2024-03-19

## 2024-03-19 LAB
ANION GAP SERPL CALCULATED.3IONS-SCNC: 6 MMOL/L (ref 4–13)
APTT PPP: 26 SECONDS (ref 23–37)
ATRIAL RATE: 82 BPM
BUN SERPL-MCNC: 9 MG/DL (ref 5–25)
CALCIUM SERPL-MCNC: 8.7 MG/DL (ref 8.4–10.2)
CARDIAC TROPONIN I PNL SERPL HS: 5 NG/L
CHLORIDE SERPL-SCNC: 102 MMOL/L (ref 96–108)
CO2 SERPL-SCNC: 30 MMOL/L (ref 21–32)
CREAT SERPL-MCNC: 0.88 MG/DL (ref 0.6–1.3)
ERYTHROCYTE [DISTWIDTH] IN BLOOD BY AUTOMATED COUNT: 13.9 % (ref 11.6–15.1)
FLUAV RNA RESP QL NAA+PROBE: NEGATIVE
FLUBV RNA RESP QL NAA+PROBE: NEGATIVE
GLUCOSE SERPL-MCNC: 98 MG/DL (ref 65–140)
HCT VFR BLD AUTO: 37.4 % (ref 36.5–46.1)
HGB BLD-MCNC: 11.6 G/DL (ref 12–15.4)
INR PPP: 0.98 (ref 0.84–1.19)
MCH RBC QN AUTO: 26.7 PG (ref 26.8–34.3)
MCHC RBC AUTO-ENTMCNC: 31 G/DL (ref 31.4–37.4)
MCV RBC AUTO: 86 FL (ref 82–98)
P AXIS: 51 DEGREES
PLATELET # BLD AUTO: 339 THOUSANDS/UL (ref 149–390)
PMV BLD AUTO: 9.4 FL (ref 8.9–12.7)
POTASSIUM SERPL-SCNC: 3.3 MMOL/L (ref 3.5–5.3)
PR INTERVAL: 180 MS
PROTHROMBIN TIME: 12.9 SECONDS (ref 11.6–14.5)
QRS AXIS: -8 DEGREES
QRSD INTERVAL: 90 MS
QT INTERVAL: 384 MS
QTC INTERVAL: 448 MS
RBC # BLD AUTO: 4.34 MILLION/UL (ref 3.88–5.12)
RSV RNA RESP QL NAA+PROBE: NEGATIVE
SARS-COV-2 RNA RESP QL NAA+PROBE: POSITIVE
SODIUM SERPL-SCNC: 138 MMOL/L (ref 135–147)
T WAVE AXIS: 28 DEGREES
VENTRICULAR RATE: 82 BPM
WBC # BLD AUTO: 9.24 THOUSAND/UL (ref 4.31–10.16)

## 2024-03-19 PROCEDURE — 93005 ELECTROCARDIOGRAM TRACING: CPT

## 2024-03-19 PROCEDURE — 96374 THER/PROPH/DIAG INJ IV PUSH: CPT

## 2024-03-19 PROCEDURE — 99285 EMERGENCY DEPT VISIT HI MDM: CPT

## 2024-03-19 PROCEDURE — 36415 COLL VENOUS BLD VENIPUNCTURE: CPT | Performed by: EMERGENCY MEDICINE

## 2024-03-19 PROCEDURE — 93010 ELECTROCARDIOGRAM REPORT: CPT | Performed by: INTERNAL MEDICINE

## 2024-03-19 PROCEDURE — 70551 MRI BRAIN STEM W/O DYE: CPT

## 2024-03-19 PROCEDURE — 99245 OFF/OP CONSLTJ NEW/EST HI 55: CPT | Performed by: PSYCHIATRY & NEUROLOGY

## 2024-03-19 PROCEDURE — 0241U HB NFCT DS VIR RESP RNA 4 TRGT: CPT | Performed by: EMERGENCY MEDICINE

## 2024-03-19 PROCEDURE — 85027 COMPLETE CBC AUTOMATED: CPT | Performed by: EMERGENCY MEDICINE

## 2024-03-19 PROCEDURE — 85730 THROMBOPLASTIN TIME PARTIAL: CPT | Performed by: EMERGENCY MEDICINE

## 2024-03-19 PROCEDURE — 85610 PROTHROMBIN TIME: CPT | Performed by: EMERGENCY MEDICINE

## 2024-03-19 PROCEDURE — 80048 BASIC METABOLIC PNL TOTAL CA: CPT | Performed by: EMERGENCY MEDICINE

## 2024-03-19 PROCEDURE — 84484 ASSAY OF TROPONIN QUANT: CPT | Performed by: EMERGENCY MEDICINE

## 2024-03-19 RX ORDER — LORAZEPAM 2 MG/ML
0.5 INJECTION INTRAMUSCULAR ONCE
Status: COMPLETED | OUTPATIENT
Start: 2024-03-19 | End: 2024-03-19

## 2024-03-19 RX ADMIN — LORAZEPAM 0.5 MG: 2 INJECTION INTRAMUSCULAR; INTRAVENOUS at 12:30

## 2024-03-19 NOTE — CONSULTS
"Consultation - Stroke   Rob Arriola 44 y.o. adult MRN: [id number]  Unit/Bed#: ED 21 Encounter: [id number]      Assessment/Plan   Addendum:  MRI brain is negative for stroke.  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  Patient incidentally tested positive for COVID.  Ok for discharge from a neurologic standpoint.    Stroke-like symptoms  Assessment & Plan  44-year-old transgender male to female, on chronic estradiol therapy, hypertension, chronic migraines, chronic cervicalgia, prior gastric bypass surgery, and anxiety, presented to the ED as a prehospital stroke alert.  Patient began experiencing left face/arm/leg numbness while at work.  She also noted a \"twisting feeling\" of the left side of her face.  She has experienced the sensory changes prior (with negative stroke workup), but never experienced the facial twisting before.    A prehospital stroke alert was activated.  Blood pressure in the 170s to 180s systolic.  Symptoms were improving, though not entirely resolved upon arrival.  NIHSS 2 for slight left flattening of the nasolabial fold and diminished sensation to light touch of the left face/arm/leg.  CT head was unremarkable.  CTA deferred due to anaphylactic allergy and low suspicion for LVO.  Decision was made to proceed with a stat MRI.    Plan:  - If stat MRI is negative for stroke, no further workup needed and patient can be discharged.  Would suspect this to be acephalgic migraine if MRI is negative.  -If stat MRI reveals an acute stroke, patient will require admission under stroke pathway.        Thrombolytic Decision: Patient not a candidate. MRI brain negative for stroke.       Follow-up with [facility] neurology as scheduled for 5/17/2024.    History of Present Illness     Reason for Consult / Principal Problem: Stroke alert  Patient last known well: Approximately 10am  Stroke alert called: 1116  Neurology time of arrival: Present prior to patient arrival.     HPI: Rob Arriola is a 44 y.o. transgender " male to female, on chronic estradiol therapy, hypertension, migraines, chronic cervical pain, B12 deficiency, prior gastric bypass, anxiety, and history of pseudoseizures (2012), presented to the ED as a prehospital stroke alert.  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  While she was sitting at work, she began noticing numbness of her left face, arm, and leg.  She has had this before.  She noted a feeling that her face was twisting on the left side, which was a new sensation.  She denied any headache or other focal neurologic symptoms.  She states that she got up and went to the bathroom.  She came back and sat down and tried to relax.  Someone did check her blood pressure.  At that point, her face was already starting to improve in terms of the twisting sensation, but the numbness seemed to persist.    Upon arrival to the ED, patient's blood pressure in the 170s to 180s systolic.  She reports that the numbness is improved, though not resolved.  She has very mild resting left facial asymmetry.  NIHSS 2.  CT head unremarkable.  CTA deferred given anaphylactic reaction to iodine and low suspicion for LVO.  Patient will be going for stat MRI.    Patient reports that she had a recent tooth extraction last Friday.    Patient follows with [facility] neurology for management of her migraines and cervicalgia.She reports that she has had prior similar symptoms when she was experiencing neck pain in the past.  Her neck pain and migraines have significantly improved after getting Botox and trigger point injections.  Patient was seen in consultation by neurology in March 2020 after presenting with paresthesias of the left face, arm, and leg, along with weakness in the left upper extremity and worsening neck pain. MRI brain did not reveal any stroke.  MRI C-spine noted no significant cord compression or cord signal abnormalities.  In January 2022 she had another presentation as a stroke alert with left-sided  numbness and tingling involving the face, arm, and leg.  MRI brain was again negative.  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    Inpatient consult to Neurology  Consult performed by: Malia Patterson PA-C  Consult ordered by: Alo Briceño DO          Review of Systems   Unable to perform ROS: Acuity of condition   Neurological:  Positive for facial asymmetry and numbness. Negative for headaches.       Historical Information   Past Medical History:   Diagnosis Date    Anxiety     Asthma     seasonal    COVID-19 virus infection 12/27/2021    Hernia of abdominal wall     Hypertension     Kidney stones     Seizures (HCC)     pseudoseizures since 2012    Viral URI with cough 12/27/2021     Past Surgical History:   Procedure Laterality Date    APPENDECTOMY      CHOLECYSTECTOMY      GASTRIC BYPASS      HERNIA REPAIR      x2     Social History   Social History     Substance and Sexual Activity   Alcohol Use Yes    Comment: 2 drinks per month     Social History     Substance and Sexual Activity   Drug Use Never     E-Cigarette/Vaping    E-Cigarette Use Never User      E-Cigarette/Vaping Substances    Nicotine No     THC No     CBD No     Flavoring No     Other No     Unknown No      Social History     Tobacco Use   Smoking Status Never   Smokeless Tobacco Never     Family History:   Family History   Problem Relation Age of Onset    Hypertension Mother     Diabetes Mother     Hypertension Sister     No Known Problems Father         head injury    Stroke Maternal Grandmother         brain aneurysm    Aneurysm Maternal Uncle         brain       Review of previous medical records was completed.     Meds/Allergies   all current active meds have been reviewed, current meds:   Current Facility-Administered Medications   Medication Dose Route Frequency    Botulinum Toxin Type A SOLR 200 Units  200 Units Injection Q3 Months    cyanocobalamin injection 1,000 mcg  1,000 mcg Intramuscular Q30 Days  "  , and PTA meds:   Prior to Admission Medications   Prescriptions Last Dose Informant Patient Reported? Taking?   Cyanocobalamin 1000 MCG/ML KIT   No No   Sig: Inject 1 mL (1,000 mcg total) into a muscle once a week Inject 1 ml IM in thigh/ buttocks or deltoid mm once weekly x 8 weeks. Afterward once monthly   DULoxetine (CYMBALTA) 20 mg capsule   No No   Sig: Take 1 capsule (20 mg total) by mouth daily   LORazepam (ATIVAN) 1 mg tablet   No No   Sig: Take 0.5 tablets (0.5 mg total) by mouth every 6 (six) hours as needed for anxiety   Riboflavin 400 MG TABS  Self No No   Sig: Take 1 tablet (400 mg total) by mouth in the morning   Syringe/Needle, Disp, (SYRINGE 3CC/26JY3-4/2\") 25G X 1-1/2\" 3 ML MISC   No No   Sig: Use for toradol IM injections.   Syringe/Needle, Disp, (SYRINGE 3CC/86UC6-9/4\") 27G X 1-4\" 3 ML MISC   No No   Sig: Use for B12 injections   acetaminophen (TYLENOL) 500 mg tablet  Self Yes No   Sig: Take 500 mg by mouth if needed   albuterol (PROVENTIL HFA,VENTOLIN HFA) 90 mcg/act inhaler  Self No No   Sig: Inhale 2 puffs every 6 (six) hours as needed for wheezing   butalbital-acetaminophen-caffeine (FIORICET,ESGIC) -40 mg per tablet   No No   Si tab q6 hours prn migraine. No more than 2-3 per week.   carisoprodol (SOMA) 250 MG   No No   Sig: Take 1 tablet (250 mg total) by mouth 3 (three) times a day   colchicine (COLCRYS) 0.6 mg tablet  Self No No   Sig: Take 1 tablet (0.6 mg total) by mouth daily 2 pills now and 1 in an hour. One daily until resolved.   Patient taking differently: Take 0.6 mg by mouth daily as needed 2 pills now and 1 in an hour. One daily until resolved.   estradiol (ESTRACE) 2 MG tablet   No No   Sig: Take 1 tablet (2 mg total) by mouth 2 (two) times a day   fluticasone (FLONASE) 50 mcg/act nasal spray   No No   Si spray into each nostril daily   hydrocortisone (ANUSOL-HC) 25 mg suppository   No No   Sig: Insert 1 suppository (25 mg total) into the rectum daily as " needed for hemorrhoids   ipratropium-albuterol (DUO-NEB) 0.5-2.5 mg/3 mL nebulizer solution  Self No No   Sig: Take 3 mL by nebulization every 6 (six) hours as needed for wheezing or shortness of breath   ketorolac (TORADOL) 30 mg/mL injection   No No   Si-2 ml IM injection once per 24 hours p.r.n. migraine.  No more than 2 injections per week.   lisinopril (ZESTRIL) 5 mg tablet   No No   Sig: Take 1 tablet (5 mg total) by mouth daily   montelukast (SINGULAIR) 10 mg tablet  Self No No   Sig: Take 1 tablet (10 mg total) by mouth daily at bedtime   multivitamin (THERAGRAN) TABS  Self Yes No   Sig: Take 1 tablet by mouth daily   ondansetron (ZOFRAN-ODT) 4 mg disintegrating tablet   No No   Sig: Take 1 tablet (4 mg total) by mouth every 8 (eight) hours as needed for nausea or vomiting   orphenadrine (NORFLEX) 100 mg tablet   No No   Sig: Take 1 tablet (100 mg total) by mouth 2 (two) times a day as needed for muscle spasms   oxyCODONE (Roxicodone) 5 immediate release tablet   No No   Sig: Take 1 tablet (5 mg total) by mouth every 6 (six) hours as needed for moderate pain for up to 8 doses Max Daily Amount: 20 mg   pantoprazole (PROTONIX) 40 mg tablet   No No   Sig: Take 1 tablet (40 mg total) by mouth daily   potassium chloride (Klor-Con M20) 20 mEq tablet   No No   Si tabs BID day 1 the 2 tabs daily for 3 days.   predniSONE 10 mg tablet  Self No No   Si tablets daily x 3 days;  3 tablets daily x 3 days; 2 tablets daily x 3 days; 1 tablet daily x 3 days   Patient taking differently: if needed 4 tablets daily x 3 days;  3 tablets daily x 3 days; 2 tablets daily x 3 days; 1 tablet daily x 3 days   rimegepant sulfate (NURTEC) 75 mg TBDP   No No   Si tab at migraine onset. No more than one dose per 24 hours.   rizatriptan (Maxalt) 10 mg tablet  Self No No   Sig: Take 1 tablet (10 mg total) by mouth as needed for migraine Take at the onset of migraine; if symptoms continue or return, may take another dose at  "least 2 hours after first dose. Take no more than 2 doses in a day.   traMADol (ULTRAM) 50 mg tablet   No No   Sig: Take 1 tablet (50 mg total) by mouth 2 (two) times a day   triamterene-hydrochlorothiazide (DYAZIDE) 37.5-25 mg per capsule   No No   Sig: Take 1 capsule by mouth every morning      Facility-Administered Medications Last Administration Doses Remaining   Botulinum Toxin Type A SOLR 200 Units 2/16/2024 10:46 AM    cyanocobalamin injection 1,000 mcg 6/19/2023  1:10 PM           Allergies   Allergen Reactions    Gadolinium Anaphylaxis     \"Oxygen dropped and coded during\"       Iodinated Contrast Media Anaphylaxis       Objective   Vitals:Blood pressure 147/70, pulse 74, temperature 98.6 °F (37 °C), temperature source Tympanic, resp. rate 16, weight (!) 163 kg (360 lb), SpO2 97%.,Body mass index is 53.16 kg/m².  No intake or output data in the 24 hours ending 03/19/24 1215    Invasive Devices:   Invasive Devices       Peripheral Intravenous Line  Duration             Peripheral IV 03/19/24 Right Antecubital <1 day                    Physical Exam  Vitals and nursing note reviewed.   Constitutional:       General: She is not in acute distress.     Appearance: She is not ill-appearing or diaphoretic.   HENT:      Head: Normocephalic and atraumatic.      Nose: Nose normal.      Mouth/Throat:      Mouth: Mucous membranes are moist.      Pharynx: Oropharynx is clear.   Eyes:      General: No scleral icterus.        Right eye: No discharge.         Left eye: No discharge.      Extraocular Movements: Extraocular movements intact and EOM normal.   Cardiovascular:      Rate and Rhythm: Normal rate.   Pulmonary:      Effort: Pulmonary effort is normal. No respiratory distress.      Breath sounds: No stridor.   Musculoskeletal:         General: No swelling or tenderness.      Right lower leg: No edema.      Left lower leg: No edema.   Skin:     General: Skin is warm and dry.      Coloration: Skin is not jaundiced or " pale.   Neurological:      Mental Status: She is alert and oriented to person, place, and time.      Cranial Nerves: Cranial nerve deficit present.      Sensory: Sensory deficit present.      Motor: Motor strength is normal.No weakness.      Coordination: Coordination normal. Finger-Nose-Finger Test normal.   Psychiatric:         Mood and Affect: Mood normal.         Speech: Speech normal.         Behavior: Behavior normal.         Thought Content: Thought content normal.         Judgment: Judgment normal.       Neurologic Exam     Mental Status   Oriented to person, place, and time.   Attention: normal. Concentration: normal.   Speech: speech is normal   Level of consciousness: alert  Knowledge: good.   Able to name object. Able to repeat.   Follows commands without difficulty.     Cranial Nerves     CN II   Visual fields full to confrontation.     CN III, IV, VI   Extraocular motions are normal.   Upgaze: normal  Downgaze: normal  Conjugate gaze: present    CN V   Left facial sensation deficit: Diminished sensation to light touch on the left.    CN VII   Left facial weakness: Subtle resting flattening of the left nasolabial fold.    CN VIII   Hearing: intact    CN XII   Tongue: not atrophic  Fasciculations: absent  Tongue deviation: none    Motor Exam   Muscle bulk: normal    Strength   Strength 5/5 throughout.     Sensory Exam   Diminished to light touch on the L arm and leg. (Subtle- 60% on the L, 100% on the R).      Gait, Coordination, and Reflexes     Gait  Gait: (Deferred at time of stroke alert)    Coordination   Finger to nose coordination: normal    Tremor   Resting tremor: absent  Intention tremor: absent  Action tremor: absent      NIHSS:  1a.Level of Consciousness: 0 = Alert   1b. LOC Questions: 0 = Answers both correctly   1c. LOC Commands: 0 = Obeys both correctly   2. Best Gaze: 0 = Normal   3. Visual: 0 = No visual field loss   4. Facial Palsy: 1   5a. Motor Right Arm: 0=No drift, limb holds 90 (or  45) degrees for full 10 seconds   5b. Motor Left Arm: 0=No drift, limb holds 90 (or 45) degrees for full 10 seconds   6a. Motor Right Le=No drift, limb holds 90 (or 45) degrees for full 10 seconds   6b. Motor Left Le=No drift, limb holds 90 (or 45) degrees for full 10 seconds   7. Limb Ataxia:  0=Absent   8. Sensory: 1=Mild to moderate sensory loss; patient feels pinprick is less sharp or is dull on the affected side; there is a loss of superficial pain with pinprick but patient is aware She is being touched   9. Best Language:  0=No aphasia, normal   10. Dysarthria: 0=Normal articulation   11. Extinction and Inattention (formerly Neglect): 0=No abnormality   Total Score: 2     Time NIHSS was completed: 1140    Modified Tucker Score:  0-1    Lab Results: I have personally reviewed pertinent reports.    Imaging Studies: I have personally reviewed pertinent reports.   and I have personally reviewed pertinent films in PACS-attending neurologist discussed with radiology.  EKG, Pathology, and Other Studies: I have personally reviewed pertinent reports.    VTE Prophylaxis: in ED     Code Status: Prior    Counseling / Coordination of Care  Critical care billing per attending.

## 2024-03-19 NOTE — PROGRESS NOTES
Responded to stroke alert. Medical team providing care for patient. No family at this time. Support to staff.   03/19/24 1100   Clinical Encounter Type   Visited With Patient not available   Crisis Visit Code  (Stroke alert.)

## 2024-03-19 NOTE — ASSESSMENT & PLAN NOTE
"44-year-old transgender male to female, on chronic estradiol therapy, hypertension, chronic migraines, chronic cervicalgia, prior gastric bypass surgery, and anxiety, presented to the ED as a prehospital stroke alert.  Patient began experiencing left face/arm/leg numbness while at work.  She also noted a \"twisting feeling\" of the left side of her face.  She has experienced the sensory changes prior (with negative stroke workup), but never experienced the facial twisting before.    A prehospital stroke alert was activated.  Blood pressure in the 170s to 180s systolic.  Symptoms were improving, though not entirely resolved upon arrival.  NIHSS 2 for slight left flattening of the nasolabial fold and diminished sensation to light touch of the left face/arm/leg.  CT head was unremarkable.  CTA deferred due to anaphylactic allergy and low suspicion for LVO.  Decision was made to proceed with a stat MRI.    Plan:  - If stat MRI is negative for stroke, no further workup needed and patient can be discharged.  Would suspect this to be acephalgic migraine if MRI is negative.  -If stat MRI reveals an acute stroke, patient will require admission under stroke pathway.  "

## 2024-03-19 NOTE — ED ATTENDING ATTESTATION
3/19/2024  IAlo DO, saw and evaluated the patient. I have discussed the patient with the resident/non-physician practitioner and agree with the resident's/non-physician practitioner's findings, Plan of Care, and MDM as documented in the resident's/non-physician practitioner's note, except where noted. All available labs and Radiology studies were reviewed.  I was present for key portions of any procedure(s) performed by the resident/non-physician practitioner and I was immediately available to provide assistance.       At this point I agree with the current assessment done in the Emergency Department.  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  She was made a prehospital stroke alert.  She does have a history of complex migraines with similar presentation.  Upon arrival to ED, EMS reports that her symptoms have mildly improved en route.    ROS: Denies preceding f/c, LH/dizziness, diaphoresis, CP, SOB, abdominal pain, n/v/d. 12 system ROS o/w negative.    PE: Mild distress, alert; PERRL, EOMI; MMM, no posterior oropharyngeal exudate, edema or erythema; HRR, no murmur, monitor shows sinus rhythm at 88 bpm; lungs CTA w/o w/r/r, POx 98% on RA (nl); abdomen s/nt/nd, nl BS in all 4 quadrant; (-) LE edema or calf TTP, FROM extremities x4, very minimal left sided weakness, upper extremity greater than lower extremity; skin p/w/d; CN II-XII GI/NF, no appreciable facial asymmetry, oriented.    MDM/DDx: Left weakness/numbness - acute stroke/TIA, complex migraine, less likely seizure.     I independently reviewed and interpreted ordered labs from this encounter.    A/P: Will check CT head (patient has anaphylaxis to IV contrast), cardiac w/u, consult Neurology, reeval for  deterioration, admit. Pt is not a tPA candidate due to improving symptoms.  May need acute MRI.    ED Course         Critical Care Time  CriticalCare Time    Date/Time: 3/19/2024 11:52 AM    Performed by: Alo Briceño DO  Authorized by: Alo Briceño DO    Critical care provider statement:     Critical care time (minutes):  30    Critical care time was exclusive of:  Separately billable procedures and treating other patients and teaching time    Critical care was necessary to treat or prevent imminent or life-threatening deterioration of the following conditions:  CNS failure or compromise    Critical care was time spent personally by me on the following activities:  Obtaining history from patient or surrogate, development of treatment plan with patient or surrogate, discussions with consultants, evaluation of patient's response to treatment, examination of patient, ordering and performing treatments and interventions, ordering and review of laboratory studies, ordering and review of radiographic studies, re-evaluation of patient's condition and review of old charts    I assumed direction of critical care for this patient from another provider in my specialty: no

## 2024-03-19 NOTE — Clinical Note
Rob Arriola was seen and treated in our emergency department on 3/19/2024.                Diagnosis:     Rob  may return to work on return date.    She may return on this date: 03/21/2024         If you have any questions or concerns, please don't hesitate to call.      Kwesi Castillo MD    ______________________________           _______________          _______________  Hospital Representative                              Date                                Time

## 2024-03-19 NOTE — DISCHARGE INSTRUCTIONS
Please follow up with neurology for further evaluation and management of your atypical migraines.

## 2024-03-22 NOTE — ED PROVIDER NOTES
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  Patient was made prehospital stroke alert.  Past medical history significant for complex migraines with similar presentation.  Patient states symptoms improved on arrival to ER, per EMS also reports that symptoms have improved.  Prior to Admission Medications   Prescriptions Last Dose Informant Patient Reported? Taking?   Cyanocobalamin 1000 MCG/ML KIT   No No   Sig: Inject 1 mL (1,000 mcg total) into a muscle once a week Inject 1 ml IM in thigh/ buttocks or deltoid mm once weekly x 8 weeks. Afterward once monthly   DULoxetine (CYMBALTA) 20 mg capsule   No No   Sig: Take 1 capsule (20 mg total) by mouth daily   LORazepam (ATIVAN) 1 mg tablet   No No   Sig: Take 0.5 tablets (0.5 mg total) by mouth every 6 (six) hours as needed for anxiety   Riboflavin 400 MG TABS  Self No No   Sig: Take 1 tablet (400 mg total) by mouth in the morning   Syringe/Needle, Disp, (SYRINGE 3CC/96UY4-3/2\") 25G X 1-1/2\" 3 ML MISC   No No   Sig: Use for toradol IM injections.   Syringe/Needle, Disp, (SYRINGE 3CC/18GR9-1/4\") 27G X 1-1/4\" 3 ML MISC   No No   Sig: Use for B12 injections   acetaminophen (TYLENOL) 500 mg tablet  Self Yes No   Sig: Take 500 mg by mouth if needed   albuterol (PROVENTIL HFA,VENTOLIN HFA) 90 mcg/act inhaler  Self No No   Sig: Inhale 2 puffs every 6 (six) hours as needed for wheezing   butalbital-acetaminophen-caffeine (FIORICET,ESGIC) -40 mg per tablet   No No   Si tab q6 hours prn migraine. No more than 2-3 per week.   carisoprodol (SOMA) 250 MG   No No   Sig: Take 1 " tablet (250 mg total) by mouth 3 (three) times a day   colchicine (COLCRYS) 0.6 mg tablet  Self No No   Sig: Take 1 tablet (0.6 mg total) by mouth daily 2 pills now and 1 in an hour. One daily until resolved.   Patient taking differently: Take 0.6 mg by mouth daily as needed 2 pills now and 1 in an hour. One daily until resolved.   estradiol (ESTRACE) 2 MG tablet   No No   Sig: Take 1 tablet (2 mg total) by mouth 2 (two) times a day   fluticasone (FLONASE) 50 mcg/act nasal spray   No No   Si spray into each nostril daily   hydrocortisone (ANUSOL-HC) 25 mg suppository   No No   Sig: Insert 1 suppository (25 mg total) into the rectum daily as needed for hemorrhoids   ipratropium-albuterol (DUO-NEB) 0.5-2.5 mg/3 mL nebulizer solution  Self No No   Sig: Take 3 mL by nebulization every 6 (six) hours as needed for wheezing or shortness of breath   ketorolac (TORADOL) 30 mg/mL injection   No No   Si-2 ml IM injection once per 24 hours p.r.n. migraine.  No more than 2 injections per week.   lisinopril (ZESTRIL) 5 mg tablet   No No   Sig: Take 1 tablet (5 mg total) by mouth daily   montelukast (SINGULAIR) 10 mg tablet  Self No No   Sig: Take 1 tablet (10 mg total) by mouth daily at bedtime   multivitamin (THERAGRAN) TABS  Self Yes No   Sig: Take 1 tablet by mouth daily   ondansetron (ZOFRAN-ODT) 4 mg disintegrating tablet   No No   Sig: Take 1 tablet (4 mg total) by mouth every 8 (eight) hours as needed for nausea or vomiting   orphenadrine (NORFLEX) 100 mg tablet   No No   Sig: Take 1 tablet (100 mg total) by mouth 2 (two) times a day as needed for muscle spasms   oxyCODONE (Roxicodone) 5 immediate release tablet   No No   Sig: Take 1 tablet (5 mg total) by mouth every 6 (six) hours as needed for moderate pain for up to 8 doses Max Daily Amount: 20 mg   pantoprazole (PROTONIX) 40 mg tablet   No No   Sig: Take 1 tablet (40 mg total) by mouth daily   potassium chloride (Klor-Con M20) 20 mEq tablet   No No   Si  tabs BID day 1 the 2 tabs daily for 3 days.   predniSONE 10 mg tablet  Self No No   Si tablets daily x 3 days;  3 tablets daily x 3 days; 2 tablets daily x 3 days; 1 tablet daily x 3 days   Patient taking differently: if needed 4 tablets daily x 3 days;  3 tablets daily x 3 days; 2 tablets daily x 3 days; 1 tablet daily x 3 days   rimegepant sulfate (NURTEC) 75 mg TBDP   No No   Si tab at migraine onset. No more than one dose per 24 hours.   rizatriptan (Maxalt) 10 mg tablet  Self No No   Sig: Take 1 tablet (10 mg total) by mouth as needed for migraine Take at the onset of migraine; if symptoms continue or return, may take another dose at least 2 hours after first dose. Take no more than 2 doses in a day.   traMADol (ULTRAM) 50 mg tablet   No No   Sig: Take 1 tablet (50 mg total) by mouth 2 (two) times a day   triamterene-hydrochlorothiazide (DYAZIDE) 37.5-25 mg per capsule   No No   Sig: Take 1 capsule by mouth every morning      Facility-Administered Medications Last Administration Doses Remaining   Botulinum Toxin Type A SOLR 200 Units 2024 10:46 AM    cyanocobalamin injection 1,000 mcg 2023  1:10 PM           Past Medical History:   Diagnosis Date    Anxiety     Asthma     seasonal    COVID-19 virus infection 2021    Hernia of abdominal wall     Hypertension     Kidney stones     Seizures (HCC)     pseudoseizures since     Viral URI with cough 2021       Past Surgical History:   Procedure Laterality Date    APPENDECTOMY      CHOLECYSTECTOMY      GASTRIC BYPASS      HERNIA REPAIR      x2       Family History   Problem Relation Age of Onset    Hypertension Mother     Diabetes Mother     Hypertension Sister     No Known Problems Father         head injury    Stroke Maternal Grandmother         brain aneurysm    Aneurysm Maternal Uncle         brain     I have reviewed and agree with the history as documented.    E-Cigarette/Vaping    E-Cigarette Use Never User       E-Cigarette/Vaping Substances    Nicotine No     THC No     CBD No     Flavoring No     Other No     Unknown No      Social History     Tobacco Use    Smoking status: Never    Smokeless tobacco: Never   Vaping Use    Vaping status: Never Used   Substance Use Topics    Alcohol use: Yes     Comment: 2 drinks per month    Drug use: Never        Review of Systems   Constitutional: Negative.    HENT: Negative.     Eyes: Negative.    Respiratory: Negative.     Cardiovascular: Negative.    Gastrointestinal: Negative.    Endocrine: Negative.    Genitourinary: Negative.    Musculoskeletal: Negative.    Allergic/Immunologic: Negative.    Neurological:  Positive for facial asymmetry, weakness, numbness and headaches.   Hematological: Negative.    Psychiatric/Behavioral: Negative.         Physical Exam  ED Triage Vitals [03/19/24 1130]   Temperature Pulse Respirations Blood Pressure SpO2   98.6 °F (37 °C) 89 18 (!) 176/84 98 %      Temp Source Heart Rate Source Patient Position - Orthostatic VS BP Location FiO2 (%)   Tympanic Monitor -- -- --      Pain Score       --             Orthostatic Vital Signs  Vitals:    03/19/24 1315 03/19/24 1330 03/19/24 1345 03/19/24 1400   BP: 127/70 130/96 133/86 133/86   Pulse: 69 66 72 70       Physical Exam  Vitals and nursing note reviewed.   Constitutional:       Appearance: Normal appearance. She is normal weight.   HENT:      Head: Normocephalic and atraumatic.      Right Ear: Tympanic membrane, ear canal and external ear normal.      Left Ear: Tympanic membrane, ear canal and external ear normal.      Nose: Nose normal.      Mouth/Throat:      Mouth: Mucous membranes are moist.      Pharynx: Oropharynx is clear.   Eyes:      Extraocular Movements: Extraocular movements intact.      Conjunctiva/sclera: Conjunctivae normal.      Pupils: Pupils are equal, round, and reactive to light.   Cardiovascular:      Rate and Rhythm: Normal rate and regular rhythm.      Pulses: Normal pulses.       Heart sounds: Normal heart sounds.   Pulmonary:      Effort: Pulmonary effort is normal.      Breath sounds: Normal breath sounds.   Abdominal:      General: Abdomen is flat. Bowel sounds are normal.      Palpations: Abdomen is soft.   Musculoskeletal:         General: Normal range of motion.      Cervical back: Normal range of motion and neck supple.   Skin:     General: Skin is warm and dry.      Capillary Refill: Capillary refill takes less than 2 seconds.   Neurological:      General: No focal deficit present.      Mental Status: She is alert and oriented to person, place, and time.      Cranial Nerves: No cranial nerve deficit.      Sensory: No sensory deficit.      Motor: No weakness.      Coordination: Coordination normal.      Gait: Gait normal.      Deep Tendon Reflexes: Reflexes normal.      Comments: No appreciable focal neurologic deficit on exam, patient face symmetric, 5 out of 5 strength in all extremities.  No appreciable sensory deficits.   Psychiatric:         Mood and Affect: Mood normal.         Behavior: Behavior normal.         Thought Content: Thought content normal.         Judgment: Judgment normal.         ED Medications  Medications   LORazepam (ATIVAN) injection 0.5 mg (0.5 mg Intravenous Given 3/19/24 1230)       Diagnostic Studies  Results Reviewed       Procedure Component Value Units Date/Time    FLU/RSV/COVID - if FLU/RSV clinically relevant [[id number]]  (Abnormal) Collected: 03/19/24 1150    Lab Status: Final result Specimen: Nares from Nose Updated: 03/19/24 1250     SARS-CoV-2 Positive     INFLUENZA A PCR Negative     INFLUENZA B PCR Negative     RSV PCR Negative    Narrative:      FOR PEDIATRIC PATIENTS - copy/paste COVID Guidelines URL to browser: [email or web address]    SARS-CoV-2 assay is a Nucleic Acid Amplification assay intended for the  qualitative detection of nucleic acid from SARS-CoV-2 in nasopharyngeal  swabs.  Results are for the presumptive identification of SARS-CoV-2 RNA.    Positive results are indicative of infection with SARS-CoV-2, the virus  causing COVID-19, but do not rule out bacterial infection or co-infection  with other viruses. Laboratories within the [place] and its  territories are required to report all positive results to the appropriate  public health authorities. Negative results do not preclude SARS-CoV-2  infection and should not be used as the sole basis for treatment or other  patient management decisions. Negative results must be combined with  clinical observations, patient history, and epidemiological information.  This test has not been FDA cleared or approved.    This test has been authorized by FDA under an Emergency Use Authorization  (EUA). This test is only authorized for the duration of time the  declaration that circumstances exist justifying the authorization of the  emergency use of an in vitro diagnostic tests for detection of SARS-CoV-2  virus and/or diagnosis of COVID-19 infection under section 564(b)(1) of  the Act, 21 U.S.C. 360bbb-3(b)(1), unless the authorization is terminated  or revoked sooner. The test has been validated but independent review by FDA  and CLIA is pending.    Test performed using [employer] GeneXpert: This RT-PCR assay targets N2,  a region unique to SARS-CoV-2. A conserved region in the E-gene was chosen  for pan-Sarbecovirus detection which includes SARS-CoV-2.    According to CMS-2020-01-R, this platform meets the definition of high-throughput technology.    HS Troponin 0hr (reflex protocol) [[id number]]  (Normal) Collected: 03/19/24 1150    Lab Status: Final result Specimen: Blood from Arm, Left Updated: 03/19/24 1238     hs TnI 0hr 5 ng/L     Basic metabolic panel [[id number]]  (Abnormal) Collected: 03/19/24 1150    Lab Status: Final result Specimen: Blood from Arm, Left Updated: 03/19/24 1229     Sodium 138 mmol/L      Potassium 3.3 mmol/L       Chloride 102 mmol/L      CO2 30 mmol/L      ANION GAP 6 mmol/L      BUN 9 mg/dL      Creatinine 0.88 mg/dL      Glucose 98 mg/dL      Calcium 8.7 mg/dL      eGFR --    Narrative:      Notes:     1. eGFR calculation is only valid for adults 18 years and older.  2. EGFR calculation cannot be performed for patients who are transgender, non-binary, or whose legal sex, sex at birth, and gender identity differ.    Protime-INR [[id number]]  (Normal) Collected: 03/19/24 1150    Lab Status: Final result Specimen: Blood from Arm, Left Updated: 03/19/24 1225     Protime 12.9 seconds      INR 0.98    APTT [[id number]]  (Normal) Collected: 03/19/24 1150    Lab Status: Final result Specimen: Blood from Arm, Left Updated: 03/19/24 1225     PTT 26 seconds     CBC and Platelet [[id number]]  (Abnormal) Collected: 03/19/24 1150    Lab Status: Final result Specimen: Blood from Arm, Left Updated: 03/19/24 1212     WBC 9.24 Thousand/uL      RBC 4.34 Million/uL      Hemoglobin 11.6 g/dL      Hematocrit 37.4 %      MCV 86 fL      MCH 26.7 pg      MCHC 31.0 g/dL      RDW 13.9 %      Platelets 339 Thousands/uL      MPV 9.4 fL                    MRI brain wo contrast   Final Result by Augustine Maher MD (03/19 5410)      No acute infarct.      Unchanged susceptibility blooming artifact in junction of left dorsolateral midbrain/upper arcelia, likely cavernoma.      Unchanged minimal nonspecific white matter disease, likely chronic migraines or chronic microangiopathy.      The study was marked in EPIC for immediate notification.      Workstation performed: [id number]         CT stroke alert brain   Final Result by Augustine Maher MD (03/19 9754)      No acute intracranial abnormality.      Findings were directly discussed with Chapincito Pace at approximately 11:35 AM.      Workstation performed: [id number]               Procedures  Procedures      ED Course                  Stroke Assessment       Row Name 03/19/24 1348             [facility]  Stroke Scale    Interval Baseline      Level of Consciousness (1a.) 0      LOC Questions (1b.) 0      LOC Commands (1c.) 0      Best Gaze (2.) 0      Visual (3.) 0      Facial Palsy (4.) 0      Motor Arm, Left (5a.) 0      Motor Arm, Right (5b.) 0      Motor Leg, Left (6a.) 0      Motor Leg, Right (6b.) 0      Limb Ataxia (7.) 0      Sensory (8.) 0      Best Language (9.) 0      Dysarthria (10.) 0      Extinction and Inattention (11.) (Formerly Neglect) 0      Total 0                                        Medical Decision Making  Patient is a 44-year-old female presenting for concerns of left-sided deficits.  DDx: CVA versus atypical migraine  Based on patient presentation and physical exam findings, primary concern is for rule out of any intracranial abnormality however high suspicion for migraine given history of similar symptoms with previous migraines.  Full stroke workup ordered, after discussion with neurology plans for emergent MRI.  MRI negative for any acute findings.  Plan for discharge at this time.  Return precautions given.  Patient understands and agrees.  Ready for discharge.    Problems Addressed:  Anxiety: acute illness or injury  Headache: acute illness or injury    Amount and/or Complexity of Data Reviewed  Labs: ordered.  Radiology: ordered.    Risk  Prescription drug management.          Disposition  Final diagnoses:   Anxiety   Headache     Time reflects when diagnosis was documented in both MDM as applicable and the Disposition within this note       Time User Action Codes Description Comment    3/19/2024 11:18 AM Rebecca Bustos Add [R53.82] Chronic fatigue     3/19/2024 11:18 AM Rebecca Bustos Remove [R53.82] Chronic fatigue     3/19/2024 11:18 AM Rebecca Bustos Add [F41.9] Anxiety     3/19/2024  2:54 PM Kwesi Castillo Add [R51.9] Headache           ED Disposition       ED Disposition   Discharge    Condition   Stable    Date/Time   Tue Mar 19, 2024  1:28 PM    Comment   Rob Arriola  discharge to home/self care.                   Follow-up Information       Follow up With Specialties Details Why Contact Info Additional Information    [facility] Emergency Department Emergency Medicine Go to  If symptoms worsen [address] [address]  [phone] [facility] Emergency Department, [address], [place], [address]   [phone]    Curly Camacho MD Internal Medicine Go to  If symptoms worsen [address] [place] [address] [phone]               Discharge Medication List as of 3/19/2024  2:55 PM        CONTINUE these medications which have NOT CHANGED    Details   acetaminophen (TYLENOL) 500 mg tablet Take 500 mg by mouth if needed, Historical Med      albuterol (PROVENTIL HFA,VENTOLIN HFA) 90 mcg/act inhaler Inhale 2 puffs every 6 (six) hours as needed for wheezing, Starting Fri 9/10/2021, Normal      butalbital-acetaminophen-caffeine (FIORICET,ESGIC) -40 mg per tablet 1 tab q6 hours prn migraine. No more than 2-3 per week., Normal      carisoprodol (SOMA) 250 MG Take 1 tablet (250 mg total) by mouth 3 (three) times a day, Starting Tue 1/16/2024, Normal      colchicine (COLCRYS) 0.6 mg tablet Take 1 tablet (0.6 mg total) by mouth daily 2 pills now and 1 in an hour. One daily until resolved., Starting Mon 6/27/2022, Normal      Cyanocobalamin 1000 MCG/ML KIT Inject 1 mL (1,000 mcg total) into a muscle once a week Inject 1 ml IM in thigh/ buttocks or deltoid mm once weekly x 8 weeks. Afterward once monthly, Starting Tue 11/14/2023, Normal      DULoxetine (CYMBALTA) 20 mg capsule Take 1 capsule (20 mg total) by mouth daily, Starting Tue 11/14/2023, Normal      estradiol (ESTRACE) 2 MG tablet Take 1 tablet (2 mg total) by mouth 2 (two) times a day, Starting Mon 3/11/2024, Normal      fluticasone (FLONASE) 50 mcg/act nasal spray 1 spray into each nostril daily, Starting Tue 1/16/2024,  Normal      hydrocortisone (ANUSOL-HC) 25 mg suppository Insert 1 suppository (25 mg total) into the rectum daily as needed for hemorrhoids, Starting Tue 11/14/2023, Normal      ipratropium-albuterol (DUO-NEB) 0.5-2.5 mg/3 mL nebulizer solution Take 3 mL by nebulization every 6 (six) hours as needed for wheezing or shortness of breath, Starting Fri 6/30/2023, Normal      ketorolac (TORADOL) 30 mg/mL injection 1-2 ml IM injection once per 24 hours p.r.n. migraine.  No more than 2 injections per week., Normal      lisinopril (ZESTRIL) 5 mg tablet Take 1 tablet (5 mg total) by mouth daily, Starting Tue 11/14/2023, Normal      LORazepam (ATIVAN) 1 mg tablet Take 0.5 tablets (0.5 mg total) by mouth every 6 (six) hours as needed for anxiety, Starting Tue 1/16/2024, Normal      montelukast (SINGULAIR) 10 mg tablet Take 1 tablet (10 mg total) by mouth daily at bedtime, Starting Thu 12/22/2022, Normal      multivitamin (THERAGRAN) TABS Take 1 tablet by mouth daily, Historical Med      ondansetron (ZOFRAN-ODT) 4 mg disintegrating tablet Take 1 tablet (4 mg total) by mouth every 8 (eight) hours as needed for nausea or vomiting, Starting Tue 3/12/2024, Normal      orphenadrine (NORFLEX) 100 mg tablet Take 1 tablet (100 mg total) by mouth 2 (two) times a day as needed for muscle spasms, Starting Wed 1/17/2024, Normal      oxyCODONE (Roxicodone) 5 immediate release tablet Take 1 tablet (5 mg total) by mouth every 6 (six) hours as needed for moderate pain for up to 8 doses Max Daily Amount: 20 mg, Starting Sat 12/16/2023, Normal      pantoprazole (PROTONIX) 40 mg tablet Take 1 tablet (40 mg total) by mouth daily, Starting Mon 3/11/2024, Normal      potassium chloride (Klor-Con M20) 20 mEq tablet 2 tabs BID day 1 the 2 tabs daily for 3 days., Normal      predniSONE 10 mg tablet 4 tablets daily x 3 days;  3 tablets daily x 3 days; 2 tablets daily x 3 days; 1 tablet daily x 3 days, Normal      Riboflavin 400 MG TABS Take 1 tablet  "(400 mg total) by mouth in the morning, Starting Fri 6/3/2022, Normal      rimegepant sulfate (NURTEC) 75 mg TBDP 1 tab at migraine onset. No more than one dose per 24 hours., Normal      rizatriptan (Maxalt) 10 mg tablet Take 1 tablet (10 mg total) by mouth as needed for migraine Take at the onset of migraine; if symptoms continue or return, may take another dose at least 2 hours after first dose. Take no more than 2 doses in a day., Starting Tue 10/17/2023, Normal      Syringe/Needle, Disp, (SYRINGE 3CC/11MV1-7/2\") 25G X 1-1/2\" 3 ML MISC Use for toradol IM injections., Normal      Syringe/Needle, Disp, (SYRINGE 3CC/28ZN0-3/4\") 27G X 1-1/4\" 3 ML MISC Use for B12 injections, Normal      traMADol (ULTRAM) 50 mg tablet Take 1 tablet (50 mg total) by mouth 2 (two) times a day, Starting Mon 3/11/2024, Normal      triamterene-hydrochlorothiazide (DYAZIDE) 37.5-25 mg per capsule Take 1 capsule by mouth every morning, Starting Tue 11/14/2023, Normal               PDMP Review         Value Time User    PDMP Reviewed  Yes 1/17/2024  1:11 PM Yamilka Boone PA-C             ED Provider  Attending physically available and evaluated Rob Arriola. I managed the patient along with the ED Attending.    Electronically Signed by           Kwesi Castillo MD  03/22/24 0359    "

## 2024-03-31 DIAGNOSIS — J45.20 MILD INTERMITTENT ASTHMA WITHOUT COMPLICATION: ICD-10-CM

## 2024-03-31 DIAGNOSIS — M54.16 RADICULOPATHY, LUMBAR REGION: ICD-10-CM

## 2024-03-31 DIAGNOSIS — J45.998 SEASONAL ASTHMA: ICD-10-CM

## 2024-03-31 RX ORDER — ALBUTEROL SULFATE 90 UG/1
2 AEROSOL, METERED RESPIRATORY (INHALATION) EVERY 6 HOURS PRN
Qty: 6.7 G | Refills: 0 | Status: CANCELLED | OUTPATIENT
Start: 2024-03-31

## 2024-04-01 DIAGNOSIS — J45.998 SEASONAL ASTHMA: Primary | ICD-10-CM

## 2024-04-01 DIAGNOSIS — J45.20 MILD INTERMITTENT ASTHMA WITHOUT COMPLICATION: ICD-10-CM

## 2024-04-01 RX ORDER — BENZONATATE 200 MG/1
200 CAPSULE ORAL 3 TIMES DAILY PRN
Qty: 30 CAPSULE | Refills: 0 | Status: SHIPPED | OUTPATIENT
Start: 2024-04-01

## 2024-04-01 RX ORDER — ALBUTEROL SULFATE 90 UG/1
2 AEROSOL, METERED RESPIRATORY (INHALATION) EVERY 6 HOURS PRN
Qty: 6.7 G | Refills: 2 | Status: SHIPPED | OUTPATIENT
Start: 2024-04-01

## 2024-04-01 RX ORDER — IPRATROPIUM BROMIDE AND ALBUTEROL SULFATE 2.5; .5 MG/3ML; MG/3ML
3 SOLUTION RESPIRATORY (INHALATION) EVERY 6 HOURS PRN
Qty: 60 ML | Refills: 0 | Status: SHIPPED | OUTPATIENT
Start: 2024-04-01

## 2024-04-01 RX ORDER — PREDNISONE 10 MG/1
TABLET ORAL
Qty: 30 TABLET | Refills: 0 | Status: SHIPPED | OUTPATIENT
Start: 2024-04-01

## 2024-04-02 ENCOUNTER — TELEMEDICINE (OUTPATIENT)
Age: 44
End: 2024-04-02
Payer: COMMERCIAL

## 2024-04-02 ENCOUNTER — APPOINTMENT (EMERGENCY)
Dept: RADIOLOGY | Facility: HOSPITAL | Age: 44
End: 2024-04-02
Payer: COMMERCIAL

## 2024-04-02 ENCOUNTER — HOSPITAL ENCOUNTER (EMERGENCY)
Facility: HOSPITAL | Age: 44
Discharge: HOME | End: 2024-04-03
Attending: EMERGENCY MEDICINE
Payer: COMMERCIAL

## 2024-04-02 VITALS
TEMPERATURE: 98.7 F | SYSTOLIC BLOOD PRESSURE: 157 MMHG | RESPIRATION RATE: 13 BRPM | HEART RATE: 73 BPM | DIASTOLIC BLOOD PRESSURE: 82 MMHG | OXYGEN SATURATION: 100 %

## 2024-04-02 VITALS — DIASTOLIC BLOOD PRESSURE: 90 MMHG | HEART RATE: 68 BPM | SYSTOLIC BLOOD PRESSURE: 139 MMHG

## 2024-04-02 DIAGNOSIS — K21.9 GERD (GASTROESOPHAGEAL REFLUX DISEASE): ICD-10-CM

## 2024-04-02 DIAGNOSIS — F41.9 ANXIETY: ICD-10-CM

## 2024-04-02 DIAGNOSIS — J45.909 ACUTE ASTHMATIC BRONCHITIS: Primary | ICD-10-CM

## 2024-04-02 DIAGNOSIS — R29.90 STROKE-LIKE SYMPTOMS: ICD-10-CM

## 2024-04-02 DIAGNOSIS — R51.9 WORSENING HEADACHES: ICD-10-CM

## 2024-04-02 DIAGNOSIS — R07.9 CHEST PAIN, UNSPECIFIED: Primary | ICD-10-CM

## 2024-04-02 LAB
ANION GAP SERPL CALCULATED.3IONS-SCNC: 12 MMOL/L (ref 4–13)
BASOPHILS # BLD AUTO: 0.03 THOUSANDS/ÂΜL (ref 0–0.1)
BASOPHILS NFR BLD AUTO: 0 % (ref 0–1)
BUN SERPL-MCNC: 14 MG/DL (ref 5–25)
CALCIUM SERPL-MCNC: 8.9 MG/DL (ref 8.4–10.2)
CARDIAC TROPONIN I PNL SERPL HS: 4 NG/L
CHLORIDE SERPL-SCNC: 103 MMOL/L (ref 96–108)
CO2 SERPL-SCNC: 25 MMOL/L (ref 21–32)
CREAT SERPL-MCNC: 0.92 MG/DL (ref 0.6–1.3)
EOSINOPHIL # BLD AUTO: 0.06 THOUSAND/ÂΜL (ref 0–0.61)
EOSINOPHIL NFR BLD AUTO: 1 % (ref 0–6)
ERYTHROCYTE [DISTWIDTH] IN BLOOD BY AUTOMATED COUNT: 13.8 % (ref 11.6–15.1)
GLUCOSE SERPL-MCNC: 131 MG/DL (ref 65–140)
HCT VFR BLD AUTO: 38.6 % (ref 36.5–46.1)
HGB BLD-MCNC: 12.1 G/DL (ref 12–15.4)
IMM GRANULOCYTES # BLD AUTO: 0.04 THOUSAND/UL (ref 0–0.2)
IMM GRANULOCYTES NFR BLD AUTO: 0 % (ref 0–2)
LYMPHOCYTES # BLD AUTO: 3.3 THOUSANDS/ÂΜL (ref 0.6–4.47)
LYMPHOCYTES NFR BLD AUTO: 25 % (ref 14–44)
MCH RBC QN AUTO: 26.9 PG (ref 26.8–34.3)
MCHC RBC AUTO-ENTMCNC: 31.3 G/DL (ref 31.4–37.4)
MCV RBC AUTO: 86 FL (ref 82–98)
MONOCYTES # BLD AUTO: 0.73 THOUSAND/ÂΜL (ref 0.17–1.22)
MONOCYTES NFR BLD AUTO: 6 % (ref 4–12)
NEUTROPHILS # BLD AUTO: 8.98 THOUSANDS/ÂΜL (ref 1.85–7.62)
NEUTS SEG NFR BLD AUTO: 68 % (ref 43–75)
NRBC BLD AUTO-RTO: 0 /100 WBCS
PLATELET # BLD AUTO: 365 THOUSANDS/UL (ref 149–390)
PMV BLD AUTO: 9.7 FL (ref 8.9–12.7)
POTASSIUM SERPL-SCNC: 3.4 MMOL/L (ref 3.5–5.3)
RBC # BLD AUTO: 4.5 MILLION/UL (ref 3.88–5.12)
SODIUM SERPL-SCNC: 140 MMOL/L (ref 135–147)
WBC # BLD AUTO: 13.14 THOUSAND/UL (ref 4.31–10.16)

## 2024-04-02 PROCEDURE — 99285 EMERGENCY DEPT VISIT HI MDM: CPT | Performed by: EMERGENCY MEDICINE

## 2024-04-02 PROCEDURE — 71046 X-RAY EXAM CHEST 2 VIEWS: CPT

## 2024-04-02 PROCEDURE — 99285 EMERGENCY DEPT VISIT HI MDM: CPT

## 2024-04-02 PROCEDURE — 80048 BASIC METABOLIC PNL TOTAL CA: CPT | Performed by: EMERGENCY MEDICINE

## 2024-04-02 PROCEDURE — 93005 ELECTROCARDIOGRAM TRACING: CPT

## 2024-04-02 PROCEDURE — 36415 COLL VENOUS BLD VENIPUNCTURE: CPT | Performed by: EMERGENCY MEDICINE

## 2024-04-02 PROCEDURE — 85025 COMPLETE CBC W/AUTO DIFF WBC: CPT | Performed by: EMERGENCY MEDICINE

## 2024-04-02 PROCEDURE — 84484 ASSAY OF TROPONIN QUANT: CPT | Performed by: EMERGENCY MEDICINE

## 2024-04-02 PROCEDURE — 99213 OFFICE O/P EST LOW 20 MIN: CPT | Performed by: INTERNAL MEDICINE

## 2024-04-02 RX ORDER — FAMOTIDINE 20 MG/1
20 TABLET, FILM COATED ORAL ONCE
Status: COMPLETED | OUTPATIENT
Start: 2024-04-02 | End: 2024-04-02

## 2024-04-02 RX ORDER — ACETAMINOPHEN 325 MG/1
975 TABLET ORAL ONCE
Status: COMPLETED | OUTPATIENT
Start: 2024-04-02 | End: 2024-04-02

## 2024-04-02 RX ORDER — MAGNESIUM HYDROXIDE/ALUMINUM HYDROXICE/SIMETHICONE 120; 1200; 1200 MG/30ML; MG/30ML; MG/30ML
30 SUSPENSION ORAL ONCE
Status: COMPLETED | OUTPATIENT
Start: 2024-04-02 | End: 2024-04-02

## 2024-04-02 RX ORDER — TRAMADOL HYDROCHLORIDE 50 MG/1
50 TABLET ORAL 2 TIMES DAILY PRN
Qty: 60 TABLET | Refills: 0 | Status: SHIPPED | OUTPATIENT
Start: 2024-04-02

## 2024-04-02 RX ADMIN — ALUMINUM HYDROXIDE, MAGNESIUM HYDROXIDE, AND DIMETHICONE 30 ML: 200; 20; 200 SUSPENSION ORAL at 23:08

## 2024-04-02 RX ADMIN — FAMOTIDINE 20 MG: 20 TABLET, FILM COATED ORAL at 23:08

## 2024-04-02 RX ADMIN — ACETAMINOPHEN 975 MG: 325 TABLET, FILM COATED ORAL at 23:08

## 2024-04-02 NOTE — PROGRESS NOTES
Virtual Regular Visit    Verification of patient location:    Patient is located at Other in the following state in which I hold an active license [place]      Assessment/Plan:    Problem List Items Addressed This Visit          Respiratory    Acute asthmatic bronchitis - Primary     Improving with addition of prednisone, albuterol and continued use of benzonatate.            Neurology/Sleep    Stroke-like symptoms     Patient was seen in the emergency room on March 19 because of some left facial drooping.  Symptoms resolved spontaneously.  CT/MRI unremarkable for any acute infarct. Diagnosed atypical migraine          Other Visit Diagnoses       Worsening headaches        Relevant Medications    traMADol (ULTRAM) 50 mg tablet                 Reason for visit is   Chief Complaint   Patient presents with    Virtual Regular Visit     Patient c/o asthma flare-up, with cough.  COVID positive on 3/19/24 at  [facility].  She c/o numbness in her hands.    Virtual Regular Visit        Encounter provider Curly Camacho MD    Provider located at [facility] [place] [facility] [place] [address] [place] [address] [phone]      Recent Visits  No visits were found meeting these conditions.  Showing recent visits within past 7 days and meeting all other requirements  Today's Visits  Date Type Provider Dept   04/02/24 Telemedicine Curly Camacho MD [facility]   Showing today's visits and meeting all other requirements  Future Appointments  No visits were found meeting these conditions.  Showing future appointments within next 150 days and meeting all other requirements       The patient was identified by name and date of birth. Rob QUIGLEY Flako was informed that this is a telemedicine visit and that the visit is being conducted through the [employer] platform. She agrees to proceed..  My office door was closed. No one else  was in the room.  She acknowledged consent and understanding of privacy and security of the video platform. The patient has agreed to participate and understands they can discontinue the visit at any time.    Patient is aware this is a billable service.     Subjective  Rob Arriola is a 44 y.o. adult seen virtually for a follow up after a recent (possible Covid related) URI .      Patient is seen virtually for follow-up visit after recent evaluation in the emergency room where she was sent over because of left facial droop associated with left upper and lower extremity weakness.  She has a history of complex migraines with similar clinical presentation in the past.  Obtained which did not reveal any acute ischemic event.  MRI findings significant for: No acute infarct. 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 Unchanged minimal nonspecific white matter disease, likely chronic migraines or chronic microangiopathy.  Time of the emergency room visit the patient was swabbed for influenza, COVID and RSV and was positive for COVID.  At time of discharge patient was not informed of the COVID-positive diagnosis.  She has continued to work with no specific precautions undertaken.  Patient subsequently developed symptoms of an asthmatic bronchitis last week and was initiated on oral prednisone along with nebulizer therapy and benzonatate with improvement in symptoms.  Presently feeling well.  Continues with a very mild cough but otherwise has no significant symptoms.  Denies fever or chills.         Past Medical History:   Diagnosis Date    Anxiety     Asthma     seasonal    COVID-19 virus infection 12/27/2021    3/19/24    Hernia of abdominal wall     Hypertension     Kidney stones     Seizures (HCC)     pseudoseizures since 2012    Viral URI with cough 12/27/2021       Past Surgical History:   Procedure Laterality Date    APPENDECTOMY       CHOLECYSTECTOMY      GASTRIC BYPASS      HERNIA REPAIR      x2       Current Outpatient Medications   Medication Sig Dispense Refill    acetaminophen (TYLENOL) 500 mg tablet Take 500 mg by mouth if needed      albuterol (PROVENTIL HFA,VENTOLIN HFA) 90 mcg/act inhaler Inhale 2 puffs every 6 (six) hours as needed for wheezing 6.7 g 2    benzonatate (TESSALON) 200 MG capsule Take 1 capsule (200 mg total) by mouth 3 (three) times a day as needed for cough 30 capsule 0    butalbital-acetaminophen-caffeine (FIORICET,ESGIC) -40 mg per tablet 1 tab q6 hours prn migraine. No more than 2-3 per week. 10 tablet 3    carisoprodol (SOMA) 250 MG Take 1 tablet (250 mg total) by mouth 3 (three) times a day (Patient taking differently: Take 250 mg by mouth 3 (three) times a day as needed) 90 tablet 0    colchicine (COLCRYS) 0.6 mg tablet Take 1 tablet (0.6 mg total) by mouth daily 2 pills now and 1 in an hour. One daily until resolved. (Patient taking differently: Take 0.6 mg by mouth daily as needed 2 pills now and 1 in an hour. One daily until resolved.) 20 tablet 0    Cyanocobalamin 1000 MCG/ML KIT Inject 1 mL (1,000 mcg total) into a muscle once a week Inject 1 ml IM in thigh/ buttocks or deltoid mm once weekly x 8 weeks. Afterward once monthly 20 mL 3    DULoxetine (CYMBALTA) 20 mg capsule Take 1 capsule (20 mg total) by mouth daily (Patient taking differently: Take 20 mg by mouth daily as needed) 30 capsule 5    estradiol (ESTRACE) 2 MG tablet Take 1 tablet (2 mg total) by mouth 2 (two) times a day 180 tablet 1    fluticasone (FLONASE) 50 mcg/act nasal spray 1 spray into each nostril daily 16 g 5    hydrocortisone (ANUSOL-HC) 25 mg suppository Insert 1 suppository (25 mg total) into the rectum daily as needed for hemorrhoids 12 suppository 0    ipratropium-albuterol (DUO-NEB) 0.5-2.5 mg/3 mL nebulizer solution Take 3 mL by nebulization every 6 (six) hours as needed for wheezing or shortness of breath 60 mL 0    ketorolac  "(TORADOL) 30 mg/mL injection 1-2 ml IM injection once per 24 hours p.r.n. migraine.  No more than 2 injections per week. 4 mL 2    LORazepam (ATIVAN) 1 mg tablet Take 0.5 tablets (0.5 mg total) by mouth every 6 (six) hours as needed for anxiety 60 tablet 0    montelukast (SINGULAIR) 10 mg tablet Take 1 tablet (10 mg total) by mouth daily at bedtime 90 tablet 1    multivitamin (THERAGRAN) TABS Take 1 tablet by mouth daily      ondansetron (ZOFRAN-ODT) 4 mg disintegrating tablet Take 1 tablet (4 mg total) by mouth every 8 (eight) hours as needed for nausea or vomiting 60 tablet 0    orphenadrine (NORFLEX) 100 mg tablet Take 1 tablet (100 mg total) by mouth 2 (two) times a day as needed for muscle spasms (Patient taking differently: Take 100 mg by mouth in the morning) 60 tablet 3    oxyCODONE (Roxicodone) 5 immediate release tablet Take 1 tablet (5 mg total) by mouth every 6 (six) hours as needed for moderate pain for up to 8 doses Max Daily Amount: 20 mg 8 tablet 0    pantoprazole (PROTONIX) 40 mg tablet Take 1 tablet (40 mg total) by mouth daily 90 tablet 1    potassium chloride (Klor-Con M20) 20 mEq tablet 2 tabs BID day 1 the 2 tabs daily for 3 days. (Patient taking differently: 20 mEq daily) 10 tablet 0    predniSONE 10 mg tablet 4 tablets daily x 3 days;  3 tablets daily x 3 days; 2 tablets daily x 3 days; 1 tablet daily x 3 days 30 tablet 0    Riboflavin 400 MG TABS Take 1 tablet (400 mg total) by mouth in the morning 90 tablet 3    rimegepant sulfate (NURTEC) 75 mg TBDP 1 tab at migraine onset. No more than one dose per 24 hours. 8 tablet 11    rizatriptan (Maxalt) 10 mg tablet Take 1 tablet (10 mg total) by mouth as needed for migraine Take at the onset of migraine; if symptoms continue or return, may take another dose at least 2 hours after first dose. Take no more than 2 doses in a day. 9 tablet 3    Syringe/Needle, Disp, (SYRINGE 3CC/60BT5-6/2\") 25G X 1-1/2\" 3 ML MISC Use for toradol IM injections. 12 each " "0    Syringe/Needle, Disp, (SYRINGE 3CC/22DD7-2/4\") 27G X 1-1/4\" 3 ML MISC Use for B12 injections 10 each 1    traMADol (ULTRAM) 50 mg tablet Take 1 tablet (50 mg total) by mouth 2 (two) times a day as needed for moderate pain 60 tablet 0    triamterene-hydrochlorothiazide (DYAZIDE) 37.5-25 mg per capsule Take 1 capsule by mouth every morning 90 capsule 1     Current Facility-Administered Medications   Medication Dose Route Frequency Provider Last Rate Last Admin    Botulinum Toxin Type A SOLR 200 Units  200 Units Injection Q3 Months    200 Units at 02/16/24 1046    cyanocobalamin injection 1,000 mcg  1,000 mcg Intramuscular Q30 Days Samantha Bagley MD   1,000 mcg at 06/19/23 1310        Allergies   Allergen Reactions    Gadolinium Anaphylaxis     \"Oxygen dropped and coded during\"       Iodinated Contrast Media Anaphylaxis       Review of Systems   Constitutional: Negative.    HENT: Negative.     Eyes: Negative.    Respiratory:  Negative for cough (Minimal at present).    Cardiovascular: Negative.    Gastrointestinal: Negative.    Endocrine: Negative.    Genitourinary: Negative.    Musculoskeletal: Negative.    Skin: Negative.    Allergic/Immunologic: Negative.    Neurological: Negative.    Hematological: Negative.    Psychiatric/Behavioral: Negative.         Video Exam    Vitals:    04/02/24 1538   BP: 139/90   BP Location: Left arm   Pulse: 68       Physical Exam  Vitals reviewed.   Constitutional:       Appearance: Normal appearance. She is obese.   HENT:      Head: Normocephalic and atraumatic.      Right Ear: External ear normal.      Left Ear: External ear normal.   Eyes:      General: No scleral icterus.     Conjunctiva/sclera: Conjunctivae normal.      Pupils: Pupils are equal, round, and reactive to light.   Neck:      Vascular: No JVD.      Trachea: No tracheal deviation.   Pulmonary:      Effort: Pulmonary effort is normal. No respiratory distress.   Abdominal:      General: Abdomen is flat. There is no " distension.   Musculoskeletal:      Cervical back: Neck supple.      Right lower leg: No edema.      Left lower leg: No edema.   Lymphadenopathy:      Cervical: No cervical adenopathy.   Skin:     Coloration: Skin is not jaundiced.      Findings: No bruising, erythema or rash.   Neurological:      General: No focal deficit present.      Mental Status: She is alert and oriented to person, place, and time. Mental status is at baseline.   Psychiatric:         Mood and Affect: Mood normal.          Visit Time  Total Visit Duration: 7 minutes of face to face time and 12 minutes chart documentation

## 2024-04-02 NOTE — ASSESSMENT & PLAN NOTE
Patient was seen in the emergency room on March 19 because of some left facial drooping.  Symptoms resolved spontaneously.  CT/MRI unremarkable for any acute infarct. Diagnosed atypical migraine

## 2024-04-03 LAB
2HR DELTA HS TROPONIN: 4 NG/L
ATRIAL RATE: 86 BPM
CARDIAC TROPONIN I PNL SERPL HS: 8 NG/L
P AXIS: 41 DEGREES
PR INTERVAL: 184 MS
QRS AXIS: -10 DEGREES
QRSD INTERVAL: 100 MS
QT INTERVAL: 392 MS
QTC INTERVAL: 469 MS
T WAVE AXIS: 37 DEGREES
VENTRICULAR RATE: 86 BPM

## 2024-04-03 PROCEDURE — 84484 ASSAY OF TROPONIN QUANT: CPT | Performed by: EMERGENCY MEDICINE

## 2024-04-03 PROCEDURE — 93010 ELECTROCARDIOGRAM REPORT: CPT | Performed by: INTERNAL MEDICINE

## 2024-04-03 PROCEDURE — 36415 COLL VENOUS BLD VENIPUNCTURE: CPT | Performed by: EMERGENCY MEDICINE

## 2024-04-03 RX ORDER — SUCRALFATE 1 G/1
1 TABLET ORAL 4 TIMES DAILY
Qty: 60 TABLET | Refills: 0 | Status: SHIPPED | OUTPATIENT
Start: 2024-04-03

## 2024-04-03 RX ORDER — FAMOTIDINE 20 MG/1
20 TABLET, FILM COATED ORAL 2 TIMES DAILY
Qty: 30 TABLET | Refills: 0 | Status: SHIPPED | OUTPATIENT
Start: 2024-04-03

## 2024-04-03 NOTE — ED PROVIDER NOTES
"History  Chief Complaint   Patient presents with    Chest Pain     Pt presents to the ed with chest pain after waking, reports taking 1 mg of ativan      45 y/o trans female with hx of HTN, anxiety, and GERD presents to the ED for evaluation of anterior chest pain and tightness that woke her from sleep approximately 1 hour ago.        Prior to Admission Medications   Prescriptions Last Dose Informant Patient Reported? Taking?   Cyanocobalamin 1000 MCG/ML KIT   No No   Sig: Inject 1 mL (1,000 mcg total) into a muscle once a week Inject 1 ml IM in thigh/ buttocks or deltoid mm once weekly x 8 weeks. Afterward once monthly   DULoxetine (CYMBALTA) 20 mg capsule   No No   Sig: Take 1 capsule (20 mg total) by mouth daily   Patient taking differently: Take 20 mg by mouth daily as needed   LORazepam (ATIVAN) 1 mg tablet   No No   Sig: Take 0.5 tablets (0.5 mg total) by mouth every 6 (six) hours as needed for anxiety   Riboflavin 400 MG TABS  Self No No   Sig: Take 1 tablet (400 mg total) by mouth in the morning   Syringe/Needle, Disp, (SYRINGE 3CC/21GX7-8/2\") 25G X 1-1/2\" 3 ML MISC   No No   Sig: Use for toradol IM injections.   Syringe/Needle, Disp, (SYRINGE 3CC/34PP3-8/4\") 27G X 1-1/4\" 3 ML MISC   No No   Sig: Use for B12 injections   acetaminophen (TYLENOL) 500 mg tablet  Self Yes No   Sig: Take 500 mg by mouth if needed   albuterol (PROVENTIL HFA,VENTOLIN HFA) 90 mcg/act inhaler   No No   Sig: Inhale 2 puffs every 6 (six) hours as needed for wheezing   benzonatate (TESSALON) 200 MG capsule   No No   Sig: Take 1 capsule (200 mg total) by mouth 3 (three) times a day as needed for cough   butalbital-acetaminophen-caffeine (FIORICET,ESGIC) -40 mg per tablet   No No   Si tab q6 hours prn migraine. No more than 2-3 per week.   carisoprodol (SOMA) 250 MG   No No   Sig: Take 1 tablet (250 mg total) by mouth 3 (three) times a day   Patient taking differently: Take 250 mg by mouth 3 (three) times a day as needed "   colchicine (COLCRYS) 0.6 mg tablet  Self No No   Sig: Take 1 tablet (0.6 mg total) by mouth daily 2 pills now and 1 in an hour. One daily until resolved.   Patient taking differently: Take 0.6 mg by mouth daily as needed 2 pills now and 1 in an hour. One daily until resolved.   estradiol (ESTRACE) 2 MG tablet   No No   Sig: Take 1 tablet (2 mg total) by mouth 2 (two) times a day   fluticasone (FLONASE) 50 mcg/act nasal spray   No No   Si spray into each nostril daily   hydrocortisone (ANUSOL-HC) 25 mg suppository   No No   Sig: Insert 1 suppository (25 mg total) into the rectum daily as needed for hemorrhoids   ipratropium-albuterol (DUO-NEB) 0.5-2.5 mg/3 mL nebulizer solution   No No   Sig: Take 3 mL by nebulization every 6 (six) hours as needed for wheezing or shortness of breath   ketorolac (TORADOL) 30 mg/mL injection   No No   Si-2 ml IM injection once per 24 hours p.r.n. migraine.  No more than 2 injections per week.   montelukast (SINGULAIR) 10 mg tablet  Self No No   Sig: Take 1 tablet (10 mg total) by mouth daily at bedtime   multivitamin (THERAGRAN) TABS  Self Yes No   Sig: Take 1 tablet by mouth daily   ondansetron (ZOFRAN-ODT) 4 mg disintegrating tablet   No No   Sig: Take 1 tablet (4 mg total) by mouth every 8 (eight) hours as needed for nausea or vomiting   orphenadrine (NORFLEX) 100 mg tablet   No No   Sig: Take 1 tablet (100 mg total) by mouth 2 (two) times a day as needed for muscle spasms   Patient taking differently: Take 100 mg by mouth in the morning   oxyCODONE (Roxicodone) 5 immediate release tablet   No No   Sig: Take 1 tablet (5 mg total) by mouth every 6 (six) hours as needed for moderate pain for up to 8 doses Max Daily Amount: 20 mg   pantoprazole (PROTONIX) 40 mg tablet   No No   Sig: Take 1 tablet (40 mg total) by mouth daily   potassium chloride (Klor-Con M20) 20 mEq tablet   No No   Si tabs BID day 1 the 2 tabs daily for 3 days.   Patient taking differently: 20 mEq daily    predniSONE 10 mg tablet   No No   Si tablets daily x 3 days;  3 tablets daily x 3 days; 2 tablets daily x 3 days; 1 tablet daily x 3 days   rimegepant sulfate (NURTEC) 75 mg TBDP   No No   Si tab at migraine onset. No more than one dose per 24 hours.   rizatriptan (Maxalt) 10 mg tablet  Self No No   Sig: Take 1 tablet (10 mg total) by mouth as needed for migraine Take at the onset of migraine; if symptoms continue or return, may take another dose at least 2 hours after first dose. Take no more than 2 doses in a day.   traMADol (ULTRAM) 50 mg tablet   No No   Sig: Take 1 tablet (50 mg total) by mouth 2 (two) times a day as needed for moderate pain   triamterene-hydrochlorothiazide (DYAZIDE) 37.5-25 mg per capsule   No No   Sig: Take 1 capsule by mouth every morning      Facility-Administered Medications Last Administration Doses Remaining   Botulinum Toxin Type A SOLR 200 Units 2024 10:46 AM    cyanocobalamin injection 1,000 mcg 2023  1:10 PM           Past Medical History:   Diagnosis Date    Anxiety     Asthma     seasonal    COVID-19 virus infection 2021    3/19/24    Hernia of abdominal wall     Hypertension     Kidney stones     Seizures (HCC)     pseudoseizures since     Viral URI with cough 2021       Past Surgical History:   Procedure Laterality Date    APPENDECTOMY      CHOLECYSTECTOMY      GASTRIC BYPASS      HERNIA REPAIR      x2       Family History   Problem Relation Age of Onset    Hypertension Mother     Diabetes Mother     Hypertension Sister     No Known Problems Father         head injury    Stroke Maternal Grandmother         brain aneurysm    Aneurysm Maternal Uncle         brain     I have reviewed and agree with the history as documented.    E-Cigarette/Vaping    E-Cigarette Use Never User      E-Cigarette/Vaping Substances    Nicotine No     THC No     CBD No     Flavoring No     Other No     Unknown No      Social History     Tobacco Use    Smoking status:  Never    Smokeless tobacco: Never   Vaping Use    Vaping status: Never Used   Substance Use Topics    Alcohol use: Yes     Comment: 2 drinks per month    Drug use: Never       Review of Systems   Constitutional:  Negative for chills and fever.   HENT:  Positive for congestion. Negative for rhinorrhea and sore throat.    Respiratory:  Positive for cough and chest tightness. Negative for shortness of breath.    Cardiovascular:  Positive for chest pain. Negative for palpitations.   Gastrointestinal:  Negative for abdominal pain, diarrhea, nausea and vomiting.   Genitourinary:  Negative for dysuria and hematuria.   Musculoskeletal:  Negative for back pain and neck pain.   Neurological:  Negative for weakness, light-headedness, numbness and headaches.   Psychiatric/Behavioral:  Negative for dysphoric mood, hallucinations, self-injury and suicidal ideas. The patient is nervous/anxious.    All other systems reviewed and are negative.      Physical Exam  Physical Exam  Vitals and nursing note reviewed.   Constitutional:       General: She is not in acute distress.     Appearance: Normal appearance. She is well-developed. She is obese. She is not ill-appearing, toxic-appearing or diaphoretic.   HENT:      Head: Normocephalic and atraumatic.      Right Ear: External ear normal.      Left Ear: External ear normal.      Nose: Nose normal. No congestion or rhinorrhea.      Mouth/Throat:      Mouth: Mucous membranes are moist.      Pharynx: Oropharynx is clear. No oropharyngeal exudate or posterior oropharyngeal erythema.   Eyes:      Extraocular Movements: Extraocular movements intact.      Conjunctiva/sclera: Conjunctivae normal.      Pupils: Pupils are equal, round, and reactive to light.   Cardiovascular:      Rate and Rhythm: Normal rate and regular rhythm.      Pulses: Normal pulses.      Heart sounds: Normal heart sounds. No murmur heard.  Pulmonary:      Effort: Pulmonary effort is normal. No respiratory distress.       Breath sounds: Normal breath sounds. No wheezing or rales.   Chest:      Chest wall: No tenderness.   Abdominal:      General: Abdomen is flat. Bowel sounds are normal. There is no distension.      Palpations: Abdomen is soft.      Tenderness: There is no abdominal tenderness. There is no right CVA tenderness, left CVA tenderness or guarding.   Musculoskeletal:         General: No swelling or tenderness. Normal range of motion.      Cervical back: Normal range of motion and neck supple. No tenderness.      Right lower leg: No tenderness. No edema.      Left lower leg: No tenderness. No edema.   Skin:     General: Skin is warm and dry.      Capillary Refill: Capillary refill takes less than 2 seconds.   Neurological:      General: No focal deficit present.      Mental Status: She is alert and oriented to person, place, and time.         Vital Signs  ED Triage Vitals   Temperature Pulse Respirations Blood Pressure SpO2   04/02/24 2244 04/02/24 2244 04/02/24 2244 04/02/24 2244 04/02/24 2244   98.7 °F (37.1 °C) 94 22 157/82 99 %      Temp Source Heart Rate Source Patient Position - Orthostatic VS BP Location FiO2 (%)   04/02/24 2244 04/02/24 2244 04/02/24 2244 04/02/24 2244 --   Oral Monitor Lying Left arm       Pain Score       04/02/24 2308       No Pain           Vitals:    04/02/24 2244 04/02/24 2354   BP: 157/82    Pulse: 94 73   Patient Position - Orthostatic VS: Lying          Visual Acuity      ED Medications  Medications   aluminum-magnesium hydroxide-simethicone (MAALOX) oral suspension 30 mL (30 mL Oral Given 4/2/24 2308)   famotidine (PEPCID) tablet 20 mg (20 mg Oral Given 4/2/24 2308)   acetaminophen (TYLENOL) tablet 975 mg (975 mg Oral Given 4/2/24 2308)       Diagnostic Studies  Results Reviewed       Procedure Component Value Units Date/Time    HS Troponin I 2hr [[id number]]  (Normal) Collected: 04/03/24 0058    Lab Status: Final result Specimen: Blood from Arm, Left Updated: 04/03/24 0216     hs TnI 2hr  8 ng/L      Delta 2hr hsTnI 4 ng/L     HS Troponin I 4hr [[id number]]     Lab Status: No result Specimen: Blood     HS Troponin 0hr (reflex protocol) [[id number]]  (Normal) Collected: 04/02/24 2311    Lab Status: Final result Specimen: Blood from Arm, Right Updated: 04/02/24 2340     hs TnI 0hr 4 ng/L     Basic metabolic panel [[id number]]  (Abnormal) Collected: 04/02/24 2311    Lab Status: Final result Specimen: Blood from Arm, Right Updated: 04/02/24 2332     Sodium 140 mmol/L      Potassium 3.4 mmol/L      Chloride 103 mmol/L      CO2 25 mmol/L      ANION GAP 12 mmol/L      BUN 14 mg/dL      Creatinine 0.92 mg/dL      Glucose 131 mg/dL      Calcium 8.9 mg/dL      eGFR --    Narrative:      Notes:     1. eGFR calculation is only valid for adults 18 years and older.  2. EGFR calculation cannot be performed for patients who are transgender, non-binary, or whose legal sex, sex at birth, and gender identity differ.    CBC and differential [[id number]]  (Abnormal) Collected: 04/02/24 2311    Lab Status: Final result Specimen: Blood from Arm, Right Updated: 04/02/24 2316     WBC 13.14 Thousand/uL      RBC 4.50 Million/uL      Hemoglobin 12.1 g/dL      Hematocrit 38.6 %      MCV 86 fL      MCH 26.9 pg      MCHC 31.3 g/dL      RDW 13.8 %      MPV 9.7 fL      Platelets 365 Thousands/uL      nRBC 0 /100 WBCs      Neutrophils Relative 68 %      Immature Grans % 0 %      Lymphocytes Relative 25 %      Monocytes Relative 6 %      Eosinophils Relative 1 %      Basophils Relative 0 %      Neutrophils Absolute 8.98 Thousands/µL      Absolute Immature Grans 0.04 Thousand/uL      Absolute Lymphocytes 3.30 Thousands/µL      Absolute Monocytes 0.73 Thousand/µL      Eosinophils Absolute 0.06 Thousand/µL      Basophils Absolute 0.03 Thousands/µL                    XR chest 2 views   ED Interpretation by Luis Moss MD (04/03 0024)   No acute cardiopulmonary disease process on my interpretation.                 Procedures  Procedures          ED Course  ED Course as of 04/03/24 0258   Tue Apr 02, 2024   2254 Procedure Note: EKG  Date/Time: 04/02/24 10:52 PM   Interpreted by: Luis Moss MD  Indications / Diagnosis: CP  ECG reviewed by me, the ED Physician: yes   The EKG demonstrates:  Rhythm: normal sinus rhythm 86 bpm  Intervals: normal intervals  Axis: normal axis  QRS/Blocks: minimal voltage criteria for LVH  ST Changes: No acute ST-T wave changes.  No STEMI.  No significant change compared to prior ECG from 3/19/2024.   Wed Apr 03, 2024   0008 CBC and differential(!)  WBC 13.14, in the setting of recent COVID viral URI.  Normal hemoglobin, hematocrit, and platelet count.   0008 hs TnI 0hr: 4  Normal, will delta   0009 Basic metabolic panel(!)  Potassium 3.4.  No other acute actionable abnormality.   0024 XR chest 2 views  No acute cardiopulmonary disease process on my interpretation.             HEART Risk Score      Flowsheet Row Most Recent Value   Heart Score Risk Calculator    History 0 Filed at: 04/03/2024 0157   ECG 0 Filed at: 04/03/2024 0157   Age 0 Filed at: 04/03/2024 0157   Risk Factors 1 Filed at: 04/03/2024 0157   Troponin 0 Filed at: 04/03/2024 0157   HEART Score 1 Filed at: 04/03/2024 0157                          SBIRT 20yo+      Flowsheet Row Most Recent Value   Initial Alcohol Screen: US AUDIT-C     1. How often do you have a drink containing alcohol? 0 Filed at: 04/02/2024 2247   2. How many drinks containing alcohol do you have on a typical day you are drinking?  0 Filed at: 04/02/2024 2247   3a. Male UNDER 65: How often do you have five or more drinks on one occasion? 0 Filed at: 04/02/2024 2247   3b. FEMALE Any Age, or MALE 65+: How often do you have 4 or more drinks on one occassion? 0 Filed at: 04/02/2024 2247   Audit-C Score 0 Filed at: 04/02/2024 2247   MILES: How many times in the past year have you...    Used an illegal drug or used a prescription medication for non-medical reasons? Never Filed at: 04/02/2024 2245                       Medical Decision Making  Amount and/or Complexity of Data Reviewed  Labs: ordered. Decision-making details documented in ED Course.  Radiology: ordered and independent interpretation performed. Decision-making details documented in ED Course.    Risk  OTC drugs.  Prescription drug management.             Disposition  Final diagnoses:   Chest pain, unspecified   GERD (gastroesophageal reflux disease)   Anxiety     Time reflects when diagnosis was documented in both MDM as applicable and the Disposition within this note       Time User Action Codes Description Comment    4/3/2024  1:57 AM Luis Moss [R07.9] Chest pain, unspecified     4/3/2024  1:57 AM Luis Moss Add [K21.9] GERD (gastroesophageal reflux disease)     4/3/2024  1:57 AM Luis Moss Add [F41.9] Anxiety           ED Disposition       ED Disposition   Discharge    Condition   Stable    Date/Time   Wed Apr 3, 2024 0158    Comment   Rob Arriola discharge to home/self care.                   Follow-up Information       Follow up With Specialties Details Why Contact Info Additional Information    Curly Camacho MD Internal Medicine Call in 1 week For follow-up [address] [place] [address]  [phone]       [facility] Emergency Department Emergency Medicine Go to  If symptoms worsen [address] [place] [id number] [phone] [facility] Emergency Department, [address] [place] [id number]            Discharge Medication List as of 4/3/2024  1:58 AM        START taking these medications    Details   famotidine (PEPCID) 20 mg tablet Take 1 tablet (20 mg total) by mouth 2 (two) times a day, Starting Wed 4/3/2024, Normal      sucralfate (CARAFATE) 1 g tablet Take 1 tablet (1 g total) by mouth 4 (four) times a day, Starting Wed 4/3/2024, Normal           CONTINUE these medications which have NOT CHANGED    Details   acetaminophen (TYLENOL)  500 mg tablet Take 500 mg by mouth if needed, Historical Med      albuterol (PROVENTIL HFA,VENTOLIN HFA) 90 mcg/act inhaler Inhale 2 puffs every 6 (six) hours as needed for wheezing, Starting Mon 4/1/2024, Normal      benzonatate (TESSALON) 200 MG capsule Take 1 capsule (200 mg total) by mouth 3 (three) times a day as needed for cough, Starting Mon 4/1/2024, Normal      butalbital-acetaminophen-caffeine (FIORICET,ESGIC) -40 mg per tablet 1 tab q6 hours prn migraine. No more than 2-3 per week., Normal      carisoprodol (SOMA) 250 MG Take 1 tablet (250 mg total) by mouth 3 (three) times a day, Starting Tue 1/16/2024, Normal      colchicine (COLCRYS) 0.6 mg tablet Take 1 tablet (0.6 mg total) by mouth daily 2 pills now and 1 in an hour. One daily until resolved., Starting Mon 6/27/2022, Normal      Cyanocobalamin 1000 MCG/ML KIT Inject 1 mL (1,000 mcg total) into a muscle once a week Inject 1 ml IM in thigh/ buttocks or deltoid mm once weekly x 8 weeks. Afterward once monthly, Starting Tue 11/14/2023, Normal      DULoxetine (CYMBALTA) 20 mg capsule Take 1 capsule (20 mg total) by mouth daily, Starting Tue 11/14/2023, Normal      estradiol (ESTRACE) 2 MG tablet Take 1 tablet (2 mg total) by mouth 2 (two) times a day, Starting Mon 3/11/2024, Normal      fluticasone (FLONASE) 50 mcg/act nasal spray 1 spray into each nostril daily, Starting Tue 1/16/2024, Normal      hydrocortisone (ANUSOL-HC) 25 mg suppository Insert 1 suppository (25 mg total) into the rectum daily as needed for hemorrhoids, Starting Tue 11/14/2023, Normal      ipratropium-albuterol (DUO-NEB) 0.5-2.5 mg/3 mL nebulizer solution Take 3 mL by nebulization every 6 (six) hours as needed for wheezing or shortness of breath, Starting Mon 4/1/2024, Normal      ketorolac (TORADOL) 30 mg/mL injection 1-2 ml IM injection once per 24 hours p.r.n. migraine.  No more than 2 injections per week., Normal      LORazepam (ATIVAN) 1 mg tablet Take 0.5 tablets (0.5 mg  "total) by mouth every 6 (six) hours as needed for anxiety, Starting Tue 1/16/2024, Normal      montelukast (SINGULAIR) 10 mg tablet Take 1 tablet (10 mg total) by mouth daily at bedtime, Starting Thu 12/22/2022, Normal      multivitamin (THERAGRAN) TABS Take 1 tablet by mouth daily, Historical Med      ondansetron (ZOFRAN-ODT) 4 mg disintegrating tablet Take 1 tablet (4 mg total) by mouth every 8 (eight) hours as needed for nausea or vomiting, Starting Tue 3/12/2024, Normal      orphenadrine (NORFLEX) 100 mg tablet Take 1 tablet (100 mg total) by mouth 2 (two) times a day as needed for muscle spasms, Starting Wed 1/17/2024, Normal      oxyCODONE (Roxicodone) 5 immediate release tablet Take 1 tablet (5 mg total) by mouth every 6 (six) hours as needed for moderate pain for up to 8 doses Max Daily Amount: 20 mg, Starting Sat 12/16/2023, Normal      pantoprazole (PROTONIX) 40 mg tablet Take 1 tablet (40 mg total) by mouth daily, Starting Mon 3/11/2024, Normal      potassium chloride (Klor-Con M20) 20 mEq tablet 2 tabs BID day 1 the 2 tabs daily for 3 days., Normal      predniSONE 10 mg tablet 4 tablets daily x 3 days;  3 tablets daily x 3 days; 2 tablets daily x 3 days; 1 tablet daily x 3 days, Normal      Riboflavin 400 MG TABS Take 1 tablet (400 mg total) by mouth in the morning, Starting Fri 6/3/2022, Normal      rimegepant sulfate (NURTEC) 75 mg TBDP 1 tab at migraine onset. No more than one dose per 24 hours., Normal      rizatriptan (Maxalt) 10 mg tablet Take 1 tablet (10 mg total) by mouth as needed for migraine Take at the onset of migraine; if symptoms continue or return, may take another dose at least 2 hours after first dose. Take no more than 2 doses in a day., Starting Tue 10/17/2023, Normal      Syringe/Needle, Disp, (SYRINGE 3CC/85QP3-4/2\") 25G X 1-1/2\" 3 ML MISC Use for toradol IM injections., Normal      Syringe/Needle, Disp, (SYRINGE 3CC/22MF6-4/4\") 27G X 1-1/4\" 3 ML MISC Use for B12 injections, Normal "      traMADol (ULTRAM) 50 mg tablet Take 1 tablet (50 mg total) by mouth 2 (two) times a day as needed for moderate pain, Starting Tue 4/2/2024, Normal      triamterene-hydrochlorothiazide (DYAZIDE) 37.5-25 mg per capsule Take 1 capsule by mouth every morning, Starting Tue 11/14/2023, Normal             No discharge procedures on file.    PDMP Review         Value Time User    PDMP Reviewed  Yes 1/17/2024  1:11 PM Yamilka Boone PA-C            ED Provider  Electronically Signed by           mouth 2 (two) times a day as needed for moderate pain, Starting Tue 4/2/2024, Normal      triamterene-hydrochlorothiazide (DYAZIDE) 37.5-25 mg per capsule Take 1 capsule by mouth every morning, Starting Tue 11/14/2023, Normal             No discharge procedures on file.    PDMP Review         Value Time User    PDMP Reviewed  Yes 1/17/2024  1:11 PM Yamilka Boone PA-C            ED Provider  Electronically Signed by             Luis Moss MD  04/16/24 0742

## 2024-04-19 ENCOUNTER — OFFICE VISIT (OUTPATIENT)
Dept: NEUROLOGY | Facility: CLINIC | Age: 44
End: 2024-04-19
Payer: COMMERCIAL

## 2024-04-19 DIAGNOSIS — M79.18 CERVICAL MYOFASCIAL PAIN SYNDROME: Primary | ICD-10-CM

## 2024-04-19 DIAGNOSIS — G43.709 CHRONIC MIGRAINE WITHOUT AURA WITHOUT STATUS MIGRAINOSUS, NOT INTRACTABLE: ICD-10-CM

## 2024-04-19 DIAGNOSIS — M54.12 CERVICAL RADICULAR PAIN: ICD-10-CM

## 2024-04-19 PROCEDURE — 20553 NJX 1/MLT TRIGGER POINTS 3/>: CPT | Performed by: PHYSICIAN ASSISTANT

## 2024-04-19 RX ORDER — VENLAFAXINE HYDROCHLORIDE 37.5 MG/1
CAPSULE, EXTENDED RELEASE ORAL
Qty: 7 CAPSULE | Refills: 0 | Status: SHIPPED | OUTPATIENT
Start: 2024-04-19

## 2024-04-19 RX ORDER — VENLAFAXINE HYDROCHLORIDE 75 MG/1
CAPSULE, EXTENDED RELEASE ORAL
Qty: 30 CAPSULE | Refills: 2 | Status: SHIPPED | OUTPATIENT
Start: 2024-04-19

## 2024-04-19 RX ORDER — BUPIVACAINE HYDROCHLORIDE 2.5 MG/ML
4 INJECTION, SOLUTION EPIDURAL; INFILTRATION; INTRACAUDAL ONCE
Status: COMPLETED | OUTPATIENT
Start: 2024-04-19 | End: 2024-04-19

## 2024-04-19 RX ORDER — TRIAMCINOLONE ACETONIDE 40 MG/ML
40 INJECTION, SUSPENSION INTRA-ARTICULAR; INTRAMUSCULAR ONCE
Status: COMPLETED | OUTPATIENT
Start: 2024-04-19 | End: 2024-04-19

## 2024-04-19 RX ADMIN — TRIAMCINOLONE ACETONIDE 40 MG: 40 INJECTION, SUSPENSION INTRA-ARTICULAR; INTRAMUSCULAR at 13:26

## 2024-04-19 RX ADMIN — BUPIVACAINE HYDROCHLORIDE 4 ML: 2.5 INJECTION, SOLUTION EPIDURAL; INFILTRATION; INTRACAUDAL at 13:24

## 2024-04-21 NOTE — PROGRESS NOTES
Universal Protocol:  Consent: The procedure was performed in an emergent situation. Verbal consent obtained.  Risks and benefits: risks, benefits and alternatives were discussed  Consent given by: patient  Procedure Details  Location(s):  Patient tolerance: patient tolerated the procedure well with no immediate complications  Additional procedure details: Trigger point injections were performed on an emergent basis and are a part of ongoing therapy.    Risks, benefits, alternatives, infection, bleeding and allergic reaction were discussed with the patient. Verbal consent was obtained prior to the procedure and is detailed in the patient's record.    Trigger point injections were performed in the following locations using a mixture of 4 mL 0.25% bupivacaine along with 1mL 40mg/1ml kenalog, using a 30 gauge, 1/2 inch needle:  L>R middle traps and upper traps, b/l splenius capitis.        Trial venlafaxine instead of cymbalta. S/e reviewed.  Continue botox as scheduled.

## 2024-05-07 DIAGNOSIS — E53.8 B12 DEFICIENCY: ICD-10-CM

## 2024-05-07 DIAGNOSIS — I10 ESSENTIAL HYPERTENSION: ICD-10-CM

## 2024-05-07 DIAGNOSIS — G43.709 CHRONIC MIGRAINE WITHOUT AURA WITHOUT STATUS MIGRAINOSUS, NOT INTRACTABLE: ICD-10-CM

## 2024-05-07 DIAGNOSIS — K21.9 GERD (GASTROESOPHAGEAL REFLUX DISEASE): ICD-10-CM

## 2024-05-07 DIAGNOSIS — I10 PRIMARY HYPERTENSION: Primary | ICD-10-CM

## 2024-05-07 DIAGNOSIS — E87.6 HYPOKALEMIA: ICD-10-CM

## 2024-05-07 DIAGNOSIS — K21.9 GASTROESOPHAGEAL REFLUX DISEASE WITHOUT ESOPHAGITIS: ICD-10-CM

## 2024-05-07 DIAGNOSIS — K42.9 UMBILICAL HERNIA WITHOUT OBSTRUCTION AND WITHOUT GANGRENE: ICD-10-CM

## 2024-05-07 RX ORDER — SUCRALFATE 1 G/1
1 TABLET ORAL 4 TIMES DAILY
Qty: 60 TABLET | Refills: 0 | Status: SHIPPED | OUTPATIENT
Start: 2024-05-07

## 2024-05-07 RX ORDER — BUTALBITAL, ACETAMINOPHEN AND CAFFEINE 50; 325; 40 MG/1; MG/1; MG/1
TABLET ORAL
Qty: 10 TABLET | Refills: 0 | Status: SHIPPED | OUTPATIENT
Start: 2024-05-07

## 2024-05-07 RX ORDER — FAMOTIDINE 20 MG/1
20 TABLET, FILM COATED ORAL 2 TIMES DAILY
Qty: 30 TABLET | Refills: 0 | Status: SHIPPED | OUTPATIENT
Start: 2024-05-07

## 2024-05-07 RX ORDER — POTASSIUM CHLORIDE 20 MEQ/1
TABLET, EXTENDED RELEASE ORAL
Qty: 10 TABLET | Refills: 0 | Status: SHIPPED | OUTPATIENT
Start: 2024-05-07

## 2024-05-07 RX ORDER — TRIAMTERENE AND HYDROCHLOROTHIAZIDE 37.5; 25 MG/1; MG/1
1 CAPSULE ORAL EVERY MORNING
Qty: 90 CAPSULE | Refills: 1 | Status: SHIPPED | OUTPATIENT
Start: 2024-05-07

## 2024-05-07 RX ORDER — PANTOPRAZOLE SODIUM 40 MG/1
40 TABLET, DELAYED RELEASE ORAL DAILY
Qty: 90 TABLET | Refills: 1 | Status: SHIPPED | OUTPATIENT
Start: 2024-05-07

## 2024-05-07 RX ORDER — LISINOPRIL 5 MG/1
5 TABLET ORAL DAILY
Qty: 30 TABLET | Refills: 3 | Status: SHIPPED | OUTPATIENT
Start: 2024-05-07

## 2024-05-07 NOTE — TELEPHONE ENCOUNTER
Last visit 4/19 Yamilka Boone    Nikki due for refill; please sign script if in agreement, thank you.

## 2024-05-10 DIAGNOSIS — E87.6 HYPOKALEMIA: Primary | ICD-10-CM

## 2024-05-14 ENCOUNTER — APPOINTMENT (OUTPATIENT)
Dept: LAB | Age: 44
End: 2024-05-14
Payer: COMMERCIAL

## 2024-05-14 DIAGNOSIS — M79.18 CERVICAL MYOFASCIAL PAIN SYNDROME: ICD-10-CM

## 2024-05-14 DIAGNOSIS — E87.6 HYPOKALEMIA: ICD-10-CM

## 2024-05-14 DIAGNOSIS — M54.12 CERVICAL RADICULAR PAIN: Primary | ICD-10-CM

## 2024-05-14 LAB
ANION GAP SERPL CALCULATED.3IONS-SCNC: 11 MMOL/L (ref 4–13)
BASOPHILS # BLD AUTO: 0.05 THOUSANDS/ÂΜL (ref 0–0.1)
BASOPHILS NFR BLD AUTO: 1 % (ref 0–1)
BUN SERPL-MCNC: 9 MG/DL (ref 5–25)
CALCIUM SERPL-MCNC: 8.7 MG/DL (ref 8.4–10.2)
CHLORIDE SERPL-SCNC: 100 MMOL/L (ref 96–108)
CO2 SERPL-SCNC: 28 MMOL/L (ref 21–32)
CREAT SERPL-MCNC: 0.8 MG/DL (ref 0.6–1.3)
EOSINOPHIL # BLD AUTO: 0.1 THOUSAND/ÂΜL (ref 0–0.61)
EOSINOPHIL NFR BLD AUTO: 1 % (ref 0–6)
ERYTHROCYTE [DISTWIDTH] IN BLOOD BY AUTOMATED COUNT: 14.3 % (ref 11.6–15.1)
GLUCOSE P FAST SERPL-MCNC: 89 MG/DL (ref 65–99)
HCT VFR BLD AUTO: 39.1 % (ref 36.5–46.1)
HGB BLD-MCNC: 12.1 G/DL (ref 12–15.4)
IMM GRANULOCYTES # BLD AUTO: 0.04 THOUSAND/UL (ref 0–0.2)
IMM GRANULOCYTES NFR BLD AUTO: 0 % (ref 0–2)
LYMPHOCYTES # BLD AUTO: 1.46 THOUSANDS/ÂΜL (ref 0.6–4.47)
LYMPHOCYTES NFR BLD AUTO: 14 % (ref 14–44)
MCH RBC QN AUTO: 26.4 PG (ref 26.8–34.3)
MCHC RBC AUTO-ENTMCNC: 30.9 G/DL (ref 31.4–37.4)
MCV RBC AUTO: 85 FL (ref 82–98)
MONOCYTES # BLD AUTO: 0.59 THOUSAND/ÂΜL (ref 0.17–1.22)
MONOCYTES NFR BLD AUTO: 6 % (ref 4–12)
NEUTROPHILS # BLD AUTO: 8.41 THOUSANDS/ÂΜL (ref 1.85–7.62)
NEUTS SEG NFR BLD AUTO: 78 % (ref 43–75)
NRBC BLD AUTO-RTO: 0 /100 WBCS
PLATELET # BLD AUTO: 316 THOUSANDS/UL (ref 149–390)
PMV BLD AUTO: 9.8 FL (ref 8.9–12.7)
POTASSIUM SERPL-SCNC: 3.8 MMOL/L (ref 3.5–5.3)
RBC # BLD AUTO: 4.59 MILLION/UL (ref 3.88–5.12)
SODIUM SERPL-SCNC: 139 MMOL/L (ref 135–147)
WBC # BLD AUTO: 10.65 THOUSAND/UL (ref 4.31–10.16)

## 2024-05-14 PROCEDURE — 85025 COMPLETE CBC W/AUTO DIFF WBC: CPT | Performed by: INTERNAL MEDICINE

## 2024-05-14 PROCEDURE — 36415 COLL VENOUS BLD VENIPUNCTURE: CPT

## 2024-05-14 PROCEDURE — 80048 BASIC METABOLIC PNL TOTAL CA: CPT

## 2024-05-15 ENCOUNTER — TELEPHONE (OUTPATIENT)
Dept: NEUROLOGY | Facility: CLINIC | Age: 44
End: 2024-05-15

## 2024-05-15 NOTE — TELEPHONE ENCOUNTER
Submitted & Re[facility] the [facility]     Approved:  & [id number].  Botox-200 units. QTY:1, Q3 Months.  DX: G43.709, Chronic Migraine.  Auth# [id number]  Valid: 05/10/2024 until 05/10/2025  4 Visits     Please Use Our Stock.

## 2024-05-17 ENCOUNTER — PROCEDURE VISIT (OUTPATIENT)
Dept: NEUROLOGY | Facility: CLINIC | Age: 44
End: 2024-05-17
Payer: COMMERCIAL

## 2024-05-17 VITALS
TEMPERATURE: 96.5 F | HEART RATE: 61 BPM | SYSTOLIC BLOOD PRESSURE: 136 MMHG | OXYGEN SATURATION: 96 % | DIASTOLIC BLOOD PRESSURE: 84 MMHG

## 2024-05-17 DIAGNOSIS — G43.701 CHRONIC MIGRAINE WITHOUT AURA WITH STATUS MIGRAINOSUS, NOT INTRACTABLE: Primary | ICD-10-CM

## 2024-05-17 DIAGNOSIS — M79.18 CERVICAL MYOFASCIAL PAIN SYNDROME: ICD-10-CM

## 2024-05-17 PROCEDURE — 64615 CHEMODENERV MUSC MIGRAINE: CPT | Performed by: PHYSICIAN ASSISTANT

## 2024-05-17 RX ORDER — NAPROXEN SODIUM 275 MG/1
275 TABLET ORAL 2 TIMES DAILY WITH MEALS
Qty: 60 TABLET | Refills: 2 | Status: SHIPPED | OUTPATIENT
Start: 2024-05-17

## 2024-05-17 NOTE — PROGRESS NOTES
Universal Protocol   Consent: Verbal consent obtained. Written consent obtained.  Risks and benefits: risks, benefits and alternatives were discussed  Consent given by: patient  Patient understanding: patient states understanding of the procedure being performed  Patient consent: the patient's understanding of the procedure matches consent given  Procedure consent: procedure consent matches procedure scheduled      Chemodenervation     Date/Time  5/17/2024 2:00 PM     Performed by  Yamilka Boone PA-C   Authorized by  Yamilka Boone PA-C     Pre-procedure details      Prepped With: Alcohol     Procedure details      Position:  Upright   Botox      Botox Type:  Type A    Brand:  Botox    mL's of Botulinum Toxin:  200    Final Concentration per CC:  100 units    Needle Gauge:  30 G 2.5 inch   Procedures      Botox Procedures: chronic headache      Indications: migraines     Injection Location      Head / Face:  L superior trapezius, R superior trapezius, L superior cervical paraspinal, R superior cervical paraspinal, L , R , procerus, L temporalis, R temporalis, R frontalis, L frontalis, R medial occipitalis and L medial occipitalis    L  injection amount:  5 unit(s)    R  injection amount:  5 unit(s)    L lateral frontalis:  5 unit(s)    R lateral frontalis:  5 unit(s)    L medial frontalis:  5 unit(s)    R medial frontalis:  5 unit(s)    L temporalis injection amount:  20 unit(s)    R temporalis injection amount:  20 unit(s)    Procerus injection amount:  5 unit(s)    L medial occipitalis injection amount:  15 unit(s)    R medial occipitalis injection amount:  15 unit(s)    L superior cervical paraspinal injection amount:  10 unit(s)    R superior cervical paraspinal injection amount:  10 unit(s)    L superior trapezius injection amount:  15 unit(s)    R superior trapezius injection amount:  15 unit(s)   Total Units      Total units used:  200    Total units discarded:  0    Post-procedure details      Chemodenervation:  Chronic migraine    Facial Nerve Location::  Bilateral facial nerve    Patient tolerance of procedure:  Tolerated well, no immediate complications   Comments       Extra units medically necessary- 45 units between L upper and middle traps, and b/l occipitalis.       Blood pressure 136/84, pulse 61, temperature (!) 96.5 °F (35.8 °C), temperature source Tympanic, SpO2 96%.    Venlafaxine caused fast HR at night per pt and so she preferred to stop it, but states she noticed it was helpful. Therefore rec low dose of that or trial of low dose Savella.  Patient agreeable to trial Savella low-dose.  S/e reviewed. Pt only takes tramadol 2x/week.  Pt asked for naproxen, states her bariatric surgeon said it was okay to take.  I explained to the patient to only use this 3 times per week approximately and not daily to prevent medication overuse headache and other complications.

## 2024-05-19 ENCOUNTER — HOSPITAL ENCOUNTER (EMERGENCY)
Facility: HOSPITAL | Age: 44
Discharge: HOME | End: 2024-05-19
Attending: EMERGENCY MEDICINE | Admitting: EMERGENCY MEDICINE
Payer: COMMERCIAL

## 2024-05-19 ENCOUNTER — APPOINTMENT (EMERGENCY)
Dept: RADIOLOGY | Facility: HOSPITAL | Age: 44
End: 2024-05-19
Payer: COMMERCIAL

## 2024-05-19 VITALS
RESPIRATION RATE: 18 BRPM | HEART RATE: 71 BPM | OXYGEN SATURATION: 93 % | HEIGHT: 69 IN | BODY MASS INDEX: 46.65 KG/M2 | DIASTOLIC BLOOD PRESSURE: 72 MMHG | TEMPERATURE: 98.2 F | WEIGHT: 315 LBS | SYSTOLIC BLOOD PRESSURE: 130 MMHG

## 2024-05-19 DIAGNOSIS — J45.21 MILD INTERMITTENT ASTHMA WITH EXACERBATION: Primary | ICD-10-CM

## 2024-05-19 LAB
ALBUMIN SERPL BCP-MCNC: 3.7 G/DL (ref 3.5–5)
ALP SERPL-CCNC: 58 U/L (ref 34–104)
ALT SERPL W P-5'-P-CCNC: 10 U/L (ref 7–52)
ANION GAP SERPL CALCULATED.3IONS-SCNC: 7 MMOL/L (ref 4–13)
AST SERPL W P-5'-P-CCNC: 18 U/L (ref 5–45)
BASOPHILS # BLD AUTO: 0.02 THOUSANDS/ÂΜL (ref 0–0.1)
BASOPHILS NFR BLD AUTO: 0 % (ref 0–1)
BILIRUB SERPL-MCNC: 0.39 MG/DL (ref 0.2–1)
BUN SERPL-MCNC: 14 MG/DL (ref 5–25)
CALCIUM SERPL-MCNC: 9 MG/DL (ref 8.4–10.2)
CARDIAC TROPONIN I PNL SERPL HS: 4 NG/L
CHLORIDE SERPL-SCNC: 103 MMOL/L (ref 96–108)
CO2 SERPL-SCNC: 28 MMOL/L (ref 21–32)
CREAT SERPL-MCNC: 0.85 MG/DL (ref 0.6–1.3)
D DIMER PPP FEU-MCNC: 0.35 UG/ML FEU
EOSINOPHIL # BLD AUTO: 0.06 THOUSAND/ÂΜL (ref 0–0.61)
EOSINOPHIL NFR BLD AUTO: 1 % (ref 0–6)
ERYTHROCYTE [DISTWIDTH] IN BLOOD BY AUTOMATED COUNT: 14.3 % (ref 11.6–15.1)
FLUAV RNA RESP QL NAA+PROBE: NEGATIVE
FLUBV RNA RESP QL NAA+PROBE: NEGATIVE
GLUCOSE SERPL-MCNC: 95 MG/DL (ref 65–140)
HCT VFR BLD AUTO: 37.4 % (ref 36.5–46.1)
HGB BLD-MCNC: 11.5 G/DL (ref 12–15.4)
IMM GRANULOCYTES # BLD AUTO: 0.02 THOUSAND/UL (ref 0–0.2)
IMM GRANULOCYTES NFR BLD AUTO: 0 % (ref 0–2)
LYMPHOCYTES # BLD AUTO: 1.75 THOUSANDS/ÂΜL (ref 0.6–4.47)
LYMPHOCYTES NFR BLD AUTO: 19 % (ref 14–44)
MCH RBC QN AUTO: 26 PG (ref 26.8–34.3)
MCHC RBC AUTO-ENTMCNC: 30.7 G/DL (ref 31.4–37.4)
MCV RBC AUTO: 85 FL (ref 82–98)
MONOCYTES # BLD AUTO: 0.56 THOUSAND/ÂΜL (ref 0.17–1.22)
MONOCYTES NFR BLD AUTO: 6 % (ref 4–12)
NEUTROPHILS # BLD AUTO: 6.86 THOUSANDS/ÂΜL (ref 1.85–7.62)
NEUTS SEG NFR BLD AUTO: 74 % (ref 43–75)
NRBC BLD AUTO-RTO: 0 /100 WBCS
PLATELET # BLD AUTO: 314 THOUSANDS/UL (ref 149–390)
PMV BLD AUTO: 9.3 FL (ref 8.9–12.7)
POTASSIUM SERPL-SCNC: 3.5 MMOL/L (ref 3.5–5.3)
PROT SERPL-MCNC: 7.3 G/DL (ref 6.4–8.4)
RBC # BLD AUTO: 4.42 MILLION/UL (ref 3.88–5.12)
RSV RNA RESP QL NAA+PROBE: NEGATIVE
SARS-COV-2 RNA RESP QL NAA+PROBE: NEGATIVE
SODIUM SERPL-SCNC: 138 MMOL/L (ref 135–147)
WBC # BLD AUTO: 9.27 THOUSAND/UL (ref 4.31–10.16)

## 2024-05-19 PROCEDURE — 0241U HB NFCT DS VIR RESP RNA 4 TRGT: CPT

## 2024-05-19 PROCEDURE — 93005 ELECTROCARDIOGRAM TRACING: CPT

## 2024-05-19 PROCEDURE — 80053 COMPREHEN METABOLIC PANEL: CPT

## 2024-05-19 PROCEDURE — 36415 COLL VENOUS BLD VENIPUNCTURE: CPT

## 2024-05-19 PROCEDURE — 85025 COMPLETE CBC W/AUTO DIFF WBC: CPT

## 2024-05-19 PROCEDURE — 94640 AIRWAY INHALATION TREATMENT: CPT

## 2024-05-19 PROCEDURE — 99285 EMERGENCY DEPT VISIT HI MDM: CPT

## 2024-05-19 PROCEDURE — 85379 FIBRIN DEGRADATION QUANT: CPT

## 2024-05-19 PROCEDURE — 71045 X-RAY EXAM CHEST 1 VIEW: CPT

## 2024-05-19 PROCEDURE — 99285 EMERGENCY DEPT VISIT HI MDM: CPT | Performed by: EMERGENCY MEDICINE

## 2024-05-19 PROCEDURE — 84484 ASSAY OF TROPONIN QUANT: CPT

## 2024-05-19 RX ORDER — IPRATROPIUM BROMIDE AND ALBUTEROL SULFATE 2.5; .5 MG/3ML; MG/3ML
3 SOLUTION RESPIRATORY (INHALATION) ONCE
Status: COMPLETED | OUTPATIENT
Start: 2024-05-19 | End: 2024-05-19

## 2024-05-19 RX ORDER — DEXAMETHASONE 4 MG/1
10 TABLET ORAL ONCE
Status: COMPLETED | OUTPATIENT
Start: 2024-05-19 | End: 2024-05-19

## 2024-05-19 RX ORDER — ALBUTEROL SULFATE 2.5 MG/3ML
2.5 SOLUTION RESPIRATORY (INHALATION) EVERY 6 HOURS PRN
Qty: 75 ML | Refills: 0 | Status: SHIPPED | OUTPATIENT
Start: 2024-05-19

## 2024-05-19 RX ADMIN — DEXAMETHASONE 10 MG: 4 TABLET ORAL at 11:43

## 2024-05-19 RX ADMIN — IPRATROPIUM BROMIDE AND ALBUTEROL SULFATE 3 ML: 2.5; .5 SOLUTION RESPIRATORY (INHALATION) at 10:17

## 2024-05-19 NOTE — ED PROVIDER NOTES
"History  Chief Complaint   Patient presents with    Shortness of Breath     Sob and chest tightness     Patient is a 44-year-old transgender female with asthma and GERD who presents to the emergency department with shortness of breath, cough, and chest tightness sensation present for the last 3 days.  She reports trying her albuterol pump inhaler with some improvement of symptoms but has noticed that her shortness of breath seems to be worsened when she exerts herself over the last few days.  She denies any vomiting but was feeling nauseated yesterday.  She denies chest pain at any point and is denying abdominal pain and had a little diarrhea yesterday.  She denies urgency, frequency, dysuria, or blood in the stool.  She reports the chest squeezing sensation radiating to the upper part of her chest but not into her neck or jaw or radiating down into her abdomen.  She was having some pain in the right arm at lower joints yesterday but her PCP started her on naproxen and all of these symptoms resolved.  Patient has had a several prior visits for similar symptoms.        Prior to Admission Medications   Prescriptions Last Dose Informant Patient Reported? Taking?   Cyanocobalamin 1000 MCG/ML KIT   No No   Sig: Inject 1 mL (1,000 mcg total) into a muscle once a week Inject 1 ml IM in thigh/ buttocks or deltoid mm once weekly x 8 weeks. Afterward once monthly   LORazepam (ATIVAN) 1 mg tablet   No No   Sig: Take 0.5 tablets (0.5 mg total) by mouth every 6 (six) hours as needed for anxiety   Milnacipran HCl 12.5 MG TABS   No No   Si tab qam with food   Riboflavin 400 MG TABS  Self No No   Sig: Take 1 tablet (400 mg total) by mouth in the morning   Syringe/Needle, Disp, (SYRINGE 3CC/71VV1-0/2\") 25G X 1-1/2\" 3 ML MISC   No No   Sig: Use for toradol IM injections.   Syringe/Needle, Disp, (SYRINGE 3CC/21PK0-3/4\") 27G X 1-1/4\" 3 ML MISC   No No   Sig: Use for B12 injections   acetaminophen (TYLENOL) 500 mg tablet  Self Yes " No   Sig: Take 500 mg by mouth if needed   albuterol (PROVENTIL HFA,VENTOLIN HFA) 90 mcg/act inhaler   No No   Sig: Inhale 2 puffs every 6 (six) hours as needed for wheezing   benzonatate (TESSALON) 200 MG capsule   No No   Sig: Take 1 capsule (200 mg total) by mouth 3 (three) times a day as needed for cough   butalbital-acetaminophen-caffeine (FIORICET,ESGIC) -40 mg per tablet   No No   Si tab q6 hours prn migraine. No more than 2-3 per week.   carisoprodol (SOMA) 250 MG   No No   Sig: Take 1 tablet (250 mg total) by mouth 3 (three) times a day   Patient taking differently: Take 250 mg by mouth 3 (three) times a day as needed   colchicine (COLCRYS) 0.6 mg tablet  Self No No   Sig: Take 1 tablet (0.6 mg total) by mouth daily 2 pills now and 1 in an hour. One daily until resolved.   Patient taking differently: Take 0.6 mg by mouth daily as needed 2 pills now and 1 in an hour. One daily until resolved.   estradiol (ESTRACE) 2 MG tablet   No No   Sig: Take 1 tablet (2 mg total) by mouth 2 (two) times a day   famotidine (PEPCID) 20 mg tablet   No No   Sig: Take 1 tablet (20 mg total) by mouth 2 (two) times a day   fluticasone (FLONASE) 50 mcg/act nasal spray   No No   Si spray into each nostril daily   hydrocortisone (ANUSOL-HC) 25 mg suppository   No No   Sig: Insert 1 suppository (25 mg total) into the rectum daily as needed for hemorrhoids   ipratropium-albuterol (DUO-NEB) 0.5-2.5 mg/3 mL nebulizer solution   No No   Sig: Take 3 mL by nebulization every 6 (six) hours as needed for wheezing or shortness of breath   ketorolac (TORADOL) 30 mg/mL injection   No No   Si-2 ml IM injection once per 24 hours p.r.n. migraine.  No more than 2 injections per week.   lisinopril (ZESTRIL) 5 mg tablet   No No   Sig: Take 1 tablet (5 mg total) by mouth daily   montelukast (SINGULAIR) 10 mg tablet  Self No No   Sig: Take 1 tablet (10 mg total) by mouth daily at bedtime   multivitamin (THERAGRAN) TABS  Self Yes No    Sig: Take 1 tablet by mouth daily   naproxen sodium (ANAPROX) 275 MG tablet   No No   Sig: Take 1 tablet (275 mg total) by mouth 2 (two) times a day with meals Prn neck pain, headaches   ondansetron (ZOFRAN-ODT) 4 mg disintegrating tablet   No No   Sig: Take 1 tablet (4 mg total) by mouth every 8 (eight) hours as needed for nausea or vomiting   orphenadrine (NORFLEX) 100 mg tablet   No No   Sig: Take 1 tablet (100 mg total) by mouth 2 (two) times a day as needed for muscle spasms   Patient taking differently: Take 100 mg by mouth in the morning   oxyCODONE (Roxicodone) 5 immediate release tablet   No No   Sig: Take 1 tablet (5 mg total) by mouth every 6 (six) hours as needed for moderate pain for up to 8 doses Max Daily Amount: 20 mg   pantoprazole (PROTONIX) 40 mg tablet   No No   Sig: Take 1 tablet (40 mg total) by mouth daily   potassium chloride (Klor-Con M20) 20 mEq tablet   No No   Si tabs BID day 1 the 2 tabs daily for 3 days.   predniSONE 10 mg tablet   No No   Si tablets daily x 3 days;  3 tablets daily x 3 days; 2 tablets daily x 3 days; 1 tablet daily x 3 days   rimegepant sulfate (NURTEC) 75 mg TBDP   No No   Si tab at migraine onset. No more than one dose per 24 hours.   rizatriptan (Maxalt) 10 mg tablet  Self No No   Sig: Take 1 tablet (10 mg total) by mouth as needed for migraine Take at the onset of migraine; if symptoms continue or return, may take another dose at least 2 hours after first dose. Take no more than 2 doses in a day.   sucralfate (CARAFATE) 1 g tablet   No No   Sig: Take 1 tablet (1 g total) by mouth 4 (four) times a day   traMADol (ULTRAM) 50 mg tablet   No No   Sig: Take 1 tablet (50 mg total) by mouth 2 (two) times a day as needed for moderate pain   triamterene-hydrochlorothiazide (DYAZIDE) 37.5-25 mg per capsule   No No   Sig: Take 1 capsule by mouth every morning      Facility-Administered Medications Last Administration Doses Remaining   Botulinum Toxin Type A SOLR  200 Units 2/16/2024 10:46 AM    Botulinum Toxin Type A SOLR 200 Units 5/17/2024  1:55 PM    cyanocobalamin injection 1,000 mcg 6/19/2023  1:10 PM           Past Medical History:   Diagnosis Date    Anxiety     Asthma     seasonal    COVID-19 virus infection 12/27/2021    3/19/24    Hernia of abdominal wall     Hypertension     Kidney stones     Seizures (HCC)     pseudoseizures since 2012    Viral URI with cough 12/27/2021       Past Surgical History:   Procedure Laterality Date    APPENDECTOMY      CHOLECYSTECTOMY      GASTRIC BYPASS      HERNIA REPAIR      x2       Family History   Problem Relation Age of Onset    Hypertension Mother     Diabetes Mother     Hypertension Sister     No Known Problems Father         head injury    Stroke Maternal Grandmother         brain aneurysm    Aneurysm Maternal Uncle         brain     I have reviewed and agree with the history as documented.    E-Cigarette/Vaping    E-Cigarette Use Never User      E-Cigarette/Vaping Substances    Nicotine No     THC No     CBD No     Flavoring No     Other No     Unknown No      Social History     Tobacco Use    Smoking status: Never    Smokeless tobacco: Never   Vaping Use    Vaping status: Never Used   Substance Use Topics    Alcohol use: Yes     Comment: 2 drinks per month    Drug use: Never        Review of Systems   Constitutional:  Negative for chills and fever.   HENT:  Negative for ear pain and sore throat.    Eyes:  Negative for pain and visual disturbance.   Respiratory:  Positive for cough, chest tightness and shortness of breath.    Cardiovascular:  Negative for chest pain and palpitations.   Gastrointestinal:  Positive for diarrhea (mild, resolved) and nausea (resolved). Negative for abdominal pain and vomiting.   Genitourinary:  Negative for dysuria, hematuria and urgency.   Musculoskeletal:  Negative for arthralgias and back pain.   Skin:  Negative for color change and rash.   Neurological:  Negative for seizures, syncope and  light-headedness.   All other systems reviewed and are negative.      Physical Exam  ED Triage Vitals   Temperature Pulse Respirations Blood Pressure SpO2   05/19/24 0922 05/19/24 0922 05/19/24 0922 05/19/24 0923 05/19/24 0922   98.2 °F (36.8 °C) 96 18 152/87 96 %      Temp src Heart Rate Source Patient Position - Orthostatic VS BP Location FiO2 (%)   -- 05/19/24 1026 05/19/24 1026 05/19/24 1026 --    Monitor Lying Right arm       Pain Score       --                    Orthostatic Vital Signs  Vitals:    05/19/24 0922 05/19/24 0923 05/19/24 1026   BP:  152/87 130/72   Pulse: 96  71   Patient Position - Orthostatic VS:   Lying       Physical Exam  Vitals and nursing note reviewed.   Constitutional:       General: She is not in acute distress.     Appearance: Normal appearance. She is well-developed. She is not ill-appearing.   HENT:      Head: Normocephalic and atraumatic.      Right Ear: External ear normal.      Left Ear: External ear normal.      Mouth/Throat:      Pharynx: Oropharynx is clear. No oropharyngeal exudate or posterior oropharyngeal erythema.   Eyes:      General:         Right eye: No discharge.         Left eye: No discharge.      Extraocular Movements: Extraocular movements intact.      Conjunctiva/sclera: Conjunctivae normal.   Cardiovascular:      Rate and Rhythm: Normal rate and regular rhythm.      Pulses: Normal pulses.      Heart sounds: Normal heart sounds. No murmur heard.  Pulmonary:      Effort: Pulmonary effort is normal. No respiratory distress.      Breath sounds: Normal breath sounds. No decreased breath sounds, wheezing, rhonchi or rales.   Abdominal:      General: Abdomen is flat.   Musculoskeletal:         General: No swelling or deformity. Normal range of motion.      Cervical back: Neck supple. No tenderness.   Skin:     General: Skin is warm and dry.      Capillary Refill: Capillary refill takes less than 2 seconds.   Neurological:      Mental Status: She is alert.         ED  Medications  Medications   ipratropium-albuterol (DUO-NEB) 0.5-2.5 mg/3 mL inhalation solution 3 mL (3 mL Nebulization Given 5/19/24 1017)   dexamethasone (DECADRON) tablet 10 mg (10 mg Oral Given 5/19/24 1143)       Diagnostic Studies  Results Reviewed       Procedure Component Value Units Date/Time    FLU/RSV/COVID - if FLU/RSV clinically relevant [[id number]]  (Normal) Collected: 05/19/24 1018    Lab Status: Final result Specimen: Nares from Nose Updated: 05/19/24 1105     SARS-CoV-2 Negative     INFLUENZA A PCR Negative     INFLUENZA B PCR Negative     RSV PCR Negative    Narrative:      FOR PEDIATRIC PATIENTS - copy/paste COVID Guidelines URL to browser: [email or web address]    SARS-CoV-2 assay is a Nucleic Acid Amplification assay intended for the  qualitative detection of nucleic acid from SARS-CoV-2 in nasopharyngeal  swabs. Results are for the presumptive identification of SARS-CoV-2 RNA.    Positive results are indicative of infection with SARS-CoV-2, the virus  causing COVID-19, but do not rule out bacterial infection or co-infection  with other viruses. Laboratories within the [place] and its  territories are required to report all positive results to the appropriate  public health authorities. Negative results do not preclude SARS-CoV-2  infection and should not be used as the sole basis for treatment or other  patient management decisions. Negative results must be combined with  clinical observations, patient history, and epidemiological information.  This test has not been FDA cleared or approved.    This test has been authorized by FDA under an Emergency Use Authorization  (EUA). This test is only authorized for the duration of time the  declaration that circumstances exist justifying the authorization of the  emergency use of an in vitro diagnostic tests for detection of SARS-CoV-2  virus and/or diagnosis of COVID-19 infection under section  564(b)(1) of  the Act, 21 U.S.C. 360bbb-3(b)(1), unless the authorization is terminated  or revoked sooner. The test has been validated but independent review by FDA  and CLIA is pending.    Test performed using [employer]: This RT-PCR assay targets N2,  a region unique to SARS-CoV-2. A conserved region in the E-gene was chosen  for pan-Sarbecovirus detection which includes SARS-CoV-2.    According to CMS-2020-01-R, this platform meets the definition of high-throughput technology.    HS Troponin 0hr (reflex protocol) [[id number]]  (Normal) Collected: 05/19/24 1018    Lab Status: Final result Specimen: Blood from Arm, Left Updated: 05/19/24 1052     hs TnI 0hr 4 ng/L     Comprehensive metabolic panel [[id number]] Collected: 05/19/24 1018    Lab Status: Final result Specimen: Blood from Arm, Left Updated: 05/19/24 1050     Sodium 138 mmol/L      Potassium 3.5 mmol/L      Chloride 103 mmol/L      CO2 28 mmol/L      ANION GAP 7 mmol/L      BUN 14 mg/dL      Creatinine 0.85 mg/dL      Glucose 95 mg/dL      Calcium 9.0 mg/dL      AST 18 U/L      ALT 10 U/L      Alkaline Phosphatase 58 U/L      Total Protein 7.3 g/dL      Albumin 3.7 g/dL      Total Bilirubin 0.39 mg/dL      eGFR --    Narrative:      Notes:     1. eGFR calculation is only valid for adults 18 years and older.  2. EGFR calculation cannot be performed for patients who are transgender, non-binary, or whose legal sex, sex at birth, and gender identity differ.    D-dimer, quantitative [[id number]]  (Normal) Collected: 05/19/24 1018    Lab Status: Final result Specimen: Blood from Arm, Left Updated: 05/19/24 1039     D-Dimer, Quant 0.35 ug/ml FEU     CBC and differential [[id number]]  (Abnormal) Collected: 05/19/24 1018    Lab Status: Final result Specimen: Blood from Arm, Left Updated: 05/19/24 1028     WBC 9.27 Thousand/uL      RBC 4.42 Million/uL      Hemoglobin 11.5 g/dL      Hematocrit 37.4 %      MCV 85 fL      MCH 26.0 pg      MCHC 30.7 g/dL      RDW  14.3 %      MPV 9.3 fL      Platelets 314 Thousands/uL      nRBC 0 /100 WBCs      Segmented % 74 %      Immature Grans % 0 %      Lymphocytes % 19 %      Monocytes % 6 %      Eosinophils Relative 1 %      Basophils Relative 0 %      Absolute Neutrophils 6.86 Thousands/µL      Absolute Immature Grans 0.02 Thousand/uL      Absolute Lymphocytes 1.75 Thousands/µL      Absolute Monocytes 0.56 Thousand/µL      Eosinophils Absolute 0.06 Thousand/µL      Basophils Absolute 0.02 Thousands/µL                    X-ray chest 1 view portable   ED Interpretation by Bello Jara DO (05/19 1025)   No acute cardiopulmonary abnormalities as independently interpreted by me          Final Result by Bello Bradley MD (05/19 1210)      No acute cardiopulmonary disease.      Resident: Patricio Berger I, the attending radiologist, have reviewed the images and agree with the final report above.      Workstation performed: [id number]               Procedures  ECG 12 Lead Documentation Only    Date/Time: 5/19/2024 9:52 AM    Performed by: Bello Jara DO  Authorized by: Bello Jara DO    Indications / Diagnosis:  Chest tightness, SOB  ECG reviewed by me, the ED Provider: yes    Patient location:  ED  Previous ECG:     Previous ECG:  Compared to current    Similarity:  No change  Interpretation:     Interpretation: normal    Rate:     ECG rate:  80    ECG rate assessment: normal    Rhythm:     Rhythm: sinus rhythm    Ectopy:     Ectopy: none    QRS:     QRS axis:  Normal    QRS intervals:  Normal  Conduction:     Conduction: normal    ST segments:     ST segments:  Normal  T waves:     T waves: normal          ED Course             HEART Risk Score      Flowsheet Row Most Recent Value   Heart Score Risk Calculator    History 0 Filed at: 05/19/2024 1117   ECG 0 Filed at: 05/19/2024 1117   Age 0 Filed at: 05/19/2024 1117   Risk Factors 1 Filed at: 05/19/2024 1117   Troponin 0 Filed at: 05/19/2024 1117   HEART Score  1 Filed at: 05/19/2024 1117                PERC Rule for PE      Flowsheet Row Most Recent Value   PERC Rule for PE    Age >=50 0 Filed at: 05/19/2024 1118   HR >=100 0 Filed at: 05/19/2024 1118   O2 Sat on room air < 95% 0 Filed at: 05/19/2024 1118   History of PE or DVT 0 Filed at: 05/19/2024 1118   Recent trauma or surgery 0 Filed at: 05/19/2024 1118   Hemoptysis 0 Filed at: 05/19/2024 1118   Exogenous estrogen 1 Filed at: 05/19/2024 1118   Unilateral leg swelling 0 Filed at: 05/19/2024 1118   PERC Rule for PE Results 1 Filed at: 05/19/2024 1118                    Wells' Criteria for PE      Flowsheet Row Most Recent Value   Wells' Criteria for PE    Clinical signs and symptoms of DVT 0 Filed at: 05/19/2024 1117   PE is primary diagnosis or equally likely 0 Filed at: 05/19/2024 1117   HR >100 0 Filed at: 05/19/2024 1117   Immobilization at least 3 days or Surgery in the previous 4 weeks 0 Filed at: 05/19/2024 1117   Previous, objectively diagnosed PE or DVT 0 Filed at: 05/19/2024 1117   Hemoptysis 0 Filed at: 05/19/2024 1117   Malignancy with treatment within 6 months or palliative 0 Filed at: 05/19/2024 1117   Wells' Criteria Total 0 Filed at: 05/19/2024 1117              Medical Decision Making  43yo transgender female presents with shortness of breath.    DDX including but not limited to: pneumonia, pleural effusion, CHF, SCAPE, PE, PTX, ACS, MI, asthma exacerbation, COPD exacerbation, COVID 19, EVALI, anemia, metabolic abnormality, renal failure.    On physical exam, patient lung sounds are clear.  Exam otherwise normal.    Exam without evidence of volume overload so doubt heart failure. EKG without signs of active ischemia. Given the timing of pain to ER presentation, HS troponin 4 so doubt NSTEMI.   Presentation not consistent with acute PE (Dimer negative), pneumothorax (not visualized on chest xr), thoracic aortic dissection, pericarditis, tamponade, pneumonia (no infectious symptoms, clear chest xr),  myocarditis (no recent illness, neg trop).   HEART score: 1 so plan to discharge patient home with PMD follow up.    Patient reports complete resolution of symptoms after DuoNeb administration while in the emergency department.  I personally walked the patient through the department and chest tightness did not return.  Suspect mild exacerbation of asthma.  Decadron administered and additional nebulized albuterol sent to the patient's pharmacy.    In the absence of additional concerning findings on physical exam, laboratory evaluation, ecg, or imaging patient is appropriate for discharge at this time.  Patient provided with informational handout that discusses symptom management and reasons to return to the emergency department.  Return precautions are discussed and the patient and/or family demonstrate understanding of the plan.  Their questions are all answered to their satisfaction and the patient is discharged.      Amount and/or Complexity of Data Reviewed  Labs: ordered.  Radiology: ordered and independent interpretation performed.    Risk  Prescription drug management.          Disposition  Final diagnoses:   Mild intermittent asthma with exacerbation     Time reflects when diagnosis was documented in both MDM as applicable and the Disposition within this note       Time User Action Codes Description Comment    5/19/2024 11:07 AM Bello Jara Add [J45.21] Mild intermittent asthma with exacerbation           ED Disposition       ED Disposition   Discharge    Condition   Stable    Date/Time   Sun May 19, 2024 1107    Comment   Rob Arriola discharge to home/self care.                   Follow-up Information       Follow up With Specialties Details Why Contact Info    Curly Camacho MD Internal Medicine Schedule an appointment as soon as possible for a visit   [address] [place] [address] [phone]              Discharge Medication List as of 5/19/2024 11:08 AM        START  taking these medications    Details   albuterol (2.5 mg/3 mL) 0.083 % nebulizer solution Take 3 mL (2.5 mg total) by nebulization every 6 (six) hours as needed for wheezing or shortness of breath, Starting Sun 5/19/2024, Normal           CONTINUE these medications which have NOT CHANGED    Details   acetaminophen (TYLENOL) 500 mg tablet Take 500 mg by mouth if needed, Historical Med      albuterol (PROVENTIL HFA,VENTOLIN HFA) 90 mcg/act inhaler Inhale 2 puffs every 6 (six) hours as needed for wheezing, Starting Mon 4/1/2024, Normal      benzonatate (TESSALON) 200 MG capsule Take 1 capsule (200 mg total) by mouth 3 (three) times a day as needed for cough, Starting Mon 4/1/2024, Normal      butalbital-acetaminophen-caffeine (FIORICET,ESGIC) -40 mg per tablet 1 tab q6 hours prn migraine. No more than 2-3 per week., Normal      carisoprodol (SOMA) 250 MG Take 1 tablet (250 mg total) by mouth 3 (three) times a day, Starting Tue 1/16/2024, Normal      colchicine (COLCRYS) 0.6 mg tablet Take 1 tablet (0.6 mg total) by mouth daily 2 pills now and 1 in an hour. One daily until resolved., Starting Mon 6/27/2022, Normal      Cyanocobalamin 1000 MCG/ML KIT Inject 1 mL (1,000 mcg total) into a muscle once a week Inject 1 ml IM in thigh/ buttocks or deltoid mm once weekly x 8 weeks. Afterward once monthly, Starting Tue 5/7/2024, Normal      estradiol (ESTRACE) 2 MG tablet Take 1 tablet (2 mg total) by mouth 2 (two) times a day, Starting Mon 3/11/2024, Normal      famotidine (PEPCID) 20 mg tablet Take 1 tablet (20 mg total) by mouth 2 (two) times a day, Starting Tue 5/7/2024, Normal      fluticasone (FLONASE) 50 mcg/act nasal spray 1 spray into each nostril daily, Starting Tue 1/16/2024, Normal      hydrocortisone (ANUSOL-HC) 25 mg suppository Insert 1 suppository (25 mg total) into the rectum daily as needed for hemorrhoids, Starting Tue 11/14/2023, Normal      ipratropium-albuterol (DUO-NEB) 0.5-2.5 mg/3 mL nebulizer  solution Take 3 mL by nebulization every 6 (six) hours as needed for wheezing or shortness of breath, Starting Mon 4/1/2024, Normal      ketorolac (TORADOL) 30 mg/mL injection 1-2 ml IM injection once per 24 hours p.r.n. migraine.  No more than 2 injections per week., Normal      lisinopril (ZESTRIL) 5 mg tablet Take 1 tablet (5 mg total) by mouth daily, Starting Tue 5/7/2024, Normal      LORazepam (ATIVAN) 1 mg tablet Take 0.5 tablets (0.5 mg total) by mouth every 6 (six) hours as needed for anxiety, Starting Tue 1/16/2024, Normal      Milnacipran HCl 12.5 MG TABS 1 tab qam with food, Normal      montelukast (SINGULAIR) 10 mg tablet Take 1 tablet (10 mg total) by mouth daily at bedtime, Starting Thu 12/22/2022, Normal      multivitamin (THERAGRAN) TABS Take 1 tablet by mouth daily, Historical Med      naproxen sodium (ANAPROX) 275 MG tablet Take 1 tablet (275 mg total) by mouth 2 (two) times a day with meals Prn neck pain, headaches, Starting Fri 5/17/2024, Normal      ondansetron (ZOFRAN-ODT) 4 mg disintegrating tablet Take 1 tablet (4 mg total) by mouth every 8 (eight) hours as needed for nausea or vomiting, Starting Tue 3/12/2024, Normal      orphenadrine (NORFLEX) 100 mg tablet Take 1 tablet (100 mg total) by mouth 2 (two) times a day as needed for muscle spasms, Starting Wed 1/17/2024, Normal      oxyCODONE (Roxicodone) 5 immediate release tablet Take 1 tablet (5 mg total) by mouth every 6 (six) hours as needed for moderate pain for up to 8 doses Max Daily Amount: 20 mg, Starting Sat 12/16/2023, Normal      pantoprazole (PROTONIX) 40 mg tablet Take 1 tablet (40 mg total) by mouth daily, Starting Tue 5/7/2024, Normal      potassium chloride (Klor-Con M20) 20 mEq tablet 2 tabs BID day 1 the 2 tabs daily for 3 days., Normal      predniSONE 10 mg tablet 4 tablets daily x 3 days;  3 tablets daily x 3 days; 2 tablets daily x 3 days; 1 tablet daily x 3 days, Normal      Riboflavin 400 MG TABS Take 1 tablet (400 mg  "total) by mouth in the morning, Starting Fri 6/3/2022, Normal      rimegepant sulfate (NURTEC) 75 mg TBDP 1 tab at migraine onset. No more than one dose per 24 hours., Normal      rizatriptan (Maxalt) 10 mg tablet Take 1 tablet (10 mg total) by mouth as needed for migraine Take at the onset of migraine; if symptoms continue or return, may take another dose at least 2 hours after first dose. Take no more than 2 doses in a day., Starting Tue 10/17/2023, Normal      sucralfate (CARAFATE) 1 g tablet Take 1 tablet (1 g total) by mouth 4 (four) times a day, Starting Tue 5/7/2024, Normal      Syringe/Needle, Disp, (SYRINGE 3CC/38RI3-1/2\") 25G X 1-1/2\" 3 ML MISC Use for toradol IM injections., Normal      Syringe/Needle, Disp, (SYRINGE 3CC/09DO9-1/4\") 27G X 1-1/4\" 3 ML MISC Use for B12 injections, Normal      traMADol (ULTRAM) 50 mg tablet Take 1 tablet (50 mg total) by mouth 2 (two) times a day as needed for moderate pain, Starting Tue 4/2/2024, Normal      triamterene-hydrochlorothiazide (DYAZIDE) 37.5-25 mg per capsule Take 1 capsule by mouth every morning, Starting Tue 5/7/2024, Normal           No discharge procedures on file.    PDMP Review         Value Time User    PDMP Reviewed  Yes 5/17/2024  1:50 PM Yamilka Boone PA-C             ED Provider  Attending physically available and evaluated Rob Arriola. I managed the patient along with the ED Attending.    Electronically Signed by           Bello Jara DO  05/19/24 5474    "

## 2024-05-20 ENCOUNTER — OFFICE VISIT (OUTPATIENT)
Age: 44
End: 2024-05-20
Payer: COMMERCIAL

## 2024-05-20 VITALS
HEART RATE: 86 BPM | DIASTOLIC BLOOD PRESSURE: 82 MMHG | BODY MASS INDEX: 46.65 KG/M2 | OXYGEN SATURATION: 97 % | SYSTOLIC BLOOD PRESSURE: 120 MMHG | WEIGHT: 315 LBS | HEIGHT: 69 IN | TEMPERATURE: 97.9 F

## 2024-05-20 DIAGNOSIS — G43.109 MIGRAINE WITH AURA AND WITHOUT STATUS MIGRAINOSUS, NOT INTRACTABLE: ICD-10-CM

## 2024-05-20 DIAGNOSIS — J45.20 MILD INTERMITTENT ASTHMA WITHOUT COMPLICATION: ICD-10-CM

## 2024-05-20 DIAGNOSIS — R71.8 MICROCYTOSIS: ICD-10-CM

## 2024-05-20 DIAGNOSIS — Z88.9 MULTIPLE ALLERGIES: ICD-10-CM

## 2024-05-20 DIAGNOSIS — I10 PRIMARY HYPERTENSION: ICD-10-CM

## 2024-05-20 DIAGNOSIS — M50.90 CERVICAL DISC DISEASE: ICD-10-CM

## 2024-05-20 DIAGNOSIS — R11.0 NAUSEA: ICD-10-CM

## 2024-05-20 DIAGNOSIS — E66.01 MORBID OBESITY WITH BMI OF 50.0-59.9, ADULT (HCC): Primary | ICD-10-CM

## 2024-05-20 DIAGNOSIS — E87.6 HYPOKALEMIA: ICD-10-CM

## 2024-05-20 DIAGNOSIS — R51.9 WORSENING HEADACHES: ICD-10-CM

## 2024-05-20 LAB
ATRIAL RATE: 80 BPM
P AXIS: 56 DEGREES
PR INTERVAL: 186 MS
QRS AXIS: 2 DEGREES
QRSD INTERVAL: 98 MS
QT INTERVAL: 394 MS
QTC INTERVAL: 454 MS
T WAVE AXIS: 20 DEGREES
VENTRICULAR RATE: 80 BPM

## 2024-05-20 PROCEDURE — 93010 ELECTROCARDIOGRAM REPORT: CPT | Performed by: INTERNAL MEDICINE

## 2024-05-20 PROCEDURE — 99214 OFFICE O/P EST MOD 30 MIN: CPT | Performed by: INTERNAL MEDICINE

## 2024-05-20 RX ORDER — POTASSIUM CHLORIDE 750 MG/1
10 TABLET, EXTENDED RELEASE ORAL 2 TIMES DAILY
Qty: 30 TABLET | Refills: 1 | Status: SHIPPED | OUTPATIENT
Start: 2024-05-20

## 2024-05-20 RX ORDER — TRAMADOL HYDROCHLORIDE 50 MG/1
50 TABLET ORAL 2 TIMES DAILY PRN
Qty: 60 TABLET | Refills: 0 | Status: SHIPPED | OUTPATIENT
Start: 2024-05-20

## 2024-05-20 RX ORDER — LORAZEPAM 1 MG/1
0.5 TABLET ORAL EVERY 6 HOURS PRN
Qty: 60 TABLET | Refills: 0 | Status: SHIPPED | OUTPATIENT
Start: 2024-05-20

## 2024-05-20 RX ORDER — VALSARTAN 40 MG/1
40 TABLET ORAL DAILY
Qty: 30 TABLET | Refills: 2 | Status: SHIPPED | OUTPATIENT
Start: 2024-05-20

## 2024-05-20 RX ORDER — MONTELUKAST SODIUM 10 MG/1
10 TABLET ORAL
Qty: 90 TABLET | Refills: 1 | Status: SHIPPED | OUTPATIENT
Start: 2024-05-20

## 2024-05-20 RX ORDER — ONDANSETRON 4 MG/1
4 TABLET, ORALLY DISINTEGRATING ORAL EVERY 8 HOURS PRN
Qty: 60 TABLET | Refills: 5 | Status: SHIPPED | OUTPATIENT
Start: 2024-05-20

## 2024-05-20 NOTE — PROGRESS NOTES
Ambulatory Visit  Name: Rob Arriola      : 1980      MRN: [id number]  Encounter Provider: Curly Camacho MD  Encounter Date: 2024   Encounter department: [facility]    Assessment & Plan   1. Morbid obesity with BMI of 50.0-59.9, adult (HCC)  Assessment & Plan:  Patient is going to begin a walking therapeutic exercise  2. Cervical disc disease  Assessment & Plan:  Naproxen twice daily.  Also recently initiated on Savella 12.5 mg daily  Orders:  -     LORazepam (ATIVAN) 1 mg tablet; Take 0.5 tablets (0.5 mg total) by mouth every 6 (six) hours as needed for anxiety  3. Worsening headaches  -     traMADol (ULTRAM) 50 mg tablet; Take 1 tablet (50 mg total) by mouth 2 (two) times a day as needed for moderate pain  4. Multiple allergies  -     montelukast (SINGULAIR) 10 mg tablet; Take 1 tablet (10 mg total) by mouth daily at bedtime  5. Migraine with aura and without status migrainosus, not intractable  Assessment & Plan:  Treated with a combination of Botox, Fioricet, tramadol, Nurtec and Toradol as needed  6. Nausea  Assessment & Plan:  Associated with migraine headache.  Ondansetron was renewed  Orders:  -     ondansetron (ZOFRAN-ODT) 4 mg disintegrating tablet; Take 1 tablet (4 mg total) by mouth every 8 (eight) hours as needed for nausea or vomiting  7. Primary hypertension  Assessment & Plan:  Blood pressure is nicely controlled on lisinopril 5 mg however patient developed a cough so we will transition to valsartan 40  Orders:  -     valsartan (DIOVAN) 40 mg tablet; Take 1 tablet (40 mg total) by mouth daily  8. Hypokalemia  Assessment & Plan:  Potassium remains low despite repletion therapy.  Will initiate potassium 10 mEq daily with follow-up labs in 3 to 4 weeks  Orders:  -     potassium chloride (Klor-Con M10) 10 mEq tablet; Take 1 tablet (10 mEq total) by mouth 2 (two) times a day  -     Basic metabolic panel; Future  9. Microcytosis  Assessment &  "Plan:  Will check iron levels.  Hemoglobin is low normal at 11.5  Orders:  -     Iron Panel (Includes Ferritin, Iron Sat%, Iron, and TIBC); Future  10. Mild intermittent asthma without complication  Assessment & Plan:  Will schedule pulmonary function testing with bronchodilator  Orders:  -     POCT spirometry  with bronchodilator       History of Present Illness     Presents to the office for follow-up with her in general doing better.  Blood pressure is better controlled with lisinopril however the patient developed a cough so we will need to transition away from her lisinopril she was in the emergency room yesterday because of some chest discomfort and tightness and shortness of breath found to be a little bit of bronchospasm.  Was treated with nebulizers in the ED and felt better.  She does have an albuterol rescue inhaler at home and does use a nebulizer treatment as well.  Labs showed a potassium level of 3.2.  Some microcytic changes in her RBC indices along with a decreasing hemoglobin 11.5.  She continues to experience episodic muscle cramping which may be due to the persistent hypokalemia.  Recently had some med changes for her migraines and was initiated on Savella dose of 12.5 mg.        Review of Systems   Constitutional: Negative.    HENT: Negative.     Eyes: Negative.    Respiratory:  Positive for chest tightness, shortness of breath and wheezing.    Cardiovascular: Negative.    Gastrointestinal:  Positive for nausea (Associated with migraines).   Endocrine: Negative.    Genitourinary: Negative.    Musculoskeletal:  Positive for arthralgias and myalgias.   Skin: Negative.    Allergic/Immunologic: Negative.    Neurological:  Positive for headaches.   Hematological: Negative.    Psychiatric/Behavioral: Negative.         Objective     /82 (BP Location: Left arm, Patient Position: Sitting, Cuff Size: Large)   Pulse 86   Temp 97.9 °F (36.6 °C) (Temporal)   Ht 5' 9\" (1.753 m)   Wt (!) 161 kg (354 " lb 9.6 oz)   SpO2 97%   BMI 52.37 kg/m²     Physical Exam  Vitals reviewed.   Constitutional:       General: She is not in acute distress.     Appearance: She is obese. She is not ill-appearing, toxic-appearing or diaphoretic.   HENT:      Head: Normocephalic and atraumatic.      Right Ear: External ear normal.      Left Ear: External ear normal.   Eyes:      General: No scleral icterus.     Conjunctiva/sclera: Conjunctivae normal.      Pupils: Pupils are equal, round, and reactive to light.   Cardiovascular:      Rate and Rhythm: Normal rate.   Pulmonary:      Effort: Pulmonary effort is normal. No respiratory distress.      Breath sounds: Normal breath sounds.   Abdominal:      General: There is no distension.   Musculoskeletal:         General: No tenderness.      Cervical back: Neck supple.      Right lower leg: No edema.      Left lower leg: No edema.   Skin:     General: Skin is warm.      Coloration: Skin is not jaundiced.      Findings: No bruising, erythema or rash.   Neurological:      General: No focal deficit present.      Mental Status: She is alert and oriented to person, place, and time. Mental status is at baseline.   Psychiatric:         Mood and Affect: Mood normal.         Behavior: Behavior normal.       Administrative Statements

## 2024-05-20 NOTE — ASSESSMENT & PLAN NOTE
Potassium remains low despite repletion therapy.  Will initiate potassium 10 mEq daily with follow-up labs in 3 to 4 weeks

## 2024-05-20 NOTE — ED ATTENDING ATTESTATION
5/19/2024  INemesio MD, saw and evaluated the patient. I have discussed the patient with the resident/non-physician practitioner and agree with the resident's/non-physician practitioner's findings, Plan of Care, and MDM as documented in the resident's/non-physician practitioner's note, except where noted. All available labs and Radiology studies were reviewed.  I was present for key portions of any procedure(s) performed by the resident/non-physician practitioner and I was immediately available to provide assistance.       At this point I agree with the current assessment done in the Emergency Department.  I have conducted an independent evaluation of this patient a history and physical is as follows:see h and p above     ED Course  ED Course as of 05/20/24 1900   Sun May 19, 2024   1025 ER MD NOTE- 12 LEAD ECG REVIEWED AND COMPARED  BY ER MD- NSR RATE OF 80- NO SIGNS OF ISCHEMIA/ INJURY / R HEART STRAIN / ANATOLY/PERICARDITIS- COMPARED TO PREVIOUS ECG 4/2/24- NO SIGN CHANGES   1026 ER MD MEDICAL DECISION MAKING NOTE- PT IS LOW RISK FOR PE BY WELLS SCORE- SCORE OF 0- FAILS PERC BY ORAL ESTROGEN- WILL CHECK D DIMER AND USE PED ED STUDY D DIMER CUTOFF OF 1000 FOR LOW RISK PT    1030 CXR PORTABLE- COMPARED TO PREVIOUS 4/2/24- NO SIGN CHANGES- NO CHANGE IN MEDIASTINUM/CARDIAC SILHOUETTE- NO FREE/SQ AIR- NO INFILTRATE- PTX- PULM EDEMA- PLEURAL EFFUSIONS    1034 ER MD NOTE- TP SEEN AND THOROUGHLY EVALUATED BY ER MD - CASE D/W ER RESIDENT - ALL STUDIES AND PREVIOUS STUDIES REVIEWED BY ER MD -- 44 YR  FEMALE PRESENTS WITH 3 DAYS OF CONSTANT MIDL CONSTANT ANTERIRO NON RADIATIGN CHEST PRESSRE- THAT IS WORSE WITH EXERTION - WITH EXERTIONAL SOB- NO FEVERS/URI/ NO HX OF VTE- NO BLE EDEMA/ ORTHOPNEA/PND- PT HAD RECENT OUTPT LABS FOR PMD APPT TOMORROW- AVSS - WELL APPEARING IN N AD- PULSE OX 96 % ON RA- INTERPRETATION IS NORMAL- NO INTERVENTION - RRR S1/S2/-CTA-B/SOFT NT/ND - EQUAL BILATERAL RADIAL/DP PULSES- NO BLE  EDEMA/CALF TENDERNESS/ASYM/ ERYTHEMA - NORMAL NON FOCAL NEURO EXAM          Critical Care Time  Procedures

## 2024-05-20 NOTE — ASSESSMENT & PLAN NOTE
Blood pressure is nicely controlled on lisinopril 5 mg however patient developed a cough so we will transition to valsartan 40

## 2024-05-29 ENCOUNTER — APPOINTMENT (OUTPATIENT)
Dept: LAB | Age: 44
End: 2024-05-29
Payer: COMMERCIAL

## 2024-05-29 DIAGNOSIS — R71.8 MICROCYTOSIS: ICD-10-CM

## 2024-05-29 DIAGNOSIS — E87.6 HYPOKALEMIA: ICD-10-CM

## 2024-05-29 LAB
ANION GAP SERPL CALCULATED.3IONS-SCNC: 10 MMOL/L (ref 4–13)
BUN SERPL-MCNC: 12 MG/DL (ref 5–25)
CALCIUM SERPL-MCNC: 8.8 MG/DL (ref 8.4–10.2)
CHLORIDE SERPL-SCNC: 103 MMOL/L (ref 96–108)
CO2 SERPL-SCNC: 26 MMOL/L (ref 21–32)
CREAT SERPL-MCNC: 0.84 MG/DL (ref 0.6–1.3)
FERRITIN SERPL-MCNC: 10 NG/ML (ref 24–307)
GLUCOSE SERPL-MCNC: 113 MG/DL (ref 65–140)
IRON SATN MFR SERPL: 9 % (ref 15–50)
IRON SERPL-MCNC: 39 UG/DL (ref 50–212)
POTASSIUM SERPL-SCNC: 3.7 MMOL/L (ref 3.5–5.3)
SODIUM SERPL-SCNC: 139 MMOL/L (ref 135–147)
TIBC SERPL-MCNC: 454 UG/DL (ref 250–450)
UIBC SERPL-MCNC: 415 UG/DL (ref 155–355)

## 2024-05-29 PROCEDURE — 82728 ASSAY OF FERRITIN: CPT

## 2024-05-29 PROCEDURE — 80048 BASIC METABOLIC PNL TOTAL CA: CPT

## 2024-05-29 PROCEDURE — 36415 COLL VENOUS BLD VENIPUNCTURE: CPT

## 2024-05-29 PROCEDURE — 83550 IRON BINDING TEST: CPT

## 2024-05-29 PROCEDURE — 83540 ASSAY OF IRON: CPT

## 2024-05-30 DIAGNOSIS — E61.1 IRON DEFICIENCY: Primary | ICD-10-CM

## 2024-06-06 ENCOUNTER — HOSPITAL ENCOUNTER (OUTPATIENT)
Dept: PULMONOLOGY | Facility: HOSPITAL | Age: 44
End: 2024-06-06
Attending: INTERNAL MEDICINE
Payer: COMMERCIAL

## 2024-06-06 DIAGNOSIS — M54.12 CERVICAL RADICULAR PAIN: Primary | ICD-10-CM

## 2024-06-06 DIAGNOSIS — R25.2 HAND CRAMP: ICD-10-CM

## 2024-06-06 DIAGNOSIS — J45.20 MILD INTERMITTENT ASTHMA WITHOUT COMPLICATION: ICD-10-CM

## 2024-06-06 PROCEDURE — 94729 DIFFUSING CAPACITY: CPT

## 2024-06-06 PROCEDURE — 94726 PLETHYSMOGRAPHY LUNG VOLUMES: CPT

## 2024-06-06 PROCEDURE — 94060 EVALUATION OF WHEEZING: CPT | Performed by: STUDENT IN AN ORGANIZED HEALTH CARE EDUCATION/TRAINING PROGRAM

## 2024-06-06 PROCEDURE — 94729 DIFFUSING CAPACITY: CPT | Performed by: STUDENT IN AN ORGANIZED HEALTH CARE EDUCATION/TRAINING PROGRAM

## 2024-06-06 PROCEDURE — 94727 GAS DIL/WSHOT DETER LNG VOL: CPT | Performed by: STUDENT IN AN ORGANIZED HEALTH CARE EDUCATION/TRAINING PROGRAM

## 2024-06-06 PROCEDURE — 94060 EVALUATION OF WHEEZING: CPT

## 2024-06-06 PROCEDURE — 94760 N-INVAS EAR/PLS OXIMETRY 1: CPT

## 2024-06-06 RX ORDER — ALBUTEROL SULFATE 2.5 MG/3ML
2.5 SOLUTION RESPIRATORY (INHALATION) ONCE
Status: COMPLETED | OUTPATIENT
Start: 2024-06-06 | End: 2024-06-06

## 2024-06-06 RX ADMIN — ALBUTEROL SULFATE 2.5 MG: 2.5 SOLUTION RESPIRATORY (INHALATION) at 07:43

## 2024-06-11 DIAGNOSIS — G43.709 CHRONIC MIGRAINE WITHOUT AURA WITHOUT STATUS MIGRAINOSUS, NOT INTRACTABLE: ICD-10-CM

## 2024-06-11 DIAGNOSIS — M79.18 CERVICAL MYOFASCIAL PAIN SYNDROME: ICD-10-CM

## 2024-06-11 RX ORDER — ORPHENADRINE CITRATE 100 MG/1
100 TABLET, EXTENDED RELEASE ORAL 2 TIMES DAILY PRN
Qty: 60 TABLET | Refills: 1 | Status: SHIPPED | OUTPATIENT
Start: 2024-06-11

## 2024-06-11 RX ORDER — KETOROLAC TROMETHAMINE 30 MG/ML
INJECTION, SOLUTION INTRAMUSCULAR; INTRAVENOUS
Qty: 4 ML | Refills: 6 | Status: SHIPPED | OUTPATIENT
Start: 2024-06-11

## 2024-06-13 ENCOUNTER — CLINICAL SUPPORT (OUTPATIENT)
Dept: NEUROLOGY | Facility: CLINIC | Age: 44
End: 2024-06-13
Payer: COMMERCIAL

## 2024-06-13 DIAGNOSIS — G43.709 CHRONIC MIGRAINE WITHOUT AURA WITHOUT STATUS MIGRAINOSUS, NOT INTRACTABLE: Primary | ICD-10-CM

## 2024-06-13 PROCEDURE — 96372 THER/PROPH/DIAG INJ SC/IM: CPT | Performed by: PSYCHIATRY & NEUROLOGY

## 2024-06-13 RX ORDER — DEXAMETHASONE SODIUM PHOSPHATE 4 MG/ML
4 INJECTION, SOLUTION INTRA-ARTICULAR; INTRALESIONAL; INTRAMUSCULAR; INTRAVENOUS; SOFT TISSUE ONCE
Status: COMPLETED | OUTPATIENT
Start: 2024-06-13 | End: 2024-06-13

## 2024-06-13 RX ADMIN — DEXAMETHASONE SODIUM PHOSPHATE 4 MG: 4 INJECTION, SOLUTION INTRA-ARTICULAR; INTRALESIONAL; INTRAMUSCULAR; INTRAVENOUS; SOFT TISSUE at 17:20

## 2024-06-17 DIAGNOSIS — G43.709 CHRONIC MIGRAINE WITHOUT AURA WITHOUT STATUS MIGRAINOSUS, NOT INTRACTABLE: ICD-10-CM

## 2024-06-18 RX ORDER — BUTALBITAL, ACETAMINOPHEN AND CAFFEINE 50; 325; 40 MG/1; MG/1; MG/1
TABLET ORAL
Qty: 10 TABLET | Refills: 2 | Status: SHIPPED | OUTPATIENT
Start: 2024-06-18 | End: 2024-06-21 | Stop reason: SDUPTHER

## 2024-06-21 ENCOUNTER — TELEMEDICINE (OUTPATIENT)
Dept: NEUROLOGY | Facility: CLINIC | Age: 44
End: 2024-06-21
Payer: COMMERCIAL

## 2024-06-21 DIAGNOSIS — G43.109 MIGRAINE WITH AURA AND WITHOUT STATUS MIGRAINOSUS, NOT INTRACTABLE: ICD-10-CM

## 2024-06-21 DIAGNOSIS — D18.00 CAVERNOUS ANGIOMA: ICD-10-CM

## 2024-06-21 DIAGNOSIS — M79.18 CERVICAL MYOFASCIAL PAIN SYNDROME: ICD-10-CM

## 2024-06-21 DIAGNOSIS — G43.709 CHRONIC MIGRAINE WITHOUT AURA WITHOUT STATUS MIGRAINOSUS, NOT INTRACTABLE: Primary | ICD-10-CM

## 2024-06-21 DIAGNOSIS — E61.1 IRON DEFICIENCY: ICD-10-CM

## 2024-06-21 PROCEDURE — 99214 OFFICE O/P EST MOD 30 MIN: CPT | Performed by: PHYSICIAN ASSISTANT

## 2024-06-21 RX ORDER — ATOGEPANT 10 MG/1
TABLET ORAL
Qty: 90 TABLET | Refills: 1 | Status: SHIPPED | OUTPATIENT
Start: 2024-06-21

## 2024-06-21 RX ORDER — BUTALBITAL, ACETAMINOPHEN AND CAFFEINE 50; 325; 40 MG/1; MG/1; MG/1
TABLET ORAL
Qty: 10 TABLET | Refills: 2 | Status: SHIPPED | OUTPATIENT
Start: 2024-06-21

## 2024-06-21 NOTE — PROGRESS NOTES
Virtual Regular Visit    Verification of patient location:    Patient is located at Home in the following state in which I hold an active license [place]      Assessment/Plan:    Problem List Items Addressed This Visit          Cardiovascular and Mediastinum    Chronic migraine without aura without status migrainosus, not intractable - Primary    Relevant Medications    Atogepant (Qulipta) 10 MG TABS    rimegepant sulfate (NURTEC) 75 mg TBDP    butalbital-acetaminophen-caffeine (FIORICET,ESGIC) -40 mg per tablet    Other Relevant Orders    TIBC Panel (incl. Iron, TIBC, % Iron Saturation)    Migraine with aura and without status migrainosus, not intractable    Relevant Medications    Atogepant (Qulipta) 10 MG TABS    rimegepant sulfate (NURTEC) 75 mg TBDP    butalbital-acetaminophen-caffeine (FIORICET,ESGIC) -40 mg per tablet       Musculoskeletal and Integument    Cervical myofascial pain syndrome       Other    Cavernous angioma    Iron deficiency    Relevant Orders    TIBC Panel (incl. Iron, TIBC, % Iron Saturation)     Trial Qulipta for migraine prevention. S/e reviewed.  PRN nurtec, fioricet another option.  Pt was asked to consider seeing eye dr again, and potentially have LP for possible increased ICP? Pt reports headaches on awakening and intermittent diplopia so needs to be r/o.    The patient should not hesitate to call me prior to her follow up with any questions or concerns.           Reason for visit is   Chief Complaint   Patient presents with    Cervical myofascial pain syndrome    Chronic migraine without aura without status migrainosus, n    Virtual Regular Visit          Encounter provider Yamilka Boone PA-C      Recent Visits  Date Type Provider Dept   06/21/24 Telemedicine Yamilka Boone PA-C [facility]   Showing recent visits within past 7 days and meeting all other requirements  Future Appointments  No visits were found meeting these conditions.  Showing future appointments  within next 150 days and meeting all other requirements       The patient was identified by name and date of birth. Rob Arriola was informed that this is a telemedicine visit and that the visit is being conducted through the Epic Embedded platform. She agrees to proceed..  My office door was closed. No one else was in the room.  She acknowledged consent and understanding of privacy and security of the video platform. The patient has agreed to participate and understands they can discontinue the visit at any time.    Patient is aware this is a billable service.     Subjective  Rob Arriola is a 44 y.o. adult who is contacted via telemedicine for neurological follow-up.      HPI     She stopped savella due to worsening anxiety and high heart rate. She feels better since stopping it.  States pcp told her to take otc iron due to low iron in the setting of spacy-ness, confusion, fatigue, forgetfulness, and asking to repeat labs now. She is still feeling bad with more headaches, almost daily and frequent migraines.  She was given IM decadron by Dr. Dickson in the [place] office one day and it helped a little.  States her HAs are strong when waking up.  Bright lights are a trigger and heat/ hot weather.  BP continues to be high at times, she states only when stressed otherwise BP is controlled on meds    At onset of a migraine she gets diplopia first, then light sens, then the HA.  Nurtec and fioricet help reduce headache temporarily.  She also thinks her neck pain is trigggering a lot of HAs.    EMG ordered before due to neck pain with left hand cramping, kaleb of the pinky and ring finger.      Past Medical History:   Diagnosis Date    Anxiety     Asthma     seasonal    COVID-19 virus infection 12/27/2021    3/19/24    Hernia of abdominal wall     Hypertension     Kidney stones     Seizures (HCC)     pseudoseizures since 2012    Viral URI with cough 12/27/2021       Past Surgical History:   Procedure Laterality Date     APPENDECTOMY      CHOLECYSTECTOMY      GASTRIC BYPASS      HERNIA REPAIR      x2       Current Outpatient Medications   Medication Sig Dispense Refill    acetaminophen (TYLENOL) 500 mg tablet Take 500 mg by mouth if needed      albuterol (2.5 mg/3 mL) 0.083 % nebulizer solution Take 3 mL (2.5 mg total) by nebulization every 6 (six) hours as needed for wheezing or shortness of breath 75 mL 0    albuterol (PROVENTIL HFA,VENTOLIN HFA) 90 mcg/act inhaler Inhale 2 puffs every 6 (six) hours as needed for wheezing 6.7 g 2    Atogepant (Qulipta) 10 MG TABS 1 tab qD 90 tablet 1    benzonatate (TESSALON) 200 MG capsule Take 1 capsule (200 mg total) by mouth 3 (three) times a day as needed for cough 30 capsule 0    butalbital-acetaminophen-caffeine (FIORICET,ESGIC) -40 mg per tablet TAKE ONE TABLET BY MOUTH EVERY 6 HOURS AS NEEDED FOR MIGRAINE. NO MORE THAN 2 TO 3 PER WEEK 10 tablet 2    colchicine (COLCRYS) 0.6 mg tablet Take 1 tablet (0.6 mg total) by mouth daily 2 pills now and 1 in an hour. One daily until resolved. (Patient taking differently: Take 0.6 mg by mouth daily as needed 2 pills now and 1 in an hour. One daily until resolved.) 20 tablet 0    Cyanocobalamin 1000 MCG/ML KIT Inject 1 mL (1,000 mcg total) into a muscle once a week Inject 1 ml IM in thigh/ buttocks or deltoid mm once weekly x 8 weeks. Afterward once monthly 20 mL 0    estradiol (ESTRACE) 2 MG tablet Take 1 tablet (2 mg total) by mouth 2 (two) times a day 180 tablet 1    famotidine (PEPCID) 20 mg tablet Take 1 tablet (20 mg total) by mouth 2 (two) times a day 30 tablet 0    fluticasone (FLONASE) 50 mcg/act nasal spray 1 spray into each nostril daily (Patient taking differently: 1 spray into each nostril daily as needed) 16 g 5    hydrocortisone (ANUSOL-HC) 25 mg suppository Insert 1 suppository (25 mg total) into the rectum daily as needed for hemorrhoids 12 suppository 0    ipratropium-albuterol (DUO-NEB) 0.5-2.5 mg/3 mL nebulizer solution Take  "3 mL by nebulization every 6 (six) hours as needed for wheezing or shortness of breath 60 mL 0    Iron, Ferrous Sulfate, 325 (65 Fe) MG TABS Take 1 tablet by mouth in the morning 42 tablet 0    ketorolac (TORADOL) 30 mg/mL injection 1-2 ml IM injection once per 24 hours p.r.n. migraine.  No more than 2 injections per week. 4 mL 6    LORazepam (ATIVAN) 1 mg tablet Take 0.5 tablets (0.5 mg total) by mouth every 6 (six) hours as needed for anxiety 60 tablet 0    montelukast (SINGULAIR) 10 mg tablet Take 1 tablet (10 mg total) by mouth daily at bedtime 90 tablet 1    multivitamin (THERAGRAN) TABS Take 1 tablet by mouth daily      ondansetron (ZOFRAN-ODT) 4 mg disintegrating tablet Take 1 tablet (4 mg total) by mouth every 8 (eight) hours as needed for nausea or vomiting 60 tablet 5    orphenadrine (NORFLEX) 100 mg tablet Take 1 tablet (100 mg total) by mouth 2 (two) times a day as needed for muscle spasms 60 tablet 1    pantoprazole (PROTONIX) 40 mg tablet Take 1 tablet (40 mg total) by mouth daily 90 tablet 1    potassium chloride (Klor-Con M10) 10 mEq tablet Take 1 tablet (10 mEq total) by mouth 2 (two) times a day 30 tablet 1    predniSONE 10 mg tablet 4 tablets daily x 3 days;  3 tablets daily x 3 days; 2 tablets daily x 3 days; 1 tablet daily x 3 days (Patient taking differently: as needed 4 tablets daily x 3 days;  3 tablets daily x 3 days; 2 tablets daily x 3 days; 1 tablet daily x 3 days) 30 tablet 0    Riboflavin 400 MG TABS Take 1 tablet (400 mg total) by mouth in the morning 90 tablet 3    rimegepant sulfate (NURTEC) 75 mg TBDP 1 tab at migraine onset. No more than one dose per 24 hours. 8 tablet 11    sucralfate (CARAFATE) 1 g tablet Take 1 tablet (1 g total) by mouth 4 (four) times a day 60 tablet 0    Syringe/Needle, Disp, (SYRINGE 3CC/97IH5-8/2\") 25G X 1-1/2\" 3 ML MISC Use for toradol IM injections. 12 each 0    Syringe/Needle, Disp, (SYRINGE 3CC/91BZ0-1/4\") 27G X 1-1/4\" 3 ML MISC Use for B12 injections " "10 each 1    traMADol (ULTRAM) 50 mg tablet Take 1 tablet (50 mg total) by mouth 2 (two) times a day as needed for moderate pain 60 tablet 0    triamterene-hydrochlorothiazide (DYAZIDE) 37.5-25 mg per capsule Take 1 capsule by mouth every morning 90 capsule 1    valsartan (DIOVAN) 40 mg tablet Take 1 tablet (40 mg total) by mouth daily 30 tablet 2    naproxen sodium (ANAPROX) 275 MG tablet Take 1 tablet (275 mg total) by mouth 2 (two) times a day with meals Prn neck pain, headaches 60 tablet 2    oxyCODONE (Roxicodone) 5 immediate release tablet Take 1 tablet (5 mg total) by mouth every 6 (six) hours as needed for moderate pain for up to 8 doses Max Daily Amount: 20 mg (Patient not taking: Reported on 5/20/2024) 8 tablet 0     Current Facility-Administered Medications   Medication Dose Route Frequency Provider Last Rate Last Admin    Botulinum Toxin Type A SOLR 200 Units  200 Units Injection Q3 Months    200 Units at 02/16/24 1046    Botulinum Toxin Type A SOLR 200 Units  200 Units Injection Q3 Months    200 Units at 05/17/24 1355    cyanocobalamin injection 1,000 mcg  1,000 mcg Intramuscular Q30 Days Samantha Bagley MD   1,000 mcg at 06/19/23 1310        Allergies   Allergen Reactions    Gadolinium Anaphylaxis     \"Oxygen dropped and coded during\"       Iodinated Contrast Media Anaphylaxis       Review of Systems   Constitutional:  Positive for fatigue. Negative for appetite change and fever.   HENT: Negative.  Negative for hearing loss, tinnitus, trouble swallowing and voice change.    Eyes:  Positive for visual disturbance. Negative for photophobia and pain.   Respiratory:  Positive for shortness of breath.    Cardiovascular: Negative.  Negative for palpitations.   Gastrointestinal:  Positive for nausea. Negative for vomiting.   Endocrine: Negative.  Negative for cold intolerance.   Genitourinary: Negative.  Negative for dysuria, frequency and urgency.   Musculoskeletal:  Positive for back pain, gait problem, " neck pain and neck stiffness. Negative for myalgias.   Skin: Negative.  Negative for rash.   Allergic/Immunologic: Negative.    Neurological:  Positive for dizziness, light-headedness, numbness (Shoulder and arms) and headaches. Negative for tremors, seizures, syncope, facial asymmetry, speech difficulty and weakness.   Hematological: Negative.  Does not bruise/bleed easily.   Psychiatric/Behavioral:  Positive for confusion and sleep disturbance.      ROS reviewed.      Video Exam    There were no vitals filed for this visit.    Physical Exam   Neurological exam:  On neurologic exam, the patient is alert and oriented to time and place. Speech is fluent and articulate, and the patient follows commands appropriately. Judgment and affect appear normal.  Extraocular muscles are intact without nystagmus. Face is symmetric. Hearing is intact bilaterally. Motor examination reveals adequate range of motion.       Visit Time  Total Visit Duration: 30 minutes

## 2024-06-24 ENCOUNTER — TELEPHONE (OUTPATIENT)
Dept: NEUROLOGY | Facility: CLINIC | Age: 44
End: 2024-06-24

## 2024-06-24 NOTE — TELEPHONE ENCOUNTER
PA submitted through [facility] on 6/17/24/KEY: [id number] for Nurtec 75 mg tabs; pending approval    Outcome  Approved on June 22  PA Case: [id number], Status: Approved, Coverage Starts on: 6/17/2024 12:00 AM, Coverage Ends on: 6/17/2025 12:00 AM. Questions? Contact [phone].  Authorization Expiration Date: 6/16/2025

## 2024-07-01 ENCOUNTER — APPOINTMENT (OUTPATIENT)
Dept: LAB | Facility: CLINIC | Age: 44
End: 2024-07-01
Payer: COMMERCIAL

## 2024-07-01 DIAGNOSIS — G43.709 CHRONIC MIGRAINE WITHOUT AURA WITHOUT STATUS MIGRAINOSUS, NOT INTRACTABLE: ICD-10-CM

## 2024-07-01 DIAGNOSIS — E61.1 IRON DEFICIENCY: ICD-10-CM

## 2024-07-01 LAB
IRON SATN MFR SERPL: 19 % (ref 15–50)
IRON SERPL-MCNC: 77 UG/DL (ref 50–212)
TIBC SERPL-MCNC: 410 UG/DL (ref 250–450)
UIBC SERPL-MCNC: 333 UG/DL (ref 155–355)

## 2024-07-01 PROCEDURE — 83550 IRON BINDING TEST: CPT

## 2024-07-01 PROCEDURE — 36415 COLL VENOUS BLD VENIPUNCTURE: CPT

## 2024-07-01 PROCEDURE — 83540 ASSAY OF IRON: CPT

## 2024-07-02 ENCOUNTER — TELEPHONE (OUTPATIENT)
Dept: NEUROLOGY | Facility: CLINIC | Age: 44
End: 2024-07-02

## 2024-07-02 NOTE — TELEPHONE ENCOUNTER
See enc 6/24/24-Nurtec approval    Urgent Qulipta PA initiated on CMM. Key: [id number]    Awaiting determination

## 2024-07-03 NOTE — TELEPHONE ENCOUNTER
PA denied-  PA Case: [id number], Status: Denied, Denial Rationale: Your medication request has been denied as it does not appear to meet medically necessary requirements. Plan rules require clinical parameters (diagnosis, lab values, test results, physical exam findings etc) be met for medical necessity approval. Information submitted does not indicate required parameter results were met. Coverage for Qulipta 10MG Tablet is denied. It does not meet medical necessity. Qulipta 10MG Tablet has been requested for the treatment of chronic migraine prophylaxis. The information provided by your prescriber does not meet [employer]'s guideline. The guideline used is: Calcitonin Gene-Related Peptide (CGRP) Prior Authorization with Quantity Limit. Information provided does not show that the policy requirements have been met. The requirement(s) not met are: Qulipta 10MG Tablet is only FDA approved for a diagnosis of Episodic Migraine Prophylaxis. You have indicated that the member has a diagnosis of chronic migraine prophylaxis. Please confirm the diagnosis and number of headaches per month. *Please note: Qulipta 60MG Tablet is FDA approved for chronic migraine prophylaxis. Qulipta 10, 30 or 60MG is approved for Episodic Migraine Prophylaxis. Therefore, coverage for Qulipta 10MG Tablet is denied. Questions? Contact [phone].

## 2024-07-08 ENCOUNTER — PATIENT MESSAGE (OUTPATIENT)
Dept: NEUROLOGY | Facility: CLINIC | Age: 44
End: 2024-07-08

## 2024-07-08 DIAGNOSIS — G43.709 CHRONIC MIGRAINE WITHOUT AURA WITHOUT STATUS MIGRAINOSUS, NOT INTRACTABLE: ICD-10-CM

## 2024-07-15 RX ORDER — ATOGEPANT 60 MG/1
TABLET ORAL
Qty: 90 TABLET | Refills: 1 | Status: SHIPPED | OUTPATIENT
Start: 2024-07-15

## 2024-07-26 ENCOUNTER — APPOINTMENT (OUTPATIENT)
Dept: RADIOLOGY | Age: 44
End: 2024-07-26
Payer: COMMERCIAL

## 2024-07-26 ENCOUNTER — TELEPHONE (OUTPATIENT)
Age: 44
End: 2024-07-26
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
## 2024-07-26 PROCEDURE — 73610 X-RAY EXAM OF ANKLE: CPT

## 2024-07-26 NOTE — TELEPHONE ENCOUNTER
Patient requested ARCELIA to , next Botox scheduled 8/27/24 at [place].     Botox team please change Botox Auth to  at [place] location   Complex Repair And Modified Advancement Flap Text: The defect edges were debeveled with a #15 scalpel blade.  The primary defect was closed partially with a complex linear closure.  Given the location of the remaining defect, shape of the defect and the proximity to free margins a modified advancement flap was deemed most appropriate for complete closure of the defect.  Using a sterile surgical marker, an appropriate advancement flap was drawn incorporating the defect and placing the expected incisions within the relaxed skin tension lines where possible.    The area thus outlined was incised deep to adipose tissue with a #15 scalpel blade.  The skin margins were undermined to an appropriate distance in all directions utilizing iris scissors.

## 2024-07-26 NOTE — PROGRESS NOTES
1. Fall    - XR ankle 2 vw left; Future        Susi Dickson MD.   Staff Neurologist  07/26/24   9:28 AM

## 2024-07-29 ENCOUNTER — CLINICAL SUPPORT (OUTPATIENT)
Age: 44
End: 2024-07-29
Payer: COMMERCIAL

## 2024-07-29 DIAGNOSIS — G43.709 CHRONIC MIGRAINE WITHOUT AURA WITHOUT STATUS MIGRAINOSUS, NOT INTRACTABLE: Primary | ICD-10-CM

## 2024-07-29 PROCEDURE — 96372 THER/PROPH/DIAG INJ SC/IM: CPT | Performed by: PSYCHIATRY & NEUROLOGY

## 2024-07-29 RX ORDER — DEXAMETHASONE SODIUM PHOSPHATE 4 MG/ML
4 INJECTION, SOLUTION INTRA-ARTICULAR; INTRALESIONAL; INTRAMUSCULAR; INTRAVENOUS; SOFT TISSUE EVERY 6 HOURS SCHEDULED
Status: CANCELLED | OUTPATIENT
Start: 2024-07-29

## 2024-07-29 RX ORDER — DEXAMETHASONE SODIUM PHOSPHATE 4 MG/ML
4 INJECTION, SOLUTION INTRA-ARTICULAR; INTRALESIONAL; INTRAMUSCULAR; INTRAVENOUS; SOFT TISSUE ONCE
Status: COMPLETED | OUTPATIENT
Start: 2024-07-29 | End: 2024-07-29

## 2024-07-29 RX ADMIN — DEXAMETHASONE SODIUM PHOSPHATE 4 MG: 4 INJECTION, SOLUTION INTRA-ARTICULAR; INTRALESIONAL; INTRAMUSCULAR; INTRAVENOUS; SOFT TISSUE at 12:36

## 2024-08-05 DIAGNOSIS — M50.90 CERVICAL DISC DISEASE: ICD-10-CM

## 2024-08-05 DIAGNOSIS — K42.9 UMBILICAL HERNIA WITHOUT OBSTRUCTION AND WITHOUT GANGRENE: ICD-10-CM

## 2024-08-05 DIAGNOSIS — E87.6 HYPOKALEMIA: ICD-10-CM

## 2024-08-05 DIAGNOSIS — Z78.9 MALE-TO-FEMALE TRANSGENDER PERSON: ICD-10-CM

## 2024-08-05 DIAGNOSIS — I10 PRIMARY HYPERTENSION: ICD-10-CM

## 2024-08-05 DIAGNOSIS — R51.9 WORSENING HEADACHES: ICD-10-CM

## 2024-08-05 DIAGNOSIS — K21.9 GASTROESOPHAGEAL REFLUX DISEASE WITHOUT ESOPHAGITIS: ICD-10-CM

## 2024-08-05 DIAGNOSIS — K64.9 HEMORRHOIDS, UNSPECIFIED HEMORRHOID TYPE: ICD-10-CM

## 2024-08-05 DIAGNOSIS — K21.9 GERD (GASTROESOPHAGEAL REFLUX DISEASE): ICD-10-CM

## 2024-08-05 RX ORDER — FAMOTIDINE 20 MG/1
20 TABLET, FILM COATED ORAL 2 TIMES DAILY
Qty: 30 TABLET | Refills: 0 | Status: SHIPPED | OUTPATIENT
Start: 2024-08-05

## 2024-08-05 RX ORDER — PANTOPRAZOLE SODIUM 40 MG/1
40 TABLET, DELAYED RELEASE ORAL DAILY
Qty: 90 TABLET | Refills: 0 | Status: SHIPPED | OUTPATIENT
Start: 2024-08-05

## 2024-08-06 ENCOUNTER — HOSPITAL ENCOUNTER (EMERGENCY)
Facility: HOSPITAL | Age: 44
Discharge: HOME | End: 2024-08-06
Attending: EMERGENCY MEDICINE | Admitting: EMERGENCY MEDICINE
Payer: COMMERCIAL

## 2024-08-06 ENCOUNTER — APPOINTMENT (EMERGENCY)
Dept: CT IMAGING | Facility: HOSPITAL | Age: 44
End: 2024-08-06
Payer: COMMERCIAL

## 2024-08-06 VITALS
BODY MASS INDEX: 50.76 KG/M2 | HEART RATE: 96 BPM | RESPIRATION RATE: 18 BRPM | SYSTOLIC BLOOD PRESSURE: 136 MMHG | TEMPERATURE: 98.2 F | DIASTOLIC BLOOD PRESSURE: 81 MMHG | OXYGEN SATURATION: 97 % | WEIGHT: 315 LBS

## 2024-08-06 DIAGNOSIS — N30.90 CYSTITIS: ICD-10-CM

## 2024-08-06 DIAGNOSIS — K59.00 CONSTIPATION: Primary | ICD-10-CM

## 2024-08-06 DIAGNOSIS — R10.9 ABDOMINAL PAIN: ICD-10-CM

## 2024-08-06 DIAGNOSIS — R31.9 HEMATURIA: ICD-10-CM

## 2024-08-06 LAB
ALBUMIN SERPL BCG-MCNC: 3.8 G/DL (ref 3.5–5)
ALP SERPL-CCNC: 59 U/L (ref 34–104)
ALT SERPL W P-5'-P-CCNC: 9 U/L (ref 7–52)
ANION GAP SERPL CALCULATED.3IONS-SCNC: 8 MMOL/L (ref 4–13)
AST SERPL W P-5'-P-CCNC: 13 U/L (ref 5–45)
ATRIAL RATE: 89 BPM
BACTERIA UR QL AUTO: ABNORMAL /HPF
BASOPHILS # BLD AUTO: 0.03 THOUSANDS/ÂΜL (ref 0–0.1)
BASOPHILS NFR BLD AUTO: 0 % (ref 0–1)
BILIRUB SERPL-MCNC: 0.48 MG/DL (ref 0.2–1)
BILIRUB UR QL STRIP: NEGATIVE
BUN SERPL-MCNC: 11 MG/DL (ref 5–25)
CALCIUM SERPL-MCNC: 8.8 MG/DL (ref 8.4–10.2)
CHLORIDE SERPL-SCNC: 100 MMOL/L (ref 96–108)
CLARITY UR: CLEAR
CO2 SERPL-SCNC: 28 MMOL/L (ref 21–32)
COLOR UR: YELLOW
CREAT SERPL-MCNC: 0.93 MG/DL (ref 0.6–1.3)
EOSINOPHIL # BLD AUTO: 0.15 THOUSAND/ÂΜL (ref 0–0.61)
EOSINOPHIL NFR BLD AUTO: 2 % (ref 0–6)
ERYTHROCYTE [DISTWIDTH] IN BLOOD BY AUTOMATED COUNT: 14.9 % (ref 11.6–15.1)
GLUCOSE SERPL-MCNC: 106 MG/DL (ref 65–140)
GLUCOSE UR STRIP-MCNC: NEGATIVE MG/DL
HCT VFR BLD AUTO: 40.4 % (ref 36.5–46.1)
HGB BLD-MCNC: 12.4 G/DL (ref 12–15.4)
HGB UR QL STRIP.AUTO: ABNORMAL
HOLD SPECIMEN: NORMAL
IMM GRANULOCYTES # BLD AUTO: 0.05 THOUSAND/UL (ref 0–0.2)
IMM GRANULOCYTES NFR BLD AUTO: 1 % (ref 0–2)
KETONES UR STRIP-MCNC: NEGATIVE MG/DL
LEUKOCYTE ESTERASE UR QL STRIP: NEGATIVE
LIPASE SERPL-CCNC: <6 U/L (ref 11–82)
LYMPHOCYTES # BLD AUTO: 2.48 THOUSANDS/ÂΜL (ref 0.6–4.47)
LYMPHOCYTES NFR BLD AUTO: 25 % (ref 14–44)
MCH RBC QN AUTO: 26.4 PG (ref 26.8–34.3)
MCHC RBC AUTO-ENTMCNC: 30.7 G/DL (ref 31.4–37.4)
MCV RBC AUTO: 86 FL (ref 82–98)
MONOCYTES # BLD AUTO: 0.53 THOUSAND/ÂΜL (ref 0.17–1.22)
MONOCYTES NFR BLD AUTO: 5 % (ref 4–12)
MUCOUS THREADS UR QL AUTO: ABNORMAL
NEUTROPHILS # BLD AUTO: 6.77 THOUSANDS/ÂΜL (ref 1.85–7.62)
NEUTS SEG NFR BLD AUTO: 67 % (ref 43–75)
NITRITE UR QL STRIP: NEGATIVE
NON-SQ EPI CELLS URNS QL MICRO: ABNORMAL /HPF
NRBC BLD AUTO-RTO: 0 /100 WBCS
P AXIS: 48 DEGREES
PH UR STRIP.AUTO: 6.5 [PH]
PLATELET # BLD AUTO: 337 THOUSANDS/UL (ref 149–390)
PMV BLD AUTO: 9.2 FL (ref 8.9–12.7)
POTASSIUM SERPL-SCNC: 3.3 MMOL/L (ref 3.5–5.3)
PR INTERVAL: 178 MS
PROT SERPL-MCNC: 7.2 G/DL (ref 6.4–8.4)
PROT UR STRIP-MCNC: ABNORMAL MG/DL
QRS AXIS: -7 DEGREES
QRSD INTERVAL: 96 MS
QT INTERVAL: 386 MS
QTC INTERVAL: 469 MS
RBC # BLD AUTO: 4.7 MILLION/UL (ref 3.88–5.12)
RBC #/AREA URNS AUTO: ABNORMAL /HPF
SODIUM SERPL-SCNC: 136 MMOL/L (ref 135–147)
SP GR UR STRIP.AUTO: 1.02 (ref 1–1.03)
T WAVE AXIS: 38 DEGREES
UROBILINOGEN UR STRIP-ACNC: <2 MG/DL
VENTRICULAR RATE: 89 BPM
WBC # BLD AUTO: 10.01 THOUSAND/UL (ref 4.31–10.16)
WBC #/AREA URNS AUTO: ABNORMAL /HPF

## 2024-08-06 PROCEDURE — 36415 COLL VENOUS BLD VENIPUNCTURE: CPT

## 2024-08-06 PROCEDURE — 96375 TX/PRO/DX INJ NEW DRUG ADDON: CPT

## 2024-08-06 PROCEDURE — 81001 URINALYSIS AUTO W/SCOPE: CPT | Performed by: EMERGENCY MEDICINE

## 2024-08-06 PROCEDURE — 83690 ASSAY OF LIPASE: CPT | Performed by: EMERGENCY MEDICINE

## 2024-08-06 PROCEDURE — 99285 EMERGENCY DEPT VISIT HI MDM: CPT

## 2024-08-06 PROCEDURE — 96374 THER/PROPH/DIAG INJ IV PUSH: CPT

## 2024-08-06 PROCEDURE — 80053 COMPREHEN METABOLIC PANEL: CPT | Performed by: EMERGENCY MEDICINE

## 2024-08-06 PROCEDURE — 85025 COMPLETE CBC W/AUTO DIFF WBC: CPT | Performed by: EMERGENCY MEDICINE

## 2024-08-06 PROCEDURE — 74176 CT ABD & PELVIS W/O CONTRAST: CPT

## 2024-08-06 PROCEDURE — 87491 CHLMYD TRACH DNA AMP PROBE: CPT

## 2024-08-06 PROCEDURE — 93005 ELECTROCARDIOGRAM TRACING: CPT

## 2024-08-06 PROCEDURE — 87591 N.GONORRHOEAE DNA AMP PROB: CPT

## 2024-08-06 PROCEDURE — 93010 ELECTROCARDIOGRAM REPORT: CPT | Performed by: INTERNAL MEDICINE

## 2024-08-06 PROCEDURE — 99285 EMERGENCY DEPT VISIT HI MDM: CPT | Performed by: EMERGENCY MEDICINE

## 2024-08-06 RX ORDER — POTASSIUM CHLORIDE 750 MG/1
10 TABLET, EXTENDED RELEASE ORAL 2 TIMES DAILY
Qty: 180 TABLET | Refills: 1 | Status: SHIPPED | OUTPATIENT
Start: 2024-08-06

## 2024-08-06 RX ORDER — VALSARTAN 40 MG/1
40 TABLET ORAL DAILY
Qty: 90 TABLET | Refills: 1 | Status: SHIPPED | OUTPATIENT
Start: 2024-08-06

## 2024-08-06 RX ORDER — KETOROLAC TROMETHAMINE 30 MG/ML
15 INJECTION, SOLUTION INTRAMUSCULAR; INTRAVENOUS ONCE
Status: COMPLETED | OUTPATIENT
Start: 2024-08-06 | End: 2024-08-06

## 2024-08-06 RX ORDER — HYDROCORTISONE ACETATE 25 MG/1
25 SUPPOSITORY RECTAL DAILY PRN
Qty: 12 SUPPOSITORY | Refills: 1 | Status: SHIPPED | OUTPATIENT
Start: 2024-08-06

## 2024-08-06 RX ORDER — TRAMADOL HYDROCHLORIDE 50 MG/1
50 TABLET ORAL 2 TIMES DAILY PRN
Qty: 60 TABLET | Refills: 0 | Status: SHIPPED | OUTPATIENT
Start: 2024-08-06

## 2024-08-06 RX ORDER — ESTRADIOL 2 MG/1
2 TABLET ORAL 2 TIMES DAILY
Qty: 180 TABLET | Refills: 1 | Status: SHIPPED | OUTPATIENT
Start: 2024-08-06

## 2024-08-06 RX ORDER — LORAZEPAM 1 MG/1
0.5 TABLET ORAL EVERY 6 HOURS PRN
Qty: 60 TABLET | Refills: 0 | Status: SHIPPED | OUTPATIENT
Start: 2024-08-06

## 2024-08-06 RX ORDER — ACETAMINOPHEN 325 MG/1
975 TABLET ORAL ONCE
Status: COMPLETED | OUTPATIENT
Start: 2024-08-06 | End: 2024-08-06

## 2024-08-06 RX ORDER — CEFPODOXIME PROXETIL 200 MG/1
200 TABLET, FILM COATED ORAL 2 TIMES DAILY WITH MEALS
Status: DISCONTINUED | OUTPATIENT
Start: 2024-08-06 | End: 2024-08-06 | Stop reason: HOSPADM

## 2024-08-06 RX ORDER — CEFPODOXIME PROXETIL 200 MG/1
200 TABLET, FILM COATED ORAL 2 TIMES DAILY
Qty: 20 TABLET | Refills: 0 | Status: SHIPPED | OUTPATIENT
Start: 2024-08-06 | End: 2024-08-16

## 2024-08-06 RX ORDER — CEFPODOXIME PROXETIL 200 MG/1
200 TABLET, FILM COATED ORAL 2 TIMES DAILY WITH MEALS
Status: DISCONTINUED | OUTPATIENT
Start: 2024-08-06 | End: 2024-08-06

## 2024-08-06 RX ORDER — FENTANYL CITRATE 50 UG/ML
50 INJECTION, SOLUTION INTRAMUSCULAR; INTRAVENOUS ONCE
Status: COMPLETED | OUTPATIENT
Start: 2024-08-06 | End: 2024-08-06

## 2024-08-06 RX ADMIN — FENTANYL CITRATE 50 MCG: 50 INJECTION INTRAMUSCULAR; INTRAVENOUS at 10:09

## 2024-08-06 RX ADMIN — KETOROLAC TROMETHAMINE 15 MG: 30 INJECTION, SOLUTION INTRAMUSCULAR; INTRAVENOUS at 06:23

## 2024-08-06 RX ADMIN — CEFPODOXIME PROXETIL 200 MG: 200 TABLET, FILM COATED ORAL at 10:52

## 2024-08-06 RX ADMIN — IOHEXOL 50 ML: 240 INJECTION, SOLUTION INTRATHECAL; INTRAVASCULAR; INTRAVENOUS; ORAL at 06:23

## 2024-08-06 RX ADMIN — ACETAMINOPHEN 975 MG: 325 TABLET, FILM COATED ORAL at 10:09

## 2024-08-06 NOTE — ED PROVIDER NOTES
"History  Chief Complaint   Patient presents with    Abdominal Pain     Reports some stomach cramping and lower back pain since yesterday, reports today started with some urinary burning and penile bleeding.  Reports feeling foggy, +dizziness + blurred vision +fatigue    Blood in Urine     Yas is a 44-year-old M2F with a past medical history of anxiety, asthma, constipation, hypertension, kidney stones, bariatric surgery who presents to the emergency department due to back pain, abdominal pain, and blood in the urine.  Patient reports that at roughly 330 this morning she got up to use the bathroom and when she went to urinate she had to strain for a little bit before she was able to urinate, and when she did start to urinate she had apolonia blood coming out of her urethra.  She reports that she has been having lumbar back pain for the past several days, however she thought that this was related to her chronic constipation issues.     In regards to her constipation issues she reports that this is secondary to her iron supplementation.  She has had gastric bypass in the past and as a result has several malabsorption issues resulting in significant anemia requiring supplemental iron.  She reports that her typical regimen for this is when she gets constipated she gets a milkshake as she is lactose intolerant and that typically \"cleans her out\".      She also endorses significant fatigue over the past several days.            Prior to Admission Medications   Prescriptions Last Dose Informant Patient Reported? Taking?   Atogepant (Qulipta) 60 MG TABS   No No   Sig: Once daily   Cyanocobalamin 1000 MCG/ML KIT  Self No No   Sig: Inject 1 mL (1,000 mcg total) into a muscle once a week Inject 1 ml IM in thigh/ buttocks or deltoid mm once weekly x 8 weeks. Afterward once monthly   Iron, Ferrous Sulfate, 325 (65 Fe) MG TABS   No No   Sig: Take 1 tablet by mouth in the morning   LORazepam (ATIVAN) 1 mg tablet   No No   Sig: Take " "0.5 tablets (0.5 mg total) by mouth every 6 (six) hours as needed for anxiety   Riboflavin 400 MG TABS  Self No No   Sig: Take 1 tablet (400 mg total) by mouth in the morning   Syringe/Needle, Disp, (SYRINGE 3CC/50YH2-3/2\") 25G X 1-2\" 3 ML MISC  Self No No   Sig: Use for toradol IM injections.   Syringe/Needle, Disp, (SYRINGE 3CC/65DX6-4/4\") 27G X -\" 3 ML MISC  Self No No   Sig: Use for B12 injections   acetaminophen (TYLENOL) 500 mg tablet  Self Yes No   Sig: Take 500 mg by mouth if needed   albuterol (2.5 mg/3 mL) 0.083 % nebulizer solution  Self No No   Sig: Take 3 mL (2.5 mg total) by nebulization every 6 (six) hours as needed for wheezing or shortness of breath   albuterol (PROVENTIL HFA,VENTOLIN HFA) 90 mcg/act inhaler  Self No No   Sig: Inhale 2 puffs every 6 (six) hours as needed for wheezing   benzonatate (TESSALON) 200 MG capsule  Self No No   Sig: Take 1 capsule (200 mg total) by mouth 3 (three) times a day as needed for cough   butalbital-acetaminophen-caffeine (FIORICET,ESGIC) -40 mg per tablet   No No   Sig: TAKE ONE TABLET BY MOUTH EVERY 6 HOURS AS NEEDED FOR MIGRAINE. NO MORE THAN 2 TO 3 PER WEEK   colchicine (COLCRYS) 0.6 mg tablet  Self No No   Sig: Take 1 tablet (0.6 mg total) by mouth daily 2 pills now and 1 in an hour. One daily until resolved.   Patient taking differently: Take 0.6 mg by mouth daily as needed 2 pills now and 1 in an hour. One daily until resolved.   estradiol (ESTRACE) 2 MG tablet  Self No No   Sig: Take 1 tablet (2 mg total) by mouth 2 (two) times a day   famotidine (PEPCID) 20 mg tablet   No No   Sig: Take 1 tablet (20 mg total) by mouth 2 (two) times a day   fluticasone (FLONASE) 50 mcg/act nasal spray  Self No No   Si spray into each nostril daily   Patient taking differently: 1 spray into each nostril daily as needed   hydrocortisone (ANUSOL-HC) 25 mg suppository  Self No No   Sig: Insert 1 suppository (25 mg total) into the rectum daily as needed for " hemorrhoids   ipratropium-albuterol (DUO-NEB) 0.5-2.5 mg/3 mL nebulizer solution  Self No No   Sig: Take 3 mL by nebulization every 6 (six) hours as needed for wheezing or shortness of breath   ketorolac (TORADOL) 30 mg/mL injection   No No   Si-2 ml IM injection once per 24 hours p.r.n. migraine.  No more than 2 injections per week.   montelukast (SINGULAIR) 10 mg tablet   No No   Sig: Take 1 tablet (10 mg total) by mouth daily at bedtime   multivitamin (THERAGRAN) TABS  Self Yes No   Sig: Take 1 tablet by mouth daily   naproxen sodium (ANAPROX) 275 MG tablet  Self No No   Sig: Take 1 tablet (275 mg total) by mouth 2 (two) times a day with meals Prn neck pain, headaches   ondansetron (ZOFRAN-ODT) 4 mg disintegrating tablet   No No   Sig: Take 1 tablet (4 mg total) by mouth every 8 (eight) hours as needed for nausea or vomiting   orphenadrine (NORFLEX) 100 mg tablet   No No   Sig: Take 1 tablet (100 mg total) by mouth 2 (two) times a day as needed for muscle spasms   oxyCODONE (Roxicodone) 5 immediate release tablet  Self No No   Sig: Take 1 tablet (5 mg total) by mouth every 6 (six) hours as needed for moderate pain for up to 8 doses Max Daily Amount: 20 mg   Patient not taking: Reported on 2024   pantoprazole (PROTONIX) 40 mg tablet   No No   Sig: Take 1 tablet (40 mg total) by mouth daily   potassium chloride (Klor-Con M10) 10 mEq tablet   No No   Sig: Take 1 tablet (10 mEq total) by mouth 2 (two) times a day   predniSONE 10 mg tablet  Self No No   Si tablets daily x 3 days;  3 tablets daily x 3 days; 2 tablets daily x 3 days; 1 tablet daily x 3 days   Patient taking differently: as needed 4 tablets daily x 3 days;  3 tablets daily x 3 days; 2 tablets daily x 3 days; 1 tablet daily x 3 days   rimegepant sulfate (NURTEC) 75 mg TBDP   No No   Si tab at migraine onset. No more than one dose per 24 hours.   sucralfate (CARAFATE) 1 g tablet  Self No No   Sig: Take 1 tablet (1 g total) by mouth 4  (four) times a day   traMADol (ULTRAM) 50 mg tablet   No No   Sig: Take 1 tablet (50 mg total) by mouth 2 (two) times a day as needed for moderate pain   triamterene-hydrochlorothiazide (DYAZIDE) 37.5-25 mg per capsule  Self No No   Sig: Take 1 capsule by mouth every morning   valsartan (DIOVAN) 40 mg tablet   No No   Sig: Take 1 tablet (40 mg total) by mouth daily      Facility-Administered Medications Last Administration Doses Remaining   Botulinum Toxin Type A SOLR 200 Units 2/16/2024 10:46 AM    Botulinum Toxin Type A SOLR 200 Units 5/17/2024  1:55 PM    cyanocobalamin injection 1,000 mcg 6/19/2023  1:10 PM           Past Medical History:   Diagnosis Date    Anxiety     Asthma     seasonal    COVID-19 virus infection 12/27/2021    3/19/24    Hernia of abdominal wall     Hypertension     Kidney stones     Seizures (HCC)     pseudoseizures since 2012    Viral URI with cough 12/27/2021       Past Surgical History:   Procedure Laterality Date    APPENDECTOMY      CHOLECYSTECTOMY      GASTRIC BYPASS      HERNIA REPAIR      x2       Family History   Problem Relation Age of Onset    Hypertension Mother     Diabetes Mother     Hypertension Sister     No Known Problems Father         head injury    Stroke Maternal Grandmother         brain aneurysm    Aneurysm Maternal Uncle         brain     I have reviewed and agree with the history as documented.    E-Cigarette/Vaping    E-Cigarette Use Never User      E-Cigarette/Vaping Substances    Nicotine No     THC No     CBD No     Flavoring No     Other No     Unknown No      Social History     Tobacco Use    Smoking status: Never    Smokeless tobacco: Never   Vaping Use    Vaping status: Never Used   Substance Use Topics    Alcohol use: Yes     Comment: 2 drinks per month    Drug use: Never        Review of Systems    Physical Exam  ED Triage Vitals   Temperature Pulse Respirations Blood Pressure SpO2   08/06/24 0414 08/06/24 0414 08/06/24 0414 08/06/24 0414 08/06/24 0414    98.2 °F (36.8 °C) 96 18 136/81 97 %      Temp Source Heart Rate Source Patient Position - Orthostatic VS BP Location FiO2 (%)   08/06/24 0414 08/06/24 0414 08/06/24 0414 08/06/24 0414 --   Oral Monitor Sitting Right arm       Pain Score       08/06/24 0651       No Pain             Orthostatic Vital Signs  Vitals:    08/06/24 0414   BP: 136/81   Pulse: 96   Patient Position - Orthostatic VS: Sitting       Physical Exam  Vitals and nursing note reviewed.   Constitutional:       Appearance: Normal appearance.   HENT:      Head: Normocephalic and atraumatic.      Right Ear: External ear normal.      Left Ear: External ear normal.      Nose: Nose normal.      Mouth/Throat:      Mouth: Mucous membranes are moist.      Pharynx: Oropharynx is clear.   Eyes:      Extraocular Movements: Extraocular movements intact.      Conjunctiva/sclera: Conjunctivae normal.   Cardiovascular:      Rate and Rhythm: Normal rate and regular rhythm.   Pulmonary:      Effort: Pulmonary effort is normal.   Abdominal:      General: There is no distension.      Palpations: Abdomen is soft.      Tenderness: There is abdominal tenderness.   Genitourinary:     Penis: Normal.       Comments: Blood noted in the patient's underwear/on her pad, penis appears normal, no blood noted at the urethral meatus.  Musculoskeletal:         General: Normal range of motion.      Cervical back: Normal range of motion and neck supple.   Skin:     General: Skin is warm and dry.   Neurological:      General: No focal deficit present.      Mental Status: She is alert and oriented to person, place, and time.   Psychiatric:         Mood and Affect: Mood normal.         Behavior: Behavior normal.         ED Medications  Medications   ketorolac (TORADOL) injection 15 mg (15 mg Intravenous Given 8/6/24 0623)   iohexol (OMNIPAQUE) 240 MG/ML solution 50 mL (50 mL Oral Given 8/6/24 0623)       Diagnostic Studies  Results Reviewed       Procedure Component Value Units  Date/Time    Urine Microscopic [[id number]]  (Abnormal) Collected: 08/06/24 0604    Lab Status: Final result Specimen: Urine, Clean Catch Updated: 08/06/24 0716     RBC, UA Innumerable /hpf      WBC, UA 1-2 /hpf      Epithelial Cells Occasional /hpf      Bacteria, UA Occasional /hpf      MUCUS THREADS Occasional    UA (URINE) with reflex to Scope [[id number]]  (Abnormal) Collected: 08/06/24 0604    Lab Status: Final result Specimen: Urine, Clean Catch Updated: 08/06/24 0712     Color, UA Yellow     Clarity, UA Clear     Specific Gravity, UA 1.022     pH, UA 6.5     Leukocytes, UA Negative     Nitrite, UA Negative     Protein, UA Trace mg/dl      Glucose, UA Negative mg/dl      Ketones, UA Negative mg/dl      Urobilinogen, UA <2.0 mg/dl      Bilirubin, UA Negative     Occult Blood, UA Large    [place] draw [[id number]] Collected: 08/06/24 0431    Lab Status: Final result Specimen: Blood from Arm, Left Updated: 08/06/24 0601    Narrative:      The following orders were created for panel order [place] draw.  Procedure                               Abnormality         Status                     ---------                               -----------         ------                     Light Blue Top on hold[[id number]]                           Final result               Gold top on hold[[id number]]                                 Final result               Green / Black tube on hold[[id number]]                       Final result                 Please view results for these tests on the individual orders.    Comprehensive metabolic panel [[id number]]  (Abnormal) Collected: 08/06/24 0431    Lab Status: Final result Specimen: Blood from Arm, Left Updated: 08/06/24 0506     Sodium 136 mmol/L      Potassium 3.3 mmol/L      Chloride 100 mmol/L      CO2 28 mmol/L      ANION GAP 8 mmol/L      BUN 11 mg/dL      Creatinine 0.93 mg/dL      Glucose 106 mg/dL      Calcium 8.8 mg/dL      AST 13 U/L      ALT 9 U/L      Alkaline Phosphatase 59  U/L      Total Protein 7.2 g/dL      Albumin 3.8 g/dL      Total Bilirubin 0.48 mg/dL      eGFR --    Narrative:      Notes:     1. eGFR calculation is only valid for adults 18 years and older.  2. EGFR calculation cannot be performed for patients who are transgender, non-binary, or whose legal sex, sex at birth, and gender identity differ.    Lipase [[id number]]  (Abnormal) Collected: 08/06/24 0431    Lab Status: Final result Specimen: Blood from Arm, Left Updated: 08/06/24 0506     Lipase <6 u/L     CBC and differential [[id number]]  (Abnormal) Collected: 08/06/24 0431    Lab Status: Final result Specimen: Blood from Arm, Left Updated: 08/06/24 0450     WBC 10.01 Thousand/uL      RBC 4.70 Million/uL      Hemoglobin 12.4 g/dL      Hematocrit 40.4 %      MCV 86 fL      MCH 26.4 pg      MCHC 30.7 g/dL      RDW 14.9 %      MPV 9.2 fL      Platelets 337 Thousands/uL      nRBC 0 /100 WBCs      Segmented % 67 %      Immature Grans % 1 %      Lymphocytes % 25 %      Monocytes % 5 %      Eosinophils Relative 2 %      Basophils Relative 0 %      Absolute Neutrophils 6.77 Thousands/µL      Absolute Immature Grans 0.05 Thousand/uL      Absolute Lymphocytes 2.48 Thousands/µL      Absolute Monocytes 0.53 Thousand/µL      Eosinophils Absolute 0.15 Thousand/µL      Basophils Absolute 0.03 Thousands/µL                    CT abdomen pelvis wo contrast    (Results Pending)         Procedures  Procedures      ED Course  ED Course as of 08/06/24 0752   Tue Aug 06, 2024   0619 Comprehensive metabolic panel(!)  Patient is mildly hypokalemic, this is a chronic issue for them they are on supplementation.   0619 CBC and differential(!)  Unremarkable   0619 Lipase(!)  Pancreatitis highly unlikely   0748 Patient handed off to Dr. Chance pending CT results.                             SBIRT 20yo+      Flowsheet Row Most Recent Value   Initial Alcohol Screen: US AUDIT-C     1. How often do you have a drink containing alcohol? 0 Filed at:  08/06/2024 0634   2. How many drinks containing alcohol do you have on a typical day you are drinking?  0 Filed at: 08/06/2024 0634   3a. Male UNDER 65: How often do you have five or more drinks on one occasion? 0 Filed at: 08/06/2024 0634   Audit-C Score 0 Filed at: 08/06/2024 0634   MILES: How many times in the past year have you...    Used an illegal drug or used a prescription medication for non-medical reasons? Never Filed at: 08/06/2024 0634                  Medical Decision Making  Yas is a 44-year-old M2F with a past medical history of anxiety, asthma, constipation, hypertension, kidney stones, bariatric surgery who presents to the emergency department due to back pain, abdominal pain, and blood in the urine.    DDx includes but is not limited to: constipation, impaction, UTI, nephrolithiasis, pancreatitis    See ED course for MDM    Results shared with patient. Return precautions given and patient expresses understanding and is comfortable with discharge.      Significant counseling was done on the chronic risks of constipation including diverticulosis, diverticulitis, and impaction.  Patient reports understanding of this conversation, and the recommendation that she implement a bowel regimen to help prevent this issue in the future.      Amount and/or Complexity of Data Reviewed  Labs: ordered.  Radiology: ordered.          Disposition  Final diagnoses:   Constipation   Abdominal pain     Time reflects when diagnosis was documented in both MDM as applicable and the Disposition within this note       Time User Action Codes Description Comment    8/6/2024  6:12 AM Tatyana Crook Add [K59.00] Constipation     8/6/2024  6:12 AM Tatyana Crook Add [R10.9] Abdominal pain           ED Disposition       None          Follow-up Information    None         Patient's Medications   Discharge Prescriptions    No medications on file     No discharge procedures on file.    PDMP Review         Value Time User     PDMP Reviewed  Yes 6/21/2024 11:51 AM Yamilka Boone PA-C             ED Provider  Attending physically available and evaluated Rob Arriola. I managed the patient along with the ED Attending.    Electronically Signed by           Tatyana Crook MD  08/06/24 0751

## 2024-08-06 NOTE — ED ATTENDING ATTESTATION
8/6/2024  I, Nemesio Le MD, saw and evaluated the patient. I have discussed the patient with the resident/non-physician practitioner and agree with the resident's/non-physician practitioner's findings, Plan of Care, and MDM as documented in the resident's/non-physician practitioner's note, except where noted. All available labs and Radiology studies were reviewed.  I was present for key portions of any procedure(s) performed by the resident/non-physician practitioner and I was immediately available to provide assistance.       At this point I agree with the current assessment done in the Emergency Department.  I have conducted an independent evaluation of this patient a history and physical is as follows: Gross hematuria.  Also some burning in urination.  Normal external male genitalia with no laceration.  Does have some gross blood in the brief.  CT scan with oral contrast due to previous history of gastric bypass surgery.    Disposition pending results of CT scan.  Will likely need antibiotics due to burning in urination, occasional bacteria and innumerable red blood cells on urinalysis.    Results Reviewed       Procedure Component Value Units Date/Time    Urine Microscopic [[id number]]  (Abnormal) Collected: 08/06/24 0604    Lab Status: Final result Specimen: Urine, Clean Catch Updated: 08/06/24 0716     RBC, UA Innumerable /hpf      WBC, UA 1-2 /hpf      Epithelial Cells Occasional /hpf      Bacteria, UA Occasional /hpf      MUCUS THREADS Occasional    UA (URINE) with reflex to Scope [[id number]]  (Abnormal) Collected: 08/06/24 0604    Lab Status: Final result Specimen: Urine, Clean Catch Updated: 08/06/24 0712     Color, UA Yellow     Clarity, UA Clear     Specific Gravity, UA 1.022     pH, UA 6.5     Leukocytes, UA Negative     Nitrite, UA Negative     Protein, UA Trace mg/dl      Glucose, UA Negative mg/dl      Ketones, UA Negative mg/dl      Urobilinogen, UA <2.0 mg/dl      Bilirubin, UA Negative      Occult Blood, UA Large    [place] draw [[id number]] Collected: 08/06/24 0431    Lab Status: Final result Specimen: Blood from Arm, Left Updated: 08/06/24 0601    Narrative:      The following orders were created for panel order [place] draw.  Procedure                               Abnormality         Status                     ---------                               -----------         ------                     Light Blue Top on hold[[id number]]                           Final result               Gold top on hold[[id number]]                                 Final result               Green / Black tube on hold[[id number]]                       Final result                 Please view results for these tests on the individual orders.    Comprehensive metabolic panel [[id number]]  (Abnormal) Collected: 08/06/24 0431    Lab Status: Final result Specimen: Blood from Arm, Left Updated: 08/06/24 0506     Sodium 136 mmol/L      Potassium 3.3 mmol/L      Chloride 100 mmol/L      CO2 28 mmol/L      ANION GAP 8 mmol/L      BUN 11 mg/dL      Creatinine 0.93 mg/dL      Glucose 106 mg/dL      Calcium 8.8 mg/dL      AST 13 U/L      ALT 9 U/L      Alkaline Phosphatase 59 U/L      Total Protein 7.2 g/dL      Albumin 3.8 g/dL      Total Bilirubin 0.48 mg/dL      eGFR --    Narrative:      Notes:     1. eGFR calculation is only valid for adults 18 years and older.  2. EGFR calculation cannot be performed for patients who are transgender, non-binary, or whose legal sex, sex at birth, and gender identity differ.    Lipase [[id number]]  (Abnormal) Collected: 08/06/24 0431    Lab Status: Final result Specimen: Blood from Arm, Left Updated: 08/06/24 0506     Lipase <6 u/L     CBC and differential [[id number]]  (Abnormal) Collected: 08/06/24 0431    Lab Status: Final result Specimen: Blood from Arm, Left Updated: 08/06/24 0450     WBC 10.01 Thousand/uL      RBC 4.70 Million/uL      Hemoglobin 12.4 g/dL      Hematocrit 40.4 %      MCV 86 fL       MCH 26.4 pg      MCHC 30.7 g/dL      RDW 14.9 %      MPV 9.2 fL      Platelets 337 Thousands/uL      nRBC 0 /100 WBCs      Segmented % 67 %      Immature Grans % 1 %      Lymphocytes % 25 %      Monocytes % 5 %      Eosinophils Relative 2 %      Basophils Relative 0 %      Absolute Neutrophils 6.77 Thousands/µL      Absolute Immature Grans 0.05 Thousand/uL      Absolute Lymphocytes 2.48 Thousands/µL      Absolute Monocytes 0.53 Thousand/µL      Eosinophils Absolute 0.15 Thousand/µL      Basophils Absolute 0.03 Thousands/µL           CT abdomen pelvis wo contrast    (Results Pending)         ED Course         Critical Care Time  Procedures

## 2024-08-06 NOTE — Clinical Note
Rob Arriola was seen and treated in our emergency department on 8/6/2024.    No restrictions            Diagnosis:     Rob  .    She may return on this date: 08/08/2024         If you have any questions or concerns, please don't hesitate to call.      Tiffany Chance MD    ______________________________           _______________          _______________  Hospital Representative                              Date                                Time

## 2024-08-06 NOTE — ED CARE HANDOFF
Emergency Department Sign Out Note        Sign out and transfer of care from Dr. Crook. See Separate Emergency Department note.     The patient, Rob Arriola, was evaluated by the previous provider for hematuria.    Workup Completed:  See previous providers note    ED Course / Workup Pending (followup):  CTAP                                  ED Course as of 08/06/24 1715   Tue Aug 06, 2024   0747 RBC Urine(!): Innumerable  41-year-old male to female presents with low back pain and hematuria.  Pending CTAP for possible kidney stone.  Some associated constipation.  Dispo after CTAP.   1001 IMPRESSION:     No acute findings in the abdomen or pelvis within the limits of unenhanced technique.     Workstation performed: [id number]       Procedures  Medical Decision Making  Patient treated for cystitis.  Pain under control.  Abdomen soft and nontender.  Continues to make urine.  Urine GC started, DELPHINE/nurse practitioner to give antibiotics if positive.  Referral to urology given.  Prescription for Vantin given.  Patient understanding and agreement with plan and discharged home with strict return precautions.    Amount and/or Complexity of Data Reviewed  Labs: ordered. Decision-making details documented in ED Course.  Radiology: ordered.    Risk  OTC drugs.  Prescription drug management.            Disposition  Final diagnoses:   Constipation   Abdominal pain   Hematuria   Cystitis     Time reflects when diagnosis was documented in both MDM as applicable and the Disposition within this note       Time User Action Codes Description Comment    8/6/2024  6:12 AM Tatyana Crook Add [K59.00] Constipation     8/6/2024  6:12 AM Tatyana Crook Add [R10.9] Abdominal pain     8/6/2024 10:26 AM Tiffany Chance Add [R31.9] Hematuria     8/6/2024 10:27 AM Tiffany Chance Add [N30.90] Cystitis           ED Disposition       ED Disposition   Discharge    Condition   Stable    Date/Time   Tue Aug 6, 2024 10:26 AM    Comment   Rob QUIGLEY  Arriola discharge to home/self care.                   Follow-up Information       Follow up With Specialties Details Why Contact Info Additional Information    Curly Camacho MD Internal Medicine Schedule an appointment as soon as possible for a visit  As needed [address] [place] [address] [phone]       [facility] Emergency Department Emergency Medicine Go to  If symptoms worsen [address] [facility] [address]  [phone] [facility] Emergency Department, [address] [place], [place], [address]    [facility] [place] [facility] Call   [address]  [address]  [facility] [address] [phone] [facility] [place], [address], [place], [place], [address]   [phone]          Discharge Medication List as of 8/6/2024 10:28 AM        START taking these medications    Details   cefpodoxime (VANTIN) 200 mg tablet Take 1 tablet (200 mg total) by mouth 2 (two) times a day for 10 days, Starting Tue 8/6/2024, Until Fri 8/16/2024, Normal           CONTINUE these medications which have NOT CHANGED    Details   acetaminophen (TYLENOL) 500 mg tablet Take 500 mg by mouth if needed, Historical Med      albuterol (2.5 mg/3 mL) 0.083 % nebulizer solution Take 3 mL (2.5 mg total) by nebulization every 6 (six) hours as needed for wheezing or shortness of breath, Starting Sun 5/19/2024, Normal      albuterol (PROVENTIL HFA,VENTOLIN HFA) 90 mcg/act inhaler Inhale 2 puffs every 6 (six) hours as needed for wheezing, Starting Mon 4/1/2024, Normal      Atogepant (Qulipta) 60 MG TABS Once daily, Normal      benzonatate (TESSALON) 200 MG capsule Take 1 capsule (200 mg total) by mouth 3 (three) times a day as needed for cough, Starting Mon 4/1/2024, Normal      butalbital-acetaminophen-caffeine (FIORICET,ESGIC) -40 mg per tablet TAKE ONE TABLET BY MOUTH EVERY 6 HOURS AS  NEEDED FOR MIGRAINE. NO MORE THAN 2 TO 3 PER WEEK, Normal      colchicine (COLCRYS) 0.6 mg tablet Take 1 tablet (0.6 mg total) by mouth daily 2 pills now and 1 in an hour. One daily until resolved., Starting Mon 6/27/2022, Normal      Cyanocobalamin 1000 MCG/ML KIT Inject 1 mL (1,000 mcg total) into a muscle once a week Inject 1 ml IM in thigh/ buttocks or deltoid mm once weekly x 8 weeks. Afterward once monthly, Starting Tue 5/7/2024, Normal      famotidine (PEPCID) 20 mg tablet Take 1 tablet (20 mg total) by mouth 2 (two) times a day, Starting Mon 8/5/2024, Normal      fluticasone (FLONASE) 50 mcg/act nasal spray 1 spray into each nostril daily, Starting Tue 1/16/2024, Normal      ipratropium-albuterol (DUO-NEB) 0.5-2.5 mg/3 mL nebulizer solution Take 3 mL by nebulization every 6 (six) hours as needed for wheezing or shortness of breath, Starting Mon 4/1/2024, Normal      Iron, Ferrous Sulfate, 325 (65 Fe) MG TABS Take 1 tablet by mouth in the morning, Starting Thu 5/30/2024, Normal      ketorolac (TORADOL) 30 mg/mL injection 1-2 ml IM injection once per 24 hours p.r.n. migraine.  No more than 2 injections per week., Normal      montelukast (SINGULAIR) 10 mg tablet Take 1 tablet (10 mg total) by mouth daily at bedtime, Starting Mon 5/20/2024, Normal      multivitamin (THERAGRAN) TABS Take 1 tablet by mouth daily, Historical Med      naproxen sodium (ANAPROX) 275 MG tablet Take 1 tablet (275 mg total) by mouth 2 (two) times a day with meals Prn neck pain, headaches, Starting Fri 5/17/2024, Normal      ondansetron (ZOFRAN-ODT) 4 mg disintegrating tablet Take 1 tablet (4 mg total) by mouth every 8 (eight) hours as needed for nausea or vomiting, Starting Mon 5/20/2024, Normal      orphenadrine (NORFLEX) 100 mg tablet Take 1 tablet (100 mg total) by mouth 2 (two) times a day as needed for muscle spasms, Starting Tue 6/11/2024, Normal      oxyCODONE (Roxicodone) 5 immediate release tablet Take 1 tablet (5 mg total) by  "mouth every 6 (six) hours as needed for moderate pain for up to 8 doses Max Daily Amount: 20 mg, Starting Sat 12/16/2023, Normal      pantoprazole (PROTONIX) 40 mg tablet Take 1 tablet (40 mg total) by mouth daily, Starting Mon 8/5/2024, Normal      predniSONE 10 mg tablet 4 tablets daily x 3 days;  3 tablets daily x 3 days; 2 tablets daily x 3 days; 1 tablet daily x 3 days, Normal      Riboflavin 400 MG TABS Take 1 tablet (400 mg total) by mouth in the morning, Starting Fri 6/3/2022, Normal      rimegepant sulfate (NURTEC) 75 mg TBDP 1 tab at migraine onset. No more than one dose per 24 hours., Normal      sucralfate (CARAFATE) 1 g tablet Take 1 tablet (1 g total) by mouth 4 (four) times a day, Starting Tue 5/7/2024, Normal      Syringe/Needle, Disp, (SYRINGE 3CC/91ZE1-1/2\") 25G X 1-1/2\" 3 ML MISC Use for toradol IM injections., Normal      Syringe/Needle, Disp, (SYRINGE 3CC/49IW7-5/4\") 27G X 1-1/4\" 3 ML MISC Use for B12 injections, Normal      triamterene-hydrochlorothiazide (DYAZIDE) 37.5-25 mg per capsule Take 1 capsule by mouth every morning, Starting Tue 5/7/2024, Normal      estradiol (ESTRACE) 2 MG tablet Take 1 tablet (2 mg total) by mouth 2 (two) times a day, Starting Mon 3/11/2024, Normal      hydrocortisone (ANUSOL-HC) 25 mg suppository Insert 1 suppository (25 mg total) into the rectum daily as needed for hemorrhoids, Starting Tue 11/14/2023, Normal      LORazepam (ATIVAN) 1 mg tablet Take 0.5 tablets (0.5 mg total) by mouth every 6 (six) hours as needed for anxiety, Starting Mon 5/20/2024, Normal      potassium chloride (Klor-Con M10) 10 mEq tablet Take 1 tablet (10 mEq total) by mouth 2 (two) times a day, Starting Mon 5/20/2024, Normal      traMADol (ULTRAM) 50 mg tablet Take 1 tablet (50 mg total) by mouth 2 (two) times a day as needed for moderate pain, Starting Mon 5/20/2024, Normal      valsartan (DIOVAN) 40 mg tablet Take 1 tablet (40 mg total) by mouth daily, Starting Mon 5/20/2024, Normal       "            ED Provider  Electronically Signed by     Tiffany Chance MD  08/06/24 8702

## 2024-08-07 LAB
C TRACH DNA SPEC QL NAA+PROBE: NEGATIVE
N GONORRHOEA DNA SPEC QL NAA+PROBE: NEGATIVE

## 2024-08-09 ENCOUNTER — TELEPHONE (OUTPATIENT)
Age: 44
End: 2024-08-09

## 2024-08-09 NOTE — TELEPHONE ENCOUNTER
New Patient    What is the reason for the patient's appointment?: Hematuria & Cystitis     What office location does the patient prefer?: [place]    Does patient have Imaging/Lab Results:  Labs & CT Scan 8/6/24    Have patient records been requested?:  If No, are the records showing in Epic: Yes      INSURANCE:   Do we accept the patient's insurance or is the patient Self-Pay?: Yes    Insurance Provider: [employer]   Plan Type/Number:   Member ID#: [id number]       HISTORY:   Has the patient had any previous Urologist(s)?: No    Was the patient seen in the ED?: 8/6/24    Has the patient had any outside testing done?: Labs & CT Scan 8/6/24    Does the patient have a personal history of cancer?: N/A    Pt reporting amounts of blood in her urine along with clotting particles.     Scheduled for the next available of 9/24/24 and added to the wait list.     Please advise.

## 2024-08-13 ENCOUNTER — TELEPHONE (OUTPATIENT)
Dept: INTERNAL MEDICINE CLINIC | Facility: OTHER | Age: 44
End: 2024-08-13

## 2024-08-13 NOTE — TELEPHONE ENCOUNTER
Received prior authorization request from [facility] for the following medication:    Tramadol HCl 50 MG Tablets    Form has been scanned into patients chart.

## 2024-08-14 NOTE — TELEPHONE ENCOUNTER
PA for SUBMITTED - traMADol HCl 50MG tablets    via    [x]CMM-KEY: [id number]  []Surescripts-Case ID #   []Faxed to plan   []Other website   []Phone call Case ID #     Office notes sent, clinical questions answered. Awaiting determination    Turnaround time for your insurance to make a decision on your Prior Authorization can take 7-21 business days.

## 2024-08-22 DIAGNOSIS — E53.8 B12 DEFICIENCY: ICD-10-CM

## 2024-08-22 DIAGNOSIS — G43.709 CHRONIC MIGRAINE WITHOUT AURA WITHOUT STATUS MIGRAINOSUS, NOT INTRACTABLE: ICD-10-CM

## 2024-08-22 RX ORDER — SYRINGE W-NEEDLE,DISPOSAB,3 ML 25GX5/8"
SYRINGE, EMPTY DISPOSABLE MISCELLANEOUS
Qty: 10 EACH | Refills: 1 | Status: SHIPPED | OUTPATIENT
Start: 2024-08-22

## 2024-08-22 RX ORDER — KETOROLAC TROMETHAMINE 30 MG/ML
INJECTION, SOLUTION INTRAMUSCULAR; INTRAVENOUS
Qty: 4 ML | Refills: 6 | Status: SHIPPED | OUTPATIENT
Start: 2024-08-22 | End: 2024-08-28

## 2024-08-22 NOTE — PROGRESS NOTES
"1. Chronic migraine without aura without status migrainosus, not intractable    - ketorolac (TORADOL) 30 mg/mL injection; 1-2 ml IM injection once per 24 hours p.r.n. migraine.  No more than 2 injections per week.  Dispense: 4 mL; Refill: 6    2. B12 deficiency    - Syringe/Needle, Disp, (SYRINGE 3CC/50SR9-6/4\") 27G X 1-1/4\" 3 ML MISC; Use for B12 injections  Dispense: 10 each; Refill: 1        Susi Dickson MD.   Staff Neurologist  08/22/24   2:21 PM    "

## 2024-08-26 ENCOUNTER — PROCEDURE VISIT (OUTPATIENT)
Age: 44
End: 2024-08-26
Payer: COMMERCIAL

## 2024-08-26 ENCOUNTER — TELEPHONE (OUTPATIENT)
Dept: NEUROLOGY | Facility: CLINIC | Age: 44
End: 2024-08-26

## 2024-08-26 VITALS
OXYGEN SATURATION: 96 % | TEMPERATURE: 97.5 F | HEART RATE: 61 BPM | DIASTOLIC BLOOD PRESSURE: 80 MMHG | SYSTOLIC BLOOD PRESSURE: 122 MMHG

## 2024-08-26 DIAGNOSIS — G43.709 CHRONIC MIGRAINE WITHOUT AURA WITHOUT STATUS MIGRAINOSUS, NOT INTRACTABLE: Primary | ICD-10-CM

## 2024-08-26 PROCEDURE — 64615 CHEMODENERV MUSC MIGRAINE: CPT | Performed by: PSYCHIATRY & NEUROLOGY

## 2024-08-26 NOTE — PROGRESS NOTES
"Universal Protocol   Consent: Written consent obtained.  Risks and benefits: risks, benefits and alternatives were discussed  Consent given by: patient  Time out: Immediately prior to procedure a \"time out\" was called to verify the correct patient, procedure, equipment, support staff and site/side marked as required.  Timeout called at: 8/26/2024 12:37 PM.  Patient understanding: patient states understanding of the procedure being performed  Patient consent: the patient's understanding of the procedure matches consent given  Procedure consent: procedure consent matches procedure scheduled  Patient identity confirmed: verbally with patient      Chemodenervation     Date/Time  8/26/2024 12:00 PM     Performed by  Susi Dickson MD   Authorized by  Susi Dickson MD     Pre-procedure details      Preparation: Patient was prepped and draped in usual sterile fashion     Procedure details      Position:  Supine   Botox      Botox Type:  Type A    Brand:  Botox    mL's of Botulinum Toxin:  155    Final Concentration per CC:  200 units    Needle Gauge:  30 G 2.5 inch   Procedures      Botox Procedures: chronic headache      Indications: migraines     Total Units      Total units used:  155    Total units discarded:  45   Post-procedure details      Chemodenervation:  Chronic migraine    Patient tolerance of procedure:  Tolerated well, no immediate complications   Comments       Chemodenervation is necessary as the patient has not responded to oral medications. The potential risks (including but not limited to: brow ptosis, eyelid ptosis, change in facial appearance, altered sensation, muscle weakness, bleeding, infection, allergic reaction, worsening of headache, pain and distal spread of toxin which could lead to difficulties swallowing, breathing or even death), benefits, rationale and alternative treatments were discussed prior to obtaining written informed consent. Patients were also informed " that they should not be injected with Botox if they know they are pregnant, could be pregnant or possibly become pregnant over the next 3 months. Alcohol prep and sterile technique were used. OnabotulinumtoxinA (Botox) was injected in the following pattern according to the PREEMPT protocol:       10 units divided in 2 sites  Procerus 5 units in 1 site   Frontalis 20 units divided in 4 sites   Temporalis 40 units divided in 8 sites   Occipitalis 30 units divided in 6 sites   Cervical paraspinals 20 units divided in 4 sites   Trapezius 30 units divided in 6 sites     Number of units injected: 155 units in 31 sites   Number of units of unavoidable wastage: 45 units   Total units used: 200 units   Areas of injection: Bilateral head/neck/trapezius       Follow up in 3 months                Susi Dickson MD.   Staff Neurologist  Neuroimmunology and Neuro-infectious disease.   08/26/24   12:39 PM

## 2024-08-26 NOTE — TELEPHONE ENCOUNTER
Received via [employer] request for prior auth for Ketorolac Tromethamine.  Request scanned into [employer].

## 2024-08-28 DIAGNOSIS — G43.709 CHRONIC MIGRAINE WITHOUT AURA WITHOUT STATUS MIGRAINOSUS, NOT INTRACTABLE: ICD-10-CM

## 2024-08-28 RX ORDER — KETOROLAC TROMETHAMINE 30 MG/ML
60 INJECTION, SOLUTION INTRAMUSCULAR; INTRAVENOUS
Qty: 2 ML | Refills: 6 | Status: SHIPPED | OUTPATIENT
Start: 2024-08-28

## 2024-08-28 NOTE — PROGRESS NOTES
1. Chronic migraine without aura without status migrainosus, not intractable    - ketorolac (TORADOL) 60 mg/2 mL; Inject 2 mL (60 mg total) into a muscle every other day as needed for moderate pain  Dispense: 2 mL; Refill: 6        Susi Dickson MD.   Staff Neurologist  08/28/24   10:12 AM

## 2024-08-28 NOTE — TELEPHONE ENCOUNTER
Request has been denied because the requested medication is excluded as a plan benefit. Therefore, unable to continue processing the prior auth for the requested medication due to plan benefit exclusion. Plan only provides for services deemed by [employer] to be medically necessary and appropriate. Can appeal this decision.    According to website alternatives include Ketorolac Tromethamine 10 mg/ml solution, 15 mg/ml solution or 60 mg/2ml solution.     Will call insurance to verify.    Called [employer] at [phone]. Spoke with Sid. He says this is not covered by pharmacy benefits. Classified as an office administered drug on plan's formulary.    Rep states that the 60 mg/2ml strength may be covered.   Secure chat sent to provider if agreeable to change in dose. Received confirmation that he is.  Verbal PA submitted.  Case #: [id number]  Because previous PA was for an excluded med from plan, rep says review may take some time. 4-15 days TAT.    Fax: [phone]  Documentation of previous order from Dr. Dickson faxed to [employer].    Awaiting determination.

## 2024-09-09 ENCOUNTER — HOSPITAL ENCOUNTER (OUTPATIENT)
Dept: NEUROLOGY | Facility: CLINIC | Age: 44
Discharge: HOME | End: 2024-09-09
Payer: COMMERCIAL

## 2024-09-09 DIAGNOSIS — M54.12 CERVICAL RADICULAR PAIN: ICD-10-CM

## 2024-09-09 DIAGNOSIS — R25.2 HAND CRAMP: ICD-10-CM

## 2024-09-09 PROCEDURE — 95886 MUSC TEST DONE W/N TEST COMP: CPT | Performed by: PSYCHIATRY & NEUROLOGY

## 2024-09-09 PROCEDURE — 95909 NRV CNDJ TST 5-6 STUDIES: CPT | Performed by: PSYCHIATRY & NEUROLOGY

## 2024-09-11 ENCOUNTER — TELEPHONE (OUTPATIENT)
Dept: NEUROLOGY | Facility: CLINIC | Age: 44
End: 2024-09-11

## 2024-09-13 ENCOUNTER — OFFICE VISIT (OUTPATIENT)
Dept: NEUROLOGY | Facility: CLINIC | Age: 44
End: 2024-09-13
Payer: COMMERCIAL

## 2024-09-13 ENCOUNTER — TELEPHONE (OUTPATIENT)
Age: 44
End: 2024-09-13

## 2024-09-13 VITALS
TEMPERATURE: 97.6 F | HEART RATE: 87 BPM | BODY MASS INDEX: 46.65 KG/M2 | WEIGHT: 315 LBS | HEIGHT: 69 IN | DIASTOLIC BLOOD PRESSURE: 84 MMHG | SYSTOLIC BLOOD PRESSURE: 138 MMHG

## 2024-09-13 DIAGNOSIS — D18.00 CAVERNOMA: ICD-10-CM

## 2024-09-13 DIAGNOSIS — H34.239 BRANCH RETINAL ARTERY OCCLUSION: Primary | ICD-10-CM

## 2024-09-13 DIAGNOSIS — G43.109 MIGRAINE WITH AURA AND WITHOUT STATUS MIGRAINOSUS, NOT INTRACTABLE: ICD-10-CM

## 2024-09-13 PROCEDURE — 99215 OFFICE O/P EST HI 40 MIN: CPT | Performed by: PSYCHIATRY & NEUROLOGY

## 2024-09-13 RX ORDER — ATORVASTATIN CALCIUM 10 MG/1
10 TABLET, FILM COATED ORAL DAILY
Qty: 30 TABLET | Refills: 3 | Status: SHIPPED | OUTPATIENT
Start: 2024-09-13

## 2024-09-13 RX ORDER — ASPIRIN 81 MG/1
81 TABLET, CHEWABLE ORAL DAILY
Qty: 30 TABLET | Refills: 3 | Status: SHIPPED | OUTPATIENT
Start: 2024-09-13 | End: 2024-09-13 | Stop reason: CLARIF

## 2024-09-13 RX ORDER — ASPIRIN 81 MG/1
81 TABLET, CHEWABLE ORAL DAILY
Qty: 30 TABLET | Refills: 3 | Status: SHIPPED | OUTPATIENT
Start: 2024-09-13

## 2024-09-13 RX ORDER — PERPHENAZINE/AMITRIPTYLINE HCL 2 MG-25 MG
TABLET ORAL
COMMUNITY

## 2024-09-13 RX ORDER — CLOPIDOGREL BISULFATE 75 MG/1
75 TABLET ORAL DAILY
Qty: 30 TABLET | Refills: 3 | Status: SHIPPED | OUTPATIENT
Start: 2024-09-13 | End: 2024-09-13 | Stop reason: CLARIF

## 2024-09-13 NOTE — PROGRESS NOTES
Patient ID: Rob Arriola is a 44 y.o. adult.    Assessment/Plan:    This is a 45 y/o  Female who is here as a new patient to establish care with us for vision loss in the her left eye. 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 Etiology of BRAO is unclear, could be large vessel vs cardioembolic. needs further workup. She needs Mri brain to look for stroke in her brain, and CTA head and neck, and echo.     PLAN:      Problem List Items Addressed This Visit          Cardiovascular and Mediastinum    Migraine with aura and without status migrainosus, not intractable    Relevant Medications    aspirin 81 mg chewable tablet    Branch retinal artery occlusion - Primary     -for secondary stroke prevention, recommend starting patient on aspirin 81mg Po qdaily chewable kind and start patient on atorvastatin 10mg PO QHS  -Blood Pressure goal < 130/80, BP is at goal currently  -LDL goal <70  -snoring - will discuss at next visit  -needs echocardiogram, will send to cardiology at next visit   -check thrombosis panel  Check lipid panel and hemoglobin A1c.     Counseling/stroke education -   -I advised patient to avoid using NSAIDs for headaches or other pain and to stick to tylenol if needed  -Recommend lifestyle modifications such as mediterranean diet & regular exercise regimen atleast 4-5 times a week for 20-30 minutes.   -I educated patient/family regarding medication compliance  -encourage smoking cessation, and control of diabetes and hypertension; defer management to primary          Relevant Medications    aspirin 81 mg chewable tablet    atorvastatin (LIPITOR) 10 mg tablet    Other Relevant Orders    MRI brain without contrast    Echo complete w/ contrast if indicated    MRI brain without contrast    Lipid panel    Hemoglobin A1C    MRA head wo contrast    MRI angiogram neck with and without contrast    Thrombosis Panel       Other    Cavernoma     Patient was found  to have cavernoma in the arcelia  Not symptomatic  Will monitor            Follow up in 2 months.     I would be happy to see the patient sooner if any new questions/concerns arise.  Patient/Guardian was advised to the call the office if they have any questions and concerns in the meantime.     Patient/Guardian does understand that if they have any new stroke like symptoms such as facial droop on one side, weakness/paralysis on either side, speech trouble, numbness on one side, balance issues, any vision changes, extreme dizziness or any new headache, to call 9-1-1 immediately or to proceed to the nearest ER immediately.      I have spent a total time of 55 minutes in caring for this patient on the day of the visit/encounter including Risks and benefits of tx options, Instructions for management, Counseling / Coordination of care, Documenting in the medical record, Reviewing / ordering tests, medicine, procedures  , Obtaining or reviewing history  , and Communicating with other healthcare professionals .     Subjective:    HPI    This is a 44 year old  female is here as a new patient to establish care with us.    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 She was then referred to neurology.    Patient says that she is still having headaches, and is getting Botox injections and is on quilipta as well. Patient says that she she otherwise is having some numbness, tingling on her face, and drooping of her face and sometimes can be associated with headaches. She also has numbness and tingling on the L hand, and EMG/NCV showed carpal tunnel syndrome on the L hand. She  otherwise took aspirin years ago and it bothered her stomach, so she stopped after. She does have a history of gastric bypass surgery/sleeve.     The following portions of the patient's history were reviewed and updated as appropriate: She  has a past medical history of Anxiety, Asthma, COVID-19 virus infection (12/27/2021), Hernia of abdominal wall, Hypertension, Kidney stones, Seizures (HCC), and Viral URI with cough (12/27/2021).  She   Patient Active Problem List    Diagnosis Date Noted    Branch retinal artery occlusion 09/13/2024    Iron deficiency 05/30/2024    Nausea 05/20/2024    Mild intermittent asthma without complication 05/20/2024    Microcytosis 05/20/2024    Chronic migraine without aura with status migrainosus, not intractable 05/17/2024    Acute asthmatic bronchitis 04/02/2024    Stroke-like symptoms 03/19/2024    B12 deficiency 10/22/2023    Cervical radicular pain 10/22/2023    Migraine with aura and without status migrainosus, not intractable 10/22/2023    Seasonal asthma 06/30/2023    Impacted third molar tooth 05/04/2023    Cavernoma 01/20/2023    Chronic fatigue 05/26/2022    Gender dysphoria 05/09/2022    Chest pain 03/04/2022    Muscle cramping 02/17/2022    Palpitations 01/13/2022    Subclinical hypothyroidism 01/13/2022    Cervical myofascial pain syndrome 06/20/2021    Hypokalemia 06/07/2021    Elevated serum immunoglobulin free light chain level 04/05/2021    Chronic pain syndrome 03/30/2021    Lumbar disc herniation with radiculopathy 03/30/2021    Chronic neck pain 05/18/2020    Low back pain 05/18/2020    Herniation of intervertebral disc of lumbar spine 04/03/2020    Morbid obesity with BMI of 50.0-59.9, adult (HCC) 04/03/2020    Left-sided weakness and sensory deficits 03/06/2020    Chronic migraine without aura without status migrainosus, not intractable 02/03/2020    Umbilical hernia 05/31/2019    GERD (gastroesophageal reflux disease) 05/31/2019    History of sleeve gastrectomy  03/19/2019    HTN (hypertension) 03/19/2019    Cervical disc disease 03/19/2019    Male-to-female transgender person 03/19/2019    Anxiety 03/19/2019     She  has a past surgical history that includes Gastric bypass; Appendectomy; Cholecystectomy; and Hernia repair.  Her family history includes Aneurysm in her maternal uncle; Diabetes in her mother; Hypertension in her mother and sister; No Known Problems in her father; Stroke in her maternal grandmother.  She  reports that she has never smoked. She has never used smokeless tobacco. She reports current alcohol use. She reports that she does not use drugs.  Current Outpatient Medications   Medication Sig Dispense Refill    acetaminophen (TYLENOL) 500 mg tablet Take 500 mg by mouth if needed      albuterol (2.5 mg/3 mL) 0.083 % nebulizer solution Take 3 mL (2.5 mg total) by nebulization every 6 (six) hours as needed for wheezing or shortness of breath 75 mL 0    albuterol (PROVENTIL HFA,VENTOLIN HFA) 90 mcg/act inhaler Inhale 2 puffs every 6 (six) hours as needed for wheezing 6.7 g 2    aspirin 81 mg chewable tablet Chew 1 tablet (81 mg total) daily 30 tablet 3    Atogepant (Qulipta) 60 MG TABS Once daily (Patient taking differently: Take 30 mg by mouth in the morning Once daily) 90 tablet 1    atorvastatin (LIPITOR) 10 mg tablet Take 1 tablet (10 mg total) by mouth daily 30 tablet 3    benzonatate (TESSALON) 200 MG capsule Take 1 capsule (200 mg total) by mouth 3 (three) times a day as needed for cough 30 capsule 0    butalbital-acetaminophen-caffeine (FIORICET,ESGIC) -40 mg per tablet TAKE ONE TABLET BY MOUTH EVERY 6 HOURS AS NEEDED FOR MIGRAINE. NO MORE THAN 2 TO 3 PER WEEK 10 tablet 2    colchicine (COLCRYS) 0.6 mg tablet Take 1 tablet (0.6 mg total) by mouth daily 2 pills now and 1 in an hour. One daily until resolved. (Patient taking differently: Take 0.6 mg by mouth daily as needed 2 pills now and 1 in an hour. One daily until resolved.) 20 tablet 0     Cyanocobalamin (B-12) 3000 MCG SUBL Place under the tongue      Cyanocobalamin 1000 MCG/ML KIT Inject 1 mL (1,000 mcg total) into a muscle once a week Inject 1 ml IM in thigh/ buttocks or deltoid mm once weekly x 8 weeks. Afterward once monthly 20 mL 0    estradiol (ESTRACE) 2 MG tablet Take 1 tablet (2 mg total) by mouth 2 (two) times a day 180 tablet 1    famotidine (PEPCID) 20 mg tablet Take 1 tablet (20 mg total) by mouth 2 (two) times a day 30 tablet 0    fluticasone (FLONASE) 50 mcg/act nasal spray 1 spray into each nostril daily (Patient taking differently: 1 spray into each nostril daily as needed) 16 g 5    hydrocortisone (ANUSOL-HC) 25 mg suppository Insert 1 suppository (25 mg total) into the rectum daily as needed for hemorrhoids 12 suppository 1    ipratropium-albuterol (DUO-NEB) 0.5-2.5 mg/3 mL nebulizer solution Take 3 mL by nebulization every 6 (six) hours as needed for wheezing or shortness of breath 60 mL 0    Iron, Ferrous Sulfate, 325 (65 Fe) MG TABS Take 1 tablet by mouth in the morning 42 tablet 0    ketorolac (TORADOL) 60 mg/2 mL Inject 2 mL (60 mg total) into a muscle every other day as needed for moderate pain 2 mL 6    LORazepam (ATIVAN) 1 mg tablet Take 0.5 tablets (0.5 mg total) by mouth every 6 (six) hours as needed for anxiety 60 tablet 0    montelukast (SINGULAIR) 10 mg tablet Take 1 tablet (10 mg total) by mouth daily at bedtime 90 tablet 1    multivitamin (THERAGRAN) TABS Take 1 tablet by mouth daily      ondansetron (ZOFRAN-ODT) 4 mg disintegrating tablet Take 1 tablet (4 mg total) by mouth every 8 (eight) hours as needed for nausea or vomiting 60 tablet 5    orphenadrine (NORFLEX) 100 mg tablet Take 1 tablet (100 mg total) by mouth 2 (two) times a day as needed for muscle spasms 60 tablet 1    pantoprazole (PROTONIX) 40 mg tablet Take 1 tablet (40 mg total) by mouth daily 90 tablet 0    potassium chloride (Klor-Con M10) 10 mEq tablet Take 1 tablet (10 mEq total) by mouth 2 (two)  "times a day 180 tablet 1    predniSONE 10 mg tablet 4 tablets daily x 3 days;  3 tablets daily x 3 days; 2 tablets daily x 3 days; 1 tablet daily x 3 days (Patient taking differently: as needed 4 tablets daily x 3 days;  3 tablets daily x 3 days; 2 tablets daily x 3 days; 1 tablet daily x 3 days) 30 tablet 0    Riboflavin 400 MG TABS Take 1 tablet (400 mg total) by mouth in the morning 90 tablet 3    rimegepant sulfate (NURTEC) 75 mg TBDP 1 tab at migraine onset. No more than one dose per 24 hours. 8 tablet 11    traMADol (ULTRAM) 50 mg tablet Take 1 tablet (50 mg total) by mouth 2 (two) times a day as needed for moderate pain 60 tablet 0    triamterene-hydrochlorothiazide (DYAZIDE) 37.5-25 mg per capsule Take 1 capsule by mouth every morning 90 capsule 1    valsartan (DIOVAN) 40 mg tablet Take 1 tablet (40 mg total) by mouth daily 90 tablet 1    naproxen sodium (ANAPROX) 275 MG tablet Take 1 tablet (275 mg total) by mouth 2 (two) times a day with meals Prn neck pain, headaches 60 tablet 2    oxyCODONE (Roxicodone) 5 immediate release tablet Take 1 tablet (5 mg total) by mouth every 6 (six) hours as needed for moderate pain for up to 8 doses Max Daily Amount: 20 mg (Patient not taking: Reported on 5/20/2024) 8 tablet 0    sucralfate (CARAFATE) 1 g tablet Take 1 tablet (1 g total) by mouth 4 (four) times a day (Patient not taking: Reported on 9/13/2024) 60 tablet 0    Syringe/Needle, Disp, (SYRINGE 3CC/22FD7-6/2\") 25G X 1-1/2\" 3 ML MISC Use for toradol IM injections. 12 each 0    Syringe/Needle, Disp, (SYRINGE 3CC/89DS8-6/4\") 27G X 1-1/4\" 3 ML MISC Use for B12 injections 10 each 1     Current Facility-Administered Medications   Medication Dose Route Frequency Provider Last Rate Last Admin    Botulinum Toxin Type A SOLR 200 Units  200 Units Injection Q3 Months    200 Units at 02/16/24 1046    Botulinum Toxin Type A SOLR 200 Units  200 Units Injection Q3 Months    200 Units at 05/17/24 5689    Botulinum Toxin Type A " SOLR 200 Units  200 Units Injection Q3 Months    200 Units at 08/26/24 1258    cyanocobalamin injection 1,000 mcg  1,000 mcg Intramuscular Q30 Days Samantha Bagley MD   1,000 mcg at 06/19/23 1310     Current Outpatient Medications on File Prior to Visit   Medication Sig    acetaminophen (TYLENOL) 500 mg tablet Take 500 mg by mouth if needed    albuterol (2.5 mg/3 mL) 0.083 % nebulizer solution Take 3 mL (2.5 mg total) by nebulization every 6 (six) hours as needed for wheezing or shortness of breath    albuterol (PROVENTIL HFA,VENTOLIN HFA) 90 mcg/act inhaler Inhale 2 puffs every 6 (six) hours as needed for wheezing    Atogepant (Qulipta) 60 MG TABS Once daily (Patient taking differently: Take 30 mg by mouth in the morning Once daily)    benzonatate (TESSALON) 200 MG capsule Take 1 capsule (200 mg total) by mouth 3 (three) times a day as needed for cough    butalbital-acetaminophen-caffeine (FIORICET,ESGIC) -40 mg per tablet TAKE ONE TABLET BY MOUTH EVERY 6 HOURS AS NEEDED FOR MIGRAINE. NO MORE THAN 2 TO 3 PER WEEK    colchicine (COLCRYS) 0.6 mg tablet Take 1 tablet (0.6 mg total) by mouth daily 2 pills now and 1 in an hour. One daily until resolved. (Patient taking differently: Take 0.6 mg by mouth daily as needed 2 pills now and 1 in an hour. One daily until resolved.)    Cyanocobalamin (B-12) 3000 MCG SUBL Place under the tongue    Cyanocobalamin 1000 MCG/ML KIT Inject 1 mL (1,000 mcg total) into a muscle once a week Inject 1 ml IM in thigh/ buttocks or deltoid mm once weekly x 8 weeks. Afterward once monthly    estradiol (ESTRACE) 2 MG tablet Take 1 tablet (2 mg total) by mouth 2 (two) times a day    famotidine (PEPCID) 20 mg tablet Take 1 tablet (20 mg total) by mouth 2 (two) times a day    fluticasone (FLONASE) 50 mcg/act nasal spray 1 spray into each nostril daily (Patient taking differently: 1 spray into each nostril daily as needed)    hydrocortisone (ANUSOL-HC) 25 mg suppository Insert 1 suppository  (25 mg total) into the rectum daily as needed for hemorrhoids    ipratropium-albuterol (DUO-NEB) 0.5-2.5 mg/3 mL nebulizer solution Take 3 mL by nebulization every 6 (six) hours as needed for wheezing or shortness of breath    Iron, Ferrous Sulfate, 325 (65 Fe) MG TABS Take 1 tablet by mouth in the morning    ketorolac (TORADOL) 60 mg/2 mL Inject 2 mL (60 mg total) into a muscle every other day as needed for moderate pain    LORazepam (ATIVAN) 1 mg tablet Take 0.5 tablets (0.5 mg total) by mouth every 6 (six) hours as needed for anxiety    montelukast (SINGULAIR) 10 mg tablet Take 1 tablet (10 mg total) by mouth daily at bedtime    multivitamin (THERAGRAN) TABS Take 1 tablet by mouth daily    ondansetron (ZOFRAN-ODT) 4 mg disintegrating tablet Take 1 tablet (4 mg total) by mouth every 8 (eight) hours as needed for nausea or vomiting    orphenadrine (NORFLEX) 100 mg tablet Take 1 tablet (100 mg total) by mouth 2 (two) times a day as needed for muscle spasms    pantoprazole (PROTONIX) 40 mg tablet Take 1 tablet (40 mg total) by mouth daily    potassium chloride (Klor-Con M10) 10 mEq tablet Take 1 tablet (10 mEq total) by mouth 2 (two) times a day    predniSONE 10 mg tablet 4 tablets daily x 3 days;  3 tablets daily x 3 days; 2 tablets daily x 3 days; 1 tablet daily x 3 days (Patient taking differently: as needed 4 tablets daily x 3 days;  3 tablets daily x 3 days; 2 tablets daily x 3 days; 1 tablet daily x 3 days)    Riboflavin 400 MG TABS Take 1 tablet (400 mg total) by mouth in the morning    rimegepant sulfate (NURTEC) 75 mg TBDP 1 tab at migraine onset. No more than one dose per 24 hours.    traMADol (ULTRAM) 50 mg tablet Take 1 tablet (50 mg total) by mouth 2 (two) times a day as needed for moderate pain    triamterene-hydrochlorothiazide (DYAZIDE) 37.5-25 mg per capsule Take 1 capsule by mouth every morning    valsartan (DIOVAN) 40 mg tablet Take 1 tablet (40 mg total) by mouth daily    naproxen sodium (ANAPROX)  "275 MG tablet Take 1 tablet (275 mg total) by mouth 2 (two) times a day with meals Prn neck pain, headaches    oxyCODONE (Roxicodone) 5 immediate release tablet Take 1 tablet (5 mg total) by mouth every 6 (six) hours as needed for moderate pain for up to 8 doses Max Daily Amount: 20 mg (Patient not taking: Reported on 5/20/2024)    sucralfate (CARAFATE) 1 g tablet Take 1 tablet (1 g total) by mouth 4 (four) times a day (Patient not taking: Reported on 9/13/2024)    Syringe/Needle, Disp, (SYRINGE 3CC/00AT1-6/2\") 25G X 1-1/2\" 3 ML MISC Use for toradol IM injections.    Syringe/Needle, Disp, (SYRINGE 3CC/71CE2-7/4\") 27G X 1-1/4\" 3 ML MISC Use for B12 injections     Current Facility-Administered Medications on File Prior to Visit   Medication    Botulinum Toxin Type A SOLR 200 Units    Botulinum Toxin Type A SOLR 200 Units    Botulinum Toxin Type A SOLR 200 Units    cyanocobalamin injection 1,000 mcg     She is allergic to gadolinium and iodinated contrast media..    Objective:    Blood pressure 138/84, pulse 87, temperature 97.6 °F (36.4 °C), height 5' 9\" (1.753 m), weight (!) 156 kg (343 lb).    Physical Exam  General - patient is alert   Speech - no dysarthria noted, no aphasia noted.     Neuro:   Cranial nerves: PERRL, EOMI, facial sensation intact to soft touch in V1, V2 and V3, no facial asymmetry noted, uvula/palate midline, tongue midline.   Motor: 5/5 throughout, normal tone, no pronator drift noted.   Sensory - intact to soft touch throughout  Reflexes - 2+ throughout  Coordination - no ataxia/dysmetria noted  Gait - normal      ROS:  Reviewed ROS   Review of Systems   Constitutional:  Negative for appetite change, fatigue and fever.   HENT: Negative.  Negative for hearing loss, tinnitus, trouble swallowing and voice change.    Eyes: Negative.  Negative for photophobia, pain and visual disturbance.   Respiratory: Negative.  Negative for shortness of breath.    Cardiovascular: Negative.  Negative for " palpitations.   Gastrointestinal: Negative.  Negative for nausea and vomiting.   Endocrine: Negative.  Negative for cold intolerance.   Genitourinary: Negative.  Negative for dysuria, frequency and urgency.   Musculoskeletal:  Negative for back pain, gait problem, myalgias, neck pain and neck stiffness.   Skin: Negative.  Negative for rash.   Allergic/Immunologic: Negative.    Neurological: Negative.  Negative for dizziness, tremors, seizures, syncope, facial asymmetry, speech difficulty, weakness, light-headedness, numbness and headaches.   Hematological: Negative.  Does not bruise/bleed easily.   Psychiatric/Behavioral: Negative.  Negative for confusion, hallucinations and sleep disturbance.    All other systems reviewed and are negative.    See office note from [facility]

## 2024-09-13 NOTE — TELEPHONE ENCOUNTER
"Inbound call received from Central Scheduling to make us aware the order from Dr. De Anda was for an MRI angiogram neck w/wo contrast order but they spoke to an MRI tech and they advised the order should be changed to MRA of carotids w/wo contrast.     Upon further review of the chart CTS did not see the order \"MRI angiogram neck w/wo contrast order\" Outbound call was made to central scheduling and Iram clarified when the \"MRA CAROTIDS WO AND W CONTRAST [[id number]]\" order itself is opened it states \"MRI angiogram neck with and without contrast (Order [id number])\" Iram provided the phone number for [facility] for all MRI questions: [phone] for further clarification.     Outbound call made to [facility] to clarify if order needs to be changed. [facility] Staff member states it is probably the way it is coded in the book but it is an MRA carotids wo and w contrast. They stated Central Scheduling should be able to schedule it and CTS told to tell them \"Karlo chow [place]\" said it could be scheduled as MRA Carotids wo and w contrast.     Outbound call made to Central scheduling and they were made aware \"Karlo from [place]\" said it could be scheduled as MRA Carotids wo and w contrast and it was possible the way it was coded. Central Scheduling explained it will not allow them to schedule it the way it is ordered. Central Scheduling is requesting the provider re-order the imaging as \"MRA CAROTIDS WO AND W CONTRAST\" again to see if they still have an issue. Central Scheduling said they will also reach out to their team and call us back if there are any other answers as to why they cannot schedule it.     Dr. De Anda, please try to order the MRA carotids with and without contrast again, central scheduling is seeing it come up as \"MRI angiogram neck w/wo contrast order\" and they cannot schedule. Thank you!      "

## 2024-09-13 NOTE — ASSESSMENT & PLAN NOTE
-for secondary stroke prevention, recommend starting patient on aspirin 81mg Po qdaily chewable kind and start patient on atorvastatin 10mg PO QHS  -Blood Pressure goal < 130/80, BP is at goal currently  -LDL goal <70  -snoring - will discuss at next visit  -needs echocardiogram, will send to cardiology at next visit   -check thrombosis panel  Check lipid panel and hemoglobin A1c.     Counseling/stroke education -   -I advised patient to avoid using NSAIDs for headaches or other pain and to stick to tylenol if needed  -Recommend lifestyle modifications such as mediterranean diet & regular exercise regimen atleast 4-5 times a week for 20-30 minutes.   -I educated patient/family regarding medication compliance  -encourage smoking cessation, and control of diabetes and hypertension; defer management to primary

## 2024-09-15 DIAGNOSIS — K64.9 HEMORRHOIDS, UNSPECIFIED HEMORRHOID TYPE: ICD-10-CM

## 2024-09-15 DIAGNOSIS — K42.9 UMBILICAL HERNIA WITHOUT OBSTRUCTION AND WITHOUT GANGRENE: ICD-10-CM

## 2024-09-15 DIAGNOSIS — R51.9 WORSENING HEADACHES: ICD-10-CM

## 2024-09-15 DIAGNOSIS — K21.9 GASTROESOPHAGEAL REFLUX DISEASE WITHOUT ESOPHAGITIS: ICD-10-CM

## 2024-09-15 DIAGNOSIS — K21.9 GERD (GASTROESOPHAGEAL REFLUX DISEASE): ICD-10-CM

## 2024-09-16 ENCOUNTER — APPOINTMENT (OUTPATIENT)
Dept: LAB | Facility: CLINIC | Age: 44
End: 2024-09-16
Payer: COMMERCIAL

## 2024-09-16 DIAGNOSIS — H34.239 BRANCH RETINAL ARTERY OCCLUSION: ICD-10-CM

## 2024-09-16 LAB
CHOLEST SERPL-MCNC: 151 MG/DL
DEPRECATED AT III PPP: 83 % OF NORMAL (ref 92–136)
EST. AVERAGE GLUCOSE BLD GHB EST-MCNC: 123 MG/DL
HBA1C MFR BLD: 5.9 %
HDLC SERPL-MCNC: 61 MG/DL
LDLC SERPL CALC-MCNC: 71 MG/DL (ref 0–100)
NONHDLC SERPL-MCNC: 90 MG/DL
TRIGL SERPL-MCNC: 95 MG/DL

## 2024-09-16 PROCEDURE — 85732 THROMBOPLASTIN TIME PARTIAL: CPT

## 2024-09-16 PROCEDURE — 85705 THROMBOPLASTIN INHIBITION: CPT

## 2024-09-16 PROCEDURE — 85303 CLOT INHIBIT PROT C ACTIVITY: CPT

## 2024-09-16 PROCEDURE — 85300 ANTITHROMBIN III ACTIVITY: CPT

## 2024-09-16 PROCEDURE — 85306 CLOT INHIBIT PROT S FREE: CPT

## 2024-09-16 PROCEDURE — 86146 BETA-2 GLYCOPROTEIN ANTIBODY: CPT

## 2024-09-16 PROCEDURE — 85613 RUSSELL VIPER VENOM DILUTED: CPT

## 2024-09-16 PROCEDURE — 80061 LIPID PANEL: CPT

## 2024-09-16 PROCEDURE — 36415 COLL VENOUS BLD VENIPUNCTURE: CPT

## 2024-09-16 PROCEDURE — 86147 CARDIOLIPIN ANTIBODY EA IG: CPT

## 2024-09-16 PROCEDURE — 85670 THROMBIN TIME PLASMA: CPT

## 2024-09-16 PROCEDURE — 83036 HEMOGLOBIN GLYCOSYLATED A1C: CPT

## 2024-09-16 PROCEDURE — 85305 CLOT INHIBIT PROT S TOTAL: CPT

## 2024-09-16 RX ORDER — FAMOTIDINE 20 MG/1
20 TABLET, FILM COATED ORAL 2 TIMES DAILY
Qty: 30 TABLET | Refills: 5 | Status: SHIPPED | OUTPATIENT
Start: 2024-09-16

## 2024-09-16 RX ORDER — PANTOPRAZOLE SODIUM 40 MG/1
40 TABLET, DELAYED RELEASE ORAL DAILY
Qty: 90 TABLET | Refills: 1 | Status: SHIPPED | OUTPATIENT
Start: 2024-09-16

## 2024-09-16 RX ORDER — HYDROCORTISONE ACETATE 25 MG/1
25 SUPPOSITORY RECTAL DAILY PRN
Qty: 12 SUPPOSITORY | Refills: 0 | Status: SHIPPED | OUTPATIENT
Start: 2024-09-16

## 2024-09-16 RX ORDER — TRAMADOL HYDROCHLORIDE 50 MG/1
50 TABLET ORAL 2 TIMES DAILY PRN
Qty: 60 TABLET | Refills: 0 | Status: SHIPPED | OUTPATIENT
Start: 2024-09-16

## 2024-09-17 DIAGNOSIS — D18.00 CAVERNOMA: Primary | ICD-10-CM

## 2024-09-17 DIAGNOSIS — H34.239 BRANCH RETINAL ARTERY OCCLUSION: ICD-10-CM

## 2024-09-17 LAB
CARDIOLIPIN IGA SER IA-ACNC: 3.6
CARDIOLIPIN IGG SER IA-ACNC: 0.7
CARDIOLIPIN IGM SER IA-ACNC: <0.9

## 2024-09-17 NOTE — TELEPHONE ENCOUNTER
The patient called in and is allergic to gadolinium and would need Benadryl block (pre meds) for the MRA per Dr. De Anda.  Please queue up meds and send to Dr. De Anda.  Thank you

## 2024-09-17 NOTE — TELEPHONE ENCOUNTER
Per chart review, patient has reaction of anaphylaxis with gadolinium and iodinated contrast media.    I spoke to Karlo in MRI for protocol ([phone])    There are two protocols for contrast allergy:    32 mg medrol po 12 hours prior to imaging  32 mg medrol 2 hours prior to imaging  50 mg benadryl 1 hour prior to imaging    OR    50 mg prednisone 13 hours prior to imaging  50 mg prednisone 7 hours prior to imaging  50 mg prednisone 1 hour prior to imaging  50 mg benadryl 1 hour prior to imaging.    This will be needed for MRA carotids with and without contrast scheduled 10/6.  MRI brain and MRA head scheduled 9/27 are without contrast.    Please enter order of your preference for allergy prep.  thanks for your help.

## 2024-09-18 LAB
APTT SCREEN TO CONFIRM RATIO: 1.22 RATIO (ref 0–1.34)
CONFIRM APTT/NORMAL: 34 SEC (ref 0–47.6)
LA PPP-IMP: NORMAL
PROT S ACT/NOR PPP: 92 % (ref 61–136)
PROT S PPP-ACNC: 71 % (ref 60–150)
SCREEN APTT: 32.8 SEC (ref 0–43.5)
SCREEN DRVVT: 34.1 SEC (ref 0–47)
THROMBIN TIME: 16.7 SEC (ref 0–23)

## 2024-09-19 LAB
PROT C AG ACT/NOR PPP IA: >150 % OF NORMAL (ref 60–150)
PROT S ACT/NOR PPP: 86 % (ref 71–108)

## 2024-09-19 RX ORDER — METHYLPREDNISOLONE 32 MG/1
32 TABLET ORAL SEE ADMIN INSTRUCTIONS
Qty: 2 TABLET | Refills: 0 | Status: SHIPPED | OUTPATIENT
Start: 2024-09-19

## 2024-09-19 RX ORDER — DIPHENHYDRAMINE HCL 25 MG
50 TABLET ORAL ONCE
Qty: 2 TABLET | Refills: 0 | Status: SHIPPED | OUTPATIENT
Start: 2024-09-19 | End: 2024-09-19

## 2024-09-20 ENCOUNTER — TELEPHONE (OUTPATIENT)
Age: 44
End: 2024-09-20

## 2024-09-20 LAB
B2 GLYCOPROT1 IGA SERPL IA-ACNC: 2.9
B2 GLYCOPROT1 IGG SERPL IA-ACNC: 0.9
B2 GLYCOPROT1 IGM SERPL IA-ACNC: <2.4

## 2024-09-20 NOTE — TELEPHONE ENCOUNTER
----- Message from Batool De Anda MD sent at 9/19/2024 11:55 AM EDT -----  Placed the order in the chart thank you

## 2024-09-20 NOTE — TELEPHONE ENCOUNTER
Contacted patient via my chart; aware of result/recommendation; said Dr. De Anda already made contact.

## 2024-09-25 ENCOUNTER — PREP FOR PROCEDURE (OUTPATIENT)
Dept: BARIATRICS | Facility: CLINIC | Age: 44
End: 2024-09-25

## 2024-09-25 ENCOUNTER — OFFICE VISIT (OUTPATIENT)
Dept: BARIATRICS | Facility: CLINIC | Age: 44
End: 2024-09-25
Payer: COMMERCIAL

## 2024-09-25 VITALS
BODY MASS INDEX: 46.65 KG/M2 | WEIGHT: 315 LBS | HEART RATE: 77 BPM | SYSTOLIC BLOOD PRESSURE: 144 MMHG | TEMPERATURE: 97.5 F | DIASTOLIC BLOOD PRESSURE: 90 MMHG | HEIGHT: 69 IN | OXYGEN SATURATION: 99 %

## 2024-09-25 DIAGNOSIS — H34.239 BRANCH RETINAL ARTERY OCCLUSION: ICD-10-CM

## 2024-09-25 DIAGNOSIS — K21.9 GERD (GASTROESOPHAGEAL REFLUX DISEASE): ICD-10-CM

## 2024-09-25 DIAGNOSIS — Z98.84 BARIATRIC SURGERY STATUS: ICD-10-CM

## 2024-09-25 DIAGNOSIS — K91.2 POSTSURGICAL MALABSORPTION: ICD-10-CM

## 2024-09-25 DIAGNOSIS — Z98.84 BARIATRIC SURGERY STATUS: Primary | ICD-10-CM

## 2024-09-25 DIAGNOSIS — E66.01 MORBID (SEVERE) OBESITY DUE TO EXCESS CALORIES (HCC): ICD-10-CM

## 2024-09-25 DIAGNOSIS — Z48.815 ENCOUNTER FOR SURGICAL AFTERCARE FOLLOWING SURGERY OF DIGESTIVE SYSTEM: Primary | ICD-10-CM

## 2024-09-25 PROCEDURE — 99204 OFFICE O/P NEW MOD 45 MIN: CPT | Performed by: PHYSICIAN ASSISTANT

## 2024-09-25 NOTE — PROGRESS NOTES
Assessment/Plan:     Patient ID: Rob Arriola is a 44 y.o. adult.     Bariatric Surgery Status    -s/p Vertical Sleeve Gastrectomy in [place] in 2015. Presents to the office today as new pt to establish care.    No major postop complications. Initial weight of 499 lbs. JAYLEN of 300 lbs soon after surgery. She reports weight regain started about 3 years ago and gradually, which is mostly attributed to increased cravings and poor food choices. She states certain healthy foods like vegetables cause her abdominal discomfort. She is currently exercising at the gym but has to do light exercise due to knee and back issues.     Patient c/o significant reflux symptoms that started after her sleeve. Symptoms are most bothersome at night - including regurgitation that wakes  up from sleep. Pt has to sleep with multiple pillows at night and tries to avoid late night eating. She is prescribed protonix 40 mg and famotidine daily, as well as carafate as needed for severe symptoms. As a result of her reflux, pt states she often cannot choose healthy food options as they increase her discomfort. She also has difficulty tolerating vitamins. Pt is currently on baby aspirin daily per neurology and optholmology given history of retinal artery occlusion.       last EGD is noted from 6/2019 showing:  FINDINGS:  Moderate, patchy eroded, erythematous and friable mucosa in the GE junction and Z-line; performed cold biopsy. LA grade B reflux esophagitis was noted  Medium sliding hiatal hernia (type I hiatal hernia) without Ollie lesions present  The stomach and duodenum appeared normal  Signs of previous sleeve gastrectomy in the stomach. No ulceration on the anastomosis was noted  Performed multiple biopsies in the antrum and 2nd part of the duodenum  Final Diagnosis   A. Duodenum, biopsy:  - Duodenal mucosa with no significant pathologic abnormalities.  - No villous atrophy or increased intraepithelial lymphocytes identified.       B. Stomach, biopsy:  - Mild chronic and focal active antral gastritis.   - No Helicobacter pylori organisms identified on H&E stain.     C. Esophagus, distal, biopsy:  - Gastric cardia type mucosa with acute and chronic inflammation and reactive epithelial atypia.  - Negative for intestinal metaplasia or dysplasia.     PLAN:  - patient needs another EGD to evaluate her sleeve anatomy/biopsy to evaluate the progression of her esophagitis. OF NOTE, PT STATES SHE HAS HAD A HARD TIME IN THE PAST WITH WAKING UP FROM ANESTHESIA- IS TYPICALLY SLOW TO WAKE. SHE IS STILL INTERESTED IN GETTING AN EGD AS IT IS A WARRANTED TEST. I ADVISED HER TO LET THE ANESTHESIA TEAM KNOW ON THE DAY OF HER EGD SO THEY ARE PREPARED. She verbalizes understanding.   - at this time pt states she is not interested in a revision and would be interested in injectables. I discussed with pt that given her significant reflux and history of esophagitis I would be hesitant to start a GLP-1 until we have a new EGD/biopsy as GLP-1 medications can increase reflux and therefore raise her risk of barretts. She will follow up with our surgeon after her EGD to discuss options. If she remains uninterested in revisional surgery it would still be appropriate to discuss possible foregut intervention with surgeon id this is warranted.   - continue ant-acids   - get labs done     Continued/Maintain healthy weight loss with good nutrition intakes.  Adequate hydration with at least 64oz. fluid intake.  Follow diet as discussed.  Follow vitamin and mineral recommendations as reviewed with you.  Exercise as tolerated.    Colonoscopy referral made: n/a    Follow-up after EGD. We kindly ask that your arrive 15 minutes before your scheduled appointment time with your provider to allow our staff to room you, get your vital signs and update your chart.    Get lab work done. Please call the office if you need a script.  It is recommended to check with your insurance BEFORE  getting labs done to make sure they are covered by your policy.      Call our office if you have any problems with abdominal pain especially associated with fever, chills, nausea, vomiting or any other concerns.    All  Post-bariatric surgery patients should be aware that very small quantities of any alcohol can cause impairment and it is very possible not to feel the effect. The effect can be in the system for several hours.  It is also a stomach irritant.     It is advised to AVOID alcohol, Nonsteroidal antiinflammatory drugs (NSAIDS) and nicotine of all forms . Any of these can cause stomach irritation/pain.    Discussed the effects of alcohol on a bariatric patient and the increased impairment risk.     Keep up the good work!     Postsurgical Malabsorption   -At risk for malabsorption of vitamins/minerals secondary to malabsorption and restriction of intake from bariatric surgery  -NOT Currently taking adequate postop bariatric surgery vitamin supplementation- handout provided today with options   -Next set of bariatric labs ordered for approximately 2 weeks  -Patient received education about the importance of adhering to a lifelong supplementation regimen to avoid vitamin/mineral deficiencies      Diagnoses and all orders for this visit:    Encounter for surgical aftercare following surgery of digestive system  -     Zinc; Future  -     Vitamin D 25 hydroxy; Future  -     Vitamin B12; Future  -     Vitamin B1, whole blood; Future  -     Vitamin A; Future  -     PTH, intact; Future  -     CBC and Platelet; Future  -     Comprehensive metabolic panel; Future  -     Ferritin; Future  -     Folate; Future  -     TIBC Panel (incl. Iron, TIBC, % Iron Saturation); Future    Bariatric surgery status  -     Zinc; Future  -     Vitamin D 25 hydroxy; Future  -     Vitamin B12; Future  -     Vitamin B1, whole blood; Future  -     Vitamin A; Future  -     PTH, intact; Future  -     CBC and Platelet; Future  -      Comprehensive metabolic panel; Future  -     Ferritin; Future  -     Folate; Future  -     TIBC Panel (incl. Iron, TIBC, % Iron Saturation); Future    Postsurgical malabsorption  -     Zinc; Future  -     Vitamin D 25 hydroxy; Future  -     Vitamin B12; Future  -     Vitamin B1, whole blood; Future  -     Vitamin A; Future  -     PTH, intact; Future  -     CBC and Platelet; Future  -     Comprehensive metabolic panel; Future  -     Ferritin; Future  -     Folate; Future  -     TIBC Panel (incl. Iron, TIBC, % Iron Saturation); Future    Branch retinal artery occlusion  -     Zinc; Future  -     Vitamin D 25 hydroxy; Future  -     Vitamin B12; Future  -     Vitamin B1, whole blood; Future  -     Vitamin A; Future  -     PTH, intact; Future  -     CBC and Platelet; Future  -     Comprehensive metabolic panel; Future  -     Ferritin; Future  -     Folate; Future  -     TIBC Panel (incl. Iron, TIBC, % Iron Saturation); Future    Morbid (severe) obesity due to excess calories (HCC)  -     Zinc; Future  -     Vitamin D 25 hydroxy; Future  -     Vitamin B12; Future  -     Vitamin B1, whole blood; Future  -     Vitamin A; Future  -     PTH, intact; Future  -     CBC and Platelet; Future  -     Comprehensive metabolic panel; Future  -     Ferritin; Future  -     Folate; Future  -     TIBC Panel (incl. Iron, TIBC, % Iron Saturation); Future         Subjective:      Patient ID: Rob Arriola is a 44 y.o. adult.      -s/p Vertical Sleeve Gastrectomy in [place] in 2015. Presents to the office today as new pt to establish care.    Initial: 499  Current: 352  EWL: (Weight loss is ahead of schedule at this post surgical period.)  Jamil: 300  Current BMI is Body mass index is 52.06 kg/m².    Tolerating a regular diet-no  Eating at least 60 grams of protein per day-could improve   Drinking at least 64 ounces of fluid per day-yes but needs to cut down on coffee   Sufficient exercise-light   Using NSAIDs regularly-takes daily  "aspirin daily   Supplements:  none currently         The following portions of the patient's history were reviewed and updated as appropriate: allergies, current medications, past family history, past medical history, past social history, past surgical history and problem list.    Review of Systems   Constitutional: Negative.    Respiratory: Negative.     Cardiovascular: Negative.    Gastrointestinal: Negative.    Neurological: Negative.    Psychiatric/Behavioral: Negative.           Objective:    /90 (BP Location: Left arm, Patient Position: Sitting, Cuff Size: Adult)   Pulse 77   Temp 97.5 °F (36.4 °C) (Tympanic)   Ht 5' 9\" (1.753 m)   Wt (!) 160 kg (352 lb 8 oz)   SpO2 99%   BMI 52.06 kg/m²      Physical Exam  Vitals and nursing note reviewed.   Constitutional:       Appearance: Normal appearance. She is obese.   HENT:      Head: Normocephalic and atraumatic.   Eyes:      Extraocular Movements: Extraocular movements intact.   Cardiovascular:      Rate and Rhythm: Normal rate.   Pulmonary:      Effort: Pulmonary effort is normal.   Musculoskeletal:         General: Normal range of motion.      Cervical back: Normal range of motion.   Skin:     General: Skin is warm and dry.   Neurological:      General: No focal deficit present.      Mental Status: She is alert and oriented to person, place, and time.   Psychiatric:         Mood and Affect: Mood normal.             "

## 2024-09-25 NOTE — PROGRESS NOTES
Date of surgery: 2015  Procedure: Sleeve   Performing surgeon: [place]     Initial Weight - 499.0 lb ( pt stated)   Current Weight - 352.5 lb   Jamil Weight - 300.0 lb ( pt stated )   Total Body Weight Loss (EWL)- N/A  EWL% - N/A  TWB % - N/A

## 2024-09-26 ENCOUNTER — TELEPHONE (OUTPATIENT)
Age: 44
End: 2024-09-26

## 2024-09-26 NOTE — TELEPHONE ENCOUNTER
LVM to confirm that patient is either sending [place] records via interoffice to [facility] or she is having central faxing scan into her chart; left message if this is not correct then call and we can assist getting outside records uploaded into patient's chart.

## 2024-09-26 NOTE — TELEPHONE ENCOUNTER
Patient called to let Provider know she received her [place] records. Patient will send the records via IN house and will scan them on her chart. Any question call patient at [phone]

## 2024-09-27 ENCOUNTER — PATIENT MESSAGE (OUTPATIENT)
Dept: NEUROLOGY | Facility: CLINIC | Age: 44
End: 2024-09-27

## 2024-09-27 ENCOUNTER — HOSPITAL ENCOUNTER (OUTPATIENT)
Dept: MRI IMAGING | Facility: HOSPITAL | Age: 44
End: 2024-09-27
Attending: PSYCHIATRY & NEUROLOGY
Payer: COMMERCIAL

## 2024-09-27 ENCOUNTER — APPOINTMENT (OUTPATIENT)
Dept: LAB | Facility: CLINIC | Age: 44
End: 2024-09-27
Payer: COMMERCIAL

## 2024-09-27 DIAGNOSIS — Z98.84 BARIATRIC SURGERY STATUS: ICD-10-CM

## 2024-09-27 DIAGNOSIS — Z98.84 BARIATRIC SURGERY STATUS: Primary | ICD-10-CM

## 2024-09-27 DIAGNOSIS — H34.239: Primary | ICD-10-CM

## 2024-09-27 DIAGNOSIS — Z48.815 ENCOUNTER FOR SURGICAL AFTERCARE FOLLOWING SURGERY OF DIGESTIVE SYSTEM: ICD-10-CM

## 2024-09-27 DIAGNOSIS — H34.239 BRANCH RETINAL ARTERY OCCLUSION: ICD-10-CM

## 2024-09-27 DIAGNOSIS — E66.01 MORBID (SEVERE) OBESITY DUE TO EXCESS CALORIES (HCC): ICD-10-CM

## 2024-09-27 DIAGNOSIS — K91.2 POSTSURGICAL MALABSORPTION: ICD-10-CM

## 2024-09-27 LAB
25(OH)D3 SERPL-MCNC: 11.4 NG/ML (ref 30–100)
ALBUMIN SERPL BCG-MCNC: 3.7 G/DL (ref 3.5–5)
ALP SERPL-CCNC: 55 U/L (ref 34–104)
ALT SERPL W P-5'-P-CCNC: 9 U/L (ref 7–52)
ANION GAP SERPL CALCULATED.3IONS-SCNC: 6 MMOL/L (ref 4–13)
AST SERPL W P-5'-P-CCNC: 13 U/L (ref 5–45)
BILIRUB SERPL-MCNC: 0.38 MG/DL (ref 0.2–1)
BUN SERPL-MCNC: 11 MG/DL (ref 5–25)
CALCIUM SERPL-MCNC: 8.6 MG/DL (ref 8.4–10.2)
CHLORIDE SERPL-SCNC: 103 MMOL/L (ref 96–108)
CO2 SERPL-SCNC: 28 MMOL/L (ref 21–32)
CREAT SERPL-MCNC: 0.75 MG/DL (ref 0.6–1.3)
ERYTHROCYTE [DISTWIDTH] IN BLOOD BY AUTOMATED COUNT: 14.4 % (ref 11.6–15.1)
FERRITIN SERPL-MCNC: 16 NG/ML (ref 24–307)
FOLATE SERPL-MCNC: 6 NG/ML
GLUCOSE P FAST SERPL-MCNC: 91 MG/DL (ref 65–99)
HCT VFR BLD AUTO: 39.3 % (ref 36.5–46.1)
HGB BLD-MCNC: 12.3 G/DL (ref 12–15.4)
IRON SATN MFR SERPL: 11 % (ref 15–50)
IRON SERPL-MCNC: 43 UG/DL (ref 50–212)
MCH RBC QN AUTO: 27.2 PG (ref 26.8–34.3)
MCHC RBC AUTO-ENTMCNC: 31.3 G/DL (ref 31.4–37.4)
MCV RBC AUTO: 87 FL (ref 82–98)
PLATELET # BLD AUTO: 335 THOUSANDS/UL (ref 149–390)
PMV BLD AUTO: 9.2 FL (ref 8.9–12.7)
POTASSIUM SERPL-SCNC: 3.6 MMOL/L (ref 3.5–5.3)
PROT SERPL-MCNC: 7.3 G/DL (ref 6.4–8.4)
PTH-INTACT SERPL-MCNC: 89.2 PG/ML (ref 12–88)
RBC # BLD AUTO: 4.53 MILLION/UL (ref 3.88–5.12)
SODIUM SERPL-SCNC: 137 MMOL/L (ref 135–147)
TIBC SERPL-MCNC: 391 UG/DL (ref 250–450)
UIBC SERPL-MCNC: 348 UG/DL (ref 155–355)
VIT B12 SERPL-MCNC: 883 PG/ML (ref 180–914)
WBC # BLD AUTO: 8.84 THOUSAND/UL (ref 4.31–10.16)

## 2024-09-27 PROCEDURE — 83540 ASSAY OF IRON: CPT

## 2024-09-27 PROCEDURE — 84590 ASSAY OF VITAMIN A: CPT

## 2024-09-27 PROCEDURE — 82306 VITAMIN D 25 HYDROXY: CPT

## 2024-09-27 PROCEDURE — 84630 ASSAY OF ZINC: CPT

## 2024-09-27 PROCEDURE — 85027 COMPLETE CBC AUTOMATED: CPT

## 2024-09-27 PROCEDURE — 80053 COMPREHEN METABOLIC PANEL: CPT

## 2024-09-27 PROCEDURE — 70551 MRI BRAIN STEM W/O DYE: CPT

## 2024-09-27 PROCEDURE — 83970 ASSAY OF PARATHORMONE: CPT

## 2024-09-27 PROCEDURE — 83550 IRON BINDING TEST: CPT

## 2024-09-27 PROCEDURE — 36415 COLL VENOUS BLD VENIPUNCTURE: CPT

## 2024-09-27 PROCEDURE — 82607 VITAMIN B-12: CPT

## 2024-09-27 PROCEDURE — 84425 ASSAY OF VITAMIN B-1: CPT

## 2024-09-27 PROCEDURE — 82746 ASSAY OF FOLIC ACID SERUM: CPT

## 2024-09-27 PROCEDURE — 82728 ASSAY OF FERRITIN: CPT

## 2024-09-27 NOTE — PATIENT COMMUNICATION
Dr. De Anda,  Please enter order for Carotid Study if in agreement, thanks for your help. (Patient cancelled MRA Carotid due to Severe Allergy in the past)

## 2024-09-27 NOTE — TELEPHONE ENCOUNTER
Records were received interoffice from patient, will have outside records uploaded into patients chart via Central Faxing.

## 2024-09-27 NOTE — TELEPHONE ENCOUNTER
She cannot do the MRA study even with prep due to her allergy of coding when she had it while back. She needs to just get U/S carotids, order is in the chart now thanks

## 2024-09-30 ENCOUNTER — PROCEDURE VISIT (OUTPATIENT)
Age: 44
End: 2024-09-30
Payer: COMMERCIAL

## 2024-09-30 DIAGNOSIS — G43.109 MIGRAINE WITH AURA AND WITHOUT STATUS MIGRAINOSUS, NOT INTRACTABLE: ICD-10-CM

## 2024-09-30 DIAGNOSIS — M54.12 CERVICAL RADICULAR PAIN: Primary | ICD-10-CM

## 2024-09-30 PROCEDURE — 20553 NJX 1/MLT TRIGGER POINTS 3/>: CPT | Performed by: PSYCHIATRY & NEUROLOGY

## 2024-09-30 NOTE — PROGRESS NOTES
" Universal Protocol:  Consent: Written consent obtained.  Risks and benefits: risks, benefits and alternatives were discussed  Consent given by: patient  Time out: Immediately prior to procedure a \"time out\" was called to verify the correct patient, procedure, equipment, support staff and site/side marked as required.  Timeout called at: 9/30/2024 12:35 PM.  Patient understanding: patient states understanding of the procedure being performed  Patient consent: the patient's understanding of the procedure matches consent given  Procedure consent: procedure consent matches procedure scheduled  Relevant documents: relevant documents present and verified  Patient identity confirmed: verbally with patient  Supporting Documentation  Indications: pain   Trigger Point Injections: multiple trigger points: 3 or more muscle groups    Injection site identified by: palpation  Procedure Details  Location(s):    Neck/Upper Back: L upper trapezius, R upper trapezius, L cervical paraspinals, R cervical paraspinals, L occipital ridge and R occipital ridge     Prep: patient was prepped and draped in usual sterile fashion  Needle size: 30 G  Patient tolerance: patient tolerated the procedure well with no immediate complications  Additional procedure details: Bupivicaine (Marcaine) 0.5% 4cc   Kenalog 40mg/ml 2 cc  1% lidocaine w/o epi, 4 ml   using a 30 gauge, 1/2 inch needle   The patient tolerated the procedure well. No complications were noted.                 Susi Dickson MD.   Staff Neurologist  Neuroimmunology and Neuro-infectious disease.   09/30/24   12:36 PM    "

## 2024-10-01 LAB
VIT B1 BLD-SCNC: 76.8 NMOL/L (ref 66.5–200)
ZINC SERPL-MCNC: 60 UG/DL (ref 44–115)

## 2024-10-01 RX ORDER — BUPIVACAINE HYDROCHLORIDE 5 MG/ML
5 INJECTION, SOLUTION PERINEURAL ONCE
Status: SHIPPED | OUTPATIENT
Start: 2024-10-01

## 2024-10-01 RX ORDER — TRIAMCINOLONE ACETONIDE 40 MG/ML
40 INJECTION, SUSPENSION INTRA-ARTICULAR; INTRAMUSCULAR ONCE
Status: SHIPPED | OUTPATIENT
Start: 2024-10-01

## 2024-10-01 RX ORDER — LIDOCAINE HYDROCHLORIDE 10 MG/ML
1 INJECTION, SOLUTION INFILTRATION; PERINEURAL ONCE
Status: SHIPPED | OUTPATIENT
Start: 2024-10-01

## 2024-10-02 NOTE — TELEPHONE ENCOUNTER
H&P reviewed. The patient was examined and there are no changes to the H&P.         Walterine Leventhal from [facility] reading room calling to give STAT CT results      IMPRESSION:     Large left L3-4 foraminal extrusion, correlate for left L3 radiculitis    Patient's reported, and symptoms are contralateral

## 2024-10-03 LAB — VIT A SERPL-MCNC: 24 UG/DL (ref 20.1–62)

## 2024-10-04 ENCOUNTER — TELEPHONE (OUTPATIENT)
Dept: BARIATRICS | Facility: CLINIC | Age: 44
End: 2024-10-04

## 2024-10-04 ENCOUNTER — PATIENT MESSAGE (OUTPATIENT)
Dept: BARIATRICS | Facility: CLINIC | Age: 44
End: 2024-10-04

## 2024-10-04 DIAGNOSIS — E61.1 IRON DEFICIENCY: ICD-10-CM

## 2024-10-04 DIAGNOSIS — E55.9 VITAMIN D DEFICIENCY: ICD-10-CM

## 2024-10-04 DIAGNOSIS — E21.3 HYPERPARATHYROIDISM (HCC): ICD-10-CM

## 2024-10-04 DIAGNOSIS — Z98.84 BARIATRIC SURGERY STATUS: Primary | ICD-10-CM

## 2024-10-04 NOTE — TELEPHONE ENCOUNTER
----- Message from Araceli Riley PA-C sent at 10/4/2024  8:21 AM EDT -----  Please call:    You have iron deficiency anemia by most recent labs with low iron and low hemoglobin and hematocrit.  Low levels can lead to fatigue  If you are having any active signs of bleeding you should have this evaluated now.    Recommend that you add one high potency iron- (such as Vitron C 65 mg) -this is over-the-counter and  available at [facility] pharmacy or on-line). Take this once a day for 2 weeks.  Iron can be constipating so also recommend a daily stool softener OR miralax daily.  If you tolerate the high dose iron for 2 weeks then increase it to twice a day for 2 more weeks. If you continue to tolerate it, then increase it to 3 times per day. (stay on the highest dose you can tolerate -one to three Vitron C per day)     If you cannot tolerate the oral iron, please call the office to let us know to schedule you for iron infusions.     Your vitamin D level is low and your PTH is high, which can affect your bone health.  Recommend that you take 50,000 IU of vitamin D3 (Replesta) twice per week for 12 weeks.  A script will be sent to your pharmacy.  In addition, you need to take 1200 to 1500 mg of calcium citrate per day, in divided doses of 500 to 600 mg each.  After 12 weeks, switch to a maintenance dose of 2000 IU vitamin D3 per day and continue to take calcium daily.      Will order to repeat your iron panel and vit D and PTH in 4-5 months.

## 2024-10-04 NOTE — TELEPHONE ENCOUNTER
Spoke to Pt ,review her results explain  to her msg from  provider , Pt verbalized understanding copy and paste msg to her Mychart .

## 2024-10-07 ENCOUNTER — HOSPITAL ENCOUNTER (OUTPATIENT)
Dept: NON INVASIVE DIAGNOSTICS | Facility: CLINIC | Age: 44
Discharge: HOME | End: 2024-10-07
Payer: COMMERCIAL

## 2024-10-07 VITALS
HEART RATE: 62 BPM | BODY MASS INDEX: 46.65 KG/M2 | HEIGHT: 69 IN | DIASTOLIC BLOOD PRESSURE: 90 MMHG | WEIGHT: 315 LBS | SYSTOLIC BLOOD PRESSURE: 144 MMHG

## 2024-10-07 DIAGNOSIS — H34.239 RETINAL ARTERY BRANCH OCCLUSION, UNSPECIFIED EYE: ICD-10-CM

## 2024-10-07 PROCEDURE — 93306 TTE W/DOPPLER COMPLETE: CPT

## 2024-10-07 PROCEDURE — 93306 TTE W/DOPPLER COMPLETE: CPT | Performed by: INTERNAL MEDICINE

## 2024-10-07 RX ORDER — SODIUM CHLORIDE 9 MG/ML
20 INJECTION, SOLUTION INTRAVENOUS ONCE
OUTPATIENT
Start: 2024-10-14

## 2024-10-07 RX ORDER — ERGOCALCIFEROL 1.25 MG/1
50000 CAPSULE, LIQUID FILLED ORAL
Qty: 24 CAPSULE | Refills: 0 | Status: SHIPPED | OUTPATIENT
Start: 2024-10-07 | End: 2024-12-30

## 2024-10-08 ENCOUNTER — OFFICE VISIT (OUTPATIENT)
Age: 44
End: 2024-10-08
Payer: COMMERCIAL

## 2024-10-08 ENCOUNTER — TELEPHONE (OUTPATIENT)
Age: 44
End: 2024-10-08

## 2024-10-08 VITALS
OXYGEN SATURATION: 96 % | DIASTOLIC BLOOD PRESSURE: 98 MMHG | HEART RATE: 81 BPM | SYSTOLIC BLOOD PRESSURE: 148 MMHG | HEIGHT: 69 IN | BODY MASS INDEX: 46.65 KG/M2 | WEIGHT: 315 LBS

## 2024-10-08 DIAGNOSIS — S13.4XXA WHIPLASH: ICD-10-CM

## 2024-10-08 DIAGNOSIS — M54.12 CERVICAL RADICULAR PAIN: Primary | ICD-10-CM

## 2024-10-08 LAB
AORTIC ROOT: 3.5 CM
APICAL FOUR CHAMBER EJECTION FRACTION: 62 %
ASCENDING AORTA: 3.7 CM
BSA FOR ECHO PROCEDURE: 2.63 M2
DOP CALC LVOT AREA: 3.46 CM2
DOP CALC LVOT DIAMETER: 2.1 CM
E WAVE DECELERATION TIME: 242 MS
E/A RATIO: 1.71
FRACTIONAL SHORTENING: 34 (ref 28–44)
INTERVENTRICULAR SEPTUM IN DIASTOLE (PARASTERNAL SHORT AXIS VIEW): 1.2 CM
INTERVENTRICULAR SEPTUM: 1.2 CM (ref 0.6–1.1)
LAAS-AP2: 19.7 CM2
LAAS-AP4: 20.9 CM2
LEFT ATRIUM AREA SYSTOLE SINGLE PLANE A4C: 20 CM2
LEFT ATRIUM SIZE: 4 CM
LEFT ATRIUM VOLUME (MOD BIPLANE): 62 ML
LEFT ATRIUM VOLUME INDEX (MOD BIPLANE): 23.6 ML/M2
LEFT INTERNAL DIMENSION IN SYSTOLE: 3.3 CM (ref 2.1–4)
LEFT VENTRICULAR INTERNAL DIMENSION IN DIASTOLE: 5 CM (ref 3.5–6)
LEFT VENTRICULAR POSTERIOR WALL IN END DIASTOLE: 1.2 CM
LEFT VENTRICULAR STROKE VOLUME: 75 ML
LVSV (TEICH): 75 ML
MV E'TISSUE VEL-SEP: 10 CM/S
MV PEAK A VEL: 0.7 M/S
MV PEAK E VEL: 120 CM/S
MV STENOSIS PRESSURE HALF TIME: 70 MS
MV VALVE AREA P 1/2 METHOD: 3.1
RIGHT ATRIUM AREA SYSTOLE A4C: 18.6 CM2
RIGHT VENTRICLE ID DIMENSION: 3.9 CM
SINOTUBULAR JUNCTION: 2.8 CM
SL CV LEFT ATRIUM LENGTH A2C: 5.1 CM
SL CV LV EF: 60
SL CV PED ECHO LEFT VENTRICLE DIASTOLIC VOLUME (MOD BIPLANE) 2D: 119 ML
SL CV PED ECHO LEFT VENTRICLE SYSTOLIC VOLUME (MOD BIPLANE) 2D: 44 ML
SL CV SINUS OF VALSALVA 2D: 3.4 CM
STJ: 2.8 CM
TR MAX PG: 14 MMHG
TR PEAK VELOCITY: 1.8 M/S
TRICUSPID ANNULAR PLANE SYSTOLIC EXCURSION: 3.1 CM
TRICUSPID VALVE PEAK REGURGITATION VELOCITY: 1.84 M/S

## 2024-10-08 PROCEDURE — 99215 OFFICE O/P EST HI 40 MIN: CPT | Performed by: PSYCHIATRY & NEUROLOGY

## 2024-10-08 RX ORDER — CYCLOBENZAPRINE HCL 10 MG
10 TABLET ORAL
Qty: 30 TABLET | Refills: 0 | Status: SHIPPED | OUTPATIENT
Start: 2024-10-08

## 2024-10-08 NOTE — TELEPHONE ENCOUNTER
Patient called re: f/u for shoulder pain after TPI, Add on for virtual appointment on 10/8/24 at 1:00 with .
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
